# Patient Record
Sex: FEMALE | Race: BLACK OR AFRICAN AMERICAN | Employment: FULL TIME | URBAN - METROPOLITAN AREA
[De-identification: names, ages, dates, MRNs, and addresses within clinical notes are randomized per-mention and may not be internally consistent; named-entity substitution may affect disease eponyms.]

---

## 2017-05-25 ENCOUNTER — HOSPITAL ENCOUNTER (EMERGENCY)
Age: 37
Discharge: HOME | End: 2017-05-25
Attending: EMERGENCY MEDICINE
Payer: COMMERCIAL

## 2017-05-25 VITALS
HEIGHT: 69 IN | WEIGHT: 275 LBS | OXYGEN SATURATION: 100 % | TEMPERATURE: 98 F | RESPIRATION RATE: 20 BRPM | HEART RATE: 98 BPM | BODY MASS INDEX: 40.73 KG/M2 | SYSTOLIC BLOOD PRESSURE: 130 MMHG | DIASTOLIC BLOOD PRESSURE: 69 MMHG

## 2017-05-25 DIAGNOSIS — K13.0 LIP ABSCESS: Primary | ICD-10-CM

## 2017-05-25 PROCEDURE — 75810000289 HC I&D ABSCESS SIMP/COMP/MULT: Performed by: EMERGENCY MEDICINE

## 2017-05-25 PROCEDURE — 99283 EMERGENCY DEPT VISIT LOW MDM: CPT | Performed by: EMERGENCY MEDICINE

## 2017-05-25 PROCEDURE — 74011250637 HC RX REV CODE- 250/637: Performed by: EMERGENCY MEDICINE

## 2017-05-25 RX ORDER — CEPHALEXIN 500 MG/1
500 CAPSULE ORAL
Status: COMPLETED | OUTPATIENT
Start: 2017-05-25 | End: 2017-05-25

## 2017-05-25 RX ORDER — INSULIN ASPART 100 [IU]/ML
INJECTION, SOLUTION INTRAVENOUS; SUBCUTANEOUS
COMMUNITY
End: 2018-05-07

## 2017-05-25 RX ORDER — TRAMADOL HYDROCHLORIDE 50 MG/1
50-100 TABLET ORAL
Qty: 20 TAB | Refills: 0 | Status: SHIPPED | OUTPATIENT
Start: 2017-05-25 | End: 2017-08-10

## 2017-05-25 RX ORDER — CEPHALEXIN 500 MG/1
500 CAPSULE ORAL 4 TIMES DAILY
Qty: 28 CAP | Refills: 0 | Status: SHIPPED | OUTPATIENT
Start: 2017-05-25 | End: 2017-05-25

## 2017-05-25 RX ORDER — CEPHALEXIN 500 MG/1
500 CAPSULE ORAL 4 TIMES DAILY
Qty: 28 CAP | Refills: 0 | Status: SHIPPED | OUTPATIENT
Start: 2017-05-25 | End: 2017-06-01

## 2017-05-25 RX ADMIN — CEPHALEXIN 500 MG: 500 CAPSULE ORAL at 05:10

## 2017-05-25 NOTE — DISCHARGE INSTRUCTIONS

## 2017-05-25 NOTE — ED NOTES
I have reviewed discharge instructions with the patient. The patient verbalized understanding. Pt left ambulatory.

## 2017-05-25 NOTE — ED PROVIDER NOTES
HPI Comments: Woke with a white bump to the right lower lip, within the vermilion border  Swollen and tender to the touch, no drainage  Patient has never had a fever blister before. Patient is a 29 y.o. female presenting with lip swelling. The history is provided by the patient. Lip Swelling    The incident occurred 1 to 2 hours ago. She came to the ER via walk-in. The volume of blood lost was none. The quality of the pain is described as throbbing. The pain is moderate. The pain has been constant since the injury. She has tried nothing for the symptoms. There was no loss of consciousness. Past Medical History:   Diagnosis Date    Diabetes ([address]     Hypertension     pt denies       Past Surgical History:   Procedure Laterality Date    HX  SECTION           Family History:   Problem Relation Age of Onset    Diabetes Mother     Other Mother      Gina Willis       Social History     Social History    Marital status: SINGLE     Spouse name: N/A    Number of children: N/A    Years of education: N/A     Occupational History    Not on file. Social History Main Topics    Smoking status: Never Smoker    Smokeless tobacco: Never Used    Alcohol use Yes      Comment: occasionally    Drug use: No    Sexual activity: Not Currently     Other Topics Concern    Not on file     Social History Narrative         ALLERGIES: Review of patient's allergies indicates no known allergies. Review of Systems   Constitutional: Negative for chills and fever. HENT: Negative for congestion, mouth sores and sore throat. Eyes: Negative for discharge and redness. Musculoskeletal: Negative for neck pain and neck stiffness. Skin: Positive for rash. Vitals:    17 0419   BP: 130/69   Pulse: 98   Resp: 20   Temp: 98 °F (36.7 °C)   SpO2: 100%   Weight: 124.7 kg (275 lb)   Height: 5' 9\" (1.753 m)            Physical Exam   Constitutional: She is oriented to person, place, and time.  She appears well-developed and well-nourished. No distress. HENT:   Head: Normocephalic and atraumatic. Mouth/Throat: Oropharynx is clear and moist. No oropharyngeal exudate. Eyes: Conjunctivae and EOM are normal. Pupils are equal, round, and reactive to light. Right eye exhibits no discharge. Left eye exhibits no discharge. No scleral icterus. Neck: Normal range of motion. Neck supple. Pulmonary/Chest: Effort normal. No respiratory distress. Abdominal: There is no tenderness. Neurological: She is alert and oriented to person, place, and time. She exhibits normal muscle tone. cni 2-12 grossly   Skin: Skin is warm and dry. She is not diaphoretic. Psychiatric: She has a normal mood and affect. Her behavior is normal.   Nursing note and vitals reviewed. MDM  Number of Diagnoses or Management Options  Lip abscess:   Diagnosis management comments: Medical decision making note:  Small pustule on lip, tender, lanced with a 30ga needle  Pain improved, small pus obtained  Keflex, ice, ultram  This concludes the \"medical decision making note\" part of this emergency department visit note. ED Course       I&D Abcess Simple  Date/Time: 5/25/2017 4:48 AM  Performed by: Karo Jeronimo  Authorized by: Karo Jeronimo     Consent:     Consent obtained:  Verbal    Consent given by:  Patient  Location:     Type:  Abscess    Location:  Mouth  Anesthesia (see MAR for exact dosages): Anesthesia method: ice pack. Procedure type:     Complexity:  Simple  Procedure details:     Needle aspiration: yes      Needle size:  25 G    Incision types:  Stab incision    Incision depth:  Dermal    Drainage:  Purulent and bloody    Drainage amount:  Scant    Packing materials:  None  Post-procedure details:     Patient tolerance of procedure:   Tolerated well, no immediate complications

## 2017-08-10 ENCOUNTER — HOSPITAL ENCOUNTER (EMERGENCY)
Age: 37
Discharge: HOME | End: 2017-08-10
Attending: EMERGENCY MEDICINE
Payer: COMMERCIAL

## 2017-08-10 VITALS
WEIGHT: 280 LBS | TEMPERATURE: 97.7 F | HEIGHT: 68 IN | DIASTOLIC BLOOD PRESSURE: 94 MMHG | BODY MASS INDEX: 42.44 KG/M2 | HEART RATE: 93 BPM | SYSTOLIC BLOOD PRESSURE: 155 MMHG | RESPIRATION RATE: 16 BRPM | OXYGEN SATURATION: 97 %

## 2017-08-10 DIAGNOSIS — L73.2 HIDRADENITIS AXILLARIS: Primary | ICD-10-CM

## 2017-08-10 PROCEDURE — 99282 EMERGENCY DEPT VISIT SF MDM: CPT | Performed by: EMERGENCY MEDICINE

## 2017-08-10 RX ORDER — SULFAMETHOXAZOLE AND TRIMETHOPRIM 800; 160 MG/1; MG/1
1 TABLET ORAL 2 TIMES DAILY
Qty: 20 TAB | Refills: 0 | Status: SHIPPED | OUTPATIENT
Start: 2017-08-10 | End: 2017-08-20

## 2017-08-10 NOTE — ED PROVIDER NOTES
HPI Comments: Pt with right axillary hidradenitis. It's with open drainage  Starting two to 3 days ago. Social history diabetes. Sugars at home in the 200s. Has never seen a surgeon for this. Patient is a 29 y.o. female presenting with skin problem. The history is provided by the patient. No  was used. Skin Problem    This is a new problem. The current episode started more than 2 days ago. The problem has been gradually worsening. The problem is associated with nothing. There has been no fever. The rash is present on the right arm. The pain is mild. The pain has been constant since onset. Associated symptoms include pain and weeping. She has tried nothing for the symptoms. Past Medical History:   Diagnosis Date    Diabetes ([address]     Hypertension     pt denies       Past Surgical History:   Procedure Laterality Date    HX  SECTION           Family History:   Problem Relation Age of Onset    Diabetes Mother     Other Mother      Son Gregorio       Social History     Social History    Marital status: SINGLE     Spouse name: N/A    Number of children: N/A    Years of education: N/A     Occupational History    Not on file. Social History Main Topics    Smoking status: Never Smoker    Smokeless tobacco: Never Used    Alcohol use Yes      Comment: occasionally    Drug use: No    Sexual activity: Not Currently     Other Topics Concern    Not on file     Social History Narrative         ALLERGIES: Review of patient's allergies indicates no known allergies. Review of Systems   Constitutional: Negative for chills and fever. Eyes: Negative for pain and redness. Respiratory: Negative for chest tightness, shortness of breath and wheezing. Cardiovascular: Negative for chest pain and leg swelling. Gastrointestinal: Negative for abdominal pain, diarrhea, nausea and vomiting. Musculoskeletal: Negative for back pain, gait problem, neck pain and neck stiffness. Skin: Positive for wound. Negative for color change and rash. Neurological: Negative for weakness, numbness and headaches. Vitals:    08/10/17 0332   BP: (!) 155/94   Pulse: 93   Resp: 16   Temp: 97.7 °F (36.5 °C)   SpO2: 97%   Weight: 127 kg (280 lb)   Height: 5' 8\" (1.727 m)            Physical Exam   Constitutional: She is oriented to person, place, and time. She appears well-developed and well-nourished. HENT:   Head: Normocephalic and atraumatic. Cardiovascular: Normal rate and regular rhythm. No murmur heard. Pulmonary/Chest: Effort normal and breath sounds normal. She has no wheezes. Abdominal: Soft. Bowel sounds are normal. There is no tenderness. Musculoskeletal: Normal range of motion. She exhibits tenderness (right axilla with scar tissue and small areas of TTP. open drainage present. ). She exhibits no edema. Neurological: She is alert and oriented to person, place, and time. Skin: Skin is warm and dry. No erythema. Nursing note and vitals reviewed. MDM  Number of Diagnoses or Management Options  Diagnosis management comments: Hidradenitis.  Will treat and refer to surgery./     Patient Progress  Patient progress: stable    ED Course       Procedures

## 2017-08-10 NOTE — DISCHARGE INSTRUCTIONS
Hidradenitis Suppurativa: Care Instructions  Your Care Instructions    Hidradenitis suppurativa (say \"are-tvlx-ih-NY-tus sup-mai-uh-TY-vuh\") is a skin condition that causes lumps on the skin that look like pimples or boils. The lumps are usually painful and can break open and drain blood and bad-smelling pus. The condition can come and go for many years. Treatment for this condition may include antibiotics and other medicines. You may need surgery to remove the lumps. Home care includes wearing loose-fitting clothes and washing the area gently. You can help prevent lumps from coming back by staying at a healthy weight and not smoking. Doctors don't know exactly how this condition starts. But they do know that something irritates and inflames the hair follicles, causing them to swell and form lumps. This skin condition can't be spread from person to person (isn't contagious). Follow-up care is a key part of your treatment and safety. Be sure to make and go to all appointments, and call your doctor if you are having problems. It's also a good idea to know your test results and keep a list of the medicines you take. How can you care for yourself at home? Skin care  · Wash the area every day with mild soap. Use your hands rather than a washcloth or sponge when you wash that part of your body. · Leave the affected areas uncovered when you can. If you have lumps that are draining, you can cover them with a bandage or other dressing. Put petroleum jelly (such as Vaseline) on the dressing to help keep it from sticking. · Wear-loose fitting clothes that don't rub against the area. Avoid activities that cause skin to rub together. · If you have pain, try a warm compress. Soak a towel or washcloth in warm water, wring it out, and place it on the affected skin for about 10 minutes. Medicines  · Be safe with medicines. Take your medicines exactly as prescribed.  Call your doctor if you think you are having a problem with your medicine. You will get more details on the specific medicines your doctor prescribes. · If your doctor prescribed antibiotics, take them as directed. Do not stop taking them just because you feel better. You need to take the full course of antibiotics. Lifestyle choices  · If you smoke, think about quitting. Smoking can make the condition worse. If you need help quitting, talk to your doctor about stop-smoking programs and medicines. These can increase your chances of quitting for good. · Stay at a healthy weight, or lose weight, by eating healthy foods and being physically active. Being overweight could make this condition worse. When should you call for help? Call your doctor now or seek immediate medical care if:  · You have symptoms of infection, such as:  ¨ Increased pain, swelling, warmth, or redness. ¨ Red streaks leading from the area. ¨ Pus draining from the area. ¨ A fever. Watch closely for changes in your health, and be sure to contact your doctor if:  · You do not get better as expected. Where can you learn more? Go to http://yocasta-flavia.info/. Enter G395 in the search box to learn more about \"Hidradenitis Suppurativa: Care Instructions. \"  Current as of: October 13, 2016  Content Version: 11.3  © 9768-7244 [employer]. Care instructions adapted under license by [employer] (which disclaims liability or warranty for this information). If you have questions about a medical condition or this instruction, always ask your healthcare professional. [place] [address] any warranty or liability for your use of this information.

## 2017-09-14 PROBLEM — L73.2 HYDRADENITIS: Status: ACTIVE | Noted: 2017-09-14

## 2017-09-19 RX ORDER — CLINDAMYCIN PHOSPHATE 900 MG/50ML
900 INJECTION INTRAVENOUS
Status: CANCELLED | OUTPATIENT
Start: 2017-09-19

## 2017-12-06 ENCOUNTER — HOSPITAL ENCOUNTER (EMERGENCY)
Age: 37
Discharge: HOME | End: 2017-12-06
Attending: EMERGENCY MEDICINE
Payer: COMMERCIAL

## 2017-12-06 VITALS
SYSTOLIC BLOOD PRESSURE: 124 MMHG | BODY MASS INDEX: 41.98 KG/M2 | OXYGEN SATURATION: 99 % | HEIGHT: 68 IN | TEMPERATURE: 98.7 F | RESPIRATION RATE: 16 BRPM | HEART RATE: 79 BPM | WEIGHT: 277 LBS | DIASTOLIC BLOOD PRESSURE: 83 MMHG

## 2017-12-06 DIAGNOSIS — L73.2 HIDRADENITIS SUPPURATIVA OF RIGHT AXILLA: ICD-10-CM

## 2017-12-06 DIAGNOSIS — M77.12 LATERAL EPICONDYLITIS OF LEFT ELBOW: Primary | ICD-10-CM

## 2017-12-06 PROCEDURE — 99282 EMERGENCY DEPT VISIT SF MDM: CPT | Performed by: EMERGENCY MEDICINE

## 2017-12-06 RX ORDER — DICLOFENAC SODIUM 50 MG/1
50 TABLET, DELAYED RELEASE ORAL 2 TIMES DAILY
Qty: 14 TAB | Refills: 0 | Status: SHIPPED | OUTPATIENT
Start: 2017-12-06 | End: 2018-05-05

## 2017-12-06 RX ORDER — CEPHALEXIN 500 MG/1
500 CAPSULE ORAL 3 TIMES DAILY
Qty: 21 CAP | Refills: 0 | Status: SHIPPED | OUTPATIENT
Start: 2017-12-06 | End: 2017-12-13

## 2017-12-06 NOTE — ED TRIAGE NOTES
Pt states she has several cysts in right axillary area that cannot have surgery until January due to insurance deductible. Also having pain in right arm.  Denies injury or trauma

## 2017-12-06 NOTE — ED NOTES
I have reviewed discharge instructions with the patient. The patient verbalized understanding. Patient left ED via Discharge Method: ambulatory to Home). Opportunity for questions and clarification provided. Patient given 2 scripts. To continue your aftercare when you leave the hospital, you may receive an automated call from our care team to check in on how you are doing. This is a free service and part of our promise to provide the best care and service to meet your aftercare needs.  If you have questions, or wish to unsubscribe from this service please call [phone]. Thank you for Choosing our [facility] Department.

## 2017-12-06 NOTE — ED PROVIDER NOTES
HPI Comments: 60-year-old lady with a history of pain in her left elbow that she says has been present for several days. Additionally, she says that she has hidradenitis in her right armpit and is scheduled for surgery on that. He says that she took some Keflex for a couple months ago and that helps decrease some of the swelling. She said she would like another prescription for that until she can follow up with her surgeon. With respect to her elbow she says her pain as an 8 of 10 and she says she does do a lot of work involving her wrist and arm. Elements of this note were created using speech recognition software. As such, errors of speech recognition may be present. Patient is a 40 y.o. female presenting with arm pain. The history is provided by the patient. Arm Pain           Past Medical History:   Diagnosis Date    Diabetes ([address]     Hypertension     pt denies       Past Surgical History:   Procedure Laterality Date    HX  SECTION           Family History:   Problem Relation Age of Onset    Diabetes Mother     Other Mother      Sukhi Ahmadi       Social History     Social History    Marital status: SINGLE     Spouse name: N/A    Number of children: N/A    Years of education: N/A     Occupational History    Not on file. Social History Main Topics    Smoking status: Never Smoker    Smokeless tobacco: Never Used    Alcohol use Yes      Comment: occasionally    Drug use: No    Sexual activity: Not Currently     Other Topics Concern    Not on file     Social History Narrative         ALLERGIES: Review of patient's allergies indicates no known allergies. Review of Systems   Constitutional: Negative for chills and fever. Musculoskeletal: Positive for arthralgias and joint swelling. Negative for myalgias. Skin: Positive for color change and wound.        Vitals:    17 0144   BP: 124/83   Pulse: 79   Resp: 16   Temp: 98.7 °F (37.1 °C)   SpO2: 98%   Weight: 125.6 kg (277 lb)   Height: 5' 8\" (1.727 m)            Physical Exam   Constitutional: She is oriented to person, place, and time. She appears well-developed and well-nourished. Musculoskeletal:   Patient has tenderness over her left lateral epicondyle she also has pain with wrist extension and supination pronation movements   Neurological: She is alert and oriented to person, place, and time. Skin:   Large area of hidradenitis in her right axilla with no obvious discrete abscess   Nursing note and vitals reviewed. MDM  Number of Diagnoses or Management Options  Hidradenitis suppurativa of right axilla:   Lateral epicondylitis of left elbow:   Diagnosis management comments: I will plan to discharge her home with some apparent for the pain and Keflex for her hidradenitis.     ED Course       Procedures

## 2017-12-06 NOTE — DISCHARGE INSTRUCTIONS
Return with any fevers, vomiting, increased swelling, worsening symptoms, or additional concerns. Follow-up with your primary care doctor and your surgeon as planned. Tennis Elbow: Exercises  Your Care Instructions  Here are some examples of typical rehabilitation exercises for your condition. Start each exercise slowly. Ease off the exercise if you start to have pain. Your doctor or physical therapist will tell you when you can start these exercises and which ones will work best for you. How to do the exercises  Wrist flexor stretch    1. Extend your arm in front of you with your palm up. 2. Bend your wrist, pointing your hand toward the floor. 3. With your other hand, gently bend your wrist farther until you feel a mild to moderate stretch in your forearm. 4. Hold for at least 15 to 30 seconds. Repeat 2 to 4 times. Wrist extensor stretch    1. Repeat steps 1 to 4 of the stretch above but begin with your extended hand palm down. Ball or sock squeeze    1. Hold a tennis ball (or a rolled-up sock) in your hand. 2. Make a fist around the ball (or sock) and squeeze. 3. Hold for about 6 seconds, and then relax for up to 10 seconds. 4. Repeat 8 to 12 times. 5. Switch the ball (or sock) to your other hand and do 8 to 12 times. Wrist deviation    1. Sit so that your arm is supported but your hand hangs off the edge of a flat surface, such as a table. 2. Hold your hand out like you are shaking hands with someone. 3. Move your hand up and down. 4. Repeat this motion 8 to 12 times. 5. Switch arms. 6. Try to do this exercise twice with each hand. Wrist curls    1. Place your forearm on a table with your hand hanging over the edge of the table, palm up. 2. Place a 1- to 2-pound weight in your hand. This may be a dumbbell, a can of food, or a filled water bottle. 3. Slowly raise and lower the weight while keeping your forearm on the table and your palm facing up.   4. Repeat this motion 8 to 12 times.  5. Switch arms, and do steps 1 through 4.  6. Repeat with your hand facing down toward the floor. Switch arms. Biceps curls    1. Sit leaning forward with your legs slightly spread and your left hand on your left thigh. 2. Place your right elbow on your right thigh, and hold the weight with your forearm horizontal.  3. Slowly curl the weight up and toward your chest.  4. Repeat this motion 8 to 12 times. 5. Switch arms, and do steps 1 through 4. Follow-up care is a key part of your treatment and safety. Be sure to make and go to all appointments, and call your doctor if you are having problems. It's also a good idea to know your test results and keep a list of the medicines you take. Where can you learn more? Go to http://yocasta-flavia.info/. Enter Q585 in the search box to learn more about \"Tennis Elbow: Exercises. \"  Current as of: March 21, 2017  Content Version: 11.4  © 3198-5486 [employer]. Care instructions adapted under license by [employer] (which disclaims liability or warranty for this information). If you have questions about a medical condition or this instruction, always ask your healthcare professional. [address] any warranty or liability for your use of this information.

## 2018-01-22 ENCOUNTER — PATIENT OUTREACH (OUTPATIENT)
Dept: CASE MANAGEMENT | Age: 38
End: 2018-01-22

## 2018-01-22 NOTE — PROGRESS NOTES
Outreach to MsRogerio Naomi who states she is in the process of planning her mother's . MsRogerio Zeus Cagle agreed to a call from E next week. This note will not be viewable in [address]

## 2018-01-30 ENCOUNTER — PATIENT OUTREACH (OUTPATIENT)
Dept: CASE MANAGEMENT | Age: 38
End: 2018-01-30

## 2018-01-30 NOTE — PROGRESS NOTES
Outreach to MsRogerio Naomi. She is currently out of town.  Call scheduled for next week, 2/5, per patient request.

## 2018-02-08 ENCOUNTER — PATIENT OUTREACH (OUTPATIENT)
Dept: CASE MANAGEMENT | Age: 38
End: 2018-02-08

## 2018-02-08 NOTE — PROGRESS NOTES
Outreach call to Ms. Salgado. She answered call and requested to call CDE back. Will await return call from Ms. Salgado. This note will not be viewable in [address]

## 2018-02-09 NOTE — PROGRESS NOTES
No return call received from Ms. Salgado. [facility] has attempted 3x to engage Ms. Salgado in DSME. Will leave it in Ms. Chester's court to return CDE's call if she is interested in DSME. No further outreach planned at this time. This note will not be viewable in [address]

## 2018-02-12 PROBLEM — R80.9 POSITIVE FOR MICROALBUMINURIA: Status: ACTIVE | Noted: 2018-02-12

## 2018-05-05 ENCOUNTER — APPOINTMENT (OUTPATIENT)
Dept: GENERAL RADIOLOGY | Age: 38
End: 2018-05-05
Attending: EMERGENCY MEDICINE
Payer: COMMERCIAL

## 2018-05-05 ENCOUNTER — HOSPITAL ENCOUNTER (EMERGENCY)
Age: 38
Discharge: HOME | End: 2018-05-05
Attending: EMERGENCY MEDICINE
Payer: COMMERCIAL

## 2018-05-05 VITALS
DIASTOLIC BLOOD PRESSURE: 84 MMHG | TEMPERATURE: 98.2 F | BODY MASS INDEX: 41.47 KG/M2 | HEART RATE: 74 BPM | WEIGHT: 280 LBS | SYSTOLIC BLOOD PRESSURE: 132 MMHG | HEIGHT: 69 IN | OXYGEN SATURATION: 99 % | RESPIRATION RATE: 20 BRPM

## 2018-05-05 DIAGNOSIS — M79.671 RIGHT FOOT PAIN: Primary | ICD-10-CM

## 2018-05-05 DIAGNOSIS — L02.811 SCALP ABSCESS: ICD-10-CM

## 2018-05-05 PROCEDURE — 73110 X-RAY EXAM OF WRIST: CPT

## 2018-05-05 PROCEDURE — 73630 X-RAY EXAM OF FOOT: CPT

## 2018-05-05 PROCEDURE — 99283 EMERGENCY DEPT VISIT LOW MDM: CPT | Performed by: EMERGENCY MEDICINE

## 2018-05-05 RX ORDER — SULFAMETHOXAZOLE AND TRIMETHOPRIM 800; 160 MG/1; MG/1
1 TABLET ORAL 2 TIMES DAILY
Qty: 20 TAB | Refills: 0 | Status: SHIPPED | OUTPATIENT
Start: 2018-05-05 | End: 2018-05-07 | Stop reason: SDUPTHER

## 2018-05-05 RX ORDER — NAPROXEN 500 MG/1
500 TABLET ORAL 2 TIMES DAILY WITH MEALS
Qty: 20 TAB | Refills: 0 | Status: SHIPPED | OUTPATIENT
Start: 2018-05-05 | End: 2018-05-07

## 2018-05-05 NOTE — ED NOTES
I have reviewed discharge instructions with the patient. The patient verbalized understanding. Patient left ED via Discharge Method: ambulatory to Home with (self). Opportunity for questions and clarification provided. Patient given 2 scripts. To continue your aftercare when you leave the hospital, you may receive an automated call from our care team to check in on how you are doing. This is a free service and part of our promise to provide the best care and service to meet your aftercare needs.  If you have questions, or wish to unsubscribe from this service please call [phone]. Thank you for Choosing our [facility] Emergency Department.

## 2018-05-05 NOTE — DISCHARGE INSTRUCTIONS
Foot Pain: Care Instructions  Your Care Instructions  Foot injuries that cause pain and swelling are fairly common. Almost all sports or home repair projects can cause a misstep that ends up as foot pain. Normal wear and tear, especially as you get older, also can cause foot pain. Most minor foot injuries will heal on their own, and home treatment is usually all you need to do. If you have a severe injury, you may need tests and treatment. Follow-up care is a key part of your treatment and safety. Be sure to make and go to all appointments, and call your doctor if you are having problems. It's also a good idea to know your test results and keep a list of the medicines you take. How can you care for yourself at home? · Take pain medicines exactly as directed. ¨ If the doctor gave you a prescription medicine for pain, take it as prescribed. ¨ If you are not taking a prescription pain medicine, ask your doctor if you can take an over-the-counter medicine. · Rest and protect your foot. Take a break from any activity that may cause pain. · Put ice or a cold pack on your foot for 10 to 20 minutes at a time. Put a thin cloth between the ice and your skin. · Prop up the sore foot on a pillow when you ice it or anytime you sit or lie down during the next 3 days. Try to keep it above the level of your heart. This will help reduce swelling. · Your doctor may recommend that you wrap your foot with an elastic bandage. Keep your foot wrapped for as long as your doctor advises. · If your doctor recommends crutches, use them as directed. · Wear roomy footwear. · As soon as pain and swelling end, begin gentle exercises of your foot. Your doctor can tell you which exercises will help. When should you call for help? Call 911 anytime you think you may need emergency care. For example, call if:  ? · Your foot turns pale, white, blue, or cold.    ?Call your doctor now or seek immediate medical care if:  ? · You cannot move or stand on your foot. ? · Your foot looks twisted or out of its normal position. ? · Your foot is not stable when you step down. ? · You have signs of infection, such as:  ¨ Increased pain, swelling, warmth, or redness. ¨ Red streaks leading from the sore area. ¨ Pus draining from a place on your foot. ¨ A fever. ? · Your foot is numb or tingly. ? Watch closely for changes in your health, and be sure to contact your doctor if:  ? · You do not get better as expected. ? · You have bruises from an injury that last longer than 2 weeks. Where can you learn more? Go to http://yocasta-flavia.info/. Enter F116 in the search box to learn more about \"Foot Pain: Care Instructions. \"  Current as of: March 21, 2017  Content Version: 11.4  © 9887-0445 [employer]. Care instructions adapted under license by [employer] (which disclaims liability or warranty for this information). If you have questions about a medical condition or this instruction, always ask your healthcare professional. [address] any warranty or liability for your use of this information.

## 2018-05-05 NOTE — ED PROVIDER NOTES
HPI Comments: Patient is a 56-year-old female with a history of diabetes who presents to the ER today complaining of pain in all right foot and her left wrist for the past 2 days. She denies any known injury. She denies any numbness or weakness. Patient is a 40 y.o. female presenting with foot pain and wrist pain. The history is provided by the patient. Foot Pain    This is a new problem. The current episode started 2 days ago. The problem occurs constantly. The problem has not changed since onset. The pain is present in the left wrist and right foot. The quality of the pain is described as aching. Pertinent negatives include no numbness, full range of motion, no back pain and no neck pain. She has tried nothing for the symptoms. There has been no history of extremity trauma. Wrist Pain    Pertinent negatives include no numbness, full range of motion, no back pain and no neck pain. Past Medical History:   Diagnosis Date    Diabetes ([address]     Hypertension     pt denies       Past Surgical History:   Procedure Laterality Date    HX  SECTION           Family History:   Problem Relation Age of Onset    Diabetes Mother     Other Mother      Nirajkapillauren Quevedo       Social History     Social History    Marital status: SINGLE     Spouse name: N/A    Number of children: N/A    Years of education: N/A     Occupational History    Not on file. Social History Main Topics    Smoking status: Never Smoker    Smokeless tobacco: Never Used    Alcohol use Yes      Comment: occasionally    Drug use: No    Sexual activity: Not Currently     Other Topics Concern    Not on file     Social History Narrative         ALLERGIES: Review of patient's allergies indicates no known allergies. Review of Systems   Constitutional: Negative. HENT: Negative. Eyes: Negative. Respiratory: Negative. Cardiovascular: Negative. Gastrointestinal: Negative. Endocrine: Negative.     Genitourinary: Negative. Musculoskeletal: Negative for back pain and neck pain. Skin: Negative. Neurological: Negative for numbness. Vitals:    05/05/18 1739   BP: 129/87   Pulse: 88   Resp: 18   Temp: 98 °F (36.7 °C)   SpO2: 100%   Weight: 127 kg (280 lb)   Height: 5' 9\" (1.753 m)            Physical Exam   Constitutional: She is oriented to person, place, and time. She appears well-developed and well-nourished. HENT:   Head: Normocephalic and atraumatic. Musculoskeletal: She exhibits tenderness. She exhibits no deformity. Tender on the flexor surface of the left wrist, and tender on the sole and arch of the right foot. No obvious deformity. No bruising. No swelling. Neurological: She is alert and oriented to person, place, and time. She has normal strength. No cranial nerve deficit or sensory deficit. GCS eye subscore is 4. GCS verbal subscore is 5. GCS motor subscore is 6. Skin: Skin is warm and dry. No rash noted. Nursing note and vitals reviewed. MDM  Number of Diagnoses or Management Options  Diagnosis management comments: Differential diagnosis includes contusion, fracture, sprain, arthritis       Amount and/or Complexity of Data Reviewed  Tests in the radiology section of CPT®: ordered and reviewed  Independent visualization of images, tracings, or specimens: yes    Risk of Complications, Morbidity, and/or Mortality  Presenting problems: low  Diagnostic procedures: low  Management options: low    Patient Progress  Patient progress: stable        ED Course   7:23 PM  X-rays of the left wrist and the right foot are negative for fracture dislocation. I advised some NSAIDs for the pain. She states that she has follow-up with her doctor next week. She then went on to ask me about drainage from a scalp wound. She reports that she wears weeks chronically and now has had swelling and pus drainage from her occipital scalp for longtime.   She is reluctant to show me this but then agrees to remove a week. There is a large indurated area on the occipital scalp which is draining pus. She does not want any incision or drainage at this time advised her to use warm compresses and to wash the area daily I will prescribe an oral antibiotic and she states she will follow-up with her doctor next week. Voice dictation software was used during the making of this note. This software is not perfect and grammatical and other typographical errors may be present. This note has been proofread, but may still contain errors.   Genaro Harris MD; 5/5/2018 @7:24 PM   ===================================================================        Procedures

## 2018-07-04 ENCOUNTER — PATIENT OUTREACH (OUTPATIENT)
Dept: CASE MANAGEMENT | Age: 38
End: 2018-07-04

## 2018-08-08 ENCOUNTER — APPOINTMENT (OUTPATIENT)
Dept: GENERAL RADIOLOGY | Age: 38
End: 2018-08-08
Attending: EMERGENCY MEDICINE
Payer: COMMERCIAL

## 2018-08-08 ENCOUNTER — HOSPITAL ENCOUNTER (EMERGENCY)
Age: 38
Discharge: HOME | End: 2018-08-09
Attending: EMERGENCY MEDICINE
Payer: COMMERCIAL

## 2018-08-08 DIAGNOSIS — M79.671 RIGHT FOOT PAIN: Primary | ICD-10-CM

## 2018-08-08 DIAGNOSIS — M25.571 ARTHRALGIA OF FOOT, RIGHT: ICD-10-CM

## 2018-08-08 PROCEDURE — 73630 X-RAY EXAM OF FOOT: CPT

## 2018-08-08 PROCEDURE — 99283 EMERGENCY DEPT VISIT LOW MDM: CPT | Performed by: EMERGENCY MEDICINE

## 2018-08-09 VITALS
SYSTOLIC BLOOD PRESSURE: 130 MMHG | HEIGHT: 69 IN | DIASTOLIC BLOOD PRESSURE: 84 MMHG | TEMPERATURE: 98.2 F | RESPIRATION RATE: 16 BRPM | OXYGEN SATURATION: 98 % | BODY MASS INDEX: 42.21 KG/M2 | WEIGHT: 285 LBS | HEART RATE: 78 BPM

## 2018-08-09 RX ORDER — TRAMADOL HYDROCHLORIDE 50 MG/1
100 TABLET ORAL
Qty: 19 TAB | Refills: 0 | Status: SHIPPED | OUTPATIENT
Start: 2018-08-09 | End: 2018-11-21

## 2018-08-09 RX ORDER — MELOXICAM 15 MG/1
15 TABLET ORAL DAILY
Qty: 20 TAB | Refills: 0 | Status: SHIPPED | OUTPATIENT
Start: 2018-08-09 | End: 2020-01-31

## 2018-08-09 NOTE — ED NOTES
I have reviewed discharge instructions with the patient. The patient verbalized understanding. Patient left ED via Discharge Method: ambulatory to Home with self. Opportunity for questions and clarification provided. Patient given 2 scripts. To continue your aftercare when you leave the hospital, you may receive an automated call from our care team to check in on how you are doing. This is a free service and part of our promise to provide the best care and service to meet your aftercare needs.  If you have questions, or wish to unsubscribe from this service please call [phone]. Thank you for Choosing our [facility] Emergency Department.

## 2018-08-09 NOTE — DISCHARGE INSTRUCTIONS

## 2018-08-09 NOTE — ED PROVIDER NOTES
HPI Comments: 69-year-old female presents with right foot pain and swelling. Pain is more localized at the base of her second and third toes   Her entire right foot is swollen. She denies any swelling in either calf. Patient is a 40 y.o. female presenting with foot pain. The history is provided by the patient. Foot Pain    This is a recurrent problem. The current episode started more than 2 days ago. The problem occurs constantly. The problem has been gradually worsening. The pain is present in the right foot. The quality of the pain is described as aching and pounding. The pain is moderate. Pertinent negatives include no numbness, no stiffness, no tingling, no back pain and no neck pain. The symptoms are aggravated by standing. She has tried nothing for the symptoms. There has been no history of extremity trauma. Past Medical History:   Diagnosis Date    Diabetes ([address]     Hypertension     pt denies       Past Surgical History:   Procedure Laterality Date    HX  SECTION           Family History:   Problem Relation Age of Onset    Diabetes Mother     Other Mother      Oleg Shaker       Social History     Social History    Marital status: SINGLE     Spouse name: N/A    Number of children: N/A    Years of education: N/A     Occupational History    Not on file. Social History Main Topics    Smoking status: Never Smoker    Smokeless tobacco: Never Used    Alcohol use Yes      Comment: occasionally    Drug use: No    Sexual activity: Not Currently     Other Topics Concern    Not on file     Social History Narrative         ALLERGIES: Review of patient's allergies indicates no known allergies. Review of Systems   Constitutional: Negative for chills and fever. HENT: Negative for congestion, ear pain and rhinorrhea. Eyes: Negative for photophobia and discharge. Respiratory: Negative for cough and shortness of breath.     Cardiovascular: Negative for chest pain and palpitations. Gastrointestinal: Negative for abdominal pain, constipation, diarrhea and vomiting. Endocrine: Negative for cold intolerance and heat intolerance. Genitourinary: Negative for dysuria and flank pain. Musculoskeletal: Negative for arthralgias, back pain, myalgias, neck pain and stiffness. Skin: Negative for rash and wound. Allergic/Immunologic: Negative for environmental allergies and food allergies. Neurological: Negative for tingling, syncope, numbness and headaches. Hematological: Negative for adenopathy. Does not bruise/bleed easily. Psychiatric/Behavioral: Negative for dysphoric mood. The patient is not nervous/anxious. All other systems reviewed and are negative. Vitals:    08/08/18 2138   BP: 150/87   Pulse: 98   Resp: 17   Temp: 98.1 °F (36.7 °C)   SpO2: 98%   Weight: 129.3 kg (285 lb)   Height: 5' 9\" (1.753 m)            Physical Exam   Constitutional: She is oriented to person, place, and time. She appears well-developed and well-nourished. She appears distressed. HENT:   Head: Normocephalic and atraumatic. Mouth/Throat: Oropharynx is clear and moist.   Eyes: Conjunctivae and EOM are normal. Pupils are equal, round, and reactive to light. Right eye exhibits no discharge. Left eye exhibits no discharge. No scleral icterus. Neck: Normal range of motion. Neck supple. No thyromegaly present. Cardiovascular: Normal rate, regular rhythm, normal heart sounds and intact distal pulses. Exam reveals no gallop and no friction rub. No murmur heard. Pulmonary/Chest: Effort normal and breath sounds normal. No respiratory distress. She has no wheezes. She exhibits no tenderness. Abdominal: Soft. Bowel sounds are normal. She exhibits no distension. There is no hepatosplenomegaly. There is no tenderness. There is no rebound and no guarding. No hernia. Musculoskeletal: Normal range of motion. She exhibits no edema or tenderness.    Right foot reveals mild to moderate diffuse swelling  No erythema. No open wounds    There is focal tenderness to palpation at the base of the right second and third toes at the MTPs  There is a palpable mass in this region. No fluctuance   Neurological: She is alert and oriented to person, place, and time. No cranial nerve deficit. She exhibits normal muscle tone. Skin: Skin is warm. No rash noted. She is not diaphoretic. No erythema. Psychiatric: She has a normal mood and affect. Her behavior is normal. Judgment and thought content normal.   Nursing note and vitals reviewed. MDM  Number of Diagnoses or Management Options  Arthralgia of foot, right: established and worsening  Right foot pain: established and worsening  Diagnosis management comments: X-ray negative    Patient will be treated symptomatically  Podiatry follow-up provided for the patient       Amount and/or Complexity of Data Reviewed  Tests in the radiology section of CPT®: ordered and reviewed  Review and summarize past medical records: yes    Risk of Complications, Morbidity, and/or Mortality  Presenting problems: moderate  Diagnostic procedures: low  Management options: low  General comments: Elements of this note have been dictated via voice recognition software. Text and phrases may be limited by the accuracy of the software. The chart has been reviewed, but errors may still be present.       Patient Progress  Patient progress: improved        ED Course       Procedures

## 2018-11-21 ENCOUNTER — HOSPITAL ENCOUNTER (EMERGENCY)
Age: 38
Discharge: HOME | End: 2018-11-21
Attending: EMERGENCY MEDICINE
Payer: COMMERCIAL

## 2018-11-21 VITALS
HEART RATE: 96 BPM | TEMPERATURE: 98.8 F | DIASTOLIC BLOOD PRESSURE: 87 MMHG | RESPIRATION RATE: 20 BRPM | SYSTOLIC BLOOD PRESSURE: 150 MMHG | WEIGHT: 293 LBS | BODY MASS INDEX: 43.4 KG/M2 | OXYGEN SATURATION: 99 % | HEIGHT: 69 IN

## 2018-11-21 DIAGNOSIS — A60.04 HERPES SIMPLEX VIRUS (HSV) INFECTION OF VAGINA: ICD-10-CM

## 2018-11-21 DIAGNOSIS — R10.2 VAGINAL PAIN: Primary | ICD-10-CM

## 2018-11-21 LAB
HCG UR QL: NEGATIVE
SERVICE CMNT-IMP: NORMAL
WET PREP GENITAL: NORMAL

## 2018-11-21 PROCEDURE — 81025 URINE PREGNANCY TEST: CPT

## 2018-11-21 PROCEDURE — 81003 URINALYSIS AUTO W/O SCOPE: CPT | Performed by: EMERGENCY MEDICINE

## 2018-11-21 PROCEDURE — 87491 CHLMYD TRACH DNA AMP PROBE: CPT

## 2018-11-21 PROCEDURE — 99284 EMERGENCY DEPT VISIT MOD MDM: CPT | Performed by: EMERGENCY MEDICINE

## 2018-11-21 PROCEDURE — 87210 SMEAR WET MOUNT SALINE/INK: CPT

## 2018-11-21 RX ORDER — ACYCLOVIR 800 MG/1
400 TABLET ORAL 3 TIMES DAILY
Qty: 15 TAB | Refills: 0 | Status: SHIPPED | OUTPATIENT
Start: 2018-11-21 | End: 2018-12-01

## 2018-11-21 RX ORDER — METRONIDAZOLE 500 MG/1
500 TABLET ORAL 2 TIMES DAILY
Qty: 14 TAB | Refills: 0 | Status: SHIPPED | OUTPATIENT
Start: 2018-11-21 | End: 2018-11-28

## 2018-11-22 NOTE — ED NOTES
I have reviewed discharge instructions with the patient. The patient verbalized understanding. Patient left ED via Discharge Method: ambulatory to Home with self. Opportunity for questions and clarification provided. Patient given 2 scripts. To continue your aftercare when you leave the hospital, you may receive an automated call from our care team to check in on how you are doing. This is a free service and part of our promise to provide the best care and service to meet your aftercare needs.  If you have questions, or wish to unsubscribe from this service please call [phone]. Thank you for Choosing our [facility] Emergency Department.

## 2018-11-27 LAB
C TRACH RRNA SPEC QL NAA+PROBE: NEGATIVE
N GONORRHOEA RRNA SPEC QL NAA+PROBE: NEGATIVE
SPECIMEN SOURCE: NORMAL

## 2019-02-28 ENCOUNTER — HOSPITAL ENCOUNTER (EMERGENCY)
Age: 39
Discharge: HOME | End: 2019-02-28
Attending: EMERGENCY MEDICINE
Payer: COMMERCIAL

## 2019-02-28 VITALS
HEIGHT: 69 IN | RESPIRATION RATE: 19 BRPM | DIASTOLIC BLOOD PRESSURE: 78 MMHG | OXYGEN SATURATION: 99 % | BODY MASS INDEX: 43.4 KG/M2 | WEIGHT: 293 LBS | TEMPERATURE: 98.2 F | HEART RATE: 94 BPM | SYSTOLIC BLOOD PRESSURE: 142 MMHG

## 2019-02-28 DIAGNOSIS — L03.311 CELLULITIS OF ABDOMINAL WALL: Primary | ICD-10-CM

## 2019-02-28 PROCEDURE — 99283 EMERGENCY DEPT VISIT LOW MDM: CPT | Performed by: EMERGENCY MEDICINE

## 2019-02-28 RX ORDER — TRAMADOL HYDROCHLORIDE 50 MG/1
50 TABLET ORAL
Qty: 20 TAB | Refills: 0 | Status: SHIPPED | OUTPATIENT
Start: 2019-02-28 | End: 2019-03-05

## 2019-02-28 RX ORDER — CLINDAMYCIN HYDROCHLORIDE 300 MG/1
300 CAPSULE ORAL 4 TIMES DAILY
Qty: 28 CAP | Refills: 0 | Status: SHIPPED | OUTPATIENT
Start: 2019-02-28 | End: 2019-03-07

## 2019-03-01 NOTE — ED PROVIDER NOTES
Patient presents with 2 locations of swelling and tenderness that has been going on and gradually worsening over the past few days. The first location is in the lower abdominal wall under the abdominal pannus. The second location is the proximal left thigh. She reports history of multiple abscesses in the past she states she's been applying boil cream and warm compresses without improvement. Denies any fever or chills. The history is provided by the patient. Abscess This is a new problem. The current episode started more than 2 days ago. The problem has been gradually worsening. There has been no fever. The rash is present on the abdomen and groin. The pain is severe. The pain has been constant since onset. Associated symptoms include pain. Pertinent negatives include no blisters, no itching, no weeping and no hives. The treatment provided no relief. Past Medical History:  
Diagnosis Date  Diabetes ([address]  Hypertension   
 pt denies Past Surgical History:  
Procedure Laterality Date  HX  SECTION Family History:  
Problem Relation Age of Onset  Diabetes Mother [place] Other Mother Dixon Ho Social History Socioeconomic History  Marital status: SINGLE Spouse name: Not on file  Number of children: Not on file  Years of education: Not on file  Highest education level: Not on file Social Needs  Financial resource strain: Not on file  Food insecurity - worry: Not on file  Food insecurity - inability: Not on file  Transportation needs - medical: Not on file  Transportation needs - non-medical: Not on file Occupational History  Not on file Tobacco Use  Smoking status: Never Smoker  Smokeless tobacco: Never Used Substance and Sexual Activity  Alcohol use: Yes Comment: occasionally  Drug use: No  
 Sexual activity: Not Currently Other Topics Concern  Not on file Social History Narrative  Not on file ALLERGIES: Patient has no known allergies. Review of Systems Skin: Negative for itching. All other systems reviewed and are negative. Vitals:  
 02/28/19 2205 BP: (!) 152/96 Pulse: 95 Resp: 20 Temp: 98.2 °F (36.8 °C) SpO2: 97% Weight: 136.1 kg (300 lb) Height: 5' 9\" (1.753 m) Physical Exam  
Constitutional: She is oriented to person, place, and time. She appears well-developed and well-nourished. No distress. HENT:  
Head: Normocephalic and atraumatic. Eyes: Conjunctivae and EOM are normal. Pupils are equal, round, and reactive to light. Neurological: She is alert and oriented to person, place, and time. Skin: Skin is warm and dry. Capillary refill takes less than 2 seconds. She is not diaphoretic. No erythema. Patient has about a palm sized area of induration to the lower abdominal wall with no focal area of fluctuance noted. Erythema is present. There is a second area of swelling and fluctuance noted in the proximal left thigh consistent with a small abscess. t Psychiatric: She has a normal mood and affect. Her behavior is normal.  
Nursing note and vitals reviewed. MDM Number of Diagnoses or Management Options Cellulitis of abdominal wall:  
Diagnosis management comments: It was recommended that the affected areas be incised and drained due to abscess formation however the patient refused. I informed her that the abscesses would probably continue to get worse even with oral antibiotics and pain medications she expressed understanding and elected to try antibiotics for a few days to see if they would improve. She did state that she would return if they were worsening. Risk of Complications, Morbidity, and/or Mortality Presenting problems: low Diagnostic procedures: minimal 
Management options: low Patient Progress Patient progress: stable Procedures

## 2019-03-01 NOTE — DISCHARGE INSTRUCTIONS

## 2019-03-01 NOTE — ED NOTES
I have reviewed discharge instructions with the patient. The patient verbalized understanding. Patient left ED via Discharge Method: ambulatory to Home with self. Opportunity for questions and clarification provided. Patient given 2 scripts. To continue your aftercare when you leave the hospital, you may receive an automated call from our care team to check in on how you are doing. This is a free service and part of our promise to provide the best care and service to meet your aftercare needs.  If you have questions, or wish to unsubscribe from this service please call [phone]. Thank you for Choosing our [facility] Emergency Department.

## 2019-03-10 ENCOUNTER — HOSPITAL ENCOUNTER (EMERGENCY)
Age: 39
Discharge: HOME | End: 2019-03-10
Attending: EMERGENCY MEDICINE
Payer: COMMERCIAL

## 2019-03-10 VITALS
TEMPERATURE: 97.8 F | BODY MASS INDEX: 41.77 KG/M2 | SYSTOLIC BLOOD PRESSURE: 145 MMHG | OXYGEN SATURATION: 98 % | DIASTOLIC BLOOD PRESSURE: 82 MMHG | HEART RATE: 80 BPM | HEIGHT: 69 IN | RESPIRATION RATE: 16 BRPM | WEIGHT: 282 LBS

## 2019-03-10 DIAGNOSIS — L02.211 ABDOMINAL WALL ABSCESS: Primary | ICD-10-CM

## 2019-03-10 LAB — GLUCOSE BLD STRIP.AUTO-MCNC: 268 MG/DL (ref 65–100)

## 2019-03-10 PROCEDURE — 96375 TX/PRO/DX INJ NEW DRUG ADDON: CPT | Performed by: NURSE PRACTITIONER

## 2019-03-10 PROCEDURE — 77030012406 HC DRN WND PENRS BARD -A

## 2019-03-10 PROCEDURE — 99284 EMERGENCY DEPT VISIT MOD MDM: CPT | Performed by: NURSE PRACTITIONER

## 2019-03-10 PROCEDURE — 74011250636 HC RX REV CODE- 250/636: Performed by: NURSE PRACTITIONER

## 2019-03-10 PROCEDURE — 82962 GLUCOSE BLOOD TEST: CPT

## 2019-03-10 PROCEDURE — 75810000289 HC I&D ABSCESS SIMP/COMP/MULT: Performed by: NURSE PRACTITIONER

## 2019-03-10 PROCEDURE — 87077 CULTURE AEROBIC IDENTIFY: CPT

## 2019-03-10 PROCEDURE — 87205 SMEAR GRAM STAIN: CPT

## 2019-03-10 PROCEDURE — 96374 THER/PROPH/DIAG INJ IV PUSH: CPT | Performed by: NURSE PRACTITIONER

## 2019-03-10 RX ORDER — MORPHINE SULFATE 4 MG/ML
4 INJECTION INTRAVENOUS
Status: COMPLETED | OUTPATIENT
Start: 2019-03-10 | End: 2019-03-10

## 2019-03-10 RX ORDER — CEPHALEXIN 500 MG/1
500 CAPSULE ORAL 4 TIMES DAILY
Qty: 28 CAP | Refills: 0 | Status: SHIPPED | OUTPATIENT
Start: 2019-03-10 | End: 2019-03-17

## 2019-03-10 RX ORDER — HYDROCODONE BITARTRATE AND ACETAMINOPHEN 5; 325 MG/1; MG/1
1 TABLET ORAL
Qty: 20 TAB | Refills: 0 | Status: SHIPPED | OUTPATIENT
Start: 2019-03-10 | End: 2019-03-13

## 2019-03-10 RX ORDER — ONDANSETRON 2 MG/ML
4 INJECTION INTRAMUSCULAR; INTRAVENOUS
Status: COMPLETED | OUTPATIENT
Start: 2019-03-10 | End: 2019-03-10

## 2019-03-10 RX ADMIN — MORPHINE SULFATE 4 MG: 4 INJECTION INTRAVENOUS at 12:23

## 2019-03-10 RX ADMIN — ONDANSETRON 4 MG: 2 INJECTION INTRAMUSCULAR; INTRAVENOUS at 12:23

## 2019-03-10 NOTE — ED NOTES
I have reviewed discharge instructions with the patient. The patient verbalized understanding. Patient left ED via Discharge Method: ambulatory to Home with friend Opportunity for questions and clarification provided. Patient given 2 scripts. To continue your aftercare when you leave the hospital, you may receive an automated call from our care team to check in on how you are doing. This is a free service and part of our promise to provide the best care and service to meet your aftercare needs.  If you have questions, or wish to unsubscribe from this service please call [phone]. Thank you for Choosing our [facility] Emergency Department.

## 2019-03-10 NOTE — DISCHARGE INSTRUCTIONS
Medications as prescribed  Return on Tuesday for a recheck. Return sooner if redness increase, you develop a fever, or for any additional concerns you may have.

## 2019-03-10 NOTE — ED PROVIDER NOTES
Patient was seen last week for an abscess on her abdomen. She was given clindamycin but declined to have abscess drained. She states she took medication as prescribed but area has failed to improve. She denies fever. She states the abscess to her abdomen has started draining. The history is provided by the patient. Skin Problem    This is a new problem. The current episode started more than 2 days ago. The problem has been gradually worsening. The problem is associated with nothing. There has been no fever. The rash is present on the abdomen. The pain is at a severity of 10/10. The pain is moderate. The pain has been constant since onset. Associated symptoms include pain. She has tried antibiotic for the symptoms. The treatment provided no relief. Past Medical History:   Diagnosis Date    [address] [address]     Hypertension     pt denies       Past Surgical History:   Procedure Laterality Date    HX  SECTION           Family History:   Problem Relation Age of Onset    Diabetes Mother     Other Mother         Mary Davidson       Social History     Socioeconomic History    Marital status: SINGLE     Spouse name: Not on file    Number of children: Not on file    Years of education: Not on file    Highest education level: Not on file   Social Needs    Financial resource strain: Not on file    Food insecurity - worry: Not on file    Food insecurity - inability: Not on file   [employer] needs - medical: Not on file   [employer] needs - non-medical: Not on file   Occupational History    Not on file   Tobacco Use    Smoking status: Never Smoker    Smokeless tobacco: Never Used   Substance and Sexual Activity    Alcohol use: Yes     Comment: occasionally    Drug use: No    Sexual activity: Not Currently   Other Topics Concern    Not on file   Social History Narrative    Not on file         ALLERGIES: Patient has no known allergies.     Review of Systems   Constitutional: Negative for fever. Respiratory: Negative for cough and shortness of breath. Cardiovascular: Negative for chest pain. Gastrointestinal: Negative for abdominal pain. Skin: Positive for color change and wound. Vitals:    03/10/19 1159 03/10/19 1223 03/10/19 1225   BP: 137/88 131/76    Pulse: 85  80   Resp: 16     Temp: 97.8 °F (36.6 °C)     SpO2: 100%  98%   Weight: 127.9 kg (282 lb)     Height: 5' 9\" (1.753 m)              Physical Exam   Constitutional: She is oriented to person, place, and time. No distress. HENT:   Head: Normocephalic. Eyes: Conjunctivae and EOM are normal.   Cardiovascular: Normal rate and regular rhythm. Pulmonary/Chest: Effort normal and breath sounds normal.   Neurological: She is alert and oriented to person, place, and time. Skin: Skin is warm. She is not diaphoretic. There is erythema. Psychiatric: She has a normal mood and affect. Her behavior is normal.   Nursing note and vitals reviewed. Patient was discussed and evaluated with Dr. Gene Jasmine. MDM  Number of Diagnoses or Management Options  Abdominal wall abscess:   Diagnosis management comments: Area drained and drain placed. Dressing applied prior to discharge. Patient given IV morphine and zofran. Wound culture collected. Patient discussed with Dr. Gene Jasmine. Prescriptions for norco and keflex given.        Amount and/or Complexity of Data Reviewed  Clinical lab tests: reviewed and ordered  Tests in the medicine section of CPT®: ordered and reviewed  Discuss the patient with other providers: yes Ok Glow  )    Risk of Complications, Morbidity, and/or Mortality  Presenting problems: moderate  Diagnostic procedures: low  Management options: moderate    Patient Progress  Patient progress: stable         I&D Abcess Simple  Date/Time: 3/10/2019 1:32 PM  Performed by: TASHA Latif  Authorized by: TASHA Latif     Consent:     Consent obtained:  Verbal    Consent given by:  Patient Risks discussed:  Incomplete drainage and pain    Alternatives discussed:  No treatment  Location:     Type:  Abscess    Size:  6 cm x 8 cm    Location:  Trunk    Trunk location:  Abdomen  Pre-procedure details:     Skin preparation:  Betadine  Anesthesia (see MAR for exact dosages): Anesthesia method:  Local infiltration    Local anesthetic:  Lidocaine 1% w/o epi  Procedure type:     Complexity:  Simple  Procedure details:     Needle aspiration: no      Incision types:  Single straight    Incision depth:  Subcutaneous    Scalpel blade:  11    Wound management:  Probed and deloculated    Drainage:  Bloody    Drainage amount:  Copious    Wound treatment:  Drain placed and wound left open    Packing materials:  None  Post-procedure details:     Patient tolerance of procedure:   Tolerated well, no immediate complications

## 2019-03-12 ENCOUNTER — HOSPITAL ENCOUNTER (EMERGENCY)
Age: 39
Discharge: HOME | End: 2019-03-12
Attending: EMERGENCY MEDICINE
Payer: COMMERCIAL

## 2019-03-12 VITALS
OXYGEN SATURATION: 98 % | BODY MASS INDEX: 41.92 KG/M2 | DIASTOLIC BLOOD PRESSURE: 92 MMHG | SYSTOLIC BLOOD PRESSURE: 139 MMHG | HEIGHT: 69 IN | TEMPERATURE: 98.3 F | WEIGHT: 283 LBS | RESPIRATION RATE: 16 BRPM | HEART RATE: 93 BPM

## 2019-03-12 DIAGNOSIS — Z51.89 WOUND CHECK, ABSCESS: Primary | ICD-10-CM

## 2019-03-12 PROCEDURE — 75810000275 HC EMERGENCY DEPT VISIT NO LEVEL OF CARE: Performed by: EMERGENCY MEDICINE

## 2019-03-12 NOTE — ED TRIAGE NOTES
Pt states she had a bartholin's cyst drained and was told to come back for follow up.     Joel Aviles RN

## 2019-03-12 NOTE — LETTER
[facility] EMERGENCY DEPT 
[address] [place] [id number] 
[phone] Work/School Note Date: 3/12/2019 To Whom It May concern: Ann-Marie Mcneil was seen and treated today in the emergency room by the following provider(s): 
Attending Provider: Mikki Wick MD 
Nurse Practitioner: TASHA Warren. Ann-Marie Mcneil needs to be excused from work 03/12/2019-03/15/2019.  
 
Sincerely, 
 
 
 
 
TASHA Nickerson

## 2019-03-12 NOTE — DISCHARGE INSTRUCTIONS
Continue with Keflex you were prescribed last visit. Continue with wound care as you are doing at home. Return in 2 days for a wound recheck. Return sooner if you develop fever, increase in redness or for any additional concerns.

## 2019-03-12 NOTE — ED NOTES
I have reviewed discharge instructions with the patient. The patient verbalized understanding. Patient left ED via Discharge Method: ambulatory to Home with self. Opportunity for questions and clarification provided. Patient given 0 scripts. To continue your aftercare when you leave the hospital, you may receive an automated call from our care team to check in on how you are doing. This is a free service and part of our promise to provide the best care and service to meet your aftercare needs.  If you have questions, or wish to unsubscribe from this service please call [phone]. Thank you for Choosing our [facility] Emergency Department.

## 2019-03-12 NOTE — ED PROVIDER NOTES
Patient presents for a wound check. She had an abscess to abdominal wall I&D 2 days ago. She had a drain placed and was prescribed keflex. She denies fever and states pain has improved. Drain remains in place. The history is provided by the patient. Wound Check    This is a new problem. The current episode started 2 days ago. The problem occurs constantly. The problem has been gradually improving. Pain location: abdomen. The quality of the pain is described as aching. The pain is at a severity of 8/10. The pain is mild. Pertinent negatives include no numbness, full range of motion, no stiffness, no tingling, no itching, no back pain and no neck pain. The symptoms are aggravated by contact. Treatments tried: keflex and drainage. The treatment provided mild relief. Past Medical History:   Diagnosis Date    Diabetes ([address]     Hypertension     pt denies       Past Surgical History:   Procedure Laterality Date    HX  SECTION           Family History:   Problem Relation Age of Onset    Diabetes Mother     Other Mother         Eda Eye       Social History     Socioeconomic History    Marital status: SINGLE     Spouse name: Not on file    Number of children: Not on file    Years of education: Not on file    Highest education level: Not on file   Social Needs    Financial resource strain: Not on file    Food insecurity - worry: Not on file    Food insecurity - inability: Not on file   [employer] needs - medical: Not on file   [employer] needs - non-medical: Not on file   Occupational History    Not on file   Tobacco Use    Smoking status: Never Smoker    Smokeless tobacco: Never Used   Substance and Sexual Activity    Alcohol use: Yes     Comment: occasionally    Drug use: No    Sexual activity: Not Currently   Other Topics Concern    Not on file   Social History Narrative    Not on file         ALLERGIES: Patient has no known allergies.     Review of Systems Constitutional: Negative for chills and fever. Respiratory: Negative for cough and shortness of breath. Musculoskeletal: Negative for back pain, neck pain and stiffness. Skin: Positive for color change and wound. Negative for itching. Neurological: Negative for tingling and numbness. Vitals:    03/12/19 1120   BP: (!) 139/92   Pulse: 93   Resp: 16   Temp: 98.3 °F (36.8 °C)   SpO2: 98%   Weight: 128.4 kg (283 lb)   Height: 5' 9\" (1.753 m)            Physical Exam   Constitutional: She is oriented to person, place, and time. She appears well-developed and well-nourished. No distress. Eyes: Conjunctivae and EOM are normal.   Cardiovascular: Normal rate and regular rhythm. Pulmonary/Chest: Effort normal and breath sounds normal.   Neurological: She is alert and oriented to person, place, and time. Skin: Skin is warm and dry. There is erythema. Erythema has improved. Penrose drain remains in place. Yellow drainage noted from the area. Psychiatric: She has a normal mood and affect. Her behavior is normal.   Nursing note and vitals reviewed. I was able to express minimal amount of drainage from the opening. Area irrigated using sterile saline and syringe. Area irrigated with 120 ml of normal saline. MDM  Number of Diagnoses or Management Options  Wound check, abscess:   Diagnosis management comments: Drain was not removed. Continue kelfex and return in 2 days for wound recheck. Patient was discussed and evaluated by Dr. Javi Daniels.         Amount and/or Complexity of Data Reviewed  Discuss the patient with other providers: yes Chinedu Copeland  )    Patient Progress  Patient progress: stable         Procedures

## 2019-03-13 LAB
BACTERIA SPEC CULT: ABNORMAL
BACTERIA SPEC CULT: ABNORMAL
GRAM STN SPEC: ABNORMAL
SERVICE CMNT-IMP: ABNORMAL

## 2019-03-14 ENCOUNTER — HOSPITAL ENCOUNTER (EMERGENCY)
Age: 39
Discharge: HOME | End: 2019-03-14
Attending: EMERGENCY MEDICINE
Payer: COMMERCIAL

## 2019-03-14 VITALS
TEMPERATURE: 97.8 F | WEIGHT: 283 LBS | HEART RATE: 75 BPM | BODY MASS INDEX: 41.92 KG/M2 | OXYGEN SATURATION: 100 % | HEIGHT: 69 IN | SYSTOLIC BLOOD PRESSURE: 140 MMHG | DIASTOLIC BLOOD PRESSURE: 75 MMHG | RESPIRATION RATE: 16 BRPM

## 2019-03-14 DIAGNOSIS — Z51.89 WOUND CHECK, ABSCESS: Primary | ICD-10-CM

## 2019-03-14 PROCEDURE — 75810000275 HC EMERGENCY DEPT VISIT NO LEVEL OF CARE: Performed by: NURSE PRACTITIONER

## 2019-03-14 RX ORDER — TRAMADOL HYDROCHLORIDE 50 MG/1
50 TABLET ORAL
Qty: 18 TAB | Refills: 0 | Status: SHIPPED | OUTPATIENT
Start: 2019-03-14 | End: 2019-03-17

## 2019-03-14 NOTE — LETTER
[facility] EMERGENCY DEPT 
[facility] [address] [place] [address] [id number] [phone] Work/School Note Date: 3/14/2019 To Whom It May concern: Kavita Rascon was seen and treated today in the emergency room by the following provider(s): 
Attending Provider: Amor Leon MD 
Nurse Practitioner: Constance Kim NP. Kavita Rascon may return to work on Wednesday. Sincerely, Liu Bragg NP

## 2019-03-14 NOTE — ED TRIAGE NOTES
Pt here for a wound check of an abscess that was drained here. It is on her lower abdomen. She states she feels some better.

## 2019-03-14 NOTE — ED NOTES
I have reviewed discharge instructions with the patient. The patient verbalized understanding. Patient left ED via Discharge Method: ambulatory to Home with self. Opportunity for questions and clarification provided. Patient given 1 scripts. Tramadol. Pt instructed to finish keflex and to follow up with ED in 2 days for wound recheck. Work excuse given. To continue your aftercare when you leave the hospital, you may receive an automated call from our care team to check in on how you are doing. This is a free service and part of our promise to provide the best care and service to meet your aftercare needs.  If you have questions, or wish to unsubscribe from this service please call [phone]. Thank you for Choosing our [facility] Emergency Department.

## 2019-03-14 NOTE — DISCHARGE INSTRUCTIONS
Home with family   Keep wound clean and replace dressing daily. Return to ED in two days for wound check.   Work note

## 2019-03-14 NOTE — ED PROVIDER NOTES
41-year-old female arrives for a wound recheck. She was seen initially on  with a abdominal wall cellulitis and refused incision and drainage however agreed to antibiotics. She returned on 3/10 with a rather large abscess to her abdominal wall. At that time she had incision and drainage completed with a Penrose drain placed. She is here today for reevaluation and wound check. She still has moderate drainage present. It is pertinent in nature. Her blood sugars are running in the low 100s to 110. No fevers at home. She did have some nausea with one of the medications as prescribed and she has stopped that. She has run out of her tramadol for pain. Otherwise she is managing her dressings on her own and feels that she is doing very well. The drain is still in place keeping the 2 incisions open. And compared with the picture she showed me the wound appears much improved. Last menstrual period was on  regular and on time.              Past Medical History:   Diagnosis Date    Diabetes ([facility] [address]     Hypertension     pt denies       Past Surgical History:   Procedure Laterality Date    HX  SECTION           Family History:   Problem Relation Age of Onset    Diabetes Mother     Other Mother         Maryella Galeazzi       Social History     Socioeconomic History    Marital status: SINGLE     Spouse name: Not on file    Number of children: Not on file    Years of education: Not on file    Highest education level: Not on file   Social Needs    Financial resource strain: Not on file    Food insecurity - worry: Not on file    Food insecurity - inability: Not on file   [employer] needs - medical: Not on file   [employer] needs - non-medical: Not on file   Occupational History    Not on file   Tobacco Use    Smoking status: Never Smoker    Smokeless tobacco: Never Used   Substance and Sexual Activity    Alcohol use: Yes     Comment: occasionally    Drug use: No    Sexual activity: Not Currently   Other Topics Concern    Not on file   Social History Narrative    Not on file         ALLERGIES: Patient has no known allergies. Review of Systems   Constitutional: Negative for chills and fever. HENT: Negative for facial swelling and nosebleeds. Eyes: Negative for discharge and redness. Respiratory: Negative for cough and shortness of breath. Cardiovascular: Negative for chest pain and palpitations. Gastrointestinal: Positive for nausea. Negative for diarrhea and vomiting. Genitourinary: Negative for difficulty urinating and menstrual problem. Musculoskeletal: Negative for back pain and myalgias. Skin: Positive for color change and wound. Neurological: Negative for weakness and headaches. Psychiatric/Behavioral: Negative for confusion and decreased concentration. Vitals:    03/14/19 1149 03/14/19 1159   BP: (!) 151/96    Pulse: 87    Resp: 20    Temp: 97.8 °F (36.6 °C)    SpO2: 98% 98%   Weight: 128.4 kg (283 lb)    Height: 5' 9\" (1.753 m)             Physical Exam   Constitutional: She is oriented to person, place, and time. She appears well-developed and well-nourished. HENT:   Head: Normocephalic and atraumatic. Eyes: EOM are normal. Pupils are equal, round, and reactive to light. Neck: Normal range of motion. Cardiovascular: Normal rate. Pulmonary/Chest: Effort normal.   Abdominal: Soft. There is tenderness. Musculoskeletal: Normal range of motion. Neurological: She is alert and oriented to person, place, and time. Skin: Skin is warm and dry. Capillary refill takes less than 2 seconds. Psychiatric: She has a normal mood and affect. Her behavior is normal. Judgment and thought content normal.   Nursing note and vitals reviewed. MDM  Number of Diagnoses or Management Options  Diagnosis management comments: 70-year-old female arrives for a wound recheck.   She was seen initially on 2/28 with a abdominal wall cellulitis and refused incision and drainage however agreed to antibiotics. She returned on 3/10 with a rather large abscess to her abdominal wall. At that time she had incision and drainage completed with a Penrose drain placed. She is here today for reevaluation and wound check. She still has moderate drainage present. It is pertinent in nature. Her blood sugars are running in the low 100s to 110. No fevers at home. She did have some nausea with one of the medications as prescribed and she has stopped that. She has run out of her tramadol for pain. Otherwise she is managing her dressings on her own and feels that she is doing very well. The drain is still in place keeping the 2 incisions open. And compared with the picture she showed me the wound appears much improved. Last menstrual period was on February 28 regular and on time. Wound cleaned with saline, and penrose left in place for two more days to allow for continued drainage. Dressing replaced. Will refill tramadol, pt to return in two days.     Risk of Complications, Morbidity, and/or Mortality  Presenting problems: minimal  Diagnostic procedures: minimal  Management options: minimal    Patient Progress  Patient progress: stable         Procedures

## 2019-03-16 ENCOUNTER — HOSPITAL ENCOUNTER (EMERGENCY)
Age: 39
Discharge: HOME | End: 2019-03-16
Attending: EMERGENCY MEDICINE
Payer: COMMERCIAL

## 2019-03-16 VITALS
DIASTOLIC BLOOD PRESSURE: 93 MMHG | HEART RATE: 89 BPM | TEMPERATURE: 98.7 F | SYSTOLIC BLOOD PRESSURE: 140 MMHG | OXYGEN SATURATION: 98 % | RESPIRATION RATE: 16 BRPM

## 2019-03-16 DIAGNOSIS — Z51.89 WOUND CHECK, ABSCESS: Primary | ICD-10-CM

## 2019-03-16 PROCEDURE — 75810000275 HC EMERGENCY DEPT VISIT NO LEVEL OF CARE: Performed by: PHYSICIAN ASSISTANT

## 2019-03-16 NOTE — ED PROVIDER NOTES
Pt here for wound check, had large abscess to lower bad area, drain in place, no fevers      The history is provided by the patient. Wound Check    This is a new problem. The current episode started more than 2 days ago. The problem occurs constantly. The problem has been gradually improving. Pain location: lower abd area. The quality of the pain is described as aching. The pain is at a severity of 2/10. The pain is mild. The symptoms are aggravated by palpation and activity. Treatments tried: I&D abx. The treatment provided significant relief. Past Medical History:   Diagnosis Date    [address] ([facility] [address]     Hypertension     pt denies       Past Surgical History:   Procedure Laterality Date    HX  SECTION           Family History:   Problem Relation Age of Onset    Diabetes Mother     Other Mother         Mis Majestic       Social History     Socioeconomic History    Marital status: SINGLE     Spouse name: Not on file    Number of children: Not on file    Years of education: Not on file    Highest education level: Not on file   Social Needs    Financial resource strain: Not on file    Food insecurity - worry: Not on file    Food insecurity - inability: Not on file   [employer] needs - medical: Not on file   [employer] needs - non-medical: Not on file   Occupational History    Not on file   Tobacco Use    Smoking status: Never Smoker    Smokeless tobacco: Never Used   Substance and Sexual Activity    Alcohol use: Yes     Comment: occasionally    Drug use: No    Sexual activity: Not Currently   Other Topics Concern    Not on file   Social History Narrative    Not on file         ALLERGIES: Patient has no known allergies. Review of Systems   All other systems reviewed and are negative.       Vitals:    19 1730   BP: (!) 140/93   Pulse: 89   Resp: 16   Temp: 98.7 °F (37.1 °C)   SpO2: 98%            Physical Exam   Constitutional: She is oriented to person, place, and time. She appears well-developed and well-nourished. No distress. obese   HENT:   Head: Normocephalic and atraumatic. Eyes: EOM are normal. Pupils are equal, round, and reactive to light. Neck: Normal range of motion. Neck supple. Cardiovascular: Normal rate and regular rhythm. Pulmonary/Chest: Effort normal and breath sounds normal.   Abdominal: Soft. Bowel sounds are normal.   Lower abd area with pinrose drain in place, no active drainage, per pt area much better   Musculoskeletal: Normal range of motion. Neurological: She is alert and oriented to person, place, and time. Skin: Skin is warm. She is not diaphoretic. Psychiatric: She has a normal mood and affect. Nursing note and vitals reviewed.        MDM  Number of Diagnoses or Management Options  Diagnosis management comments: Healing abscess to lower abd  Drain removed, wound cleaned and dressed  Stressed to pt to continue at home meds, call her pmd on Monday to arrange follow up appt, return toer if symptoms worsen       Amount and/or Complexity of Data Reviewed  Review and summarize past medical records: yes    Risk of Complications, Morbidity, and/or Mortality  Presenting problems: low  Diagnostic procedures: low  Management options: low    Patient Progress  Patient progress: improved         Procedures

## 2019-03-16 NOTE — ED NOTES
I have reviewed discharge instructions with the patient. The patient verbalized understanding. Patient left ED via Discharge Method: ambulatory to Home with self. Opportunity for questions and clarification provided. Patient given 0 scripts. To continue your aftercare when you leave the hospital, you may receive an automated call from our care team to check in on how you are doing. This is a free service and part of our promise to provide the best care and service to meet your aftercare needs.  If you have questions, or wish to unsubscribe from this service please call [phone]. Thank you for Choosing our [facility] Emergency Department.

## 2019-03-16 NOTE — ED NOTES
Patient's wounds were covered with ABD Pad and paper tape. Patient instructed to return to \"C\" Bed when she was redressed.

## 2019-12-14 ENCOUNTER — HOSPITAL ENCOUNTER (EMERGENCY)
Age: 39
Discharge: HOME | End: 2019-12-14
Attending: EMERGENCY MEDICINE
Payer: COMMERCIAL

## 2019-12-14 VITALS
BODY MASS INDEX: 41.03 KG/M2 | HEART RATE: 89 BPM | OXYGEN SATURATION: 99 % | HEIGHT: 69 IN | TEMPERATURE: 98.1 F | WEIGHT: 277 LBS | DIASTOLIC BLOOD PRESSURE: 78 MMHG | SYSTOLIC BLOOD PRESSURE: 146 MMHG | RESPIRATION RATE: 16 BRPM

## 2019-12-14 DIAGNOSIS — L02.411 ABSCESS OF RIGHT AXILLA: Primary | ICD-10-CM

## 2019-12-14 DIAGNOSIS — L73.2 HYDRADENITIS: ICD-10-CM

## 2019-12-14 PROCEDURE — 74011250636 HC RX REV CODE- 250/636: Performed by: EMERGENCY MEDICINE

## 2019-12-14 PROCEDURE — 99283 EMERGENCY DEPT VISIT LOW MDM: CPT | Performed by: EMERGENCY MEDICINE

## 2019-12-14 PROCEDURE — 96372 THER/PROPH/DIAG INJ SC/IM: CPT | Performed by: EMERGENCY MEDICINE

## 2019-12-14 PROCEDURE — 75810000289 HC I&D ABSCESS SIMP/COMP/MULT: Performed by: EMERGENCY MEDICINE

## 2019-12-14 RX ORDER — KETOROLAC TROMETHAMINE 30 MG/ML
60 INJECTION, SOLUTION INTRAMUSCULAR; INTRAVENOUS
Status: COMPLETED | OUTPATIENT
Start: 2019-12-14 | End: 2019-12-14

## 2019-12-14 RX ORDER — DOXYCYCLINE HYCLATE 100 MG
100 TABLET ORAL 2 TIMES DAILY
Qty: 28 TAB | Refills: 1 | Status: SHIPPED | OUTPATIENT
Start: 2019-12-14 | End: 2019-12-28

## 2019-12-14 RX ORDER — HYDROCODONE BITARTRATE AND ACETAMINOPHEN 5; 325 MG/1; MG/1
1 TABLET ORAL
Qty: 12 TAB | Refills: 0 | Status: SHIPPED | OUTPATIENT
Start: 2019-12-14 | End: 2019-12-17

## 2019-12-14 RX ADMIN — KETOROLAC TROMETHAMINE 60 MG: 30 INJECTION, SOLUTION INTRAMUSCULAR at 20:42

## 2019-12-15 NOTE — ED NOTES
I have reviewed discharge instructions with the patient. The patient verbalized understanding. Patient left ED via Discharge Method: ambulatory to Home with self. Opportunity for questions and clarification provided. Patient given 2 scripts. To continue your aftercare when you leave the hospital, you may receive an automated call from our care team to check in on how you are doing. This is a free service and part of our promise to provide the best care and service to meet your aftercare needs.  If you have questions, or wish to unsubscribe from this service please call [phone]. Thank you for Choosing our [facility] Emergency Department.

## 2019-12-15 NOTE — DISCHARGE INSTRUCTIONS
Patient Education        Skin Abscess: Care Instructions  Your Care Instructions    A skin abscess is a bacterial infection that forms a pocket of pus. A boil is a kind of skin abscess. The doctor may have cut an opening in the abscess so that the pus can drain out. You may have gauze in the cut so that the abscess will stay open and keep draining. You may need antibiotics. You will need to follow up with your doctor to make sure the infection has gone away. The doctor has checked you carefully, but problems can develop later. If you notice any problems or new symptoms, get medical treatment right away. Follow-up care is a key part of your treatment and safety. Be sure to make and go to all appointments, and call your doctor if you are having problems. It's also a good idea to know your test results and keep a list of the medicines you take. How can you care for yourself at home? · Apply warm and dry compresses, a heating pad set on low, or a hot water bottle 3 or 4 times a day for pain. Keep a cloth between the heat source and your skin. · If your doctor prescribed antibiotics, take them as directed. Do not stop taking them just because you feel better. You need to take the full course of antibiotics. · Take pain medicines exactly as directed. ? If the doctor gave you a prescription medicine for pain, take it as prescribed. ? If you are not taking a prescription pain medicine, ask your doctor if you can take an over-the-counter medicine. · Keep your bandage clean and dry. Change the bandage whenever it gets wet or dirty, or at least one time a day. · If the abscess was packed with gauze:  ? Keep follow-up appointments to have the gauze changed or removed. If the doctor instructed you to remove the gauze, follow the instructions you were given for how to remove it. ? After the gauze is removed, soak the area in warm water for 15 to 20 minutes 2 times a day, until the wound closes.   When should you call for help? Call your doctor now or seek immediate medical care if:    · You have signs of worsening infection, such as:  ? Increased pain, swelling, warmth, or redness. ? Red streaks leading from the infected skin. ? Pus draining from the wound. ? A fever.    Watch closely for changes in your health, and be sure to contact your doctor if:    · You do not get better as expected. Where can you learn more? Go to http://yocasta-flavia.info/. Enter W156 in the search box to learn more about \"Skin Abscess: Care Instructions. \"  Current as of: April 1, 2019  Content Version: 12.2  © 2231-9504 [employer]. Care instructions adapted under license by [employer] (which disclaims liability or warranty for this information). If you have questions about a medical condition or this instruction, always ask your healthcare professional. [place] [address] any warranty or liability for your use of this information. Patient Education        Hidradenitis Suppurativa: Care Instructions  Your Care Instructions    Hidradenitis suppurativa (say \"myj-xlls-ui-NY-tus sup-yur-uh-TY-vuh\") is a skin condition that causes lumps on the skin that look like pimples or boils. The lumps are usually painful and can break open and drain blood and bad-smelling pus. The condition can come and go for many years. Treatment for this condition may include antibiotics and other medicines. You may need surgery to remove the lumps. Home care includes wearing loose-fitting clothes and washing the area gently. You can help prevent lumps from coming back by staying at a healthy weight and not smoking. Doctors don't know exactly how this condition starts. But they do know that something irritates and inflames the hair follicles, causing them to swell and form lumps. This skin condition can't be spread from person to person (isn't contagious).   Follow-up care is a key part of your treatment and safety. Be sure to make and go to all appointments, and call your doctor if you are having problems. It's also a good idea to know your test results and keep a list of the medicines you take. How can you care for yourself at home?  [facility] care    · Wash the area every day with mild soap. Use your hands rather than a washcloth or sponge when you wash that part of your body.     · Leave the affected areas uncovered when you can. If you have lumps that are draining, you can cover them with a bandage or other dressing. Put petroleum jelly (such as Vaseline) on the dressing to help keep it from sticking.     · Wear-loose fitting clothes that don't rub against the area. Avoid activities that cause skin to rub together.     · If you have pain, try a warm compress. Soak a towel or washcloth in warm water, wring it out, and place it on the affected skin for about 10 minutes. Medicines    · Be safe with medicines. Take your medicines exactly as prescribed. Call your doctor if you think you are having a problem with your medicine. You will get more details on the specific medicines your doctor prescribes.     · If your doctor prescribed antibiotics, take them as directed. Do not stop taking them just because you feel better. You need to take the full course of antibiotics.    Lifestyle choices    · If you smoke, think about quitting. Smoking can make the condition worse. If you need help quitting, talk to your doctor about stop-smoking programs and medicines. These can increase your chances of quitting for good.     · Stay at a healthy weight, or lose weight, by eating healthy foods and being physically active. Being overweight could make this condition worse. When should you call for help? Call your doctor now or seek immediate medical care if:    · You have symptoms of infection, such as:  ? Increased pain, swelling, warmth, or redness. ? Red streaks leading from the area. ? Pus draining from the area. ? A fever.  Watch closely for changes in your health, and be sure to contact your doctor if:    · You do not get better as expected. Where can you learn more? Go to http://yocasta-flavia.info/. Enter O990 in the search box to learn more about \"Hidradenitis Suppurativa: Care Instructions. \"  Current as of: April 1, 2019  Content Version: 12.2  © 6270-0886 [employer]. Care instructions adapted under license by [employer] (which disclaims liability or warranty for this information). If you have questions about a medical condition or this instruction, always ask your healthcare professional. [address] any warranty or liability for your use of this information.

## 2019-12-15 NOTE — ED PROVIDER NOTES
Patient presents with complaints of recurrence of abscess in the axilla. The patient has had multiple episodes in the past with several currently draining. The history is provided by the patient. Skin Problem    This is a recurrent problem. The current episode started 2 days ago. The problem has been gradually worsening. Associated with: Probable hidradenitis  There has been no fever. Affected Location: Right axilla. The pain is at a severity of 8/10. The pain is moderate. The pain has been constant since onset. Associated symptoms include pain and weeping. She has tried nothing for the symptoms. The treatment provided no relief.         Past Medical History:   Diagnosis Date    Diabetes ([facility] [address]     Hypertension     pt denies       Past Surgical History:   Procedure Laterality Date    HX  SECTION           Family History:   Problem Relation Age of Onset    Diabetes Mother     Other Mother         Joni Bio       Social History     Socioeconomic History    Marital status: SINGLE     Spouse name: Not on file    Number of children: Not on file    Years of education: Not on file    Highest education level: Not on file   Occupational History    Not on file   Social Needs    Financial resource strain: Not on file    Food insecurity:     Worry: Not on file     Inability: Not on file    Transportation needs:     Medical: Not on file     Non-medical: Not on file   Tobacco Use    Smoking status: Never Smoker    Smokeless tobacco: Never Used   Substance and Sexual Activity    Alcohol use: Yes     Comment: occasionally    Drug use: No    Sexual activity: Not Currently   Lifestyle    Physical activity:     Days per week: Not on file     Minutes per session: Not on file    Stress: Not on file   Relationships    Social connections:     Talks on phone: Not on file     Gets together: Not on file     Attends Moravian service: Not on file     Active member of club or organization: Not on file Attends meetings of clubs or organizations: Not on file     Relationship status: Not on file    Intimate partner violence:     Fear of current or ex partner: Not on file     Emotionally abused: Not on file     Physically abused: Not on file     Forced sexual activity: Not on file   Other Topics Concern    Not on file   Social History Narrative    Not on file         ALLERGIES: Patient has no known allergies. Review of Systems   All other systems reviewed and are negative. Vitals:    12/14/19 1931   BP: 130/84   Pulse: 89   Resp: 16   Temp: 98.1 °F (36.7 °C)   SpO2: 97%   Weight: 125.6 kg (277 lb)   Height: 5' 9\" (1.753 m)            Physical Exam  Vitals signs and nursing note reviewed. Constitutional:       Appearance: Normal appearance. HENT:      Head: Normocephalic and atraumatic. Musculoskeletal: Normal range of motion. Skin:     General: Skin is warm and dry. Capillary Refill: Capillary refill takes less than 2 seconds. Comments: Sniffing and scarring and keloid formation is noted in the right axilla there are multiple small sites draining purulent material.  There is 1 large tender and indurated area centrally located with some mild erythema surrounding. Neurological:      General: No focal deficit present. Mental Status: She is alert and oriented to person, place, and time. Mental status is at baseline. Psychiatric:         Mood and Affect: Mood normal.         Behavior: Behavior normal.          MDM  Number of Diagnoses or Management Options  Abscess of right axilla:   Hydradenitis:   Risk of Complications, Morbidity, and/or Mortality  Presenting problems: low  Diagnostic procedures: minimal  Management options: low    Patient Progress  Patient progress: stable         I&D Abcess Simple  Date/Time: 12/14/2019 8:10 PM  Performed by: Kendrick Evans DO  Authorized by:  Kendrick Evans DO     Consent:     Consent obtained:  Verbal    Consent given by:  Patient    Risks discussed:  Bleeding  Location:     Type:  Abscess    Location:  Upper extremity    Upper extremity location: Axilla. Pre-procedure details:     Skin preparation:  Betadine  Anesthesia (see MAR for exact dosages): Anesthesia method:  Local infiltration    Local anesthetic:  Lidocaine 1% w/o epi  Procedure type:     Complexity:  Simple  Procedure details:     Needle aspiration: no      Incision types:  Single straight    Incision depth:  Subcutaneous    Scalpel blade:  11    Wound management:  Probed and deloculated    Drainage:  Purulent    Drainage amount: Moderate    Wound treatment:  Wound left open    Packing materials:  None  Post-procedure details:     Patient tolerance of procedure:   Tolerated well, no immediate complications

## 2019-12-22 ENCOUNTER — HOSPITAL ENCOUNTER (EMERGENCY)
Age: 39
Discharge: HOME | End: 2019-12-22
Attending: EMERGENCY MEDICINE
Payer: COMMERCIAL

## 2019-12-22 VITALS
TEMPERATURE: 99 F | DIASTOLIC BLOOD PRESSURE: 80 MMHG | OXYGEN SATURATION: 99 % | HEART RATE: 95 BPM | HEIGHT: 69 IN | SYSTOLIC BLOOD PRESSURE: 115 MMHG | WEIGHT: 277 LBS | BODY MASS INDEX: 41.03 KG/M2 | RESPIRATION RATE: 18 BRPM

## 2019-12-22 DIAGNOSIS — L73.2 HIDRADENITIS SUPPURATIVA: Primary | ICD-10-CM

## 2019-12-22 PROCEDURE — 99283 EMERGENCY DEPT VISIT LOW MDM: CPT | Performed by: EMERGENCY MEDICINE

## 2019-12-22 RX ORDER — CIPROFLOXACIN 500 MG/1
500 TABLET ORAL 2 TIMES DAILY
Qty: 14 TAB | Refills: 0 | Status: SHIPPED | OUTPATIENT
Start: 2019-12-22 | End: 2019-12-29

## 2019-12-22 RX ORDER — SULFAMETHOXAZOLE AND TRIMETHOPRIM 800; 160 MG/1; MG/1
1 TABLET ORAL 2 TIMES DAILY
Qty: 14 TAB | Refills: 0 | Status: SHIPPED | OUTPATIENT
Start: 2019-12-22 | End: 2019-12-29

## 2019-12-22 RX ORDER — HYDROCODONE BITARTRATE AND ACETAMINOPHEN 5; 325 MG/1; MG/1
1 TABLET ORAL
Qty: 8 TAB | Refills: 0 | Status: SHIPPED | OUTPATIENT
Start: 2019-12-22 | End: 2019-12-24

## 2019-12-23 NOTE — DISCHARGE INSTRUCTIONS
Patient Education        Hidradenitis Suppurativa: Care Instructions  Your Care Instructions    Hidradenitis suppurativa (say \"knn-jxcu-ov-NY-tus sup-yur-uh-TY-vuh\") is a skin condition that causes lumps on the skin that look like pimples or boils. The lumps are usually painful and can break open and drain blood and bad-smelling pus. The condition can come and go for many years. Treatment for this condition may include antibiotics and other medicines. You may need surgery to remove the lumps. Home care includes wearing loose-fitting clothes and washing the area gently. You can help prevent lumps from coming back by staying at a healthy weight and not smoking. Doctors don't know exactly how this condition starts. But they do know that something irritates and inflames the hair follicles, causing them to swell and form lumps. This skin condition can't be spread from person to person (isn't contagious). Follow-up care is a key part of your treatment and safety. Be sure to make and go to all appointments, and call your doctor if you are having problems. It's also a good idea to know your test results and keep a list of the medicines you take. How can you care for yourself at home?  [facility] care    · Wash the area every day with mild soap. Use your hands rather than a washcloth or sponge when you wash that part of your body.     · Leave the affected areas uncovered when you can. If you have lumps that are draining, you can cover them with a bandage or other dressing. Put petroleum jelly (such as Vaseline) on the dressing to help keep it from sticking.     · Wear-loose fitting clothes that don't rub against the area. Avoid activities that cause skin to rub together.     · If you have pain, try a warm compress. Soak a towel or washcloth in warm water, wring it out, and place it on the affected skin for about 10 minutes. Medicines    · Be safe with medicines. Take your medicines exactly as prescribed.  Call your doctor if you think you are having a problem with your medicine. You will get more details on the specific medicines your doctor prescribes.     · If your doctor prescribed antibiotics, take them as directed. Do not stop taking them just because you feel better. You need to take the full course of antibiotics.    Lifestyle choices    · If you smoke, think about quitting. Smoking can make the condition worse. If you need help quitting, talk to your doctor about stop-smoking programs and medicines. These can increase your chances of quitting for good.     · Stay at a healthy weight, or lose weight, by eating healthy foods and being physically active. Being overweight could make this condition worse. When should you call for help? Call your doctor now or seek immediate medical care if:    · You have symptoms of infection, such as:  ? Increased pain, swelling, warmth, or redness. ? Red streaks leading from the area. ? Pus draining from the area. ? A fever.    Watch closely for changes in your health, and be sure to contact your doctor if:    · You do not get better as expected. Where can you learn more? Go to http://yocasta-flavia.info/. Enter W566 in the search box to learn more about \"Hidradenitis Suppurativa: Care Instructions. \"  Current as of: April 1, 2019  Content Version: 12.2  © 8557-9909 [employer]. Care instructions adapted under license by [employer] (which disclaims liability or warranty for this information). If you have questions about a medical condition or this instruction, always ask your healthcare professional. [place] [address] any warranty or liability for your use of this information.

## 2019-12-23 NOTE — ED TRIAGE NOTES
Pt c/o right axillary abscess. States she was here last Saturday for same concern. Pt states it is still draining but is swollen.

## 2019-12-23 NOTE — ED PROVIDER NOTES
52-year-old -American female with history of diabetes and hidradenitis presents with continued drainage and pain from axillary abscess. She was seen 8 days ago and had I&D performed. She is on doxycycline but reports no improvement in discharge or discomfort. She has been referred to surgery but cannot see them until next month. No fever or vomiting. Glucose has been normal.    The history is provided by the patient.    Abscess           Past Medical History:   Diagnosis Date    Diabetes ([address]     Hypertension     pt denies       Past Surgical History:   Procedure Laterality Date    HX  SECTION           Family History:   Problem Relation Age of Onset    Diabetes Mother     Other Mother         Ron Funk       Social History     Socioeconomic History    Marital status: SINGLE     Spouse name: Not on file    Number of children: Not on file    Years of education: Not on file    Highest education level: Not on file   Occupational History    Not on file   Social Needs    Financial resource strain: Not on file    Food insecurity:     Worry: Not on file     Inability: Not on file    Transportation needs:     Medical: Not on file     Non-medical: Not on file   Tobacco Use    Smoking status: Never Smoker    Smokeless tobacco: Never Used   Substance and Sexual Activity    Alcohol use: Yes     Comment: occasionally    Drug use: No    Sexual activity: Not Currently   Lifestyle    Physical activity:     Days per week: Not on file     Minutes per session: Not on file    Stress: Not on file   Relationships    Social connections:     Talks on phone: Not on file     Gets together: Not on file     Attends Scientology service: Not on file     Active member of club or organization: Not on file     Attends meetings of clubs or organizations: Not on file     Relationship status: Not on file    Intimate partner violence:     Fear of current or ex partner: Not on file     Emotionally abused: Not on file     Physically abused: Not on file     Forced sexual activity: Not on file   Other Topics Concern    Not on file   Social History Narrative    Not on file         ALLERGIES: Patient has no known allergies. Review of Systems   Constitutional: Negative for fever. Respiratory: Negative for shortness of breath. Gastrointestinal: Negative for vomiting. Neurological: Negative for headaches. Vitals:    12/22/19 1959   BP: 107/84   Pulse: (!) 102   Resp: 17   Temp: 99 °F (37.2 °C)   SpO2: 97%   Weight: 125.6 kg (277 lb)   Height: 5' 9\" (1.753 m)            Physical Exam  Vitals signs and nursing note reviewed. Constitutional:       General: She is not in acute distress. Appearance: Normal appearance. HENT:      Head: Normocephalic and atraumatic. Mouth/Throat:      Mouth: Mucous membranes are moist.   Eyes:      Pupils: Pupils are equal, round, and reactive to light. Neck:      Musculoskeletal: Normal range of motion. Cardiovascular:      Rate and Rhythm: Normal rate. Pulmonary:      Effort: Pulmonary effort is normal.   Skin:     Comments: Right axilla has numerous areas of purulent drainage and extensive scarring and discoloration consistent with chronic hidradenitis. Neurological:      Mental Status: She is alert and oriented to person, place, and time. Psychiatric:         Mood and Affect: Mood normal.         Behavior: Behavior normal.          MDM  Number of Diagnoses or Management Options  Diagnosis management comments: Patient is nontoxic and appears safe for continued outpatient treatment with antibiotics. Considering diabetes will add Cipro to cover Pseudomonas and Bactrim for MRSA. Advised to follow-up with general surgery as soon as possible.     Risk of Complications, Morbidity, and/or Mortality  Presenting problems: low  Diagnostic procedures: low  Management options: low           Procedures

## 2019-12-23 NOTE — ED NOTES
I have reviewed discharge instructions with the patient. The patient verbalized understanding. Patient left ED via Discharge Method: ambulatory to Home with self. Opportunity for questions and clarification provided. Patient given 3 scripts. To continue your aftercare when you leave the hospital, you may receive an automated call from our care team to check in on how you are doing. This is a free service and part of our promise to provide the best care and service to meet your aftercare needs.  If you have questions, or wish to unsubscribe from this service please call [phone]. Thank you for Choosing our [facility] Emergency Department.

## 2020-01-31 ENCOUNTER — HOSPITAL ENCOUNTER (EMERGENCY)
Age: 40
Discharge: HOME | End: 2020-01-31
Attending: EMERGENCY MEDICINE
Payer: COMMERCIAL

## 2020-01-31 VITALS
WEIGHT: 275 LBS | OXYGEN SATURATION: 99 % | DIASTOLIC BLOOD PRESSURE: 70 MMHG | HEART RATE: 80 BPM | TEMPERATURE: 98 F | RESPIRATION RATE: 16 BRPM | HEIGHT: 70 IN | BODY MASS INDEX: 39.37 KG/M2 | SYSTOLIC BLOOD PRESSURE: 151 MMHG

## 2020-01-31 DIAGNOSIS — M25.572 ACUTE LEFT ANKLE PAIN: ICD-10-CM

## 2020-01-31 DIAGNOSIS — M79.89 LEFT LEG SWELLING: Primary | ICD-10-CM

## 2020-01-31 LAB
ANION GAP SERPL CALC-SCNC: 7 MMOL/L (ref 7–16)
BASOPHILS # BLD: 0 K/UL (ref 0–0.2)
BASOPHILS NFR BLD: 0 % (ref 0–2)
BUN SERPL-MCNC: 15 MG/DL (ref 6–23)
CALCIUM SERPL-MCNC: 9 MG/DL (ref 8.3–10.4)
CHLORIDE SERPL-SCNC: 97 MMOL/L (ref 98–107)
CO2 SERPL-SCNC: 26 MMOL/L (ref 21–32)
CREAT SERPL-MCNC: 0.88 MG/DL (ref 0.6–1)
DIFFERENTIAL METHOD BLD: ABNORMAL
EOSINOPHIL # BLD: 0.1 K/UL (ref 0–0.8)
EOSINOPHIL NFR BLD: 1 % (ref 0.5–7.8)
ERYTHROCYTE [DISTWIDTH] IN BLOOD BY AUTOMATED COUNT: 12.9 % (ref 11.9–14.6)
GLUCOSE SERPL-MCNC: 385 MG/DL (ref 65–100)
HCT VFR BLD AUTO: 32.2 % (ref 35.8–46.3)
HGB BLD-MCNC: 10.2 G/DL (ref 11.7–15.4)
IMM GRANULOCYTES # BLD AUTO: 0 K/UL (ref 0–0.5)
IMM GRANULOCYTES NFR BLD AUTO: 0 % (ref 0–5)
LYMPHOCYTES # BLD: 1.7 K/UL (ref 0.5–4.6)
LYMPHOCYTES NFR BLD: 24 % (ref 13–44)
MCH RBC QN AUTO: 26 PG (ref 26.1–32.9)
MCHC RBC AUTO-ENTMCNC: 31.7 G/DL (ref 31.4–35)
MCV RBC AUTO: 81.9 FL (ref 79.6–97.8)
MONOCYTES # BLD: 0.3 K/UL (ref 0.1–1.3)
MONOCYTES NFR BLD: 5 % (ref 4–12)
NEUTS SEG # BLD: 4.9 K/UL (ref 1.7–8.2)
NEUTS SEG NFR BLD: 70 % (ref 43–78)
NRBC # BLD: 0 K/UL (ref 0–0.2)
PLATELET # BLD AUTO: 384 K/UL (ref 150–450)
PMV BLD AUTO: 10.4 FL (ref 9.4–12.3)
POTASSIUM SERPL-SCNC: 4.1 MMOL/L (ref 3.5–5.1)
RBC # BLD AUTO: 3.93 M/UL (ref 4.05–5.2)
SODIUM SERPL-SCNC: 130 MMOL/L (ref 136–145)
URATE SERPL-MCNC: 4 MG/DL (ref 2.6–6)
WBC # BLD AUTO: 6.9 K/UL (ref 4.3–11.1)

## 2020-01-31 PROCEDURE — 80048 BASIC METABOLIC PNL TOTAL CA: CPT

## 2020-01-31 PROCEDURE — 99283 EMERGENCY DEPT VISIT LOW MDM: CPT

## 2020-01-31 PROCEDURE — 85025 COMPLETE CBC W/AUTO DIFF WBC: CPT

## 2020-01-31 PROCEDURE — 84550 ASSAY OF BLOOD/URIC ACID: CPT

## 2020-01-31 PROCEDURE — 74011250637 HC RX REV CODE- 250/637: Performed by: EMERGENCY MEDICINE

## 2020-01-31 RX ORDER — HYDROCODONE BITARTRATE AND ACETAMINOPHEN 7.5; 325 MG/1; MG/1
1 TABLET ORAL
Status: COMPLETED | OUTPATIENT
Start: 2020-01-31 | End: 2020-01-31

## 2020-01-31 RX ORDER — IBUPROFEN 800 MG/1
800 TABLET ORAL
Qty: 21 TAB | Refills: 0 | Status: SHIPPED | OUTPATIENT
Start: 2020-01-31 | End: 2020-02-23

## 2020-01-31 RX ORDER — HYDROCODONE BITARTRATE AND ACETAMINOPHEN 7.5; 325 MG/1; MG/1
1 TABLET ORAL
Qty: 12 TAB | Refills: 0 | Status: SHIPPED | OUTPATIENT
Start: 2020-01-31 | End: 2020-02-03

## 2020-01-31 RX ADMIN — HYDROCODONE BITARTRATE AND ACETAMINOPHEN 1 TABLET: 7.5; 325 TABLET ORAL at 22:10

## 2020-02-01 NOTE — ED PROVIDER NOTES
61-year-old black female presents emergency department complaining of left lower extremity swelling for the last week. States when it first started she was seen at the urgent care and had an ultrasound which was read as negative. She is not placed on any medications for the discomfort, and complains of persistent pain and swelling to the left lower extremity primarily from the knee down to the ankle. Patient denies recent travel, trauma, surgery, hormonal therapy or history of DVT/PE. The history is provided by the patient. Leg Pain    This is a new problem. The current episode started more than 2 days ago. The problem occurs constantly. The problem has been gradually worsening. The pain is present in the left lower leg, left ankle and left foot. The quality of the pain is described as aching. The pain is moderate. Associated symptoms include stiffness. Pertinent negatives include no numbness, no tingling, no itching, no back pain and no neck pain. The symptoms are aggravated by palpation, standing and movement. She has tried OTC pain medications and rest for the symptoms. The treatment provided no relief. There has been no history of extremity trauma.         Past Medical History:   Diagnosis Date    Diabetes ([address]     Hypertension     pt denies       Past Surgical History:   Procedure Laterality Date    HX  SECTION           Family History:   Problem Relation Age of Onset    Diabetes Mother     Other Mother         [facility]       Social History     Socioeconomic History    Marital status: SINGLE     Spouse name: Not on file    Number of children: Not on file    Years of education: Not on file    Highest education level: Not on file   Occupational History    Not on file   Social Needs    Financial resource strain: Not on file    Food insecurity:     Worry: Not on file     Inability: Not on file    Transportation needs:     Medical: Not on file     Non-medical: Not on file Tobacco Use    Smoking status: Never Smoker    Smokeless tobacco: Never Used   Substance and Sexual Activity    Alcohol use: Yes     Comment: occasionally    Drug use: No    Sexual activity: Not Currently   Lifestyle    Physical activity:     Days per week: Not on file     Minutes per session: Not on file    Stress: Not on file   Relationships    Social connections:     Talks on phone: Not on file     Gets together: Not on file     Attends Sikhism service: Not on file     Active member of club or organization: Not on file     Attends meetings of clubs or organizations: Not on file     Relationship status: Not on file    Intimate partner violence:     Fear of current or ex partner: Not on file     Emotionally abused: Not on file     Physically abused: Not on file     Forced sexual activity: Not on file   Other Topics Concern    Not on file   Social History Narrative    Not on file         ALLERGIES: Patient has no known allergies. Review of Systems   Constitutional: Negative for chills and fever. Respiratory: Negative for shortness of breath. Cardiovascular: Positive for leg swelling. Negative for chest pain and palpitations. Musculoskeletal: Positive for arthralgias and stiffness. Negative for back pain and neck pain. Skin: Negative for itching. Neurological: Negative for tingling and numbness. All other systems reviewed and are negative. Vitals:    01/31/20 1928   BP: (!) 143/109   Pulse: 90   Resp: 17   Temp: 98 °F (36.7 °C)   SpO2: 97%   Weight: 124.7 kg (275 lb)   Height: 5' 10\" (1.778 m)            Physical Exam  Vitals signs and nursing note reviewed. Constitutional:       General: She is not in acute distress. Appearance: She is well-developed. HENT:      Head: Normocephalic and atraumatic.       Right Ear: External ear normal.      Left Ear: External ear normal.      Nose: Nose normal.      Mouth/Throat:      Mouth: Mucous membranes are moist.   Eyes: Extraocular Movements: Extraocular movements intact. Conjunctiva/sclera: Conjunctivae normal.      Pupils: Pupils are equal, round, and reactive to light. Neck:      Musculoskeletal: Normal range of motion and neck supple. No neck rigidity. Cardiovascular:      Rate and Rhythm: Normal rate and regular rhythm. Heart sounds: Normal heart sounds. Pulmonary:      Effort: Pulmonary effort is normal.      Breath sounds: Normal breath sounds. Abdominal:      General: Bowel sounds are normal.      Palpations: Abdomen is soft. Tenderness: There is no abdominal tenderness. Hernia: No hernia is present. Musculoskeletal: Normal range of motion. General: Swelling present. Left ankle: She exhibits swelling. She exhibits normal range of motion, no ecchymosis, no deformity, no laceration and normal pulse. Tenderness. Lateral malleolus tenderness found. No posterior TFL, no head of 5th metatarsal and no proximal fibula tenderness found. Achilles tendon exhibits pain. Achilles tendon exhibits no defect and normal Pride's test results. Right lower leg: No edema. Left lower leg: Edema present. Comments: Left lower extremity with 2+ swelling, moderate tenderness to the medial malleolus with moderate warmth to the ankle region compared to the right side. Distal pulses are 2+. Sensation remains intact. Negative Homans, negative calf tenderness. Skin:     General: Skin is warm and dry. Capillary Refill: Capillary refill takes less than 2 seconds. Coloration: Skin is not jaundiced. Findings: No rash. Neurological:      General: No focal deficit present. Mental Status: She is alert and oriented to person, place, and time. Cranial Nerves: Cranial nerves are intact. Sensory: Sensation is intact. Motor: Motor function is intact. Coordination: Coordination is intact.    Psychiatric:         Mood and Affect: Mood normal.         Behavior: Behavior normal.          MDM  Number of Diagnoses or Management Options  Acute left ankle pain: new and requires workup  Left leg swelling: new and requires workup     Amount and/or Complexity of Data Reviewed  Clinical lab tests: ordered and reviewed  Review and summarize past medical records: yes    Risk of Complications, Morbidity, and/or Mortality  Presenting problems: moderate  Diagnostic procedures: minimal  Management options: moderate    Patient Progress  Patient progress: stable         Procedures      Results Reviewed:      Recent Results (from the past 24 hour(s))   CBC WITH AUTOMATED DIFF    Collection Time: 01/31/20  8:49 PM   Result Value Ref Range    WBC 6.9 4.3 - 11.1 K/uL    RBC 3.93 (L) 4.05 - 5.2 M/uL    HGB 10.2 (L) 11.7 - 15.4 g/dL    HCT 32.2 (L) 35.8 - 46.3 %    MCV 81.9 79.6 - 97.8 FL    MCH 26.0 (L) 26.1 - 32.9 PG    MCHC 31.7 31.4 - 35.0 g/dL    RDW 12.9 11.9 - 14.6 %    PLATELET 653 557 - 265 K/uL    MPV 10.4 9.4 - 12.3 FL    ABSOLUTE NRBC 0.00 0.0 - 0.2 K/uL    DF AUTOMATED      NEUTROPHILS 70 43 - 78 %    LYMPHOCYTES 24 13 - 44 %    MONOCYTES 5 4.0 - 12.0 %    EOSINOPHILS 1 0.5 - 7.8 %    BASOPHILS 0 0.0 - 2.0 %    IMMATURE GRANULOCYTES 0 0.0 - 5.0 %    ABS. NEUTROPHILS 4.9 1.7 - 8.2 K/UL    ABS. LYMPHOCYTES 1.7 0.5 - 4.6 K/UL    ABS. MONOCYTES 0.3 0.1 - 1.3 K/UL    ABS. EOSINOPHILS 0.1 0.0 - 0.8 K/UL    ABS. BASOPHILS 0.0 0.0 - 0.2 K/UL    ABS. IMM.  GRANS. 0.0 0.0 - 0.5 K/UL   URIC ACID    Collection Time: 01/31/20  8:49 PM   Result Value Ref Range    Uric acid 4.0 2.6 - 6.0 MG/DL   METABOLIC PANEL, BASIC    Collection Time: 01/31/20  8:49 PM   Result Value Ref Range    Sodium 130 (L) 136 - 145 mmol/L    Potassium 4.1 3.5 - 5.1 mmol/L    Chloride 97 (L) 98 - 107 mmol/L    CO2 26 21 - 32 mmol/L    Anion gap 7 7 - 16 mmol/L    Glucose 385 (H) 65 - 100 mg/dL    BUN 15 6 - 23 MG/DL    Creatinine 0.88 0.6 - 1.0 MG/DL    GFR est AA >60 >60 ml/min/1.73m2    GFR est non-AA >60 >60 ml/min/1.73m2    Calcium 9.0 8.3 - 10.4 MG/DL       I discussed the results of all labs, procedures, radiographs, and treatments with the patient and available family. Treatment plan is agreed upon and the patient is ready for discharge. All voiced understanding of the discharge plan and medication instructions or changes as appropriate. Questions about treatment in the ED were answered. All were encouraged to return should symptoms worsen or new problems develop.

## 2020-02-01 NOTE — ED NOTES
I have reviewed discharge instructions with the patient. The patient verbalized understanding. Patient left ED via Discharge Method: ambulatory to Home . Opportunity for questions and clarification provided. Patient given 2 scripts. To continue your aftercare when you leave the hospital, you may receive an automated call from our care team to check in on how you are doing. This is a free service and part of our promise to provide the best care and service to meet your aftercare needs.  If you have questions, or wish to unsubscribe from this service please call [phone]. Thank you for Choosing our [facility] Emergency Department.

## 2020-02-01 NOTE — ED TRIAGE NOTES
Pt is complaining of left lower leg pain and swelling for 2 days. Pt denies any injury. Ankle and foot are swollen.

## 2020-02-01 NOTE — DISCHARGE INSTRUCTIONS

## 2020-02-23 ENCOUNTER — HOSPITAL ENCOUNTER (EMERGENCY)
Age: 40
Discharge: HOME | End: 2020-02-23
Attending: EMERGENCY MEDICINE
Payer: COMMERCIAL

## 2020-02-23 ENCOUNTER — APPOINTMENT (OUTPATIENT)
Dept: CT IMAGING | Age: 40
End: 2020-02-23
Attending: EMERGENCY MEDICINE
Payer: COMMERCIAL

## 2020-02-23 ENCOUNTER — APPOINTMENT (OUTPATIENT)
Dept: GENERAL RADIOLOGY | Age: 40
End: 2020-02-23
Attending: EMERGENCY MEDICINE
Payer: COMMERCIAL

## 2020-02-23 ENCOUNTER — APPOINTMENT (OUTPATIENT)
Dept: ULTRASOUND IMAGING | Age: 40
End: 2020-02-23
Attending: EMERGENCY MEDICINE
Payer: COMMERCIAL

## 2020-02-23 VITALS
WEIGHT: 260 LBS | HEART RATE: 88 BPM | OXYGEN SATURATION: 97 % | TEMPERATURE: 98.1 F | SYSTOLIC BLOOD PRESSURE: 122 MMHG | HEIGHT: 70 IN | BODY MASS INDEX: 37.22 KG/M2 | DIASTOLIC BLOOD PRESSURE: 86 MMHG | RESPIRATION RATE: 17 BRPM

## 2020-02-23 DIAGNOSIS — R07.9 CHEST PAIN, UNSPECIFIED TYPE: Primary | ICD-10-CM

## 2020-02-23 LAB
ALBUMIN SERPL-MCNC: 2.9 G/DL (ref 3.5–5)
ALBUMIN/GLOB SERPL: 0.4 {RATIO} (ref 1.2–3.5)
ALP SERPL-CCNC: 191 U/L (ref 50–130)
ALT SERPL-CCNC: 35 U/L (ref 12–65)
ANION GAP SERPL CALC-SCNC: 8 MMOL/L (ref 7–16)
AST SERPL-CCNC: 19 U/L (ref 15–37)
ATRIAL RATE: 116 BPM
BASOPHILS # BLD: 0 K/UL (ref 0–0.2)
BASOPHILS NFR BLD: 0 % (ref 0–2)
BILIRUB SERPL-MCNC: 0.4 MG/DL (ref 0.2–1.1)
BUN SERPL-MCNC: 9 MG/DL (ref 6–23)
CALCIUM SERPL-MCNC: 10 MG/DL (ref 8.3–10.4)
CALCULATED P AXIS, ECG09: 39 DEGREES
CALCULATED R AXIS, ECG10: 5 DEGREES
CALCULATED T AXIS, ECG11: 79 DEGREES
CHLORIDE SERPL-SCNC: 99 MMOL/L (ref 98–107)
CO2 SERPL-SCNC: 24 MMOL/L (ref 21–32)
CREAT SERPL-MCNC: 0.79 MG/DL (ref 0.6–1)
DIAGNOSIS, 93000: NORMAL
DIFFERENTIAL METHOD BLD: ABNORMAL
EOSINOPHIL # BLD: 0 K/UL (ref 0–0.8)
EOSINOPHIL NFR BLD: 0 % (ref 0.5–7.8)
ERYTHROCYTE [DISTWIDTH] IN BLOOD BY AUTOMATED COUNT: 13.2 % (ref 11.9–14.6)
GLOBULIN SER CALC-MCNC: 7.3 G/DL (ref 2.3–3.5)
GLUCOSE SERPL-MCNC: 246 MG/DL (ref 65–100)
HCG UR QL: NEGATIVE
HCT VFR BLD AUTO: 35 % (ref 35.8–46.3)
HGB BLD-MCNC: 10.9 G/DL (ref 11.7–15.4)
IMM GRANULOCYTES # BLD AUTO: 0 K/UL (ref 0–0.5)
IMM GRANULOCYTES NFR BLD AUTO: 0 % (ref 0–5)
LYMPHOCYTES # BLD: 2.1 K/UL (ref 0.5–4.6)
LYMPHOCYTES NFR BLD: 21 % (ref 13–44)
MCH RBC QN AUTO: 24.8 PG (ref 26.1–32.9)
MCHC RBC AUTO-ENTMCNC: 31.1 G/DL (ref 31.4–35)
MCV RBC AUTO: 79.7 FL (ref 79.6–97.8)
MONOCYTES # BLD: 0.5 K/UL (ref 0.1–1.3)
MONOCYTES NFR BLD: 5 % (ref 4–12)
NEUTS SEG # BLD: 7.1 K/UL (ref 1.7–8.2)
NEUTS SEG NFR BLD: 73 % (ref 43–78)
NRBC # BLD: 0 K/UL (ref 0–0.2)
P-R INTERVAL, ECG05: 140 MS
PLATELET # BLD AUTO: 514 K/UL (ref 150–450)
PMV BLD AUTO: 9.6 FL (ref 9.4–12.3)
POTASSIUM SERPL-SCNC: 3.9 MMOL/L (ref 3.5–5.1)
PROT SERPL-MCNC: 10.2 G/DL (ref 6.3–8.2)
Q-T INTERVAL, ECG07: 304 MS
QRS DURATION, ECG06: 88 MS
QTC CALCULATION (BEZET), ECG08: 422 MS
RBC # BLD AUTO: 4.39 M/UL (ref 4.05–5.2)
SODIUM SERPL-SCNC: 131 MMOL/L (ref 136–145)
TROPONIN I SERPL-MCNC: <0.02 NG/ML (ref 0.02–0.05)
TROPONIN I SERPL-MCNC: <0.02 NG/ML (ref 0.02–0.05)
VENTRICULAR RATE, ECG03: 116 BPM
WBC # BLD AUTO: 9.8 K/UL (ref 4.3–11.1)

## 2020-02-23 PROCEDURE — 74011000258 HC RX REV CODE- 258: Performed by: EMERGENCY MEDICINE

## 2020-02-23 PROCEDURE — 81025 URINE PREGNANCY TEST: CPT

## 2020-02-23 PROCEDURE — 93971 EXTREMITY STUDY: CPT

## 2020-02-23 PROCEDURE — 71045 X-RAY EXAM CHEST 1 VIEW: CPT

## 2020-02-23 PROCEDURE — 71260 CT THORAX DX C+: CPT

## 2020-02-23 PROCEDURE — 93005 ELECTROCARDIOGRAM TRACING: CPT | Performed by: EMERGENCY MEDICINE

## 2020-02-23 PROCEDURE — 85025 COMPLETE CBC W/AUTO DIFF WBC: CPT

## 2020-02-23 PROCEDURE — 84484 ASSAY OF TROPONIN QUANT: CPT

## 2020-02-23 PROCEDURE — 74011636320 HC RX REV CODE- 636/320: Performed by: EMERGENCY MEDICINE

## 2020-02-23 PROCEDURE — 99284 EMERGENCY DEPT VISIT MOD MDM: CPT

## 2020-02-23 PROCEDURE — 81003 URINALYSIS AUTO W/O SCOPE: CPT

## 2020-02-23 PROCEDURE — 80053 COMPREHEN METABOLIC PANEL: CPT

## 2020-02-23 RX ORDER — INDOMETHACIN 50 MG/1
50 CAPSULE ORAL 3 TIMES DAILY
COMMUNITY
End: 2020-10-21

## 2020-02-23 RX ORDER — COLCHICINE 0.6 MG/1
0.6 CAPSULE ORAL 2 TIMES DAILY
COMMUNITY
End: 2020-10-21

## 2020-02-23 RX ORDER — METFORMIN HYDROCHLORIDE 1000 MG/1
1000 TABLET ORAL
COMMUNITY
Start: 2020-01-25 | End: 2022-03-25

## 2020-02-23 RX ORDER — SODIUM CHLORIDE 0.9 % (FLUSH) 0.9 %
10 SYRINGE (ML) INJECTION
Status: COMPLETED | OUTPATIENT
Start: 2020-02-23 | End: 2020-02-23

## 2020-02-23 RX ORDER — CLINDAMYCIN HYDROCHLORIDE 150 MG/1
300 CAPSULE ORAL 4 TIMES DAILY
Qty: 60 CAP | Refills: 0 | Status: SHIPPED | OUTPATIENT
Start: 2020-02-23 | End: 2020-10-21

## 2020-02-23 RX ADMIN — Medication 10 ML: at 13:40

## 2020-02-23 RX ADMIN — SODIUM CHLORIDE 100 ML: 900 INJECTION, SOLUTION INTRAVENOUS at 13:40

## 2020-02-23 RX ADMIN — IOPAMIDOL 100 ML: 755 INJECTION, SOLUTION INTRAVENOUS at 13:40

## 2020-02-23 NOTE — DISCHARGE INSTRUCTIONS
AS WE DISCUSSED, THERE WERE FINDINGS ON YOUR EXAM TODAY THAT ARE ESSENTIAL THAT YOU FOLLOW UP ON IMMEDIATELY WITH YOUR PRIMARY CARE PROVIDER FOR FURTHER WORKUP INCLUDING MAMMOGRAM.

## 2020-02-23 NOTE — ED TRIAGE NOTES
Pt c/o constant mid chest pain and right arm pain and weakness for several days. Pt also c/o left foot pain and swelling. Pt states she went to PCP couple days ago and they are treating her for gout but per pt it has not improved. Pt denies any cardiac history.

## 2020-02-23 NOTE — ED NOTES
I have reviewed discharge instructions with the patient. The patient verbalized understanding. Patient left ED via Discharge Method: ambulatory to Home with self    Opportunity for questions and clarification provided. Patient given 1 scripts. To continue your aftercare when you leave the hospital, you may receive an automated call from our care team to check in on how you are doing. This is a free service and part of our promise to provide the best care and service to meet your aftercare needs.  If you have questions, or wish to unsubscribe from this service please call [phone]. Thank you for Choosing our [facility] Emergency Department.

## 2020-02-23 NOTE — ED PROVIDER NOTES
Patient is a 81-year-old female who presents with chest pain and shortness of breath. Patient states pain for the past couple of days located in her left chest, and states that she has had swelling of her left leg for last 2 weeks. Denies any abdominal pain, no nausea or vomiting, no cough, no congestion, no fevers or chills, no further complaints.   Pain is described as sharp in nature and stays in the left chest.           Past Medical History:   Diagnosis Date    Diabetes ([facility] [address]     Hypertension     pt denies       Past Surgical History:   Procedure Laterality Date    HX  SECTION           Family History:   Problem Relation Age of Onset    Diabetes Mother     Other Mother         Williams Rodríguez       Social History     Socioeconomic History    Marital status: SINGLE     Spouse name: Not on file    Number of children: Not on file    Years of education: Not on file    Highest education level: Not on file   Occupational History    Not on file   Social Needs    Financial resource strain: Not on file    Food insecurity:     Worry: Not on file     Inability: Not on file    Transportation needs:     Medical: Not on file     Non-medical: Not on file   Tobacco Use    Smoking status: Never Smoker    Smokeless tobacco: Never Used   Substance and Sexual Activity    Alcohol use: Yes     Comment: occasionally    Drug use: No    Sexual activity: Not Currently   Lifestyle    Physical activity:     Days per week: Not on file     Minutes per session: Not on file    Stress: Not on file   Relationships    Social connections:     Talks on phone: Not on file     Gets together: Not on file     Attends Sikh service: Not on file     Active member of club or organization: Not on file     Attends meetings of clubs or organizations: Not on file     Relationship status: Not on file    Intimate partner violence:     Fear of current or ex partner: Not on file     Emotionally abused: Not on file     Physically abused: Not on file     Forced sexual activity: Not on file   Other Topics Concern    Not on file   Social History Narrative    Not on file         ALLERGIES: Patient has no known allergies. Review of Systems   Constitutional: Negative for chills and fever. HENT: Negative for rhinorrhea and sore throat. Eyes: Negative for visual disturbance. Respiratory: Negative for cough and shortness of breath. Cardiovascular: Positive for chest pain and leg swelling. Gastrointestinal: Negative for abdominal pain, diarrhea, nausea and vomiting. Genitourinary: Negative for dysuria. Musculoskeletal: Negative for back pain and neck pain. Skin: Negative for rash. Neurological: Negative for weakness and headaches. Psychiatric/Behavioral: The patient is not nervous/anxious. Vitals:    02/23/20 1217   BP: 122/86   Pulse: (!) 117   Resp: 17   Temp: 98.1 °F (36.7 °C)   SpO2: 97%   Weight: 117.9 kg (260 lb)   Height: 5' 10\" (1.778 m)            Physical Exam  Vitals signs and nursing note reviewed. Constitutional:       Appearance: She is well-developed. HENT:      Head: Normocephalic. Right Ear: External ear normal.      Left Ear: External ear normal.   Eyes:      Conjunctiva/sclera: Conjunctivae normal.      Pupils: Pupils are equal, round, and reactive to light. Neck:      Musculoskeletal: Normal range of motion and neck supple. Trachea: No tracheal deviation. Cardiovascular:      Rate and Rhythm: Normal rate and regular rhythm. Heart sounds: Normal heart sounds. No murmur. Pulmonary:      Effort: Pulmonary effort is normal. No respiratory distress. Breath sounds: Normal breath sounds. Abdominal:      Palpations: Abdomen is soft. Tenderness: There is no abdominal tenderness. Musculoskeletal: Normal range of motion. Left lower leg: She exhibits tenderness. Edema present. Skin:     Findings: No rash.    Neurological:      Mental Status: She is alert and oriented to person, place, and time. Cranial Nerves: No cranial nerve deficit. MDM  Number of Diagnoses or Management Options     Amount and/or Complexity of Data Reviewed  Clinical lab tests: ordered and reviewed  Tests in the radiology section of CPT®: ordered and reviewed  Tests in the medicine section of CPT®: ordered and reviewed  Review and summarize past medical records: yes    Risk of Complications, Morbidity, and/or Mortality  Presenting problems: high  Diagnostic procedures: high  Management options: high    Patient Progress  Patient progress: stable         Procedures  Recent Results (from the past 12 hour(s))   EKG, 12 LEAD, INITIAL    Collection Time: 02/23/20 12:07 PM   Result Value Ref Range    Ventricular Rate 116 BPM    Atrial Rate 116 BPM    P-R Interval 140 ms    QRS Duration 88 ms    Q-T Interval 304 ms    QTC Calculation (Bezet) 422 ms    Calculated P Axis 39 degrees    Calculated R Axis 5 degrees    Calculated T Axis 79 degrees    Diagnosis       Sinus tachycardia  Moderate voltage criteria for LVH, may be normal variant  Nonspecific T wave abnormality  Abnormal ECG  When compared with ECG of 22-FEB-2014 15:40,  Nonspecific T wave abnormality, worse in Anterolateral leads     CBC WITH AUTOMATED DIFF    Collection Time: 02/23/20 12:19 PM   Result Value Ref Range    WBC 9.8 4.3 - 11.1 K/uL    RBC 4.39 4.05 - 5.2 M/uL    HGB 10.9 (L) 11.7 - 15.4 g/dL    HCT 35.0 (L) 35.8 - 46.3 %    MCV 79.7 79.6 - 97.8 FL    MCH 24.8 (L) 26.1 - 32.9 PG    MCHC 31.1 (L) 31.4 - 35.0 g/dL    RDW 13.2 11.9 - 14.6 %    PLATELET 069 (H) 433 - 450 K/uL    MPV 9.6 9.4 - 12.3 FL    ABSOLUTE NRBC 0.00 0.0 - 0.2 K/uL    DF AUTOMATED      NEUTROPHILS 73 43 - 78 %    LYMPHOCYTES 21 13 - 44 %    MONOCYTES 5 4.0 - 12.0 %    EOSINOPHILS 0 (L) 0.5 - 7.8 %    BASOPHILS 0 0.0 - 2.0 %    IMMATURE GRANULOCYTES 0 0.0 - 5.0 %    ABS. NEUTROPHILS 7.1 1.7 - 8.2 K/UL    ABS. LYMPHOCYTES 2.1 0.5 - 4.6 K/UL    ABS.  MONOCYTES 0.5 0.1 - 1.3 K/UL    ABS. EOSINOPHILS 0.0 0.0 - 0.8 K/UL    ABS. BASOPHILS 0.0 0.0 - 0.2 K/UL    ABS. IMM. GRANS. 0.0 0.0 - 0.5 K/UL   METABOLIC PANEL, COMPREHENSIVE    Collection Time: 02/23/20 12:19 PM   Result Value Ref Range    Sodium 131 (L) 136 - 145 mmol/L    Potassium 3.9 3.5 - 5.1 mmol/L    Chloride 99 98 - 107 mmol/L    CO2 24 21 - 32 mmol/L    Anion gap 8 7 - 16 mmol/L    Glucose 246 (H) 65 - 100 mg/dL    BUN 9 6 - 23 MG/DL    Creatinine 0.79 0.6 - 1.0 MG/DL    GFR est AA >60 >60 ml/min/1.73m2    GFR est non-AA >60 >60 ml/min/1.73m2    Calcium 10.0 8.3 - 10.4 MG/DL    Bilirubin, total 0.4 0.2 - 1.1 MG/DL    ALT (SGPT) 35 12 - 65 U/L    AST (SGOT) 19 15 - 37 U/L    Alk. phosphatase 191 (H) 50 - 130 U/L    Protein, total 10.2 (H) 6.3 - 8.2 g/dL    Albumin 2.9 (L) 3.5 - 5.0 g/dL    Globulin 7.3 (H) 2.3 - 3.5 g/dL    A-G Ratio 0.4 (L) 1.2 - 3.5     TROPONIN I    Collection Time: 02/23/20 12:19 PM   Result Value Ref Range    Troponin-I, Qt. <0.02 (L) 0.02 - 0.05 NG/ML   HCG URINE, QL. - POC    Collection Time: 02/23/20 12:44 PM   Result Value Ref Range    Pregnancy test,urine (POC) NEGATIVE  NEG       Ct Chest W Cont    Result Date: 2/23/2020  CT OF THE CHEST WITH INTRAVENOUS CONTRAST, 2/23/2020 Indication: Constant mid chest pain and right arm pain and weakness for several days. Left foot pain and swelling. Comparison: None Technique:   2.5 mm axial scans from above the aortic arch to the lung bases following the uneventful administration of 70 mL of Isovue-370. Intravenous contrast was given to evaluate for pulmonary embolism. All CT scans performed at this facility use one or all of the following: Automated exposure control, adjustment of the mA and/or kVp according to patient's size, iterative reconstruction. Findings: The base of the neck is unremarkable in appearance.   No clear adenopathy is seen although there is a mild asymmetrically prominent right axillary lymph node seen on axial image 26 measuring 1.4 cm short axis. The thoracic aorta is normal in caliber. Opacification of the pulmonary arteries is good. No abnormal filling defects are seen to suggest pulmonary embolism. Evaluation with lung windows demonstrates no acute infiltrates. Only moderate dependent atelectatic appearing changes are seen of the lungs. No pleural effusion is seen. Lungs are expanded without evidence for pneumothorax. No acute osseous abnormality is seen. Abnormal changes are seen in the right breast which are incompletely characterized by CT. Specifically, there is abnormal skin changes which appear to involve the right nipple and extending up to the right axilla. The possibility of a malignant process cannot be excluded. Limited evaluation of the upper abdomen demonstrates no acute abnormality. IMPRESSION:  1. No evidence for pulmonary embolism, or acute thoracic process otherwise to suggest a worrisome etiology for the patient's clinical symptoms. Only moderate dependent atelectatic appearing changes are seen of the lungs. 2. Changes of the right breast which are incompletely characterized. Specifically, there are abnormal skin changes which appear to involve the right nipple and subsequently extend to the right axilla. A right breast malignancy cannot be excluded given the appearance. If this is not a known finding, then initial evaluation with clinical exam is recommended. A follow-up diagnostic mammogram, and ultrasound, if indicated, is recommended if this has not been previously characterized at an outside institution. 3. Prominent right axillary lymph node. If the patient is subsequently found to have a right breast malignancy, then a metastatic lymph nodes cannot be excluded. Xr Chest Port    Result Date: 2/23/2020  CHEST X-RAY, single portable view  2/23/2020 History: Right arm and chest pain for 2 days. Technique: Single frontal view of the chest. Comparison: None. Findings:  The cardiac silhouette is normal in respect to size. The lungs are expanded without evidence for pneumothorax. No consolidative airspace process or pleural effusion is seen. IMPRESSION: 1. No acute cardiopulmonary process evident on single frontal view of the chest.     Duplex Lower Ext Venous Left    Result Date: 2/23/2020  HISTORY: Right lower extremity swelling, DVT Exam: Left lower extremity ultrasound Technique: Realtime grayscale and color Doppler imaging is performed in the longitudinal and transverse planes. FINDINGS: There is normal flow, augmentation, and compressibility within the deep venous system from the groin to popliteal fossa. Posterior tibial vein and peroneal vein are also normal. No Baker's cyst. IMPRESSIONS: No evidence of deep venous thrombosis within the left lower extremity. 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 I did not feel any lumps in her breast on brief breast exam however discussed with patient at length importance of follow up for mammogram and further workup to rule out breast cancer. I will start patient on clindamycin to cover for infection and patient to return with any worsening pain or swelling or any further concerns. As for left lower leg there is mild swelling although no redness and neurovascularly fully intact so discussed need for further workup with PCP including possible repeat US if symptoms persist and discussed elevation of left leg while resting at home. Will repeat troponin to complete cardiac workup at this time as EKG is sinus tachycardia (improved in ED to mid 80s on repeat evaluation) with no acute ischemic changes and initial troponin WNL.   If negative will discharge with plan as above.

## 2020-06-22 ENCOUNTER — HOSPITAL ENCOUNTER (EMERGENCY)
Age: 40
Discharge: HOME | End: 2020-06-22
Attending: EMERGENCY MEDICINE
Payer: COMMERCIAL

## 2020-06-22 VITALS
TEMPERATURE: 97.6 F | BODY MASS INDEX: 37.03 KG/M2 | RESPIRATION RATE: 20 BRPM | SYSTOLIC BLOOD PRESSURE: 146 MMHG | HEART RATE: 92 BPM | HEIGHT: 69 IN | DIASTOLIC BLOOD PRESSURE: 78 MMHG | OXYGEN SATURATION: 100 % | WEIGHT: 250 LBS

## 2020-06-22 DIAGNOSIS — E11.42 DIABETIC POLYNEUROPATHY ASSOCIATED WITH TYPE 2 DIABETES MELLITUS (HCC): Primary | ICD-10-CM

## 2020-06-22 DIAGNOSIS — R73.9 HYPERGLYCEMIA: ICD-10-CM

## 2020-06-22 LAB
ANION GAP SERPL CALC-SCNC: 4 MMOL/L (ref 7–16)
BASOPHILS # BLD: 0 K/UL (ref 0–0.2)
BASOPHILS NFR BLD: 0 % (ref 0–2)
BUN SERPL-MCNC: 13 MG/DL (ref 6–23)
CALCIUM SERPL-MCNC: 8.9 MG/DL (ref 8.3–10.4)
CHLORIDE SERPL-SCNC: 100 MMOL/L (ref 98–107)
CO2 SERPL-SCNC: 27 MMOL/L (ref 21–32)
CREAT SERPL-MCNC: 0.76 MG/DL (ref 0.6–1)
DIFFERENTIAL METHOD BLD: ABNORMAL
EOSINOPHIL # BLD: 0 K/UL (ref 0–0.8)
EOSINOPHIL NFR BLD: 1 % (ref 0.5–7.8)
ERYTHROCYTE [DISTWIDTH] IN BLOOD BY AUTOMATED COUNT: 13.4 % (ref 11.9–14.6)
EST. AVERAGE GLUCOSE BLD GHB EST-MCNC: 341 MG/DL
GLUCOSE SERPL-MCNC: 381 MG/DL (ref 65–100)
HBA1C MFR BLD: 13.5 %
HCT VFR BLD AUTO: 33.2 % (ref 35.8–46.3)
HGB BLD-MCNC: 10.8 G/DL (ref 11.7–15.4)
IMM GRANULOCYTES # BLD AUTO: 0 K/UL (ref 0–0.5)
IMM GRANULOCYTES NFR BLD AUTO: 0 % (ref 0–5)
LYMPHOCYTES # BLD: 1.5 K/UL (ref 0.5–4.6)
LYMPHOCYTES NFR BLD: 22 % (ref 13–44)
MCH RBC QN AUTO: 27.1 PG (ref 26.1–32.9)
MCHC RBC AUTO-ENTMCNC: 32.5 G/DL (ref 31.4–35)
MCV RBC AUTO: 83.4 FL (ref 79.6–97.8)
MONOCYTES # BLD: 0.4 K/UL (ref 0.1–1.3)
MONOCYTES NFR BLD: 5 % (ref 4–12)
NEUTS SEG # BLD: 4.7 K/UL (ref 1.7–8.2)
NEUTS SEG NFR BLD: 71 % (ref 43–78)
NRBC # BLD: 0 K/UL (ref 0–0.2)
PLATELET # BLD AUTO: 300 K/UL (ref 150–450)
PMV BLD AUTO: 11 FL (ref 9.4–12.3)
POTASSIUM SERPL-SCNC: 4.2 MMOL/L (ref 3.5–5.1)
RBC # BLD AUTO: 3.98 M/UL (ref 4.05–5.2)
SODIUM SERPL-SCNC: 131 MMOL/L (ref 136–145)
WBC # BLD AUTO: 6.6 K/UL (ref 4.3–11.1)

## 2020-06-22 PROCEDURE — 80048 BASIC METABOLIC PNL TOTAL CA: CPT

## 2020-06-22 PROCEDURE — 74011250636 HC RX REV CODE- 250/636: Performed by: EMERGENCY MEDICINE

## 2020-06-22 PROCEDURE — 74011636637 HC RX REV CODE- 636/637: Performed by: EMERGENCY MEDICINE

## 2020-06-22 PROCEDURE — 74011250637 HC RX REV CODE- 250/637: Performed by: EMERGENCY MEDICINE

## 2020-06-22 PROCEDURE — 83036 HEMOGLOBIN GLYCOSYLATED A1C: CPT

## 2020-06-22 PROCEDURE — 96374 THER/PROPH/DIAG INJ IV PUSH: CPT

## 2020-06-22 PROCEDURE — 99284 EMERGENCY DEPT VISIT MOD MDM: CPT

## 2020-06-22 PROCEDURE — 85025 COMPLETE CBC W/AUTO DIFF WBC: CPT

## 2020-06-22 RX ORDER — GABAPENTIN 300 MG/1
300 CAPSULE ORAL
Status: COMPLETED | OUTPATIENT
Start: 2020-06-22 | End: 2020-06-22

## 2020-06-22 RX ORDER — GABAPENTIN 300 MG/1
CAPSULE ORAL
Qty: 90 CAP | Refills: 1 | Status: SHIPPED | OUTPATIENT
Start: 2020-06-22 | End: 2020-11-09

## 2020-06-22 RX ADMIN — HUMAN INSULIN 10 UNITS: 100 INJECTION, SOLUTION SUBCUTANEOUS at 13:32

## 2020-06-22 RX ADMIN — GABAPENTIN 300 MG: 300 CAPSULE ORAL at 13:53

## 2020-06-22 RX ADMIN — SODIUM CHLORIDE 1000 ML: 900 INJECTION, SOLUTION INTRAVENOUS at 13:53

## 2020-06-22 NOTE — ED PROVIDER NOTES
72-year-old female with marginally controlled type 2 diabetes presents with pain numbness and burning sensation to her feet times onset 2 to 3 days prior to arrival and have been fairly constant. She notices the numbness in a stocking glove distribution from the toes to the proximal calves. Slightly worse on the left side than the right. She also notices some edema present both legs, again worse on the left than the right, the swelling seems to be better in the morning when she awakens, and worse by the end of the day after having been up and about on her feet. Patient states her sugar was 112 the other morning, but sometimes is as high as 300s. She is on metformin, and another pill that starts with the letter \"F\" (Denilson [place]? ? ). She has no change in activities no change in footwear, no injury to the feet or legs. She notices no numbness or weakness to the arms or face, no acute visual changes that she states things are generally a little bit blurrier at times, intermittently.            Past Medical History:   Diagnosis Date    Diabetes ([facility] [address]     Hypertension     pt denies       Past Surgical History:   Procedure Laterality Date    HX  SECTION           Family History:   Problem Relation Age of Onset    Diabetes Mother     Other Mother         Toñito Rm       Social History     Socioeconomic History    Marital status: SINGLE     Spouse name: Not on file    Number of children: Not on file    Years of education: Not on file    Highest education level: Not on file   Occupational History    Not on file   Social Needs    Financial resource strain: Not on file    Food insecurity     Worry: Not on file     Inability: Not on file    Transportation needs     Medical: Not on file     Non-medical: Not on file   Tobacco Use    Smoking status: Never Smoker    Smokeless tobacco: Never Used   Substance and Sexual Activity    Alcohol use: Yes     Comment: occasionally    Drug use: No    Sexual activity: Not Currently   Lifestyle    Physical activity     Days per week: Not on file     Minutes per session: Not on file    Stress: Not on file   Relationships    Social connections     Talks on phone: Not on file     Gets together: Not on file     Attends [employer] service: Not on file     Active member of club or organization: Not on file     Attends meetings of clubs or organizations: Not on file     Relationship status: Not on file    Intimate partner violence     Fear of current or ex partner: Not on file     Emotionally abused: Not on file     Physically abused: Not on file     Forced sexual activity: Not on file   Other Topics Concern    Not on file   Social History Narrative    Not on file         ALLERGIES: Patient has no known allergies. Review of Systems   Constitutional: Negative for chills and fever. HENT: Negative for rhinorrhea and sore throat. Eyes: Negative for discharge and redness. Respiratory: Negative for cough and shortness of breath. Cardiovascular: Positive for leg swelling. Negative for chest pain and palpitations. Gastrointestinal: Negative for abdominal pain, diarrhea, nausea and vomiting. Musculoskeletal: Negative for arthralgias and back pain. Skin: Negative for rash. Neurological: Positive for numbness. Negative for dizziness and headaches. All other systems reviewed and are negative. Vitals:    06/22/20 1122 06/22/20 1125   BP:  (!) 144/93   Pulse:  92   Resp:  20   Temp: 97.6 °F (36.4 °C)    SpO2:  99%   Weight: 113.4 kg (250 lb)    Height: 5' 9\" (1.753 m)             Physical Exam  Vitals signs and nursing note reviewed. Constitutional:       General: She is not in acute distress. Appearance: Normal appearance. She is well-developed. She is not ill-appearing, toxic-appearing or diaphoretic. HENT:      Head: Normocephalic and atraumatic. Eyes:      General: No scleral icterus. Right eye: No discharge.          Left eye: No discharge. Conjunctiva/sclera: Conjunctivae normal.      Pupils: Pupils are equal, round, and reactive to light. Neck:      Musculoskeletal: Normal range of motion and neck supple. Cardiovascular:      Rate and Rhythm: Normal rate and regular rhythm. Pulses:           Dorsalis pedis pulses are 2+ on the right side and 2+ on the left side. Posterior tibial pulses are 1+ on the right side and 1+ on the left side. Heart sounds: Normal heart sounds. No murmur. No gallop. Pulmonary:      Effort: Pulmonary effort is normal. No respiratory distress. Breath sounds: Normal breath sounds. No wheezing or rales. Abdominal:      General: Bowel sounds are normal.      Palpations: Abdomen is soft. Tenderness: There is no abdominal tenderness. There is no guarding. Musculoskeletal: Normal range of motion. Right lower leg: Edema present. Left lower leg: Edema present. Legs:    Skin:     General: Skin is warm and dry. Neurological:      General: No focal deficit present. Mental Status: She is alert and oriented to person, place, and time. Mental status is at baseline. Motor: No abnormal muscle tone. Comments: cni 2-12 grossly   Psychiatric:         Mood and Affect: Mood normal.         Behavior: Behavior normal.          MDM  Number of Diagnoses or Management Options  Diagnosis management comments: Medical decision making note:  Numbness and pain with mild swelling to both feet, suspect diabetic neuropathy. Pulses intact, doubtful for vascular disease. Sugars a bit high at 381, A1c has unfortunately trended back up to 13.  1 dose of insulin a liter of saline here,  About the benefits of a low carb diet information provided in AVS.  Follow-up with PCP,   start Neurontin for what seems to be diabetic neuropathy. This concludes the \"medical decision making note\" part of this emergency department visit note.            Procedures

## 2020-06-22 NOTE — DISCHARGE INSTRUCTIONS
Avoid sugary drinks, juice and gatorade  Instead, drink : water, diet sodas, crystal lite, or powerade-zero. Tea and/or coffee should be UNsweetened, or ARTIFICIALLY sweetened    Avoid breads, pasta, rice, fruit, sweets, candy. Eat meats, eggs, cheese, fats, oils, nuts, green vegetables    Look at [employer]  Or get the new atkins diet   To learn more about how to consume a ketogenic diet    Watch on youtube, for [employer]: \"the two big lies about type 2 diabetes\", and \"how to reverse type 2 diabetes naturally\"  He has an excellent book : \"The Diabetes Code\", which can be had for about $14    You do NOT have to progress to [facility] AND HIGHER DOSES OF) insulin, in order to keep your sugars under control  Treat the CAUSE (insulin resistance), NOT the symptom (blood sugar)    You can reach me at [email or web address] to discuss        Patient Education        Diabetic Neuropathy: Care Instructions  Your Care Instructions     When you have diabetes, your blood sugar level may get too high. Over time, high blood sugar levels can damage nerves. This is called diabetic neuropathy. Nerve damage can cause pain, burning, tingling, and numbness and may leave you feeling weak. The feet are often affected. When you have nerve damage in your feet, you cannot feel your feet and toes as well as normal and may not notice cuts or sores. Even a small injury can lead to a serious infection. It is very important that you follow your doctor's advice on foot care. Sometimes diabetes damages nerves that help the body function. If this happens, your blood pressure, sweating, digestion, and urination might be affected. Your doctor may give you a target blood sugar level that is higher or lower than you are used to. Try to keep your blood sugar very close to this target level to prevent more damage. Follow-up care is a key part of your treatment and safety.  Be sure to make and go to all appointments, and call your doctor if you are having problems. It's also a good idea to know your test results and keep a list of the medicines you take. How can you care for yourself at home? · Take your medicines exactly as prescribed. Call your doctor if you think you are having a problem with your medicine. It is very important that you take your insulin or diabetes pills as your doctor tells you. · Try to keep blood sugar at your target level. ? Eat a variety of healthy foods, with carbohydrate spread out in your meals. A dietitian can help you plan meals. ? Try to get at least 30 minutes of exercise on most days. ? Check your blood sugar as many times each day as your doctor recommends. · Take and record your blood pressure at home if your doctor tells you to. Learn the importance of the two measures of blood pressure (such as 130 over 80, or 130/80). To take your blood pressure at home:  ? Ask your doctor to check your blood pressure monitor to be sure it is accurate and the cuff fits you. Also ask your doctor to watch you to make sure that you are using it right. ? Do not use medicine known to raise blood pressure (such as some nasal decongestant sprays) before taking your blood pressure. ? Avoid taking your blood pressure if you have just exercised or are nervous or upset. Rest at least 15 minutes before you take a reading. · Take pain medicines exactly as directed. ? If the doctor gave you a prescription medicine for pain, take it as prescribed. ? If you are not taking a prescription pain medicine, ask your doctor if you can take an over-the-counter medicine. · Do not smoke. Smoking can increase your chance for a heart attack or stroke. If you need help quitting, talk to your doctor about stop-smoking programs and medicines. These can increase your chances of quitting for good. · Limit alcohol to 2 drinks a day for men and 1 drink a day for women. Too much alcohol can cause health problems.   · Eat small meals often, rather than 2 or 3 large meals a day. To care for your feet  · Prevent injury by wearing shoes at all times, even when you are indoors. · Do foot care as part of your daily routine. Wash your feet and then rub lotion on your feet, but not between your toes. Use a handheld mirror or magnifying mirror to inspect your feet for blisters, cuts, cracks, or sores. · Have your toenails trimmed and filed straight across. · Wear shoes and socks that fit well. Soft shoes that have good support and that fit well (such as tennis shoes) are best for your feet. · Check your shoes for any loose objects or rough edges before you put them on. · Ask your doctor to check your feet during each visit. Your doctor may notice a foot problem you have missed. · Get early treatment for any foot problem, even a minor one. When should you call for help? Call your doctor now or seek immediate medical care if:  · You have symptoms of infection, such as:  ? Increased pain, swelling, warmth, or redness. ? Red streaks leading from the area. ? Pus draining from the area. ? A fever. · You have new or worse numbness, pain, or tingling in any part of your body. Watch closely for changes in your health, and be sure to contact your doctor if:  · You have a new problem with your feet, such as:  ? A new sore or ulcer. ? A break in the skin that is not healing after several days. ? Bleeding corns or calluses. ? An ingrown toenail. · You do not get better as expected. Where can you learn more? Go to http://yocasta-flavia.info/  Enter V828 in the search box to learn more about \"Diabetic Neuropathy: Care Instructions. \"  Current as of: December 20, 2019               Content Version: 12.5  © 7153-9355 [employer]. Care instructions adapted under license by [employer] (which disclaims liability or warranty for this information).  If you have questions about a medical condition or this instruction, always ask your healthcare professional. [address] any warranty or liability for your use of this information.

## 2020-06-22 NOTE — ED NOTES
I have reviewed discharge instructions with the patient. The patient verbalized understanding. Patient left ED via Discharge Method: ambulatory to Home with self. Opportunity for questions and clarification provided. Patient given 1 scripts. To continue your aftercare when you leave the hospital, you may receive an automated call from our care team to check in on how you are doing. This is a free service and part of our promise to provide the best care and service to meet your aftercare needs.  If you have questions, or wish to unsubscribe from this service please call [phone]. Thank you for Choosing our [address] [place] [address] Emergency Department.

## 2020-06-22 NOTE — ED TRIAGE NOTES
Patient presents with subjective bilateral numbness to both legs and feet. Reports sensation has been present for a couple days. Sensation WNL in triage. Mask applied.

## 2020-06-23 ENCOUNTER — PATIENT OUTREACH (OUTPATIENT)
Dept: CASE MANAGEMENT | Age: 40
End: 2020-06-23

## 2020-06-23 NOTE — PROGRESS NOTES
RNCM attempted to reach pt regarding ED ASHLEY, no answer and vm is full. Will continue attempts to reach pt.

## 2020-06-24 ENCOUNTER — PATIENT OUTREACH (OUTPATIENT)
Dept: CASE MANAGEMENT | Age: 40
End: 2020-06-24

## 2020-06-25 ENCOUNTER — PATIENT OUTREACH (OUTPATIENT)
Dept: CASE MANAGEMENT | Age: 40
End: 2020-06-25

## 2020-10-12 ENCOUNTER — APPOINTMENT (OUTPATIENT)
Dept: GENERAL RADIOLOGY | Age: 40
End: 2020-10-12
Attending: EMERGENCY MEDICINE
Payer: COMMERCIAL

## 2020-10-12 ENCOUNTER — HOSPITAL ENCOUNTER (EMERGENCY)
Age: 40
Discharge: HOME | End: 2020-10-12
Attending: EMERGENCY MEDICINE
Payer: COMMERCIAL

## 2020-10-12 VITALS
RESPIRATION RATE: 16 BRPM | TEMPERATURE: 98.8 F | OXYGEN SATURATION: 98 % | SYSTOLIC BLOOD PRESSURE: 125 MMHG | HEART RATE: 89 BPM | DIASTOLIC BLOOD PRESSURE: 74 MMHG

## 2020-10-12 DIAGNOSIS — R07.89 ATYPICAL CHEST PAIN: Primary | ICD-10-CM

## 2020-10-12 LAB
ALBUMIN SERPL-MCNC: 2.8 G/DL (ref 3.5–5)
ALBUMIN/GLOB SERPL: 0.5 {RATIO} (ref 1.2–3.5)
ALP SERPL-CCNC: 124 U/L (ref 50–136)
ALT SERPL-CCNC: 22 U/L (ref 12–65)
ANION GAP SERPL CALC-SCNC: 8 MMOL/L (ref 7–16)
AST SERPL-CCNC: 16 U/L (ref 15–37)
BASOPHILS # BLD: 0 K/UL (ref 0–0.2)
BASOPHILS NFR BLD: 0 % (ref 0–2)
BILIRUB SERPL-MCNC: 0.2 MG/DL (ref 0.2–1.1)
BUN SERPL-MCNC: 12 MG/DL (ref 6–23)
CALCIUM SERPL-MCNC: 8.9 MG/DL (ref 8.3–10.4)
CHLORIDE SERPL-SCNC: 99 MMOL/L (ref 98–107)
CO2 SERPL-SCNC: 27 MMOL/L (ref 21–32)
CREAT SERPL-MCNC: 0.9 MG/DL (ref 0.6–1)
DIFFERENTIAL METHOD BLD: ABNORMAL
EOSINOPHIL # BLD: 0.1 K/UL (ref 0–0.8)
EOSINOPHIL NFR BLD: 1 % (ref 0.5–7.8)
ERYTHROCYTE [DISTWIDTH] IN BLOOD BY AUTOMATED COUNT: 13.6 % (ref 11.9–14.6)
GLOBULIN SER CALC-MCNC: 6.2 G/DL (ref 2.3–3.5)
GLUCOSE SERPL-MCNC: 272 MG/DL (ref 65–100)
HCT VFR BLD AUTO: 31.2 % (ref 35.8–46.3)
HGB BLD-MCNC: 10 G/DL (ref 11.7–15.4)
IMM GRANULOCYTES # BLD AUTO: 0 K/UL (ref 0–0.5)
IMM GRANULOCYTES NFR BLD AUTO: 0 % (ref 0–5)
LYMPHOCYTES # BLD: 2.2 K/UL (ref 0.5–4.6)
LYMPHOCYTES NFR BLD: 23 % (ref 13–44)
MCH RBC QN AUTO: 25.8 PG (ref 26.1–32.9)
MCHC RBC AUTO-ENTMCNC: 32.1 G/DL (ref 31.4–35)
MCV RBC AUTO: 80.4 FL (ref 79.6–97.8)
MONOCYTES # BLD: 0.5 K/UL (ref 0.1–1.3)
MONOCYTES NFR BLD: 5 % (ref 4–12)
NEUTS SEG # BLD: 6.8 K/UL (ref 1.7–8.2)
NEUTS SEG NFR BLD: 71 % (ref 43–78)
NRBC # BLD: 0 K/UL (ref 0–0.2)
PLATELET # BLD AUTO: 387 K/UL (ref 150–450)
PMV BLD AUTO: 10 FL (ref 9.4–12.3)
POTASSIUM SERPL-SCNC: 3.6 MMOL/L (ref 3.5–5.1)
PROT SERPL-MCNC: 9 G/DL (ref 6.3–8.2)
RBC # BLD AUTO: 3.88 M/UL (ref 4.05–5.2)
SODIUM SERPL-SCNC: 134 MMOL/L (ref 136–145)
TROPONIN-HIGH SENSITIVITY: 3.1 PG/ML (ref 0–14)
WBC # BLD AUTO: 9.7 K/UL (ref 4.3–11.1)

## 2020-10-12 PROCEDURE — 84484 ASSAY OF TROPONIN QUANT: CPT

## 2020-10-12 PROCEDURE — 99284 EMERGENCY DEPT VISIT MOD MDM: CPT

## 2020-10-12 PROCEDURE — 73610 X-RAY EXAM OF ANKLE: CPT

## 2020-10-12 PROCEDURE — 99283 EMERGENCY DEPT VISIT LOW MDM: CPT

## 2020-10-12 PROCEDURE — 74011250637 HC RX REV CODE- 250/637: Performed by: EMERGENCY MEDICINE

## 2020-10-12 PROCEDURE — 71046 X-RAY EXAM CHEST 2 VIEWS: CPT

## 2020-10-12 PROCEDURE — 85025 COMPLETE CBC W/AUTO DIFF WBC: CPT

## 2020-10-12 PROCEDURE — 80053 COMPREHEN METABOLIC PANEL: CPT

## 2020-10-12 RX ORDER — SUCRALFATE 1 G/1
1 TABLET ORAL ONCE
Status: COMPLETED | OUTPATIENT
Start: 2020-10-12 | End: 2020-10-12

## 2020-10-12 RX ORDER — TRAMADOL HYDROCHLORIDE 50 MG/1
50 TABLET ORAL
Status: COMPLETED | OUTPATIENT
Start: 2020-10-12 | End: 2020-10-12

## 2020-10-12 RX ADMIN — SUCRALFATE 1 G: 1 TABLET ORAL at 20:20

## 2020-10-12 RX ADMIN — TRAMADOL HYDROCHLORIDE 50 MG: 50 TABLET, FILM COATED ORAL at 20:20

## 2020-10-12 NOTE — ED PROVIDER NOTES
60-year-old lady presents with concerns about pain in her upper right chest that radiates into her jaw. She has had that pain for about 36 hours. She said it started yesterday morning and has persisted. With her pain she is had no other symptoms including no difficulty breathing. No fevers or chills. No prior history of cardiac issues but she does have a history of diabetes. Additionally, she is having pain on the inside of her right ankle that started around the same time. She has not had any swelling in that leg. No trauma or injury. No other associated symptoms. Elements of this note were created using speech recognition software. As such, errors of speech recognition may be present.            Past Medical History:   Diagnosis Date    Diabetes ([facility] [address]     Hypertension     pt denies       Past Surgical History:   Procedure Laterality Date    HX  SECTION           Family History:   Problem Relation Age of Onset    Diabetes Mother     Other Mother         Lashaun Ashley       Social History     Socioeconomic History    Marital status: SINGLE     Spouse name: Not on file    Number of children: Not on file    Years of education: Not on file    Highest education level: Not on file   Occupational History    Not on file   Social Needs    Financial resource strain: Not on file    Food insecurity     Worry: Not on file     Inability: Not on file    Transportation needs     Medical: Not on file     Non-medical: Not on file   Tobacco Use    Smoking status: Never Smoker    Smokeless tobacco: Never Used   Substance and Sexual Activity    Alcohol use: Yes     Comment: occasionally    Drug use: No    Sexual activity: Not Currently   Lifestyle    Physical activity     Days per week: Not on file     Minutes per session: Not on file    Stress: Not on file   Relationships    Social connections     Talks on phone: Not on file     Gets together: Not on file     Attends Sabianism service: Not on file     Active member of club or organization: Not on file     Attends meetings of clubs or organizations: Not on file     Relationship status: Not on file    Intimate partner violence     Fear of current or ex partner: Not on file     Emotionally abused: Not on file     Physically abused: Not on file     Forced sexual activity: Not on file   Other Topics Concern    Not on file   Social History Narrative    Not on file         ALLERGIES: Patient has no known allergies. Review of Systems   Constitutional: Negative for chills, diaphoresis and fever. HENT: Negative for congestion, rhinorrhea and sore throat. Eyes: Negative for redness and visual disturbance. Respiratory: Positive for chest tightness. Negative for cough, choking, shortness of breath and wheezing. Cardiovascular: Positive for chest pain. Negative for palpitations and leg swelling. Gastrointestinal: Negative for abdominal pain, blood in stool, diarrhea, nausea and vomiting. Endocrine: Negative for polydipsia and polyuria. Genitourinary: Negative for dysuria and hematuria. Musculoskeletal: Negative for arthralgias, myalgias and neck stiffness. Skin: Negative for rash. Allergic/Immunologic: Negative for environmental allergies and food allergies. Neurological: Negative for dizziness, weakness and headaches. Hematological: Negative for adenopathy. Does not bruise/bleed easily. Psychiatric/Behavioral: Negative for confusion and sleep disturbance. The patient is not nervous/anxious. Vitals:    10/12/20 1920   BP: 130/80   Pulse: 99   Resp: 20   Temp: 99 °F (37.2 °C)   SpO2: 99%            Physical Exam  Vitals signs and nursing note reviewed. Constitutional:       General: She is not in acute distress. Appearance: Normal appearance. She is well-developed. She is not toxic-appearing. HENT:      Head: Normocephalic and atraumatic. Eyes:      General: No scleral icterus. Right eye: No discharge. Left eye: No discharge. Conjunctiva/sclera: Conjunctivae normal.      Pupils: Pupils are equal, round, and reactive to light. Neck:      Musculoskeletal: Normal range of motion. No neck rigidity. Cardiovascular:      Rate and Rhythm: Normal rate and regular rhythm. Heart sounds: Normal heart sounds. Pulmonary:      Effort: Pulmonary effort is normal. No respiratory distress. Breath sounds: Normal breath sounds. No wheezing or rales. Comments: Minor tenderness along her right sternal border. Chest:      Chest wall: Tenderness present. Abdominal:      General: Bowel sounds are normal. There is no distension. Palpations: Abdomen is soft. Tenderness: There is no guarding or rebound. Musculoskeletal: Normal range of motion. General: Tenderness present. Comments: She had tenderness over her medial malleolus. No erythema or edema. No ligamentous instability. Lymphadenopathy:      Cervical: No cervical adenopathy. Skin:     General: Skin is warm and dry. Neurological:      General: No focal deficit present. Mental Status: She is alert and oriented to person, place, and time. Psychiatric:         Mood and Affect: Mood normal.         Behavior: Behavior normal.          MDM  Number of Diagnoses or Management Options  Diagnosis management comments: EKG shows no acute ischemic changes. I will check a troponin and other basic blood work. I will also get an x-ray of her right ankle. I will treat her symptoms with some Carafate and Ultram.    ED Course as of Oct 12 2055   Mon Oct 12, 2020   2053 Patient's troponin is negative. Her glucose is little high but her blood work is otherwise unremarkable.    [AC]   2054 Globin is stable at her baseline.   I do not think she is having an acute coronary syndrome and I will discharge her home.    [AC]      ED Course User Index  [AC] Agnes Mckeon MD       Procedures

## 2020-10-13 ENCOUNTER — PATIENT OUTREACH (OUTPATIENT)
Dept: CASE MANAGEMENT | Age: 40
End: 2020-10-13

## 2020-10-13 LAB
ATRIAL RATE: 99 BPM
CALCULATED P AXIS, ECG09: 53 DEGREES
CALCULATED R AXIS, ECG10: 65 DEGREES
CALCULATED T AXIS, ECG11: -5 DEGREES
DIAGNOSIS, 93000: NORMAL
P-R INTERVAL, ECG05: 156 MS
Q-T INTERVAL, ECG07: 328 MS
QRS DURATION, ECG06: 90 MS
QTC CALCULATION (BEZET), ECG08: 420 MS
VENTRICULAR RATE, ECG03: 99 BPM

## 2020-10-13 NOTE — ED NOTES
I have reviewed discharge instructions with the patient. The patient verbalized understanding. Patient left ED via Discharge Method: ambulatory to Home with self. Opportunity for questions and clarification provided. Patient given 0 scripts. Pt instructed to follow up with PCP if chest pain continues. To continue your aftercare when you leave the hospital, you may receive an automated call from our care team to check in on how you are doing. This is a free service and part of our promise to provide the best care and service to meet your aftercare needs.  If you have questions, or wish to unsubscribe from this service please call [phone]. Thank you for Choosing our [facility] Emergency Department.

## 2020-10-13 NOTE — PROGRESS NOTES
Date/Time:  10/13/2020 10:38 AM  Attempted to reach patient by telephone. Left HIPPA compliant message requesting a return call. Will attempt to reach patient again.
2

## 2020-10-14 ENCOUNTER — PATIENT OUTREACH (OUTPATIENT)
Dept: CASE MANAGEMENT | Age: 40
End: 2020-10-14

## 2020-10-14 NOTE — PROGRESS NOTES
Date/Time:  10/14/2020 9:49 AM  Attempted to reach patient by telephone. Left HIPPA compliant message requesting a return call. Ccm will close case d/t unable to reach x2.

## 2020-10-21 ENCOUNTER — HOSPITAL ENCOUNTER (EMERGENCY)
Age: 40
Discharge: HOME | End: 2020-10-21
Attending: EMERGENCY MEDICINE
Payer: COMMERCIAL

## 2020-10-21 VITALS
BODY MASS INDEX: 37.03 KG/M2 | WEIGHT: 250 LBS | HEIGHT: 69 IN | OXYGEN SATURATION: 99 % | SYSTOLIC BLOOD PRESSURE: 120 MMHG | TEMPERATURE: 99.3 F | RESPIRATION RATE: 18 BRPM | HEART RATE: 77 BPM | DIASTOLIC BLOOD PRESSURE: 78 MMHG

## 2020-10-21 DIAGNOSIS — M19.071 ARTHRITIS OF RIGHT ANKLE: Primary | ICD-10-CM

## 2020-10-21 LAB — HCG UR QL: NEGATIVE

## 2020-10-21 PROCEDURE — 74011250637 HC RX REV CODE- 250/637: Performed by: EMERGENCY MEDICINE

## 2020-10-21 PROCEDURE — 81025 URINE PREGNANCY TEST: CPT

## 2020-10-21 PROCEDURE — 81003 URINALYSIS AUTO W/O SCOPE: CPT

## 2020-10-21 PROCEDURE — 99284 EMERGENCY DEPT VISIT MOD MDM: CPT

## 2020-10-21 RX ORDER — COLCHICINE 0.6 MG/1
0.6 CAPSULE ORAL 2 TIMES DAILY
Qty: 10 CAP | Refills: 0 | Status: SHIPPED | OUTPATIENT
Start: 2020-10-21 | End: 2020-11-09

## 2020-10-21 RX ORDER — INDOMETHACIN 25 MG/1
25 CAPSULE ORAL 3 TIMES DAILY
Qty: 20 CAP | Refills: 0 | Status: SHIPPED | OUTPATIENT
Start: 2020-10-21 | End: 2020-11-09

## 2020-10-21 RX ORDER — OXYCODONE AND ACETAMINOPHEN 5; 325 MG/1; MG/1
1 TABLET ORAL
Status: COMPLETED | OUTPATIENT
Start: 2020-10-21 | End: 2020-10-21

## 2020-10-21 RX ORDER — OXYCODONE AND ACETAMINOPHEN 5; 325 MG/1; MG/1
1 TABLET ORAL
Qty: 12 TAB | Refills: 0 | Status: SHIPPED | OUTPATIENT
Start: 2020-10-21 | End: 2020-10-24

## 2020-10-21 RX ORDER — PIOGLITAZONEHYDROCHLORIDE 30 MG/1
30 TABLET ORAL DAILY
COMMUNITY
End: 2021-12-20

## 2020-10-21 RX ADMIN — OXYCODONE HYDROCHLORIDE AND ACETAMINOPHEN 1 TABLET: 5; 325 TABLET ORAL at 21:09

## 2020-10-21 NOTE — ED NOTES
Pt c/o swelling and pain to right side of body,  pt states started about a week ago when seen for same symptoms here. Pt than  states she has seeing  her primary care doctor for same symptoms for awhile  and has tried different medications and is still not feeling better.

## 2020-10-22 NOTE — ED PROVIDER NOTES
Patient complains of pain in her right ankle, right wrist, and right temple area. Onset 2 days ago. No fever. No URI symptoms. No nausea or vomiting. States her appetite is decreased secondary to the pain. States she was hospitalized in [facility] earlier this year for similar symptoms but she does not know what her diagnosis was. She has been referred to Rheumatologist but has not been seen as yet. Denies history of gout, RA. No injury. Had an Xray of the right ankle recently. Has tried Ibuprofen without relief.             Past Medical History:   Diagnosis Date    Diabetes ([facility] [address]     Hypertension     pt denies       Past Surgical History:   Procedure Laterality Date    HX  SECTION           Family History:   Problem Relation Age of Onset    Diabetes Mother     Other Mother         Shanna Urrutia       Social History     Socioeconomic History    Marital status: SINGLE     Spouse name: Not on file    Number of children: Not on file    Years of education: Not on file    Highest education level: Not on file   Occupational History    Not on file   Social Needs    Financial resource strain: Not on file    Food insecurity     Worry: Not on file     Inability: Not on file    Transportation needs     Medical: Not on file     Non-medical: Not on file   Tobacco Use    Smoking status: Never Smoker    Smokeless tobacco: Never Used   Substance and Sexual Activity    Alcohol use: Yes     Comment: occasionally    Drug use: No    Sexual activity: Not Currently   Lifestyle    Physical activity     Days per week: Not on file     Minutes per session: Not on file    Stress: Not on file   Relationships    Social connections     Talks on phone: Not on file     Gets together: Not on file     Attends Anabaptist service: Not on file     Active member of club or organization: Not on file     Attends meetings of clubs or organizations: Not on file     Relationship status: Not on file    Intimate partner violence Fear of current or ex partner: Not on file     Emotionally abused: Not on file     Physically abused: Not on file     Forced sexual activity: Not on file   Other Topics Concern    Not on file   Social History Narrative    Not on file         ALLERGIES: Patient has no known allergies. Review of Systems   Constitutional: Positive for appetite change. Negative for chills and fever. HENT: Negative. Eyes: Negative. Respiratory: Negative. Cardiovascular: Negative. Gastrointestinal: Negative. Genitourinary: Negative. Musculoskeletal: Positive for arthralgias, joint swelling and myalgias. Skin: Negative. Neurological: Negative. Hematological: Negative. Psychiatric/Behavioral: Negative. Vitals:    10/21/20 1936 10/21/20 2113   BP: 122/79 120/78   Pulse:  77   Resp: 16 18   Temp: 99 °F (37.2 °C) 99.3 °F (37.4 °C)   SpO2: 99% 99%   Weight: 113.4 kg (250 lb)    Height: 5' 9\" (1.753 m)             Physical Exam  Vitals signs and nursing note reviewed. Constitutional:       Appearance: She is obese. HENT:      Head: Normocephalic and atraumatic. Comments: No TMJ tenderness, swelling. No trismus. Right Ear: Tympanic membrane, ear canal and external ear normal.      Left Ear: Tympanic membrane, ear canal and external ear normal.      Nose: Nose normal.      Mouth/Throat:      Mouth: Mucous membranes are moist.      Comments: Has large tonsils with crypts that have white stones present. No exudate. Eyes:      Extraocular Movements: Extraocular movements intact. Pupils: Pupils are equal, round, and reactive to light. Neck:      Musculoskeletal: Normal range of motion and neck supple. Cardiovascular:      Rate and Rhythm: Normal rate and regular rhythm. Pulses: Normal pulses. Heart sounds: Normal heart sounds. Pulmonary:      Effort: Pulmonary effort is normal.      Breath sounds: Normal breath sounds.    Musculoskeletal:      Comments: Right ankle with swelling, tenderness to the lightest palpation, increased warmth. No redness. No induration. Right wrist with tenderness to palpation. FROM. No redness or increased warmth. Maximum tenderness is ulnar side dorsal aspect. No swelling. Skin:     General: Skin is warm and dry. Neurological:      General: No focal deficit present. Mental Status: She is alert and oriented to person, place, and time. Motor: No weakness. MDM  Number of Diagnoses or Management Options  Arthritis of right ankle:   Diagnosis management comments:  Ankle pain with swelling consistent with possible gout  Percocet 5 po  Rx Indocin 25 tid, Colchicine 0.6 bid, Percocet 5 # 12  Follow up with PCP  Return with new or worse symptoms         Amount and/or Complexity of Data Reviewed  Clinical lab tests: reviewed  Tests in the radiology section of CPT®: reviewed  Decide to obtain previous medical records or to obtain history from someone other than the patient: yes  Review and summarize past medical records: yes  Independent visualization of images, tracings, or specimens: yes    Patient Progress  Patient progress: stable         Procedures

## 2020-10-22 NOTE — ED NOTES
I have reviewed discharge instructions with the patient. The patient verbalized understanding. Patient left ED via Discharge Method: ambulatory to Home with self. Opportunity for questions and clarification provided. Patient given 3 scripts. To continue your aftercare when you leave the hospital, you may receive an automated call from our care team to check in on how you are doing. This is a free service and part of our promise to provide the best care and service to meet your aftercare needs.  If you have questions, or wish to unsubscribe from this service please call [phone]. Thank you for Choosing our [facility] Emergency Department.

## 2020-10-22 NOTE — DISCHARGE INSTRUCTIONS
Patient Education        Arthritis: Care Instructions  Overview  Arthritis, also called osteoarthritis, is a breakdown of the cartilage that cushions your joints. When the cartilage wears down, your bones rub against each other. This causes pain and stiffness. Many people have some arthritis as they age. Arthritis most often affects the joints of the spine, hands, hips, knees, or feet. Arthritis never goes away completely. But medicine and home treatment can help reduce pain and help you stay active. Follow-up care is a key part of your treatment and safety. Be sure to make and go to all appointments, and call your doctor if you are having problems. It's also a good idea to know your test results and keep a list of the medicines you take. How can you care for yourself at home? · Stay at a healthy weight. Being overweight puts extra strain on your joints. · Talk to your doctor or physical therapist about exercises that will help ease joint pain. ? Stretch. You may enjoy gentle forms of yoga to help keep your joints and muscles flexible. ? Walk instead of jog. Other types of exercise that are less stressful on the joints include riding a bike, swimming, ronn chi, or water exercise. ? Lift weights. Strong muscles help reduce stress on your joints. Stronger thigh muscles, for example, take some of the stress off of the knees and hips. Learn the right way to lift weights so you don't make joint pain worse. · Take your medicines exactly as prescribed. Call your doctor if you think you are having a problem with your medicine. · Take pain medicines exactly as directed. ? If the doctor gave you a prescription medicine for pain, take it as prescribed. ? If you are not taking a prescription pain medicine, ask your doctor if you can take an over-the-counter medicine. · Use a cane, crutch, walker, or another device if you need help to get around. These can help rest your joints.  You also can use other things to make life easier, such as a higher toilet seat and padded handles on kitchen utensils. · Do not sit in low chairs. They can make it hard to get up. · Put heat or cold on your sore joints as needed. Use whichever helps you most. You can also switch between hot and cold packs. ? Apply heat 2 or 3 times a day for 20 to 30 minutes--using a heating pad, hot shower, or hot pack--to relieve pain and stiffness. But don't use heat on a swollen joint. ? Put ice or a cold pack on your sore joint for 10 to 20 minutes at a time. Put a thin cloth between the ice and your skin. When should you call for help? Call your doctor now or seek immediate medical care if:    · You have sudden swelling, warmth, or pain in any joint.     · You have joint pain and a fever or rash.     · You have such bad pain that you cannot use a joint. Watch closely for changes in your health, and be sure to contact your doctor if:    · You have mild joint symptoms that continue even with more than 6 weeks of care at home.     · You have stomach pain or other problems with your medicine. Where can you learn more? Go to http://www.gray.com/  Enter C411 in the search box to learn more about \"Arthritis: Care Instructions. \"  Current as of: December 9, 2019               Content Version: 12.6  © 0648-3462 [employer]. Care instructions adapted under license by [employer] (which disclaims liability or warranty for this information). If you have questions about a medical condition or this instruction, always ask your healthcare professional. [address] any warranty or liability for your use of this information.

## 2020-10-27 ENCOUNTER — HOSPITAL ENCOUNTER (EMERGENCY)
Age: 40
Discharge: HOME | End: 2020-10-27
Attending: EMERGENCY MEDICINE
Payer: COMMERCIAL

## 2020-10-27 VITALS
WEIGHT: 262.35 LBS | OXYGEN SATURATION: 98 % | HEIGHT: 69 IN | HEART RATE: 113 BPM | RESPIRATION RATE: 16 BRPM | DIASTOLIC BLOOD PRESSURE: 86 MMHG | BODY MASS INDEX: 38.86 KG/M2 | TEMPERATURE: 98.5 F | SYSTOLIC BLOOD PRESSURE: 131 MMHG

## 2020-10-27 DIAGNOSIS — M25.471 ANKLE SWELLING, RIGHT: Primary | ICD-10-CM

## 2020-10-27 PROCEDURE — 99283 EMERGENCY DEPT VISIT LOW MDM: CPT

## 2020-10-27 PROCEDURE — 74011250637 HC RX REV CODE- 250/637: Performed by: EMERGENCY MEDICINE

## 2020-10-27 RX ORDER — OXYCODONE HYDROCHLORIDE 5 MG/1
5 TABLET ORAL
Status: COMPLETED | OUTPATIENT
Start: 2020-10-27 | End: 2020-10-27

## 2020-10-27 RX ORDER — CLINDAMYCIN HYDROCHLORIDE 150 MG/1
300 CAPSULE ORAL 3 TIMES DAILY
Qty: 42 CAP | Refills: 0 | Status: SHIPPED | OUTPATIENT
Start: 2020-10-27 | End: 2020-11-09

## 2020-10-27 RX ORDER — CLINDAMYCIN HYDROCHLORIDE 150 MG/1
300 CAPSULE ORAL
Status: COMPLETED | OUTPATIENT
Start: 2020-10-27 | End: 2020-10-27

## 2020-10-27 RX ORDER — OXYCODONE HYDROCHLORIDE 5 MG/1
5 TABLET ORAL
Qty: 13 TAB | Refills: 0 | Status: SHIPPED | OUTPATIENT
Start: 2020-10-27 | End: 2020-11-09

## 2020-10-27 RX ADMIN — OXYCODONE 5 MG: 5 TABLET ORAL at 03:36

## 2020-10-27 RX ADMIN — CLINDAMYCIN HYDROCHLORIDE 300 MG: 150 CAPSULE ORAL at 03:36

## 2020-10-27 NOTE — ED NOTES
Pt c/o lower leg and ankle swelling and very painful. States that she has been seen and treated several times for the same c/o.   States that nothing that has been done for has helped

## 2020-10-27 NOTE — ED TRIAGE NOTES
Pt presents to the ED for right foot and ankle swelling x 3 weeks. Pt has been seen and treated several times for the same c/o and states that the swelling just becomes worse every day.   Masked on arrival

## 2020-10-27 NOTE — ED PROVIDER NOTES
71-year-old lady presents with concerns about persistent pain and swelling in her right ankle. She says this is gradually gotten worse over the last 3 weeks. He was initially seen by me a couple weeks ago and at that time had no redness or edema. She then apparently got worse and was seen about a week ago and diagnosed with presumptive gout and treated with indomethacin and colchicine. She says neither those treatments seem to help much,  and her pain and swelling is gotten worse. She says she is had no fevers or chills but it is now too uncomfortable to walk on. X-ray was performed on her first visit which was unremarkable. She says currently she has no other swelling, pain, or redness in any other joints. No other associated symptoms. Elements of this note were created using speech recognition software. As such, errors of speech recognition may be present.            Past Medical History:   Diagnosis Date    Diabetes ([facility] [address]     Hypertension     pt denies       Past Surgical History:   Procedure Laterality Date    HX  SECTION           Family History:   Problem Relation Age of Onset    Diabetes Mother     Other Mother         Ruperto Holt       Social History     Socioeconomic History    Marital status: SINGLE     Spouse name: Not on file    Number of children: Not on file    Years of education: Not on file    Highest education level: Not on file   Occupational History    Not on file   Social Needs    Financial resource strain: Not on file    Food insecurity     Worry: Not on file     Inability: Not on file    Transportation needs     Medical: Not on file     Non-medical: Not on file   Tobacco Use    Smoking status: Never Smoker    Smokeless tobacco: Never Used   Substance and Sexual Activity    Alcohol use: Yes     Comment: occasionally    Drug use: No    Sexual activity: Not Currently   Lifestyle    Physical activity     Days per week: Not on file     Minutes per session: Not on file    Stress: Not on file   Relationships    Social connections     Talks on phone: Not on file     Gets together: Not on file     Attends Hinduism service: Not on file     Active member of club or organization: Not on file     Attends meetings of clubs or organizations: Not on file     Relationship status: Not on file    Intimate partner violence     Fear of current or ex partner: Not on file     Emotionally abused: Not on file     Physically abused: Not on file     Forced sexual activity: Not on file   Other Topics Concern    Not on file   Social History Narrative    Not on file         ALLERGIES: Patient has no known allergies. Review of Systems   Constitutional: Negative for chills and fever. Respiratory: Negative for cough and shortness of breath. Cardiovascular: Positive for leg swelling. Negative for chest pain and palpitations. Musculoskeletal: Positive for arthralgias, gait problem and joint swelling. Skin: Positive for color change. Negative for rash and wound. Vitals:    10/27/20 0320   BP: 131/86   Pulse: (!) 113   Resp: 16   Temp: 98.5 °F (36.9 °C)   SpO2: 98%   Weight: 119 kg (262 lb 5.6 oz)   Height: 5' 9\" (1.753 m)            Physical Exam  Vitals signs and nursing note reviewed. Constitutional:       Appearance: Normal appearance. Cardiovascular:      Rate and Rhythm: Tachycardia present. Comments: Mild tachycardia  Musculoskeletal:      Comments: Right medial malleolus now has some erythema and induration associated with some swelling. It is tender to the touch. I do not appreciate any abscess or fluctuance. No pain, swelling, or induration in her right calf. Neurological:      General: No focal deficit present. Mental Status: She is alert and oriented to person, place, and time.           MDM  Number of Diagnoses or Management Options  Ankle swelling, right:   Diagnosis management comments: Patient's exam is now concerning for a possible infection. Given the erythema and induration with no response to the colchicine or indomethacin I will empirically treat with clindamycin. I will also give her a new oxycodone prescription to help with the pain. Did encourage her to follow-up in the next 2 to 3 days with her primary care doctor for reevaluation.          Procedures

## 2020-10-27 NOTE — ED NOTES
I have reviewed discharge instructions with the patient. The patient verbalized understanding. Patient left ED via Discharge Method: ambulatory to Home with , self). Opportunity for questions and clarification provided. Patient given 2 scripts. To continue your aftercare when you leave the hospital, you may receive an automated call from our care team to check in on how you are doing. This is a free service and part of our promise to provide the best care and service to meet your aftercare needs.  If you have questions, or wish to unsubscribe from this service please call [phone]. Thank you for Choosing our [facility] Emergency Department.

## 2020-10-27 NOTE — DISCHARGE INSTRUCTIONS
Return with any fevers, vomiting, increased swelling, worsening symptoms, or additional concerns. Follow-up with your primary care doctor in 2 or 3 days for reevaluation.

## 2020-10-28 ENCOUNTER — APPOINTMENT (OUTPATIENT)
Dept: ULTRASOUND IMAGING | Age: 40
DRG: 494 | End: 2020-10-28
Attending: HOSPITALIST
Payer: COMMERCIAL

## 2020-10-28 ENCOUNTER — HOSPITAL ENCOUNTER (INPATIENT)
Age: 40
LOS: 12 days | Discharge: HOME HEALTH | DRG: 494 | End: 2020-11-09
Attending: EMERGENCY MEDICINE | Admitting: HOSPITALIST
Payer: COMMERCIAL

## 2020-10-28 DIAGNOSIS — M00.9 SEPTIC ARTHRITIS OF LEFT ANKLE, DUE TO UNSPECIFIED ORGANISM (HCC): Primary | ICD-10-CM

## 2020-10-28 PROBLEM — M25.471 RIGHT ANKLE SWELLING: Status: ACTIVE | Noted: 2020-10-28

## 2020-10-28 PROBLEM — M25.571 RIGHT ANKLE PAIN: Status: ACTIVE | Noted: 2020-10-28

## 2020-10-28 LAB
ALBUMIN SERPL-MCNC: 2.3 G/DL (ref 3.5–5)
ALBUMIN/GLOB SERPL: 0.4 {RATIO} (ref 1.2–3.5)
ALP SERPL-CCNC: 177 U/L (ref 50–136)
ALT SERPL-CCNC: 33 U/L (ref 12–65)
ANION GAP SERPL CALC-SCNC: 7 MMOL/L (ref 7–16)
APPEARANCE UR: ABNORMAL
AST SERPL-CCNC: 20 U/L (ref 15–37)
BACTERIA URNS QL MICRO: 0 /HPF
BASOPHILS # BLD: 0 K/UL (ref 0–0.2)
BASOPHILS NFR BLD: 0 % (ref 0–2)
BILIRUB SERPL-MCNC: 0.4 MG/DL (ref 0.2–1.1)
BILIRUB UR QL: NEGATIVE
BUN SERPL-MCNC: 10 MG/DL (ref 6–23)
CALCIUM SERPL-MCNC: 9.4 MG/DL (ref 8.3–10.4)
CHLORIDE SERPL-SCNC: 103 MMOL/L (ref 98–107)
CO2 SERPL-SCNC: 26 MMOL/L (ref 21–32)
COLOR UR: ABNORMAL
CREAT SERPL-MCNC: 0.65 MG/DL (ref 0.6–1)
CRP SERPL HS-MCNC: 132 MG/L
DIFFERENTIAL METHOD BLD: ABNORMAL
EOSINOPHIL # BLD: 0.1 K/UL (ref 0–0.8)
EOSINOPHIL NFR BLD: 1 % (ref 0.5–7.8)
EPI CELLS #/AREA URNS HPF: ABNORMAL /HPF
ERYTHROCYTE [DISTWIDTH] IN BLOOD BY AUTOMATED COUNT: 13.8 % (ref 11.9–14.6)
ERYTHROCYTE [SEDIMENTATION RATE] IN BLOOD: >120 MM/HR (ref 0–20)
EST. AVERAGE GLUCOSE BLD GHB EST-MCNC: 289 MG/DL
GLOBULIN SER CALC-MCNC: 6.4 G/DL (ref 2.3–3.5)
GLUCOSE BLD STRIP.AUTO-MCNC: 166 MG/DL (ref 65–100)
GLUCOSE BLD STRIP.AUTO-MCNC: 180 MG/DL (ref 65–100)
GLUCOSE SERPL-MCNC: 181 MG/DL (ref 65–100)
GLUCOSE UR STRIP.AUTO-MCNC: NEGATIVE MG/DL
HBA1C MFR BLD: 11.7 % (ref 4.8–6)
HCT VFR BLD AUTO: 27.2 % (ref 35.8–46.3)
HGB BLD-MCNC: 8.6 G/DL (ref 11.7–15.4)
HGB UR QL STRIP: ABNORMAL
IMM GRANULOCYTES # BLD AUTO: 0 K/UL (ref 0–0.5)
IMM GRANULOCYTES NFR BLD AUTO: 0 % (ref 0–5)
KETONES UR QL STRIP.AUTO: ABNORMAL MG/DL
LACTATE SERPL-SCNC: 0.9 MMOL/L (ref 0.4–2)
LEUKOCYTE ESTERASE UR QL STRIP.AUTO: ABNORMAL
LYMPHOCYTES # BLD: 1.4 K/UL (ref 0.5–4.6)
LYMPHOCYTES NFR BLD: 16 % (ref 13–44)
MAGNESIUM SERPL-MCNC: 1.8 MG/DL (ref 1.8–2.4)
MCH RBC QN AUTO: 25.2 PG (ref 26.1–32.9)
MCHC RBC AUTO-ENTMCNC: 31.6 G/DL (ref 31.4–35)
MCV RBC AUTO: 79.8 FL (ref 79.6–97.8)
MONOCYTES # BLD: 0.4 K/UL (ref 0.1–1.3)
MONOCYTES NFR BLD: 5 % (ref 4–12)
NEUTS SEG # BLD: 7.2 K/UL (ref 1.7–8.2)
NEUTS SEG NFR BLD: 79 % (ref 43–78)
NITRITE UR QL STRIP.AUTO: NEGATIVE
NRBC # BLD: 0 K/UL (ref 0–0.2)
OTHER OBSERVATIONS,UCOM: ABNORMAL
PH UR STRIP: 6 [PH] (ref 5–9)
PLATELET # BLD AUTO: 432 K/UL (ref 150–450)
PMV BLD AUTO: 9.7 FL (ref 9.4–12.3)
POTASSIUM SERPL-SCNC: 3.7 MMOL/L (ref 3.5–5.1)
PROT SERPL-MCNC: 8.7 G/DL (ref 6.3–8.2)
PROT UR STRIP-MCNC: 30 MG/DL
RBC # BLD AUTO: 3.41 M/UL (ref 4.05–5.2)
RBC #/AREA URNS HPF: ABNORMAL /HPF
SODIUM SERPL-SCNC: 136 MMOL/L (ref 138–145)
SP GR UR REFRACTOMETRY: 1.02 (ref 1–1.02)
UROBILINOGEN UR QL STRIP.AUTO: 0.2 EU/DL (ref 0.2–1)
WBC # BLD AUTO: 9.1 K/UL (ref 4.3–11.1)
WBC URNS QL MICRO: ABNORMAL /HPF

## 2020-10-28 PROCEDURE — 36415 COLL VENOUS BLD VENIPUNCTURE: CPT

## 2020-10-28 PROCEDURE — 93971 EXTREMITY STUDY: CPT

## 2020-10-28 PROCEDURE — 74011000258 HC RX REV CODE- 258: Performed by: EMERGENCY MEDICINE

## 2020-10-28 PROCEDURE — 83036 HEMOGLOBIN GLYCOSYLATED A1C: CPT

## 2020-10-28 PROCEDURE — 96375 TX/PRO/DX INJ NEW DRUG ADDON: CPT

## 2020-10-28 PROCEDURE — 87040 BLOOD CULTURE FOR BACTERIA: CPT

## 2020-10-28 PROCEDURE — 82962 GLUCOSE BLOOD TEST: CPT

## 2020-10-28 PROCEDURE — 80053 COMPREHEN METABOLIC PANEL: CPT

## 2020-10-28 PROCEDURE — 81001 URINALYSIS AUTO W/SCOPE: CPT

## 2020-10-28 PROCEDURE — 83735 ASSAY OF MAGNESIUM: CPT

## 2020-10-28 PROCEDURE — 86141 C-REACTIVE PROTEIN HS: CPT

## 2020-10-28 PROCEDURE — 74011250636 HC RX REV CODE- 250/636: Performed by: EMERGENCY MEDICINE

## 2020-10-28 PROCEDURE — 74011250636 HC RX REV CODE- 250/636: Performed by: HOSPITALIST

## 2020-10-28 PROCEDURE — 83605 ASSAY OF LACTIC ACID: CPT

## 2020-10-28 PROCEDURE — 99283 EMERGENCY DEPT VISIT LOW MDM: CPT

## 2020-10-28 PROCEDURE — 85652 RBC SED RATE AUTOMATED: CPT

## 2020-10-28 PROCEDURE — 65270000029 HC RM PRIVATE

## 2020-10-28 PROCEDURE — 74011000250 HC RX REV CODE- 250: Performed by: HOSPITALIST

## 2020-10-28 PROCEDURE — 96365 THER/PROPH/DIAG IV INF INIT: CPT

## 2020-10-28 PROCEDURE — 85025 COMPLETE CBC W/AUTO DIFF WBC: CPT

## 2020-10-28 PROCEDURE — 2709999900 HC NON-CHARGEABLE SUPPLY

## 2020-10-28 RX ORDER — SODIUM CHLORIDE 0.9 % (FLUSH) 0.9 %
5-40 SYRINGE (ML) INJECTION EVERY 8 HOURS
Status: DISCONTINUED | OUTPATIENT
Start: 2020-10-28 | End: 2020-11-09 | Stop reason: HOSPADM

## 2020-10-28 RX ORDER — ACETAMINOPHEN 650 MG/1
650 SUPPOSITORY RECTAL
Status: DISCONTINUED | OUTPATIENT
Start: 2020-10-28 | End: 2020-11-09 | Stop reason: HOSPADM

## 2020-10-28 RX ORDER — ACETAMINOPHEN 325 MG/1
650 TABLET ORAL
Status: DISCONTINUED | OUTPATIENT
Start: 2020-10-28 | End: 2020-11-09 | Stop reason: HOSPADM

## 2020-10-28 RX ORDER — VANCOMYCIN 1.75 GRAM/500 ML IN 0.9 % SODIUM CHLORIDE INTRAVENOUS
1750 EVERY 12 HOURS
Status: DISCONTINUED | OUTPATIENT
Start: 2020-10-29 | End: 2020-10-30 | Stop reason: DRUGHIGH

## 2020-10-28 RX ORDER — POTASSIUM CHLORIDE 7.45 MG/ML
10 INJECTION INTRAVENOUS AS NEEDED
Status: DISCONTINUED | OUTPATIENT
Start: 2020-10-28 | End: 2020-11-09 | Stop reason: HOSPADM

## 2020-10-28 RX ORDER — ENOXAPARIN SODIUM 100 MG/ML
40 INJECTION SUBCUTANEOUS DAILY
Status: DISCONTINUED | OUTPATIENT
Start: 2020-10-29 | End: 2020-11-09 | Stop reason: HOSPADM

## 2020-10-28 RX ORDER — VANCOMYCIN 1.75 GRAM/500 ML IN 0.9 % SODIUM CHLORIDE INTRAVENOUS
1750
Status: COMPLETED | OUTPATIENT
Start: 2020-10-28 | End: 2020-10-28

## 2020-10-28 RX ORDER — SODIUM CHLORIDE 0.9 % (FLUSH) 0.9 %
5-40 SYRINGE (ML) INJECTION AS NEEDED
Status: DISCONTINUED | OUTPATIENT
Start: 2020-10-28 | End: 2020-11-09 | Stop reason: HOSPADM

## 2020-10-28 RX ORDER — NALOXONE HYDROCHLORIDE 0.4 MG/ML
0.4 INJECTION, SOLUTION INTRAMUSCULAR; INTRAVENOUS; SUBCUTANEOUS AS NEEDED
Status: DISCONTINUED | OUTPATIENT
Start: 2020-10-28 | End: 2020-11-09 | Stop reason: HOSPADM

## 2020-10-28 RX ORDER — INSULIN LISPRO 100 [IU]/ML
INJECTION, SOLUTION INTRAVENOUS; SUBCUTANEOUS
Status: DISCONTINUED | OUTPATIENT
Start: 2020-10-28 | End: 2020-10-30

## 2020-10-28 RX ORDER — POLYETHYLENE GLYCOL 3350 17 G/17G
17 POWDER, FOR SOLUTION ORAL DAILY PRN
Status: DISCONTINUED | OUTPATIENT
Start: 2020-10-28 | End: 2020-11-03

## 2020-10-28 RX ORDER — MAGNESIUM SULFATE HEPTAHYDRATE 40 MG/ML
2 INJECTION, SOLUTION INTRAVENOUS AS NEEDED
Status: DISCONTINUED | OUTPATIENT
Start: 2020-10-28 | End: 2020-11-09 | Stop reason: HOSPADM

## 2020-10-28 RX ORDER — HYDROMORPHONE HYDROCHLORIDE 1 MG/ML
0.4 INJECTION, SOLUTION INTRAMUSCULAR; INTRAVENOUS; SUBCUTANEOUS
Status: DISCONTINUED | OUTPATIENT
Start: 2020-10-28 | End: 2020-10-30

## 2020-10-28 RX ORDER — PROMETHAZINE HYDROCHLORIDE 25 MG/1
12.5 TABLET ORAL
Status: DISCONTINUED | OUTPATIENT
Start: 2020-10-28 | End: 2020-11-09 | Stop reason: HOSPADM

## 2020-10-28 RX ORDER — ONDANSETRON 2 MG/ML
4 INJECTION INTRAMUSCULAR; INTRAVENOUS
Status: DISCONTINUED | OUTPATIENT
Start: 2020-10-28 | End: 2020-11-09 | Stop reason: HOSPADM

## 2020-10-28 RX ORDER — MORPHINE SULFATE 4 MG/ML
4 INJECTION INTRAVENOUS
Status: COMPLETED | OUTPATIENT
Start: 2020-10-28 | End: 2020-10-28

## 2020-10-28 RX ORDER — PIOGLITAZONEHYDROCHLORIDE 15 MG/1
15 TABLET ORAL
Status: DISCONTINUED | OUTPATIENT
Start: 2020-10-29 | End: 2020-11-09 | Stop reason: HOSPADM

## 2020-10-28 RX ORDER — NYSTATIN 100000 [USP'U]/G
POWDER TOPICAL 2 TIMES DAILY
Status: DISCONTINUED | OUTPATIENT
Start: 2020-10-28 | End: 2020-11-09 | Stop reason: HOSPADM

## 2020-10-28 RX ORDER — OXYCODONE HYDROCHLORIDE 5 MG/1
5 TABLET ORAL
Status: DISCONTINUED | OUTPATIENT
Start: 2020-10-28 | End: 2020-11-09 | Stop reason: HOSPADM

## 2020-10-28 RX ADMIN — VANCOMYCIN HYDROCHLORIDE 1750 MG: 10 INJECTION, POWDER, LYOPHILIZED, FOR SOLUTION INTRAVENOUS at 13:36

## 2020-10-28 RX ADMIN — HYDROMORPHONE HYDROCHLORIDE 0.4 MG: 1 INJECTION, SOLUTION INTRAMUSCULAR; INTRAVENOUS; SUBCUTANEOUS at 16:47

## 2020-10-28 RX ADMIN — Medication 10 ML: at 22:00

## 2020-10-28 RX ADMIN — CEFEPIME HYDROCHLORIDE 1 G: 1 INJECTION, POWDER, FOR SOLUTION INTRAMUSCULAR; INTRAVENOUS at 12:27

## 2020-10-28 RX ADMIN — MORPHINE SULFATE 4 MG: 4 INJECTION INTRAVENOUS at 12:24

## 2020-10-28 RX ADMIN — FAMOTIDINE 20 MG: 10 INJECTION INTRAVENOUS at 20:32

## 2020-10-28 RX ADMIN — HYDROMORPHONE HYDROCHLORIDE 0.4 MG: 1 INJECTION, SOLUTION INTRAMUSCULAR; INTRAVENOUS; SUBCUTANEOUS at 20:31

## 2020-10-28 RX ADMIN — Medication 5 ML: at 14:41

## 2020-10-28 RX ADMIN — SODIUM CHLORIDE 1000 ML: 900 INJECTION, SOLUTION INTRAVENOUS at 12:24

## 2020-10-28 NOTE — PROGRESS NOTES
TRANSFER - IN REPORT:    Verbal report received from Pretty RN(name) on [place] [facility]  being received from ED(unit) for routine progression of care      Report consisted of patients Situation, Background, Assessment and   Recommendations(SBAR). Information from the following report(s) SBAR, Kardex, ED Summary, Intake/Output, MAR and Recent Results was reviewed with the receiving nurse. Opportunity for questions and clarification was provided. Assessment completed upon patients arrival to unit and care assumed.

## 2020-10-28 NOTE — PROGRESS NOTES
END OF SHIFT NOTE:    INTAKE/OUTPUT  No intake/output data recorded. Voiding: YES  Catheter: NO  Drain:              Flatus: Patient does have flatus present. Stool:  0 occurrences. Characteristics:       Emesis: 0 occurrences. Characteristics:        VITAL SIGNS  Patient Vitals for the past 12 hrs:   Temp Pulse Resp BP SpO2   10/28/20 1625 98.9 °F (37.2 °C) 92 16 (!) 144/92 100 %   10/28/20 1133 99.4 °F (37.4 °C) (!) 103 16 (!) 134/90 100 %       Pain Assessment  Pain Intensity 1: 10 (10/28/20 1133)        Patient Stated Pain Goal: 0    Ambulating  No    Shift report given to oncoming nurse at the bedside.     Paty Ernst RN

## 2020-10-28 NOTE — ED NOTES
TRANSFER - OUT REPORT:    Verbal report given to Cristiano Iqbal RN on [facility]  being transferred to  for routine progression of care       Report consisted of patients Situation, Background, Assessment and   Recommendations(SBAR). Information from the following report(s) SBAR, Kardex, ED Summary, Procedure Summary, MAR and Recent Results was reviewed with the receiving nurse. Lines:       Opportunity for questions and clarification was provided.       Patient transported with:   [employer]

## 2020-10-28 NOTE — H&P
HOSPITALIST H&P/CONSULT  NAME:  Rayna Charles   Age:  45 y.o.  :   1980   MRN:   [id number]  PCP: Abraham Villa MD  Consulting MD:  Treatment Team: Attending Provider: Leilani Man MD; Primary Nurse: Kelsey Fay; Surgeon: Kaushik Heath MD; Primary Nurse: Autumn Reddy  HPI:   Patient is a 28-year-old -American female with history of morbid obesity, type 2 diabetes mellitus uncontrolled, hypertension who presented to the ER with 3 weeks history of constant right ankle pain and swelling which is progressively getting worse. Patient is currently a little somnolent since she got morphine in the ER for pain control. She is easily arousable and is answering questions appropriately. She reports symptoms began spontaneously with sudden onset of right ankle pain that was followed by ankle swelling. She has been to the emergency multiple times but she was told initially that it was a gout attack and then next time was told to be cellulitis and was discharged on antibiotics, colchicine, indomethacin. Over the last 2 to 3 days she has developed a blister medial aspect of right ankle. She denies having any fever but reports feeling warm every now and then with chills. Denies having any chest pain, palpitations, cough, shortness of breath, abdominal pain. Patient has been evaluated by Ortho in ER with possible plan for washout tomorrow following an MRI of right foot. Blood work showed CRP of 132 raising suspicion of septic joint. Complete ROS done and is as stated in HPI or otherwise negative  Past Medical History:   Diagnosis Date    Diabetes ([address]     Hypertension     pt denies      Past Surgical History:   Procedure Laterality Date    HX  SECTION        Prior to Admission Medications   Prescriptions Last Dose Informant Patient Reported? Taking?    Insulin Needles, Disposable, (STEPHANIE PEN NEEDLE) 32 gauge x \" ndle   No No   Sig: To be used with Insulin pen.   L-Mfolate-B6 Phos-Methyl-B12 (METANX) 3-35-2 mg tab tab   Yes No   Sig: Take 1 Tab by mouth daily. clindamycin (CLEOCIN) 150 mg capsule   No No   Sig: Take 2 Caps by mouth three (3) times daily for 7 days. colchicine (MITIGARE) 0.6 mg capsule   No No   Sig: Take 1 Cap by mouth two (2) times a day. dapagliflozin (FARXIGA) 10 mg tab tablet   Yes No   Sig: Take 10 mg by mouth daily. gabapentin (Neurontin) 300 mg capsule   No No   Sig: Take one a day for 3 days,   Then increase to twice a day for 3 days  The increase to three times a day   indomethacin (INDOCIN) 25 mg capsule   No No   Sig: Take 1 Cap by mouth three (3) times daily. metFORMIN (GLUCOPHAGE) 1,000 mg tablet   Yes No   Sig: Take 1,000 mg by mouth. oxyCODONE IR (ROXICODONE) 5 mg immediate release tablet   No No   Sig: Take 1 Tab by mouth every six (6) hours as needed for Pain for up to 3 days. Max Daily Amount: 20 mg.   pioglitazone (ACTOS) 30 mg tablet   Yes No   Sig: Take  by mouth daily. Facility-Administered Medications: None     No Known Allergies   Social History     Tobacco Use    Smoking status: Never Smoker    Smokeless tobacco: Never Used   Substance Use Topics    Alcohol use: Yes     Comment: occasionally      Family History   Problem Relation Age of Onset    Diabetes Mother    [place] Other Mother         [facility]      Objective:     Visit Vitals  BP (!) 134/90 (BP 1 Location: Right arm, BP Patient Position: At rest)   Pulse (!) 103   Temp 99.4 °F (37.4 °C)   Resp 16   Ht 5' 9\" (1.753 m)   Wt 118.8 kg (262 lb)   SpO2 100%   BMI 38.69 kg/m²      Temp (24hrs), Av.4 °F (37.4 °C), Min:99.4 °F (37.4 °C), Max:99.4 °F (37.4 °C)    Oxygen Therapy  O2 Sat (%): 100 % (10/28/20 1133)  O2 Device: Room air (10/28/20 1133)  Physical Exam:  General:    Morbidly obese, somewhat somnolent, NAD, on room air     Head:   Normocephalic, without obvious abnormality, atraumatic. Nose:  Nares normal. No drainage or sinus tenderness.   Lungs: Clear to auscultation bilaterally. No Wheezing or Rhonchi. No rales. Heart:   Regular rate and rhythm,  no murmur, rub or gallop. Abdomen:   Soft, non-tender. Not distended. Bowel sounds normal.   Extremities: 1+ pitting edema noticed in the right lower extremity mostly around ankle along with 5 cmx 7 cm blister with purulent fluid on medial aspect of right ankle. Warm and tender to palpation. Skin:     Texture, turgor normal. No rashes or lesions. Not Jaundiced  Neurologic: GCS 15, no motor or sensory deficits, cranial nerves II to XII grossly intact  Psych:             AO x3, mood and affect flat  Data Review:   Recent Results (from the past 24 hour(s))   CBC WITH AUTOMATED DIFF    Collection Time: 10/28/20 11:51 AM   Result Value Ref Range    WBC 9.1 4.3 - 11.1 K/uL    RBC 3.41 (L) 4.05 - 5.2 M/uL    HGB 8.6 (L) 11.7 - 15.4 g/dL    HCT 27.2 (L) 35.8 - 46.3 %    MCV 79.8 79.6 - 97.8 FL    MCH 25.2 (L) 26.1 - 32.9 PG    MCHC 31.6 31.4 - 35.0 g/dL    RDW 13.8 11.9 - 14.6 %    PLATELET 288 807 - 637 K/uL    MPV 9.7 9.4 - 12.3 FL    ABSOLUTE NRBC 0.00 0.0 - 0.2 K/uL    DF AUTOMATED      NEUTROPHILS 79 (H) 43 - 78 %    LYMPHOCYTES 16 13 - 44 %    MONOCYTES 5 4.0 - 12.0 %    EOSINOPHILS 1 0.5 - 7.8 %    BASOPHILS 0 0.0 - 2.0 %    IMMATURE GRANULOCYTES 0 0.0 - 5.0 %    ABS. NEUTROPHILS 7.2 1.7 - 8.2 K/UL    ABS. LYMPHOCYTES 1.4 0.5 - 4.6 K/UL    ABS. MONOCYTES 0.4 0.1 - 1.3 K/UL    ABS. EOSINOPHILS 0.1 0.0 - 0.8 K/UL    ABS. BASOPHILS 0.0 0.0 - 0.2 K/UL    ABS. IMM.  GRANS. 0.0 0.0 - 0.5 K/UL   METABOLIC PANEL, COMPREHENSIVE    Collection Time: 10/28/20 11:51 AM   Result Value Ref Range    Sodium 136 (L) 138 - 145 mmol/L    Potassium 3.7 3.5 - 5.1 mmol/L    Chloride 103 98 - 107 mmol/L    CO2 26 21 - 32 mmol/L    Anion gap 7 7 - 16 mmol/L    Glucose 181 (H) 65 - 100 mg/dL    BUN 10 6 - 23 MG/DL    Creatinine 0.65 0.6 - 1.0 MG/DL    GFR est AA >60 >60 ml/min/1.73m2    GFR est non-AA >60 >60 ml/min/1.73m2 Calcium 9.4 8.3 - 10.4 MG/DL    Bilirubin, total 0.4 0.2 - 1.1 MG/DL    ALT (SGPT) 33 12 - 65 U/L    AST (SGOT) 20 15 - 37 U/L    Alk. phosphatase 177 (H) 50 - 136 U/L    Protein, total 8.7 (H) 6.3 - 8.2 g/dL    Albumin 2.3 (L) 3.5 - 5.0 g/dL    Globulin 6.4 (H) 2.3 - 3.5 g/dL    A-G Ratio 0.4 (L) 1.2 - 3.5     MAGNESIUM    Collection Time: 10/28/20 11:51 AM   Result Value Ref Range    Magnesium 1.8 1.8 - 2.4 mg/dL   LACTIC ACID    Collection Time: 10/28/20 11:51 AM   Result Value Ref Range    Lactic acid 0.9 0.4 - 2.0 MMOL/L   CRP, HIGH SENSITIVITY    Collection Time: 10/28/20 11:51 AM   Result Value Ref Range    CRP, High sensitivity 132.0 mg/L     Imaging /Procedures /Studies   All diagnostic imaging personally reviewed by me. MRI of right foot pending  Assessment and Plan: Active Hospital Problems    Diagnosis Date Noted    Septic joint ([facility] 10/28/2020     Priority: 1 - One    Right ankle pain 10/28/2020     Priority: 2 - Two    Right ankle swelling 10/28/2020     Priority: 3 - Three    Positive for microalbuminuria 02/12/2018    DM2 (diabetes mellitus, type 2) ([facility] 02/24/2014     Newly diagnosed 2/22/14      HTN (hypertension) 02/24/2014    Morbid obesity ([facility] [address] 02/24/2014       PLAN  · Admit to inpatient for possible right ankle septic joint. · Patient evaluated by Ortho, with concerns of septic joint. MRI of right ankle is pending we will follow with results. · Follow-up with blood cultures. Lactic acid is normal  · Start on empiric IV antibiotic Rocephin 2 g IV daily along with vancomycin. Patient given 1 dose of cefepime and Vanco in the ER. · Pain control with Dilaudid and Jamilah as needed  · MiraLAX for opioid-induced constipation  · GI prophylaxis with Pepcid  · Bedrest for now  · Check A1c. POC glucose QA CHS. Restart Actos and Farxiga along with Humalog sliding scale. Per records last A1c from June 2020 was 13. 1.   · Concerns for medication and dietary noncompliance  · DVT prophylaxis with Lovenox  · Check venous Doppler of right lower extremity to rule out DVT    Code Status: Full  High risk with opioids on board    Anticipated discharge: Greater than 2 midnights    Signed By: Maddy Luna MD     October 28, 2020

## 2020-10-28 NOTE — ED PROVIDER NOTES
[address] EMERGENCY DEPARTMENT     Zoë Payton is a 45 y.o. female seen on 10/28/2020 in the [facility] EMERGENCY DEPT in room CPU04/CP4. Chief Complaint   Patient presents with    Foot Swelling     HPI: 28-year-old female presents to the emergency department with complaints of left ankle pain and infection. Patient has been seen in the emergency department 3 times for the same with progressive symptoms. Patient initially thought to be gout. She did not improve with gout medications therefore returned. Patient then treated with antibiotics for possible infection. Patient followed up with orthopedics, Dr. Herson De Los Santos, today in the office. Patient's foot and ankle continued to swell and had developed a blister. Dr. Herson De Los Santos tried to directly admit patient to the hospital however there were no beds. Patient was sent to the emergency department to be evaluated and to be admitted. Patient was to be admitted to hospitalist with orthopedics on consult. Patient denies any fevers, chills, nausea, vomiting, diarrhea, chest pain, shortness of breath or any other concerns. Historian: Patient    REVIEW OF SYSTEMS     Review of Systems   Constitutional: Negative for fever. HENT: Negative. Respiratory: Negative. Cardiovascular: Positive for leg swelling. Gastrointestinal: Negative. Genitourinary: Negative. Musculoskeletal: Positive for arthralgias, gait problem and joint swelling. Skin: Positive for wound. Hematological: Negative. Psychiatric/Behavioral: Negative. All other systems reviewed and are negative.       PAST MEDICAL HISTORY     Past Medical History:   Diagnosis Date    Diabetes ([address]     Hypertension     pt denies     Past Surgical History:   Procedure Laterality Date    HX  SECTION       Social History     Socioeconomic History    Marital status: SINGLE     Spouse name: Not on file    Number of children: Not on file    Years of education: Not on file    Highest education level: Not on file   Tobacco Use    Smoking status: Never Smoker    Smokeless tobacco: Never Used   Substance and Sexual Activity    Alcohol use: Yes     Comment: occasionally    Drug use: No    Sexual activity: Not Currently     Prior to Admission Medications   Prescriptions Last Dose Informant Patient Reported? Taking? Insulin Needles, Disposable, (STEPHANIE PEN NEEDLE) 32 gauge x 5/32\" ndle   No No   Sig: To be used with Insulin pen. L-Mfolate-B6 Phos-Methyl-B12 (METANX) 3-35-2 mg tab tab   Yes No   Sig: Take 1 Tab by mouth daily. clindamycin (CLEOCIN) 150 mg capsule   No No   Sig: Take 2 Caps by mouth three (3) times daily for 7 days. colchicine (MITIGARE) 0.6 mg capsule   No No   Sig: Take 1 Cap by mouth two (2) times a day. dapagliflozin (FARXIGA) 10 mg tab tablet   Yes No   Sig: Take 10 mg by mouth daily. gabapentin (Neurontin) 300 mg capsule   No No   Sig: Take one a day for 3 days,   Then increase to twice a day for 3 days  The increase to three times a day   indomethacin (INDOCIN) 25 mg capsule   No No   Sig: Take 1 Cap by mouth three (3) times daily. metFORMIN (GLUCOPHAGE) 1,000 mg tablet   Yes No   Sig: Take 1,000 mg by mouth. oxyCODONE IR (ROXICODONE) 5 mg immediate release tablet   No No   Sig: Take 1 Tab by mouth every six (6) hours as needed for Pain for up to 3 days. Max Daily Amount: 20 mg.   pioglitazone (ACTOS) 30 mg tablet   Yes No   Sig: Take  by mouth daily. Facility-Administered Medications: None     No Known Allergies     PHYSICAL EXAM       Vitals:    10/28/20 1133   BP: (!) 134/90   Pulse: (!) 103   Resp: 16   Temp: 99.4 °F (37.4 °C)   SpO2: 100%    Vital signs were reviewed. Physical Exam  Vitals signs and nursing note reviewed. Constitutional:       General: She is in acute distress (Obvious discomfort). Appearance: Normal appearance. HENT:      Head: Normocephalic and atraumatic.       Mouth/Throat:      Mouth: Mucous membranes are moist.   Eyes:      Extraocular Movements: Extraocular movements intact. Cardiovascular:      Rate and Rhythm: Normal rate and regular rhythm. Pulses: Normal pulses. Heart sounds: Normal heart sounds. Pulmonary:      Effort: Pulmonary effort is normal.      Breath sounds: Normal breath sounds. Abdominal:      General: Abdomen is flat. Palpations: Abdomen is soft. Musculoskeletal:         General: Swelling and tenderness present. Comments: Patient's left ankle on significantly swollen with warmth and erythema. Patient with large blister noted on the foot and ankle. Patient's range of motion significantly restricted secondary to pain. Skin:     Findings: Lesion present. Neurological:      General: No focal deficit present. Mental Status: She is alert and oriented to person, place, and time. Psychiatric:         Mood and Affect: Mood normal.         Behavior: Behavior normal.         Thought Content: Thought content normal.         Judgment: Judgment normal.          MEDICAL DECISION MAKING     ED Course:    Recent Results (from the past 8 hour(s))   CBC WITH AUTOMATED DIFF    Collection Time: 10/28/20 11:51 AM   Result Value Ref Range    WBC 9.1 4.3 - 11.1 K/uL    RBC 3.41 (L) 4.05 - 5.2 M/uL    HGB 8.6 (L) 11.7 - 15.4 g/dL    HCT 27.2 (L) 35.8 - 46.3 %    MCV 79.8 79.6 - 97.8 FL    MCH 25.2 (L) 26.1 - 32.9 PG    MCHC 31.6 31.4 - 35.0 g/dL    RDW 13.8 11.9 - 14.6 %    PLATELET 537 494 - 246 K/uL    MPV 9.7 9.4 - 12.3 FL    ABSOLUTE NRBC 0.00 0.0 - 0.2 K/uL    DF AUTOMATED      NEUTROPHILS 79 (H) 43 - 78 %    LYMPHOCYTES 16 13 - 44 %    MONOCYTES 5 4.0 - 12.0 %    EOSINOPHILS 1 0.5 - 7.8 %    BASOPHILS 0 0.0 - 2.0 %    IMMATURE GRANULOCYTES 0 0.0 - 5.0 %    ABS. NEUTROPHILS 7.2 1.7 - 8.2 K/UL    ABS. LYMPHOCYTES 1.4 0.5 - 4.6 K/UL    ABS. MONOCYTES 0.4 0.1 - 1.3 K/UL    ABS. EOSINOPHILS 0.1 0.0 - 0.8 K/UL    ABS. BASOPHILS 0.0 0.0 - 0.2 K/UL    ABS. IMM.  GRANS. 0.0 0.0 - 0.5 K/UL   SED RATE, AUTOMATED    Collection Time: 10/28/20 11:51 AM   Result Value Ref Range    Sed rate, automated >120 (H) 0 - 20 mm/hr   METABOLIC PANEL, COMPREHENSIVE    Collection Time: 10/28/20 11:51 AM   Result Value Ref Range    Sodium 136 (L) 138 - 145 mmol/L    Potassium 3.7 3.5 - 5.1 mmol/L    Chloride 103 98 - 107 mmol/L    CO2 26 21 - 32 mmol/L    Anion gap 7 7 - 16 mmol/L    Glucose 181 (H) 65 - 100 mg/dL    BUN 10 6 - 23 MG/DL    Creatinine 0.65 0.6 - 1.0 MG/DL    GFR est AA >60 >60 ml/min/1.73m2    GFR est non-AA >60 >60 ml/min/1.73m2    Calcium 9.4 8.3 - 10.4 MG/DL    Bilirubin, total 0.4 0.2 - 1.1 MG/DL    ALT (SGPT) 33 12 - 65 U/L    AST (SGOT) 20 15 - 37 U/L    Alk. phosphatase 177 (H) 50 - 136 U/L    Protein, total 8.7 (H) 6.3 - 8.2 g/dL    Albumin 2.3 (L) 3.5 - 5.0 g/dL    Globulin 6.4 (H) 2.3 - 3.5 g/dL    A-G Ratio 0.4 (L) 1.2 - 3.5     MAGNESIUM    Collection Time: 10/28/20 11:51 AM   Result Value Ref Range    Magnesium 1.8 1.8 - 2.4 mg/dL   LACTIC ACID    Collection Time: 10/28/20 11:51 AM   Result Value Ref Range    Lactic acid 0.9 0.4 - 2.0 MMOL/L   CRP, HIGH SENSITIVITY    Collection Time: 10/28/20 11:51 AM   Result Value Ref Range    CRP, High sensitivity 132.0 mg/L   HEMOGLOBIN A1C WITH EAG    Collection Time: 10/28/20 11:51 AM   Result Value Ref Range    Hemoglobin A1c 11.7 (H) 4.8 - 6.0 %    Est. average glucose 289 mg/dL     Duplex Lower Ext Venous Right    Result Date: 10/28/2020  Right lower extremity venous ultrasound INDICATION:  Pain and swelling, chronic, moderate COMPARISON: Radiography of the right ankle 10/12/2020 and ultrasound of the leg 2/23/2020 Technique: Grayscale, color Doppler, and spectral analysis were performed on the deep veins. FINDINGS: There is no evidence of Baker's cyst. Note of a right inguinal 2.5 x 1.1 x 1.7 cm lymph node with normal morphology and no hypervascularity. This is of unclear but doubtful significance, unless clinically suspicious.  There is normal flow in the common femoral, superficial femoral, greater saphenous, femoral and popliteal veins. Normal compression and augmentation demonstrated. The proximal calf veins are also patent as is the partially visualized contralateral common femoral vein. IMPRESSION: No evidence of deep venous thrombosis in the right lower extremity       MDM  Number of Diagnoses or Management Options  Septic arthritis of left ankle, due to unspecified organism [facility]):   Diagnosis management comments: 73-year-old female presented to the emergency department for suspected septic arthritis of her left ankle. Patient given an vancomycin and cefepime as well as fluids and pain control. Patient will be admitted to the hospitalist with plans for orthopedics to be on consult and to possibly take patient to the operating room tomorrow morning. Patient is agreeable to this plan. Amount and/or Complexity of Data Reviewed  Clinical lab tests: ordered and reviewed  Decide to obtain previous medical records or to obtain history from someone other than the patient: yes  Review and summarize past medical records: yes  Discuss the patient with other providers: yes    Patient Progress  Patient progress: stable        Disposition:  Admitted  Diagnosis:     ICD-10-CM ICD-9-CM   1. Septic arthritis of left ankle, due to unspecified organism ([facility]  M00.9 711.07     ____________________________________________________________________  A portion of this note was generated using voice recognition dictation software. While the note has been reviewed for accuracy, please note certain words and phrases may not be transcribed as intended and some grammatical and/or typographical errors may be present.

## 2020-10-28 NOTE — PROGRESS NOTES
10/28/20 1625   Dual Skin Pressure Injury Assessment   Dual Skin Pressure Injury Assessment WDL   Second Care Provider (Based on [address] [place] [address]) Cynthia Laughlin RN   Skin Integumentary   Skin Integumentary (WDL) X   Skin Color Appropriate for ethnicity; Red  (redness on RLE and under breast)   Skin Condition/Temp Dry;Warm;Inflamed  (inflammed on RLE & under breast)   Skin Integrity Scars (comment); Tattoos (comment);Cracked; Abrasion;Blister; Wound (add Wound LDA)    Pressure  Injury Documentation No Pressure Injury Noted-Pressure Ulcer Prevention Initiated   Wound Prevention and Protection Methods   Orientation of Wound Prevention Posterior   Location of Wound Prevention Sacrum/Coccyx   Dressing Present  No   Wound Offloading (Prevention Methods) Bed, pressure reduction mattress;Pillows;Repositioning

## 2020-10-28 NOTE — PROGRESS NOTES
Orthopedic Update:     Patient of Dr. Varinder Antonio. I spoke with him regarding this patient. Awaiting bed placement in the hospital. Right foot to be washed out tomorrow in the OR. Ok to start antibiotics. Will order MRI right foot and NPO after midnight.

## 2020-10-28 NOTE — ED TRIAGE NOTES
Pt arrives with foot pain after having been seen 4 times and unable to be diagnosed, first thought it was gout and then unsure of what it was. Right foot swollen with large blister, MD clark wants pt to be admitted and MD Neely Palmyra at bedside. Unable to bear weight on extremity.

## 2020-10-28 NOTE — PROGRESS NOTES
Pharmacokinetic Consult to Pharmacist    Charly Castaneda is a 45 y.o. female being treated for BJI with vancomycin. Height: 5' 9\" (175.3 cm)  Weight: 118.8 kg (262 lb)  Lab Results   Component Value Date/Time    BUN 10 10/28/2020 11:51 AM    Creatinine 0.65 10/28/2020 11:51 AM    WBC 9.1 10/28/2020 11:51 AM    Lactic acid 0.9 10/28/2020 11:51 AM      Estimated Creatinine Clearance: 148.5 mL/min (by C-G formula based on SCr of 0.65 mg/dL). CULTURES:  pending    Day 1 of vancomycin. Goal trough is 15-20. Vancomycin dose initiated at 1750 mg Q12H. Will continue to follow patient and order levels when clinically indicated.     Thank you,  Dontrell Arreola, PharmD, [id number]  Clinical Pharmacist  [phone]

## 2020-10-29 ENCOUNTER — APPOINTMENT (OUTPATIENT)
Dept: MRI IMAGING | Age: 40
DRG: 494 | End: 2020-10-29
Attending: PHYSICIAN ASSISTANT
Payer: COMMERCIAL

## 2020-10-29 ENCOUNTER — ANESTHESIA EVENT (OUTPATIENT)
Dept: SURGERY | Age: 40
DRG: 494 | End: 2020-10-29
Payer: COMMERCIAL

## 2020-10-29 ENCOUNTER — ANESTHESIA (OUTPATIENT)
Dept: SURGERY | Age: 40
DRG: 494 | End: 2020-10-29
Payer: COMMERCIAL

## 2020-10-29 LAB
ABO + RH BLD: NORMAL
ALBUMIN SERPL-MCNC: 1.9 G/DL (ref 3.5–5)
ALBUMIN/GLOB SERPL: 0.3 {RATIO} (ref 1.2–3.5)
ALP SERPL-CCNC: 153 U/L (ref 50–136)
ALT SERPL-CCNC: 23 U/L (ref 12–65)
ANION GAP SERPL CALC-SCNC: 6 MMOL/L (ref 7–16)
AST SERPL-CCNC: 14 U/L (ref 15–37)
BILIRUB SERPL-MCNC: 0.3 MG/DL (ref 0.2–1.1)
BLOOD GROUP ANTIBODIES SERPL: NORMAL
BUN SERPL-MCNC: 8 MG/DL (ref 6–23)
CALCIUM SERPL-MCNC: 8.4 MG/DL (ref 8.3–10.4)
CHLORIDE SERPL-SCNC: 107 MMOL/L (ref 98–107)
CO2 SERPL-SCNC: 26 MMOL/L (ref 21–32)
CREAT SERPL-MCNC: 0.48 MG/DL (ref 0.6–1)
ERYTHROCYTE [DISTWIDTH] IN BLOOD BY AUTOMATED COUNT: 13.6 % (ref 11.9–14.6)
GLOBULIN SER CALC-MCNC: 5.5 G/DL (ref 2.3–3.5)
GLUCOSE BLD STRIP.AUTO-MCNC: 117 MG/DL (ref 65–100)
GLUCOSE BLD STRIP.AUTO-MCNC: 117 MG/DL (ref 65–100)
GLUCOSE BLD STRIP.AUTO-MCNC: 137 MG/DL (ref 65–100)
GLUCOSE SERPL-MCNC: 157 MG/DL (ref 65–100)
HCT VFR BLD AUTO: 23.5 % (ref 35.8–46.3)
HGB BLD-MCNC: 7.5 G/DL (ref 11.7–15.4)
MCH RBC QN AUTO: 25.3 PG (ref 26.1–32.9)
MCHC RBC AUTO-ENTMCNC: 31.9 G/DL (ref 31.4–35)
MCV RBC AUTO: 79.4 FL (ref 79.6–97.8)
NRBC # BLD: 0 K/UL (ref 0–0.2)
PLATELET # BLD AUTO: 352 K/UL (ref 150–450)
PMV BLD AUTO: 9.4 FL (ref 9.4–12.3)
POTASSIUM SERPL-SCNC: 3.5 MMOL/L (ref 3.5–5.1)
PROT SERPL-MCNC: 7.4 G/DL (ref 6.3–8.2)
RBC # BLD AUTO: 2.96 M/UL (ref 4.05–5.2)
SODIUM SERPL-SCNC: 139 MMOL/L (ref 136–145)
SPECIMEN EXP DATE BLD: NORMAL
WBC # BLD AUTO: 7.5 K/UL (ref 4.3–11.1)

## 2020-10-29 PROCEDURE — 74011250636 HC RX REV CODE- 250/636: Performed by: INTERNAL MEDICINE

## 2020-10-29 PROCEDURE — 74011000258 HC RX REV CODE- 258: Performed by: HOSPITALIST

## 2020-10-29 PROCEDURE — 74011250636 HC RX REV CODE- 250/636: Performed by: HOSPITALIST

## 2020-10-29 PROCEDURE — 85027 COMPLETE CBC AUTOMATED: CPT

## 2020-10-29 PROCEDURE — 76010000138 HC OR TIME 0.5 TO 1 HR: Performed by: ORTHOPAEDIC SURGERY

## 2020-10-29 PROCEDURE — 74011250637 HC RX REV CODE- 250/637: Performed by: FAMILY MEDICINE

## 2020-10-29 PROCEDURE — 87075 CULTR BACTERIA EXCEPT BLOOD: CPT

## 2020-10-29 PROCEDURE — 2709999900 HC NON-CHARGEABLE SUPPLY: Performed by: ORTHOPAEDIC SURGERY

## 2020-10-29 PROCEDURE — 74011000250 HC RX REV CODE- 250: Performed by: HOSPITALIST

## 2020-10-29 PROCEDURE — 82962 GLUCOSE BLOOD TEST: CPT

## 2020-10-29 PROCEDURE — 77030000032 HC CUF TRNQT ZIMM -B: Performed by: ORTHOPAEDIC SURGERY

## 2020-10-29 PROCEDURE — 86900 BLOOD TYPING SEROLOGIC ABO: CPT

## 2020-10-29 PROCEDURE — 77030002916 HC SUT ETHLN J&J -A: Performed by: ORTHOPAEDIC SURGERY

## 2020-10-29 PROCEDURE — 74011250636 HC RX REV CODE- 250/636: Performed by: NURSE ANESTHETIST, CERTIFIED REGISTERED

## 2020-10-29 PROCEDURE — 76942 ECHO GUIDE FOR BIOPSY: CPT | Performed by: ORTHOPAEDIC SURGERY

## 2020-10-29 PROCEDURE — 76210000006 HC OR PH I REC 0.5 TO 1 HR: Performed by: ORTHOPAEDIC SURGERY

## 2020-10-29 PROCEDURE — 76010010054 HC POST OP PAIN BLOCK: Performed by: ORTHOPAEDIC SURGERY

## 2020-10-29 PROCEDURE — 80053 COMPREHEN METABOLIC PANEL: CPT

## 2020-10-29 PROCEDURE — 76060000032 HC ANESTHESIA 0.5 TO 1 HR: Performed by: ORTHOPAEDIC SURGERY

## 2020-10-29 PROCEDURE — 36415 COLL VENOUS BLD VENIPUNCTURE: CPT

## 2020-10-29 PROCEDURE — 2709999900 HC NON-CHARGEABLE SUPPLY

## 2020-10-29 PROCEDURE — 74011000250 HC RX REV CODE- 250: Performed by: NURSE ANESTHETIST, CERTIFIED REGISTERED

## 2020-10-29 PROCEDURE — 74011250636 HC RX REV CODE- 250/636: Performed by: ANESTHESIOLOGY

## 2020-10-29 PROCEDURE — 65270000029 HC RM PRIVATE

## 2020-10-29 PROCEDURE — 87205 SMEAR GRAM STAIN: CPT

## 2020-10-29 PROCEDURE — 0S9F0ZZ DRAINAGE OF RIGHT ANKLE JOINT, OPEN APPROACH: ICD-10-PCS | Performed by: ORTHOPAEDIC SURGERY

## 2020-10-29 PROCEDURE — 74011250637 HC RX REV CODE- 250/637: Performed by: ANESTHESIOLOGY

## 2020-10-29 PROCEDURE — 74011250637 HC RX REV CODE- 250/637: Performed by: HOSPITALIST

## 2020-10-29 RX ORDER — FLUMAZENIL 0.1 MG/ML
0.2 INJECTION INTRAVENOUS
Status: DISCONTINUED | OUTPATIENT
Start: 2020-10-29 | End: 2020-11-09 | Stop reason: HOSPADM

## 2020-10-29 RX ORDER — ACETAMINOPHEN 500 MG
1000 TABLET ORAL ONCE
Status: COMPLETED | OUTPATIENT
Start: 2020-10-29 | End: 2020-10-29

## 2020-10-29 RX ORDER — OXYCODONE HYDROCHLORIDE 5 MG/1
10 TABLET ORAL
Status: DISCONTINUED | OUTPATIENT
Start: 2020-10-29 | End: 2020-11-09 | Stop reason: HOSPADM

## 2020-10-29 RX ORDER — FENTANYL CITRATE 50 UG/ML
100 INJECTION, SOLUTION INTRAMUSCULAR; INTRAVENOUS ONCE
Status: COMPLETED | OUTPATIENT
Start: 2020-10-29 | End: 2020-10-29

## 2020-10-29 RX ORDER — FENTANYL CITRATE 50 UG/ML
INJECTION, SOLUTION INTRAMUSCULAR; INTRAVENOUS AS NEEDED
Status: DISCONTINUED | OUTPATIENT
Start: 2020-10-29 | End: 2020-10-29 | Stop reason: HOSPADM

## 2020-10-29 RX ORDER — NALOXONE HYDROCHLORIDE 0.4 MG/ML
0.1 INJECTION, SOLUTION INTRAMUSCULAR; INTRAVENOUS; SUBCUTANEOUS
Status: DISCONTINUED | OUTPATIENT
Start: 2020-10-29 | End: 2020-11-09 | Stop reason: HOSPADM

## 2020-10-29 RX ORDER — SODIUM CHLORIDE, SODIUM LACTATE, POTASSIUM CHLORIDE, CALCIUM CHLORIDE 600; 310; 30; 20 MG/100ML; MG/100ML; MG/100ML; MG/100ML
100 INJECTION, SOLUTION INTRAVENOUS CONTINUOUS
Status: DISCONTINUED | OUTPATIENT
Start: 2020-10-29 | End: 2020-10-29 | Stop reason: HOSPADM

## 2020-10-29 RX ORDER — MIDAZOLAM HYDROCHLORIDE 1 MG/ML
2 INJECTION, SOLUTION INTRAMUSCULAR; INTRAVENOUS
Status: COMPLETED | OUTPATIENT
Start: 2020-10-29 | End: 2020-10-29

## 2020-10-29 RX ORDER — HYDROMORPHONE HYDROCHLORIDE 2 MG/ML
0.5 INJECTION, SOLUTION INTRAMUSCULAR; INTRAVENOUS; SUBCUTANEOUS
Status: COMPLETED | OUTPATIENT
Start: 2020-10-29 | End: 2020-10-29

## 2020-10-29 RX ORDER — DIPHENHYDRAMINE HYDROCHLORIDE 50 MG/ML
12.5 INJECTION, SOLUTION INTRAMUSCULAR; INTRAVENOUS
Status: DISCONTINUED | OUTPATIENT
Start: 2020-10-29 | End: 2020-11-09 | Stop reason: HOSPADM

## 2020-10-29 RX ORDER — ONDANSETRON 2 MG/ML
INJECTION INTRAMUSCULAR; INTRAVENOUS AS NEEDED
Status: DISCONTINUED | OUTPATIENT
Start: 2020-10-29 | End: 2020-10-29 | Stop reason: HOSPADM

## 2020-10-29 RX ORDER — HALOPERIDOL 5 MG/ML
1 INJECTION INTRAMUSCULAR
Status: ACTIVE | OUTPATIENT
Start: 2020-10-29 | End: 2020-10-30

## 2020-10-29 RX ORDER — SODIUM CHLORIDE 9 MG/ML
75 INJECTION, SOLUTION INTRAVENOUS CONTINUOUS
Status: DISCONTINUED | OUTPATIENT
Start: 2020-10-29 | End: 2020-10-30

## 2020-10-29 RX ORDER — PROPOFOL 10 MG/ML
INJECTION, EMULSION INTRAVENOUS AS NEEDED
Status: DISCONTINUED | OUTPATIENT
Start: 2020-10-29 | End: 2020-10-29 | Stop reason: HOSPADM

## 2020-10-29 RX ORDER — PROPOFOL 10 MG/ML
INJECTION, EMULSION INTRAVENOUS
Status: DISCONTINUED | OUTPATIENT
Start: 2020-10-29 | End: 2020-10-29 | Stop reason: HOSPADM

## 2020-10-29 RX ORDER — SODIUM CHLORIDE, SODIUM LACTATE, POTASSIUM CHLORIDE, CALCIUM CHLORIDE 600; 310; 30; 20 MG/100ML; MG/100ML; MG/100ML; MG/100ML
75 INJECTION, SOLUTION INTRAVENOUS CONTINUOUS
Status: DISCONTINUED | OUTPATIENT
Start: 2020-10-29 | End: 2020-10-29

## 2020-10-29 RX ORDER — LIDOCAINE HYDROCHLORIDE 10 MG/ML
0.1 INJECTION INFILTRATION; PERINEURAL AS NEEDED
Status: DISCONTINUED | OUTPATIENT
Start: 2020-10-29 | End: 2020-10-29 | Stop reason: HOSPADM

## 2020-10-29 RX ORDER — OXYCODONE HYDROCHLORIDE 5 MG/1
5 TABLET ORAL
Status: COMPLETED | OUTPATIENT
Start: 2020-10-29 | End: 2020-10-29

## 2020-10-29 RX ORDER — LIDOCAINE HYDROCHLORIDE 20 MG/ML
INJECTION, SOLUTION EPIDURAL; INFILTRATION; INTRACAUDAL; PERINEURAL AS NEEDED
Status: DISCONTINUED | OUTPATIENT
Start: 2020-10-29 | End: 2020-10-29 | Stop reason: HOSPADM

## 2020-10-29 RX ADMIN — PIOGLITAZONE HYDROCHLORIDE 15 MG: 15 TABLET ORAL at 06:28

## 2020-10-29 RX ADMIN — FENTANYL CITRATE 25 MCG: 50 INJECTION INTRAMUSCULAR; INTRAVENOUS at 17:21

## 2020-10-29 RX ADMIN — ONDANSETRON 4 MG: 2 INJECTION INTRAMUSCULAR; INTRAVENOUS at 17:16

## 2020-10-29 RX ADMIN — PROPOFOL 50 MG: 10 INJECTION, EMULSION INTRAVENOUS at 17:02

## 2020-10-29 RX ADMIN — HYDROMORPHONE HYDROCHLORIDE 0.5 MG: 2 INJECTION INTRAMUSCULAR; INTRAVENOUS; SUBCUTANEOUS at 17:52

## 2020-10-29 RX ADMIN — OXYCODONE HYDROCHLORIDE 5 MG: 5 TABLET ORAL at 00:28

## 2020-10-29 RX ADMIN — OXYCODONE HYDROCHLORIDE 5 MG: 5 TABLET ORAL at 11:56

## 2020-10-29 RX ADMIN — HYDROMORPHONE HYDROCHLORIDE 0.5 MG: 2 INJECTION INTRAMUSCULAR; INTRAVENOUS; SUBCUTANEOUS at 18:15

## 2020-10-29 RX ADMIN — HYDROMORPHONE HYDROCHLORIDE 0.4 MG: 1 INJECTION, SOLUTION INTRAMUSCULAR; INTRAVENOUS; SUBCUTANEOUS at 04:51

## 2020-10-29 RX ADMIN — HYDROMORPHONE HYDROCHLORIDE 0.4 MG: 1 INJECTION, SOLUTION INTRAMUSCULAR; INTRAVENOUS; SUBCUTANEOUS at 08:49

## 2020-10-29 RX ADMIN — FENTANYL CITRATE 25 MCG: 50 INJECTION INTRAMUSCULAR; INTRAVENOUS at 17:13

## 2020-10-29 RX ADMIN — ROPIVACAINE HYDROCHLORIDE 20 ML: 5 INJECTION, SOLUTION EPIDURAL; INFILTRATION; PERINEURAL at 14:12

## 2020-10-29 RX ADMIN — FENTANYL CITRATE 100 MCG: 50 INJECTION, SOLUTION INTRAMUSCULAR; INTRAVENOUS at 14:03

## 2020-10-29 RX ADMIN — FAMOTIDINE 20 MG: 10 INJECTION INTRAVENOUS at 20:04

## 2020-10-29 RX ADMIN — LIDOCAINE HYDROCHLORIDE 20 MG: 20 INJECTION, SOLUTION EPIDURAL; INFILTRATION; INTRACAUDAL; PERINEURAL at 17:02

## 2020-10-29 RX ADMIN — FENTANYL CITRATE 25 MCG: 50 INJECTION INTRAMUSCULAR; INTRAVENOUS at 17:15

## 2020-10-29 RX ADMIN — MIDAZOLAM 2 MG: 1 INJECTION INTRAMUSCULAR; INTRAVENOUS at 14:03

## 2020-10-29 RX ADMIN — OXYCODONE 10 MG: 5 TABLET ORAL at 21:41

## 2020-10-29 RX ADMIN — VANCOMYCIN HYDROCHLORIDE 1750 MG: 10 INJECTION, POWDER, LYOPHILIZED, FOR SOLUTION INTRAVENOUS at 14:10

## 2020-10-29 RX ADMIN — VANCOMYCIN HYDROCHLORIDE 1750 MG: 10 INJECTION, POWDER, LYOPHILIZED, FOR SOLUTION INTRAVENOUS at 01:57

## 2020-10-29 RX ADMIN — SODIUM CHLORIDE 75 ML/HR: 900 INJECTION, SOLUTION INTRAVENOUS at 11:53

## 2020-10-29 RX ADMIN — FENTANYL CITRATE 25 MCG: 50 INJECTION INTRAMUSCULAR; INTRAVENOUS at 17:18

## 2020-10-29 RX ADMIN — ROPIVACAINE HYDROCHLORIDE 30 ML: 5 INJECTION, SOLUTION EPIDURAL; INFILTRATION; PERINEURAL at 14:09

## 2020-10-29 RX ADMIN — FAMOTIDINE 20 MG: 10 INJECTION INTRAVENOUS at 08:37

## 2020-10-29 RX ADMIN — Medication 10 ML: at 20:08

## 2020-10-29 RX ADMIN — CEFTRIAXONE SODIUM 2 G: 2 INJECTION, POWDER, FOR SOLUTION INTRAMUSCULAR; INTRAVENOUS at 00:19

## 2020-10-29 RX ADMIN — OXYCODONE 5 MG: 5 TABLET ORAL at 18:33

## 2020-10-29 RX ADMIN — SODIUM CHLORIDE, SODIUM LACTATE, POTASSIUM CHLORIDE, AND CALCIUM CHLORIDE 100 ML/HR: 600; 310; 30; 20 INJECTION, SOLUTION INTRAVENOUS at 14:00

## 2020-10-29 RX ADMIN — HYDROMORPHONE HYDROCHLORIDE 0.5 MG: 2 INJECTION INTRAMUSCULAR; INTRAVENOUS; SUBCUTANEOUS at 17:57

## 2020-10-29 RX ADMIN — HYDROMORPHONE HYDROCHLORIDE 0.5 MG: 2 INJECTION INTRAMUSCULAR; INTRAVENOUS; SUBCUTANEOUS at 18:04

## 2020-10-29 RX ADMIN — ACETAMINOPHEN 1000 MG: 500 TABLET, FILM COATED ORAL at 14:18

## 2020-10-29 RX ADMIN — PROPOFOL 180 MCG/KG/MIN: 10 INJECTION, EMULSION INTRAVENOUS at 17:02

## 2020-10-29 RX ADMIN — Medication 10 ML: at 06:30

## 2020-10-29 NOTE — BRIEF OP NOTE
Brief Postoperative Note    Patient: Nidhi Chang  YOB: 1980  MRN: [id number]    Date of Procedure: 10/29/2020     Pre-Op Diagnosis: right bankle swelling [M25.472]    Post-Op Diagnosis: right septic ankle    Procedure(s):  EXTREMITY IRRIGATION AND DEBRIDEMENT/right ANKLE    Surgeon(s):  Eldon Early MD    Surgical Assistant: None    Anesthesia: General     Estimated Blood Loss (mL): Minimal    Complications: None    Specimens:   ID Type Source Tests Collected by Time Destination   1 : Right Ankle  Joint Fluid Ankle CULTURE, ANAEROBIC, AEROBIC CULTURE + Jolene Wharton MD 10/29/2020 1715 Microbiology        Implants: * No implants in log *    Drains: * No LDAs found *    Findings: septic ankle    Electronically Signed by Pablo Cooney MD on 10/29/2020 at 6:19 PM

## 2020-10-29 NOTE — ANESTHESIA PROCEDURE NOTES
Peripheral Block    Start time: 10/29/2020 2:11 PM  End time: 10/29/2020 2:12 PM  Performed by: Berto Campbell MD  Authorized by: Berto Campbell MD       Pre-procedure: Indications: at surgeon's request and post-op pain management    Preanesthetic Checklist: patient identified, risks and benefits discussed, site marked, timeout performed, anesthesia consent given and patient being monitored    Timeout Time: 14:10          Block Type:   Block Type: Adductor canal  Laterality:  Right  Monitoring:  Standard ASA monitoring, continuous pulse ox, frequent vital sign checks, heart rate, responsive to questions and oxygen  Injection Technique:  Single shot  Procedures: ultrasound guided    Patient Position: supine  Prep: chlorhexidine    Location:  Mid thigh  Needle Type:  Stimuplex  Needle Gauge:  22 G  Needle Localization:  Ultrasound guidance  Medication Injected:  Ropivacaine 0.5% with epinephrine 1:200,000 injection, 20 mL (Mixture components: EPINEPHrine HCl (PF) 1 mg/mL (1 mL) Soln, . 005 mL; ropivacaine (PF) 5 mg/mL (0.5 %) Soln, 1 mL)  Med Admin Time: 10/29/2020 2:12 PM    Assessment:  Number of attempts:  1  Injection Assessment:  Incremental injection every 5 mL, local visualized surrounding nerve on ultrasound, negative aspiration for CSF, negative aspiration for blood, no paresthesia, no intravascular symptoms and ultrasound image on chart  Patient tolerance:  Patient tolerated the procedure well with no immediate complications

## 2020-10-29 NOTE — PROGRESS NOTES
Name: Cedric Juárez MRN: [id number]  : 1980  Age:40 y.o.  female  Admit Date:  10/28/2020 LOS: 1      Hospitalist Progress Note     Reason for Admission:  Septic joint ([address] [M00.9]  Right ankle pain [M25.571]  Right ankle swelling [M25.471]    Hospital Course:  Please refer to the admission H&P for details of presentation. In summary, Cedric Juárez is a 45 y.o. female with medical history significant for CAD, type 2 diabetes mellitus uncontrolled, hypertension who was admitted due to progressive worsening of right ankle pain and swelling for 3 weeks with concern for septic joint. ED, patient was afebrile and work-up shows no leukocytosis but elevated CRP. Was evaluated by orthopedics. Patient underwent ankle joint washout in the OR on 10/29/2020. She has been started on IV antibiotics for suspected septic joint. Subjective/24 hr Events (10/29/20) :  Patient is seen and examined at bedside. No acute events reported overnight by nursing staff. Reports no foot pain at rest but pain on movement or palpation. Patient denies fever, chills, chest pains, shortness of breath, n/v, abdominal pain. ROS: 10 point review of systems is otherwise negative with the exception of the elements mentioned above.     Objective:    Patient Vitals for the past 24 hrs:   Temp Pulse Resp BP SpO2   10/29/20 1450  81  (!) 146/79 100 %   10/29/20 1422  83 17 (!) 156/88 100 %   10/29/20 1417  84 17 (!) 153/79 100 %   10/29/20 1412  86 17 (!) 164/80 100 %   10/29/20 1410     100 %   10/29/20 1403     100 %   10/29/20 1344 98.4 °F (36.9 °C) 87 18 129/74 99 %   10/29/20 0720 98.4 °F (36.9 °C) 89 18 128/71 98 %   10/29/20 0516 98.6 °F (37 °C) 86 17 128/78 96 %   10/29/20 0049 98.6 °F (37 °C) 93 17 114/68 95 %   10/28/20 1951 99.2 °F (37.3 °C) 93 16 118/74 94 %   10/28/20 1625 98.9 °F (37.2 °C) 92 16 (!) 144/92 100 %     Oxygen Therapy  O2 Sat (%): 100 % (10/29/20 1450)  O2 Device: CO2 nasal cannula (10/29/20 1403)  O2 Flow Rate (L/min): 3 l/min (10/29/20 1450)    Estimated body mass index is 38.69 kg/m² as calculated from the following:    Height as of an earlier encounter on 10/28/20: 5' 9\" (1.753 m). Weight as of this encounter: 118.8 kg (262 lb). Intake/Output Summary (Last 24 hours) at 10/29/2020 1549  Last data filed at 10/29/2020 1240  Gross per 24 hour   Intake    Output 700 ml   Net -700 ml       *Note that automatically entered I/Os may not be accurate; dependent on patient compliance with collection and accurate  by techs. Physical Exam:   General:     alert, awake, no acute distress. Well nourished. Obese  Head:   normocephalic, atraumatic  Eyes, Ears, nose: PERRL, EOMI. Normal conjunctiva  Neck:    supple, non-tender. Trachea midline. Lungs:   CTAB, no wheezing, rhonchi, rales  Cardiac:   RRR, Normal S1 and S2. Abdomen:   Soft, non distended, nontender, +BS, no guarding/rebound  Extremities:   Warm, dry. No edema. Rt ankle swelling with warmth/tenderness + 5 cmx 7 cm blister with purulent fluid on medial aspect of right ankle. Skin:   No rashes, no jaundice  Neuro:  AAOx3.  No gross focal neurological deficit  Psychiatric:  No anxiety, calm, cooperative    Data Review:  I have reviewed all labs, meds, and studies from the last 24 hours:      Labs:    Recent Results (from the past 24 hour(s))   GLUCOSE, POC    Collection Time: 10/28/20  4:32 PM   Result Value Ref Range    Glucose (POC) 166 (H) 65 - 100 mg/dL   CULTURE, BLOOD    Collection Time: 10/28/20  6:15 PM    Specimen: Blood   Result Value Ref Range    Special Requests: RIGHT  Antecubital        Culture result: NO GROWTH AFTER 15 HOURS     URINALYSIS W/ RFLX MICROSCOPIC    Collection Time: 10/28/20  7:00 PM   Result Value Ref Range    Color CRYS      Appearance CLOUDY      Specific gravity 1.019 1.001 - 1.023      pH (UA) 6.0 5.0 - 9.0      Protein 30 (A) NEG mg/dL    Glucose Negative mg/dL    Ketone TRACE (A) NEG mg/dL    Bilirubin Negative NEG      Blood LARGE (A) NEG      Urobilinogen 0.2 0.2 - 1.0 EU/dL    Nitrites Negative NEG      Leukocyte Esterase SMALL (A) NEG      WBC 0-3 0 /hpf    RBC 20-50 0 /hpf    Epithelial cells 0-3 0 /hpf    Bacteria 0 0 /hpf    Other observations RESULTS VERIFIED MANUALLY     GLUCOSE, POC    Collection Time: 10/28/20  9:32 PM   Result Value Ref Range    Glucose (POC) 180 (H) 65 - 563 mg/dL   METABOLIC PANEL, COMPREHENSIVE    Collection Time: 10/29/20  4:25 AM   Result Value Ref Range    Sodium 139 136 - 145 mmol/L    Potassium 3.5 3.5 - 5.1 mmol/L    Chloride 107 98 - 107 mmol/L    CO2 26 21 - 32 mmol/L    Anion gap 6 (L) 7 - 16 mmol/L    Glucose 157 (H) 65 - 100 mg/dL    BUN 8 6 - 23 MG/DL    Creatinine 0.48 (L) 0.6 - 1.0 MG/DL    GFR est AA >60 >60 ml/min/1.73m2    GFR est non-AA >60 >60 ml/min/1.73m2    Calcium 8.4 8.3 - 10.4 MG/DL    Bilirubin, total 0.3 0.2 - 1.1 MG/DL    ALT (SGPT) 23 12 - 65 U/L    AST (SGOT) 14 (L) 15 - 37 U/L    Alk.  phosphatase 153 (H) 50 - 136 U/L    Protein, total 7.4 6.3 - 8.2 g/dL    Albumin 1.9 (L) 3.5 - 5.0 g/dL    Globulin 5.5 (H) 2.3 - 3.5 g/dL    A-G Ratio 0.3 (L) 1.2 - 3.5     CBC W/O DIFF    Collection Time: 10/29/20  4:25 AM   Result Value Ref Range    WBC 7.5 4.3 - 11.1 K/uL    RBC 2.96 (L) 4.05 - 5.2 M/uL    HGB 7.5 (L) 11.7 - 15.4 g/dL    HCT 23.5 (L) 35.8 - 46.3 %    MCV 79.4 (L) 79.6 - 97.8 FL    MCH 25.3 (L) 26.1 - 32.9 PG    MCHC 31.9 31.4 - 35.0 g/dL    RDW 13.6 11.9 - 14.6 %    PLATELET 270 683 - 674 K/uL    MPV 9.4 9.4 - 12.3 FL    ABSOLUTE NRBC 0.00 0.0 - 0.2 K/uL   GLUCOSE, POC    Collection Time: 10/29/20 11:56 AM   Result Value Ref Range    Glucose (POC) 137 (H) 65 - 100 mg/dL         All Micro Results     Procedure Component Value Units Date/Time    CULTURE, BLOOD [[id number]] Collected:  10/28/20 1815    Order Status:  Completed Specimen:  Blood Updated:  10/29/20 1100     Special Requests: --        RIGHT  Antecubital       Culture result: NO GROWTH AFTER 15 HOURS       CULTURE, BLOOD [[id number]] Collected:  10/28/20 1225    Order Status:  Completed Specimen:  Blood Updated:  10/28/20 1244          Current Meds:  Current Facility-Administered Medications   Medication Dose Route Frequency    lidocaine (XYLOCAINE) 10 mg/mL (1 %) injection 0.1 mL  0.1 mL SubCUTAneous PRN    lactated Ringers infusion  100 mL/hr IntraVENous CONTINUOUS    [START ON 10/30/2020] vancomycin trough reminder   Other ONCE    0.9% sodium chloride infusion  75 mL/hr IntraVENous CONTINUOUS    dapagliflozin (FARXIGA) tablet 10 mg (Patient Supplied)  10 mg Oral DAILY    pioglitazone (ACTOS) tablet 15 mg  15 mg Oral ACB    sodium chloride (NS) flush 5-40 mL  5-40 mL IntraVENous Q8H    sodium chloride (NS) flush 5-40 mL  5-40 mL IntraVENous PRN    acetaminophen (TYLENOL) tablet 650 mg  650 mg Oral Q6H PRN    Or    acetaminophen (TYLENOL) suppository 650 mg  650 mg Rectal Q6H PRN    polyethylene glycol (MIRALAX) packet 17 g  17 g Oral DAILY PRN    promethazine (PHENERGAN) tablet 12.5 mg  12.5 mg Oral Q6H PRN    Or    ondansetron (ZOFRAN) injection 4 mg  4 mg IntraVENous Q4H PRN    enoxaparin (LOVENOX) injection 40 mg  40 mg SubCUTAneous DAILY    potassium chloride 10 mEq in 100 ml IVPB  10 mEq IntraVENous PRN    magnesium sulfate 2 g/50 ml IVPB (premix or compounded)  2 g IntraVENous PRN    famotidine (PF) (PEPCID) 20 mg in 0.9% sodium chloride 10 mL injection  20 mg IntraVENous BID    insulin lispro (HUMALOG) injection   SubCUTAneous AC&HS    cefTRIAXone (ROCEPHIN) 2 g in 0.9% sodium chloride (MBP/ADV) 50 mL  2 g IntraVENous Q24H    HYDROmorphone (PF) (DILAUDID) injection 0.4 mg  0.4 mg IntraVENous Q3H PRN    oxyCODONE IR (ROXICODONE) tablet 5 mg  5 mg Oral Q4H PRN    naloxone (NARCAN) injection 0.4 mg  0.4 mg IntraVENous PRN    nystatin (MYCOSTATIN) 100,000 unit/gram powder   Topical BID    vancomycin (VANCOCIN) 1750 mg in  ml infusion  1,750 mg IntraVENous Q12H         Other Studies:  Xr Chest Pa Lat    Result Date: 10/12/2020  History: cp Two views chest Findings: The lungs are well expanded and clear. The cardiac silhouette, and mediastinal contour, and osseous structures are normal.     Impression: Unremarkable two-view chest.     Xr Ankle Rt Min 3 V    Result Date: 10/12/2020  History: Medial right ankle pain and swelling, 2 days duration 3 views right ankle FINDINGS: No acute fracture, dislocation, or additional acute bony abnormality. IMPRESSION: No acute findings. Duplex Lower Ext Venous Right    Result Date: 10/28/2020  Right lower extremity venous ultrasound INDICATION:  Pain and swelling, chronic, moderate COMPARISON: Radiography of the right ankle 10/12/2020 and ultrasound of the leg 2/23/2020 Technique: Grayscale, color Doppler, and spectral analysis were performed on the deep veins. FINDINGS: There is no evidence of Baker's cyst. Note of a right inguinal 2.5 x 1.1 x 1.7 cm lymph node with normal morphology and no hypervascularity. This is of unclear but doubtful significance, unless clinically suspicious. There is normal flow in the common femoral, superficial femoral, greater saphenous, femoral and popliteal veins. Normal compression and augmentation demonstrated. The proximal calf veins are also patent as is the partially visualized contralateral common femoral vein. IMPRESSION: No evidence of deep venous thrombosis in the right lower extremity     Assessment:    Active Hospital Problems    Diagnosis Date Noted    Right ankle swelling 10/28/2020    Right ankle pain 10/28/2020    Septic joint ([address] 10/28/2020    Positive for microalbuminuria 02/12/2018    DM2 (diabetes mellitus, type 2) ([address] 02/24/2014     Newly diagnosed 2/22/14      HTN (hypertension) 02/24/2014    Morbid obesity ([address] 02/24/2014       Plan:    Rt Ankle pain/swelling concerning for septic Joint  No DVT and no acute findings with XR.  Concerning for septic given warmth/TTP and slight erythema palpation. - continue IV ceftriaxone + vancomycin (10/29 - )  - follow up BCx    T2DM, uncontrolled  A1c 11.7  - hold home metformin  - consult diabetic education  - continue with home Actos and Farxiga  - ISS, diabetic diet    Morbid Obesity: Body mass index is 38.69 kg/m². Diet:  DIET NPO  DVT PPx: lovenox  Code: Full Code  Dispo: pending clinical course and PT/OT Eval  Estimated Discharge: TBD based on clinical course    Labs/Imaging Reviewed. Patient is HIGH risk due to current condition and comorbid conditions as well as requiring frequent monitoring and high risk of decline. Plan discussed with staff, patient/family and are in agreement.       Signed By: Alyssa Sosa MD     October 29, 2020

## 2020-10-29 NOTE — PROGRESS NOTES
Care Management Interventions  PCP Verified by CM: Yes  Last Visit to PCP: 02/10/20  Mode of Transport at Discharge: Self  Transition of Care Consult (CM Consult): Discharge Planning  Current Support Network: Own Home  Confirm Follow Up Transport: Self  Discharge Location  Discharge Placement: Unable to determine at this time    Met with patient in room (ppe + social distance maintained). Patient lives alone at home, she is independent, demographics confirmed. Her PCP is Dr. Noble Osgood. She uses [facility] pharmacy on [address] [place] [address]. She plans to go home @ d/c pending medical course. Will follow for any needs at d/c.     1600: patient's family brought disability/fmla paperwork to the hospital to be completed - spoke with Ortho office and faxed documents. Notified patient's family -  Ena Hendricksrohan [phone] that I faxed information and that I left blank paperwork in patient's room.

## 2020-10-29 NOTE — ANESTHESIA PREPROCEDURE EVALUATION
Anesthetic History   No history of anesthetic complications            Review of Systems / Medical History  Patient summary reviewed and pertinent labs reviewed    Pulmonary  Within defined limits                 Neuro/Psych   Within defined limits           Cardiovascular    Hypertension (Pt denies)              Exercise tolerance: >4 METS  Comments: Denies recent CP, SOB or Palpitations   GI/Hepatic/Renal  Within defined limits              Endo/Other    Diabetes (HgB A1c of 11.7): poorly controlled, type 2    Morbid obesity and anemia (Hgb of 7.5)     Other Findings              Physical Exam    Airway  Mallampati: III  TM Distance: 4 - 6 cm  Neck ROM: normal range of motion   Mouth opening: Normal     Cardiovascular  Regular rate and rhythm,  S1 and S2 normal,  no murmur, click, rub, or gallop             Dental  No notable dental hx       Pulmonary  Breath sounds clear to auscultation               Abdominal  GI exam deferred       Other Findings            Anesthetic Plan    ASA: 3  Anesthesia type: total IV anesthesia      Post-op pain plan if not by surgeon: peripheral nerve block single      Anesthetic plan and risks discussed with: Patient      Patient reports she has not eaten in \"days. \"

## 2020-10-29 NOTE — PROGRESS NOTES
Physical Therapy Note:    Physical therapy evaluation orders received and chart reviewed. Attempted to see patient to initiate assessment. Patient currently off of the floor for procedure. Will follow and re-attempt at a later time/date as schedule permits/patient available following procedure.     Thank you,  Dong Noland, PT, DPT

## 2020-10-29 NOTE — CONSULTS
This patient is a 78-year-old -American female with a history of right ankle pain and inability to bear weight. She presented to the office yesterday after having been to the ER 3 days complaining of right ankle pain and then was asked to follow-up in our orthopedic office yesterday. I examined the patient in the office yesterday as well as today. She appears to have a septic ankle joint with a large medial ankle abscess. She was sent to the emergency room as she could not be direct admitted due to a high bed census. She was admitted to the hospitalist service yesterday through the emergency room. She is afebrile with a white count within normal limits but does have a C-reactive protein of 132 and ongoing pain and swelling with an obvious purulent area on the medial ankle joint. We have discussed treatment options and I have recommended incision and drainage with arthrotomy of the ankle and subtalar joints at this point. We will take intraoperative cultures to guide treatment and she may very well need infectious disease consultation for antibiotic therapy. We discussed that given the magnitude of her infection that she may need multiple washouts that we would have to determine based on her clinical course. She understands the plan and wishes to proceed with incision and drainage of the right ankle today.

## 2020-10-29 NOTE — PERIOP NOTES
TRANSFER - IN REPORT:    Verbal report received from Yara Hopkins RN(name) on [place]  being received from  236(unit) for ordered procedure      Report consisted of patients Situation, Background, Assessment and   Recommendations(SBAR). Information from the following report(s) SBAR was reviewed with the receiving nurse. Opportunity for questions and clarification was provided. Assessment completed upon patients arrival to unit and care assumed.

## 2020-10-29 NOTE — PROGRESS NOTES
Problem: Diabetes Self-Management  Goal: *Disease process and treatment process  Description: Define diabetes and identify own type of diabetes; list 3 options for treating diabetes. Outcome: Progressing Towards Goal  Goal: *Incorporating nutritional management into lifestyle  Description: Describe effect of type, amount and timing of food on blood glucose; list 3 methods for planning meals. Outcome: Progressing Towards Goal  Goal: *Incorporating physical activity into lifestyle  Description: State effect of exercise on blood glucose levels. Outcome: Progressing Towards Goal  Goal: *Developing strategies to promote health/change behavior  Description: Define the ABC's of diabetes; identify appropriate screenings, schedule and personal plan for screenings. Outcome: Progressing Towards Goal  Goal: *Using medications safely  Description: State effect of diabetes medications on diabetes; name diabetes medication taking, action and side effects. Outcome: Progressing Towards Goal  Goal: *Monitoring blood glucose, interpreting and using results  Description: Identify recommended blood glucose targets  and personal targets. Outcome: Progressing Towards Goal  Goal: *Prevention, detection, treatment of acute complications  Description: List symptoms of hyper- and hypoglycemia; describe how to treat low blood sugar and actions for lowering  high blood glucose level. Outcome: Progressing Towards Goal  Goal: *Prevention, detection and treatment of chronic complications  Description: Define the natural course of diabetes and describe the relationship of blood glucose levels to long term complications of diabetes.   Outcome: Progressing Towards Goal  Goal: *Developing strategies to address psychosocial issues  Description: Describe feelings about living with diabetes; identify support needed and support network  Outcome: Progressing Towards Goal  Goal: *Insulin pump training  Outcome: Progressing Towards Goal  Goal: *Sick day guidelines  Outcome: Progressing Towards Goal  Goal: *Patient Specific Goal (EDIT GOAL, INSERT TEXT)  Outcome: Progressing Towards Goal     Problem: Patient Education: Go to Patient Education Activity  Goal: Patient/Family Education  Outcome: Progressing Towards Goal     Problem: Falls - Risk of  Goal: *Absence of Falls  Description: Document Stephen Hansen Fall Risk and appropriate interventions in the flowsheet. Outcome: Progressing Towards Goal  Note: Fall Risk Interventions:  Mobility Interventions: Communicate number of staff needed for ambulation/transfer, Patient to call before getting OOB         Medication Interventions: Patient to call before getting OOB, Teach patient to arise slowly    Elimination Interventions: Call light in reach              Problem: Patient Education: Go to Patient Education Activity  Goal: Patient/Family Education  Outcome: Progressing Towards Goal     Problem: Pressure Injury - Risk of  Goal: *Prevention of pressure injury  Description: Document Alistair Scale and appropriate interventions in the flowsheet.   Outcome: Progressing Towards Goal  Note: Pressure Injury Interventions:       Moisture Interventions: Absorbent underpads    Activity Interventions: Increase time out of bed, Pressure redistribution bed/mattress(bed type)    Mobility Interventions: Pressure redistribution bed/mattress (bed type), HOB 30 degrees or less    Nutrition Interventions: Offer support with meals,snacks and hydration, Document food/fluid/supplement intake                     Problem: Patient Education: Go to Patient Education Activity  Goal: Patient/Family Education  Outcome: Progressing Towards Goal

## 2020-10-29 NOTE — ANESTHESIA POSTPROCEDURE EVALUATION
Procedure(s):  EXTREMITY IRRIGATION AND DEBRIDEMENT/LEFT ANKLE.    total IV anesthesia    Anesthesia Post Evaluation      Multimodal analgesia: multimodal analgesia used between 6 hours prior to anesthesia start to PACU discharge  Patient location during evaluation: bedside  Patient participation: complete - patient participated  Level of consciousness: awake and alert  Pain management: adequate  Airway patency: patent  Anesthetic complications: no  Cardiovascular status: hemodynamically stable  Respiratory status: spontaneous ventilation  Hydration status: euvolemic  Comments: Patient stable and may discharge at this time. Post anesthesia nausea and vomiting:  none  Final Post Anesthesia Temperature Assessment:  Normothermia (36.0-37.5 degrees C)    Good result with popliteal and adductor canal blocks. INITIAL Post-op Vital signs:   Vitals Value Taken Time   /68 10/29/2020  5:45 PM   Temp 36.8 °C (98.3 °F) 10/29/2020  5:38 PM   Pulse 94 10/29/2020  5:46 PM   Resp 18 10/29/2020  5:38 PM   SpO2 100 % 10/29/2020  5:46 PM   Vitals shown include unvalidated device data.

## 2020-10-29 NOTE — PROGRESS NOTES
TRANSFER - OUT REPORT:    Verbal report given to Jojo Richard on [address]  being transferred to preop for ordered procedure       Report consisted of patients Situation, Background, Assessment and   Recommendations(SBAR). Information from the following report(s) Kardex was reviewed with the receiving nurse. Lines:   Peripheral IV 10/29/20 Right; Inner Forearm (Active)   Site Assessment Clean, dry, & intact 10/29/20 1240   Phlebitis Assessment 0 10/29/20 1240   Infiltration Assessment 0 10/29/20 1240   Dressing Status Clean, dry, & intact 10/29/20 1240   Dressing Type Transparent 10/29/20 1240   Hub Color/Line Status Capped;Flushed 10/29/20 1240        Opportunity for questions and clarification was provided.       Patient transported with:   [employer]

## 2020-10-29 NOTE — ANESTHESIA PROCEDURE NOTES
Peripheral Block    Start time: 10/29/2020 2:07 PM  End time: 10/29/2020 2:09 PM  Performed by: Mela Damon MD  Authorized by: Mela Damon MD       Pre-procedure: Indications: at surgeon's request and post-op pain management    Preanesthetic Checklist: patient identified, risks and benefits discussed, site marked, timeout performed, anesthesia consent given and patient being monitored    Timeout Time: 14:03          Block Type:   Block Type:  Popliteal  Laterality:  Right  Monitoring:  Standard ASA monitoring, continuous pulse ox, frequent vital sign checks, heart rate, responsive to questions and oxygen  Injection Technique:  Single shot  Procedures: ultrasound guided and nerve stimulator    Patient Position: supine  Prep: chlorhexidine    Location:  Lower thigh  Needle Type:  Stimuplex  Needle Gauge:  22 G  Needle Localization:  Anatomical landmarks, ultrasound guidance and nerve stimulator  Motor Response comment:   Motor Response: minimal motor response >0.4 mA   Medication Injected:  Ropivacaine 0.5% with epinephrine 1:200,000 injection, 30 mL (Mixture components: EPINEPHrine HCl (PF) 1 mg/mL (1 mL) Soln, . 005 mL; ropivacaine (PF) 5 mg/mL (0.5 %) Soln, 1 mL)  Med Admin Time: 10/29/2020 2:09 PM    Assessment:  Number of attempts:  1  Injection Assessment:  Incremental injection every 5 mL, local visualized surrounding nerve on ultrasound, negative aspiration for CSF, negative aspiration for blood, no paresthesia, no intravascular symptoms and ultrasound image on chart  Patient tolerance:  Patient tolerated the procedure well with no immediate complications

## 2020-10-29 NOTE — PROGRESS NOTES
Patient was brought down to get MRI prior to surgery and she could not tolerate due to pain and swelling. The camera would not go over foot. Per  mri not needed prior to surgery. Pt sent to preop.

## 2020-10-29 NOTE — PERIOP NOTES
TRANSFER - OUT REPORT:    Verbal report given to Jose Velazco RN on [employer]  being transferred to [facility] routine post - op       Report consisted of patients Situation, Background, Assessment and   Recommendations(SBAR). Information from the following report(s) SBAR, Procedure Summary, Intake/Output, MAR, Recent Results and Cardiac Rhythm NSR was reviewed with the receiving nurse. Lines:   Peripheral IV 10/29/20 Right; Inner Forearm (Active)   Site Assessment Clean, dry, & intact 10/29/20 1738   Phlebitis Assessment 0 10/29/20 1738   Infiltration Assessment 0 10/29/20 1738   Dressing Status Clean, dry, & intact 10/29/20 1738   Dressing Type Transparent;Tape 10/29/20 1738   Hub Color/Line Status Blue;Capped 10/29/20 1738       Peripheral IV 10/29/20 Right Hand (Active)   Site Assessment Clean, dry, & intact 10/29/20 1738   Phlebitis Assessment 0 10/29/20 1738   Infiltration Assessment 0 10/29/20 1738   Dressing Status Clean, dry, & intact 10/29/20 1738   Dressing Type Transparent;Tape 10/29/20 1738   Hub Color/Line Status Pink; Infusing 10/29/20 1738        Opportunity for questions and clarification was provided. Patient transported with:   O2 @ 2 liters    VTE prophylaxis orders have not been written for [employer]. Patient given floor number and nurses name. No family in hospital with patient.

## 2020-10-29 NOTE — OP NOTES
FULL OP NOTE    PATIENT NAME: Rosa Tee  MRN: [id number]    DATE OF SURGERY: 10/29/2020    PREOPERATIVE DIAGNOSIS: right septic ankle      POSTOPERATIVE DIAGNOSIS: right septic ankle  . PROCEDURE: 1. Right ankle arthrotomy with exploration and removal of septic material, 78657                            2.  Right tibia incision of osteomyelitis with drainage, 06029                             3.  Right subtalar joint arthrotomy with exploration and removal of infection material, 53222    SURGEON: Jovani Mariee MD    HARDWARE: * No implants in log *   INDICATIONS: This patient is a 71-year-old female who presented to the office yesterday with a multiday history of right ankle pain and swelling consistent with a septic ankle joint. She was admitted to the hospital with C-reactive protein of 132 and presents today for operative incision and drainage. Risks and benefits of the procedure including but not limited to risks of anesthetic complications as well as surgical complications including damage nerves blood vessels, risk infection, risk of incomplete pain relief, and need for additional surgery been discussed with the patient. She understands the risks and wishes to proceed with surgery at this time. PROCEDURE IN DETAIL: A time out was done to confirm the operating procedure, surgeon, patient and site. A block was placed by the department of anesthesia. In a preop surgical timeout the right lower extremity was identified to correct surgical site and prepped and draped in a standard sterile fashion using ChloraPrep solution. An anteromedial approach to the ankle was opened at that time with gross purulence identified. Cultures were obtained at that time. An arthrotomy of the ankle was then performed there was purulence located in the ankle joint which was debrided out at that time there was no significant damage to the cartilage identified however at that point.   The tibia was also incised at that point. There is no definite osteomyelitis identified in the tibia. A subtalar joint arthrotomy was also performed at that time. The wounds were then sundeep irrigated using sterile saline and closed loosely using nylon sutures. Sterile dressings then applied. Anesthesia was discontinued. The patient was transferred back to recovery bed. She was taken recovery in satisfactory condition. She appeared to tolerate the procedure well. There were no apparent surgical or anesthetic complications. All needle and sponge counts were correct. Postoperatively we will follow her operative cultures. She will likely need infectious disease consultation. She also may need additional washouts based on the level of purulence identified. TOURNIQUET TIME: Approx 12  minutes. SPECIMENS: none    ESTIMATED BLOOD LOSS: min mL.

## 2020-10-30 PROBLEM — L73.2 LEFT AXILLARY HIDRADENITIS: Status: ACTIVE | Noted: 2020-10-30

## 2020-10-30 LAB
ALBUMIN SERPL-MCNC: 2 G/DL (ref 3.5–5)
ALBUMIN/GLOB SERPL: 0.4 {RATIO} (ref 1.2–3.5)
ALP SERPL-CCNC: 170 U/L (ref 50–136)
ALT SERPL-CCNC: 24 U/L (ref 12–65)
ANION GAP SERPL CALC-SCNC: 3 MMOL/L (ref 7–16)
AST SERPL-CCNC: 18 U/L (ref 15–37)
BILIRUB SERPL-MCNC: 0.2 MG/DL (ref 0.2–1.1)
BUN SERPL-MCNC: 8 MG/DL (ref 6–23)
CALCIUM SERPL-MCNC: 8.2 MG/DL (ref 8.3–10.4)
CHLORIDE SERPL-SCNC: 106 MMOL/L (ref 98–107)
CO2 SERPL-SCNC: 28 MMOL/L (ref 21–32)
CREAT SERPL-MCNC: 0.58 MG/DL (ref 0.6–1)
ERYTHROCYTE [DISTWIDTH] IN BLOOD BY AUTOMATED COUNT: 13.8 % (ref 11.9–14.6)
GLOBULIN SER CALC-MCNC: 4.8 G/DL (ref 2.3–3.5)
GLUCOSE BLD STRIP.AUTO-MCNC: 139 MG/DL (ref 65–100)
GLUCOSE BLD STRIP.AUTO-MCNC: 147 MG/DL (ref 65–100)
GLUCOSE BLD STRIP.AUTO-MCNC: 147 MG/DL (ref 65–100)
GLUCOSE SERPL-MCNC: 188 MG/DL (ref 65–100)
HCT VFR BLD AUTO: 24.5 % (ref 35.8–46.3)
HGB BLD-MCNC: 7.7 G/DL (ref 11.7–15.4)
MCH RBC QN AUTO: 25.4 PG (ref 26.1–32.9)
MCHC RBC AUTO-ENTMCNC: 31.4 G/DL (ref 31.4–35)
MCV RBC AUTO: 80.9 FL (ref 79.6–97.8)
NRBC # BLD: 0 K/UL (ref 0–0.2)
PLATELET # BLD AUTO: 387 K/UL (ref 150–450)
PMV BLD AUTO: 9.8 FL (ref 9.4–12.3)
POTASSIUM SERPL-SCNC: 3.7 MMOL/L (ref 3.5–5.1)
PROT SERPL-MCNC: 6.8 G/DL (ref 6.3–8.2)
RBC # BLD AUTO: 3.03 M/UL (ref 4.05–5.2)
SODIUM SERPL-SCNC: 137 MMOL/L (ref 136–145)
VANCOMYCIN TROUGH SERPL-MCNC: 11.3 UG/ML (ref 5–20)
WBC # BLD AUTO: 10.7 K/UL (ref 4.3–11.1)

## 2020-10-30 PROCEDURE — 74011250636 HC RX REV CODE- 250/636: Performed by: HOSPITALIST

## 2020-10-30 PROCEDURE — 2709999900 HC NON-CHARGEABLE SUPPLY

## 2020-10-30 PROCEDURE — 82962 GLUCOSE BLOOD TEST: CPT

## 2020-10-30 PROCEDURE — 65270000029 HC RM PRIVATE

## 2020-10-30 PROCEDURE — 74011250636 HC RX REV CODE- 250/636: Performed by: INTERNAL MEDICINE

## 2020-10-30 PROCEDURE — 74011000250 HC RX REV CODE- 250: Performed by: HOSPITALIST

## 2020-10-30 PROCEDURE — 74011250637 HC RX REV CODE- 250/637: Performed by: FAMILY MEDICINE

## 2020-10-30 PROCEDURE — 85027 COMPLETE CBC AUTOMATED: CPT

## 2020-10-30 PROCEDURE — 36415 COLL VENOUS BLD VENIPUNCTURE: CPT

## 2020-10-30 PROCEDURE — 74011250637 HC RX REV CODE- 250/637: Performed by: HOSPITALIST

## 2020-10-30 PROCEDURE — 80202 ASSAY OF VANCOMYCIN: CPT

## 2020-10-30 PROCEDURE — 80053 COMPREHEN METABOLIC PANEL: CPT

## 2020-10-30 RX ORDER — MORPHINE SULFATE 2 MG/ML
2 INJECTION, SOLUTION INTRAMUSCULAR; INTRAVENOUS
Status: DISCONTINUED | OUTPATIENT
Start: 2020-10-30 | End: 2020-11-09 | Stop reason: HOSPADM

## 2020-10-30 RX ORDER — INSULIN LISPRO 100 [IU]/ML
0-10 INJECTION, SOLUTION INTRAVENOUS; SUBCUTANEOUS
Status: DISCONTINUED | OUTPATIENT
Start: 2020-10-30 | End: 2020-11-02

## 2020-10-30 RX ORDER — VANCOMYCIN HYDROCHLORIDE
1250 EVERY 8 HOURS
Status: DISCONTINUED | OUTPATIENT
Start: 2020-10-30 | End: 2020-11-03

## 2020-10-30 RX ADMIN — MORPHINE SULFATE 2 MG: 2 INJECTION, SOLUTION INTRAMUSCULAR; INTRAVENOUS at 14:45

## 2020-10-30 RX ADMIN — PIOGLITAZONE HYDROCHLORIDE 15 MG: 15 TABLET ORAL at 07:32

## 2020-10-30 RX ADMIN — VANCOMYCIN HYDROCHLORIDE 1250 MG: 10 INJECTION, POWDER, LYOPHILIZED, FOR SOLUTION INTRAVENOUS at 11:52

## 2020-10-30 RX ADMIN — ENOXAPARIN SODIUM 40 MG: 40 INJECTION SUBCUTANEOUS at 08:39

## 2020-10-30 RX ADMIN — OXYCODONE 10 MG: 5 TABLET ORAL at 18:15

## 2020-10-30 RX ADMIN — OXYCODONE 10 MG: 5 TABLET ORAL at 22:46

## 2020-10-30 RX ADMIN — VANCOMYCIN HYDROCHLORIDE 1250 MG: 10 INJECTION, POWDER, LYOPHILIZED, FOR SOLUTION INTRAVENOUS at 18:15

## 2020-10-30 RX ADMIN — FAMOTIDINE 20 MG: 10 INJECTION INTRAVENOUS at 08:39

## 2020-10-30 RX ADMIN — Medication 10 ML: at 22:00

## 2020-10-30 RX ADMIN — Medication 10 ML: at 13:03

## 2020-10-30 RX ADMIN — OXYCODONE 10 MG: 5 TABLET ORAL at 02:22

## 2020-10-30 RX ADMIN — VANCOMYCIN HYDROCHLORIDE 1750 MG: 10 INJECTION, POWDER, LYOPHILIZED, FOR SOLUTION INTRAVENOUS at 02:20

## 2020-10-30 RX ADMIN — FAMOTIDINE 20 MG: 10 INJECTION INTRAVENOUS at 22:46

## 2020-10-30 RX ADMIN — Medication 10 ML: at 08:45

## 2020-10-30 RX ADMIN — OXYCODONE 10 MG: 5 TABLET ORAL at 12:00

## 2020-10-30 RX ADMIN — OXYCODONE 10 MG: 5 TABLET ORAL at 07:32

## 2020-10-30 NOTE — CONSULTS
Comprehensive Nutrition Assessment    Type and Reason for Visit: Consult   Consult for general nutrition management and supplements (Hospitalist)    Nutrition Recommendations/Plan:  Continue current diet  Add glucerna TID    Nutrition Assessment:  PMH: morbid obesity, HTN, type 2 diabetes mellitus uncontrolled, DKA, hypertension who presented to the ER with 3 weeks history of constant right ankle pain and swelling. Pt s/p right ankle joint washout and drainage of abscess 10/29. Upon assessment pt was alert and oriented, stating her ankle was in immense pain earlier this am, however is now managed by pain meds. Pt reported extremely poor PO intake over last 3-4 weeks, when pain and infection in ankle first started. Visual observation of lunch tray showed pt consumed <10% of meal. Pt stated she \"has no desire to eat when in pain. \" Pt denied n/v/d. Pt also denied any recent weight loss. Pt reported eating at baseline, 3-4 meals per day, prior to onset of septic ankle. Pt agreeable to glucerna once educated on the reason for needing protein related to wound healing. Pt educated on drinking ONS, and when intake improves then number of glucerna per day can be reduced. RD intern brought pt strawberry glucerna, pt sipping on it when RD intern left room. Estimated Daily Nutrient Needs:  Energy (kcal): 0191-2428(97-66HGUV/XK CBW: 118.8kg)   Protein (g): 77-89(20% estimated needs)     Current Nutrition Therapies:  DIET DIABETIC CONSISTENT CARB Regular    Anthropometric Measures:  · Height:  5' 9\" (175.3 cm)  · Current Body Wt:  118.8 kg (261 lb 14.5 oz)(standing scale 10/29)   Body mass index is 38.69 kg/m². · BMI Category:  Obese class 2 (BMI 35.0-39. 9)     WT / BMI WEIGHT BODY MASS INDEX   10/29/2020 262 lb 38.69 kg/m2   10/27/2020 262 lb 5.6 oz 38.74 kg/m2   10/21/2020 250 lb 36.92 kg/m2   6/22/2020 250 lb 36.92 kg/m2   2/23/2020 260 lb 37.31 kg/m2   1/31/2020 275 lb 39.46 kg/m2   12/22/2019 277 lb 40.91 kg/m2 12/14/2019 277 lb 40.91 kg/m2   Weight hx per EMR shows weight remaining fairly stable, however unable to verify sources of weight. Nutrition Diagnosis:   · Inadequate oral intake related to (pain from septic ankle joint) as evidenced by (pt reported barriers to PO intake)    Nutrition Interventions:   Food and/or Nutrient Delivery: Continue current diet, Start oral nutrition supplement    Goals:   Increase oral intake to >75% of estimated needs       Nutrition Monitoring and Evaluation:   Food/Nutrient Intake Outcomes: Food and nutrient intake, Supplement intake    Discharge Planning:    Continue oral nutrition supplement, Continue current diet     Electronically signed by Sylvie Giordano on 10/30/2020 at 1:04 PM    Contact: [phone]

## 2020-10-30 NOTE — PROGRESS NOTES
END OF SHIFT NOTE:    INTAKE/OUTPUT  10/29 0701 - 10/30 0700  In: 900 [I.V.:900]  Out: 705 [Urine:700]  Voiding: YES  Catheter: NO  Drain:              Flatus: Patient does have flatus present. Stool:  0 occurrences. Characteristics:  Stool Assessment  Stool Color: Other (Comment)(have not observed )    Emesis: 0 occurrences. Characteristics:        VITAL SIGNS  Patient Vitals for the past 12 hrs:   Temp Pulse Resp BP SpO2   10/30/20 1600 98.7 °F (37.1 °C) 88 17 123/68 100 %   10/30/20 0705 99.3 °F (37.4 °C) 94 16 116/69 97 %       Pain Assessment  Pain Intensity 1: 10 (10/30/20 0733)  Pain Location 1: Ankle, Incisional  Pain Intervention(s) 1: Medication (see MAR)  Patient Stated Pain Goal: 2    Ambulating  No    Shift report given to oncoming nurse at the bedside.     Oscar Mijares RN

## 2020-10-30 NOTE — PROGRESS NOTES
TRANSFER - IN REPORT:    Verbal report received from Alvarez rodrigez Zandra Larsen  being received from OP PACU for routine post - op      Report consisted of patients Situation, Background, Assessment and   Recommendations(SBAR). Information from the following report(s) Kardex was reviewed with the receiving nurse. Opportunity for questions and clarification was provided. Assessment completed upon patients arrival to unit and care assumed.

## 2020-10-30 NOTE — PROGRESS NOTES
PT Note:  PT orders received and chart review initiated. Attempted evaluation. Patient refused stating she's in too much pain and wants to try to sleep right now. Will attempt another time/day as schedule permits.   A Zaid Nicole, PT, DPT

## 2020-10-30 NOTE — DIABETES MGMT
Patient admitted with septic joint. Blood glucose ranged 117-157 yesterday with patient receiving Actos 15mg. Blood glucose this morning was 147. Reviewed patient current regimen: Actos 15mg daily and Humalog SSI however noted both a normal and resistant scale listed. Provider updated via [employer] for clarification. Patient seen for assessment regarding diabetes management. Patient in bed, lights off, awakens to voice. Patient states her pain is \"terrible\" this morning but she did receive pain medicine this morning, unit secretary updated and will inform priamry RN. Patient has a past medical history of DM, obesity, HTN. Patient states they were diagnosed in 2014 with diabetes and voices a positive family history of diabetes. Patient states they do have a working glucometer with supplies at home. Per patient they typically check blood glucose levels one time a day in the morning. Per patient their blood glucose levels have been running 100s around 150 at home. Patient states they are currently taking Metformin, Actos, and Muscoda Loveless at home for management of diabetes. Patient has not attended formal diabetes education in the past. Patient reports no difficulty with affording their diabetic supplies. Patient has used both insulin syring method and insulin pen method in the past, stating she was on Tresiba but ran out of that when her doctor left the practice she was going to. Patient states she no longer sees Dr. Pat Brown but she does have a new PCP. Discussed possible need for insulin at discharge as patient HbA1c 11.7 (eAG 289). Reviewed the effects of good glycemic control on wound healing. Also discussed the relationship between hyperglycemia and infection. Discussed complications of uncontrolled diabetes. Patient verbalized understanding and voices no further questions regarding diabetes management at this time. Update: attempted to see patient for diabetes education at 1125.  Patient in bed, lights off, sleeping. Patient awakens to voice. Patient states she is not up for education at this time but was receptive to receiving written diabetes educational materials. Reviewed significance of HbA1c. Educated patient regarding educational materials and outpatient education classes. Patient would like referral placed for outpatient education. Referral placed to [employer].

## 2020-10-30 NOTE — CONSULTS
Infectious Disease Consult    Impression:   · Left ankle infection/septic joint. On clindamycin as outpatient so concern cx will be negative. Poorly controlled DM and active L axillary hidradenitis lesion as risk factors. · Type 2 DM: poorly controlled, Hgb A1c 10/28 11.7  · Hidradenitis suppurative bilateral axilla had only drainage to Left axilla so tissue remains and is actively draining but states has been this way since March. Plan:   · Continue current ABX: Vanc and Ceftriaxone  · Follow blood and surgical cx  · DM management per IM but based in HgbA1c oral meds alone may not be sufficient. Control BS crucial to eradication current infection ankle joint. · Did discuss that anticipate will need 6 weeks abx, probably IV. · Would consider surgical consult as outpatient for resection L axilla hidradenitis; extensive area and actively draining; doubt will resolve alone and this is not a primarily infectious problem, rather a sweat gland dysfunction. Anti-infectives:   1. Vancomycin 10/28-  2. CTX 10/29-  3. Cefepime x 1 10/28    Subjective:   Date of Consultation:  October 30, 2020  Date of Admission: 10/28/2020   Referring Physician:   Reason for Consult: septic ankle    Patient is a 45 y.o. female with HTN, DM, obesity, and  R axillary hidradenitis suppurativa (positive path/negative cx at [address] in March)  with resection at [address] in April, who was admitted on 10/28/2020 with complaints of swelling of her left foot and ankle and left ankle pain for 3rd time in 3 weeks. Initially thought to be gout but did not improve with treatment gout. Seen initially 10/12 in ED with CP and R ankle pain. Seen back 10/21 and this time indicated similar symptoms in early 2020 and [facility] and referred to Rheum. Exam on that visit noted exquisite pain in R ankle to lightest touch as well as right wrist pain without redness or heat and preserved ROM. WBC normal and X-ray without effusion.  Treated with Indocin and Colchicine. Seen back 10/27 and treated with clindamycin x 7 days Seen yesterday by Ortho and noted blister on medial R ankle and wanted to admit but hospital [facility] so sent thru ED. Got dose CTX in ED. Ortho rec MRI and planned for OR exploration. On arrival, low grade temp 99.4, mild tachycardia but not hypotensive. No peripheral leukocytosis. Has been taking Clindamycin. Taken to OR 10/29 and dontrell pus was encountered immediately; underwent arthrotomy and debridement and surgical cultures obtained. Blood cx collected on admission and does appear before CTX given. Gram stain operative cx negative; culture pending. No positive cx in Care Everywhere. CRP 10/28 132. Denies any past staph or strep infection. Denies groin hidradenitis lesions. Chronic draining wound L axilla since March. No diarrhea, vaginitis, ingrown nails, recent pedicure or dental work. No sore or loose teeth. No open wounds beyond axilla on left. Patient Active Problem List   Diagnosis Code    DKA (diabetic ketoacidoses) ([address] E11.10    DM2 (diabetes mellitus, type 2) ([address] E11.9    Morbid obesity ([address] E66.01    HTN (hypertension) I10    Hyponatremia E87.1    Hydradenitis L73.2    Positive for microalbuminuria R80.9    Right ankle swelling M25.471    Right ankle pain M25.571    Septic joint ([address] M00.9     Past Medical History:   Diagnosis Date    Diabetes ([address]     Hypertension     pt denies      Family History   Problem Relation Age of Onset    Diabetes Mother     Other Mother         Mary Kate Mckenzien      Social History     Tobacco Use    Smoking status: Never Smoker    Smokeless tobacco: Never Used   Substance Use Topics    Alcohol use: Yes     Comment: occasionally     Past Surgical History:   Procedure Laterality Date    HX  SECTION        Prior to Admission medications    Medication Sig Start Date End Date Taking?  Authorizing Provider   clindamycin (CLEOCIN) 150 mg capsule Take 2 Caps by mouth three (3) times daily for 7 days. 10/27/20 11/3/20  Tati Carrera MD   oxyCODONE IR (ROXICODONE) 5 mg immediate release tablet Take 1 Tab by mouth every six (6) hours as needed for Pain for up to 3 days. Max Daily Amount: 20 mg. 10/27/20 10/30/20  Tati Carrera MD   L-Evyjgxi-K4 Phos-Methyl-B12 [facility]) 3-35-2 mg tab tab Take 1 Tab by mouth daily. Mateo Mckeon MD   pioglitazone (ACTOS) 30 mg tablet Take  by mouth daily. Mateo Mckeon MD   indomethacin (INDOCIN) 25 mg capsule Take 1 Cap by mouth three (3) times daily. 10/21/20   Kali Jimenes MD   colchicine (MITIGARE) 0.6 mg capsule Take 1 Cap by mouth two (2) times a day. 10/21/20   Kali Jimenes MD   dapagliflozin (FARXIGA) 10 mg tab tablet Take 10 mg by mouth daily. 20   Mateo Mckeon MD   gabapentin (Neurontin) 300 mg capsule Take one a day for 3 days,   Then increase to twice a day for 3 days  The increase to three times a day 20   Harriet Montez MD   metFORMIN (GLUCOPHAGE) 1,000 mg tablet Take 1,000 mg by mouth. 20   Mateo Mckeon MD   Insulin Needles, Disposable, (STEPHANIE PEN NEEDLE) 32 gauge x 5/32\" ndle To be used with Insulin pen. 18   Juan J Andrew MD     No Known Allergies       Review of Systems:  A comprehensive review of systems was negative except for that written in the History of Present Illness. Objective:   Blood pressure 116/69, pulse 94, temperature 99.3 °F (37.4 °C), resp. rate 16, height 5' 9\" (1.753 m), weight 118.8 kg (262 lb), last menstrual period 10/29/2020, SpO2 97 %.   Temp (24hrs), Av.2 °F (36.8 °C), Min:97.6 °F (36.4 °C), Max:99.3 °F (37.4 °C)       Lines: none    Exam:     General: NC/AT, alert and cooperative, looks stated age, in NAD   HEENT: PERRL, non-icteric sclera, no splinter hemorrhages, good dentition   Neck: supple, symmetric, no masses or lymphadenopathy   Cardiac:Nl S1/S2, no murmurs/rubs/gallops/clicks, no LE edema   Pulmonary:clear bilaterally ivette/wheezes, good air movement    Abdomen: soft, NT, non-distended, BS marine   Skin:no rashes, well healed incision R axilla; left with enlarge tissue with multiple oozing sites; no odor or local redness   MSK:no enlarged or swollen joints in limbs or sternoclavicular area; left ankle and distal leg wrapped; no redness or heat; no ingrown nails or foot wounds   Extremities: no cords/clots/cyanosis, pulses palpable and symmetric    Psychiatric: mood and affect appropriate to situation       Data Review:   Recent Results (from the past 24 hour(s))   GLUCOSE, POC    Collection Time: 10/29/20 11:56 AM   Result Value Ref Range    Glucose (POC) 137 (H) 65 - 100 mg/dL   TYPE & SCREEN    Collection Time: 10/29/20  4:22 PM   Result Value Ref Range    Crossmatch Expiration 11/01/2020,2359     ABO/Rh(D) AB POSITIVE     Antibody screen NEG    CULTURE, WOUND W GRAM STAIN    Collection Time: 10/29/20  5:15 PM    Specimen: Ankle    RIGHT   Result Value Ref Range    Special Requests: NO SPECIAL REQUESTS      GRAM STAIN PENDING     Culture result:        NO GROWTH AFTER SHORT PERIOD OF INCUBATION. FURTHER RESULTS TO FOLLOW AFTER OVERNIGHT INCUBATION. GLUCOSE, POC    Collection Time: 10/29/20  5:39 PM   Result Value Ref Range    Glucose (POC) 117 (H) 65 - 100 mg/dL   GLUCOSE, POC    Collection Time: 10/29/20  9:23 PM   Result Value Ref Range    Glucose (POC) 117 (H) 65 - 393 mg/dL   METABOLIC PANEL, COMPREHENSIVE    Collection Time: 10/30/20  1:05 AM   Result Value Ref Range    Sodium 137 136 - 145 mmol/L    Potassium 3.7 3.5 - 5.1 mmol/L    Chloride 106 98 - 107 mmol/L    CO2 28 21 - 32 mmol/L    Anion gap 3 (L) 7 - 16 mmol/L    Glucose 188 (H) 65 - 100 mg/dL    BUN 8 6 - 23 MG/DL    Creatinine 0.58 (L) 0.6 - 1.0 MG/DL    GFR est AA >60 >60 ml/min/1.73m2    GFR est non-AA >60 >60 ml/min/1.73m2    Calcium 8.2 (L) 8.3 - 10.4 MG/DL    Bilirubin, total 0.2 0.2 - 1.1 MG/DL    ALT (SGPT) 24 12 - 65 U/L    AST (SGOT) 18 15 - 37 U/L    Alk.  phosphatase 170 (H) 50 - 136 U/L    Protein, total 6.8 6.3 - 8.2 g/dL    Albumin 2.0 (L) 3.5 - 5.0 g/dL    Globulin 4.8 (H) 2.3 - 3.5 g/dL    A-G Ratio 0.4 (L) 1.2 - 3.5     CBC W/O DIFF    Collection Time: 10/30/20  1:05 AM   Result Value Ref Range    WBC 10.7 4.3 - 11.1 K/uL    RBC 3.03 (L) 4.05 - 5.2 M/uL    HGB 7.7 (L) 11.7 - 15.4 g/dL    HCT 24.5 (L) 35.8 - 46.3 %    MCV 80.9 79.6 - 97.8 FL    MCH 25.4 (L) 26.1 - 32.9 PG    MCHC 31.4 31.4 - 35.0 g/dL    RDW 13.8 11.9 - 14.6 %    PLATELET 065 252 - 608 K/uL    MPV 9.8 9.4 - 12.3 FL    ABSOLUTE NRBC 0.00 0.0 - 0.2 K/uL   VANCOMYCIN, TROUGH    Collection Time: 10/30/20  1:05 AM   Result Value Ref Range    Vancomycin,trough 11.3 5 - 20 ug/mL   GLUCOSE, POC    Collection Time: 10/30/20  7:08 AM   Result Value Ref Range    Glucose (POC) 147 (H) 65 - 100 mg/dL        Microbiology:    All Micro Results     Procedure Component Value Units Date/Time    CULTURE, BLOOD [[id number]] Collected:  10/28/20 1815    Order Status:  Completed Specimen:  Blood Updated:  10/30/20 0905     Special Requests: --        RIGHT  Antecubital       Culture result: NO GROWTH 2 DAYS       CULTURE, Ermalinda Rued STAIN [[id number]] Collected:  10/29/20 1715    Order Status:  Completed Specimen:  Ankle Updated:  10/30/20 0649     Special Requests: NO SPECIAL REQUESTS        GRAM STAIN PENDING     Culture result:       NO GROWTH AFTER SHORT PERIOD OF INCUBATION. FURTHER RESULTS TO FOLLOW AFTER OVERNIGHT INCUBATION.           Miguelina Alegre [[id number]] Collected:  10/29/20 1715    Order Status:  Completed Updated:  10/29/20 2243    CULTURE, BLOOD [[id number]] Collected:  10/28/20 1225    Order Status:  Completed Specimen:  Blood Updated:  10/28/20 1244          Studies/Imaging:    Final [99]  10/28/2020  14:49  10/28/2020  15:13    Study Result     Right lower extremity venous ultrasound     INDICATION:  Pain and swelling, chronic, moderate     COMPARISON: Radiography of the right ankle 10/12/2020 and ultrasound of the leg  2/23/2020     Technique: Grayscale, color Doppler, and spectral analysis were performed on the  deep veins.     FINDINGS: There is no evidence of Baker's cyst. Note of a right inguinal 2.5 x  1.1 x 1.7 cm lymph node with normal morphology and no hypervascularity. This is  of unclear but doubtful significance, unless clinically suspicious.     There is normal flow in the common femoral, superficial femoral, greater  saphenous, femoral and popliteal veins. Normal compression and augmentation  demonstrated.  The proximal calf veins are also patent as is the partially  visualized contralateral common femoral vein.        IMPRESSION  IMPRESSION: No evidence of deep venous thrombosis in the right lower extremity           Signed By: Arjun Quintana MD     October 30, 2020

## 2020-10-30 NOTE — PROGRESS NOTES
Patient seen and examined    Dressing clean and dry    Pain well controlled    Cxs pending    Appreciate ID input  Plans for 6 weeks iv abx noted  Ok to wbat on right leg--may need additional I and D next week depending on clinical course

## 2020-10-30 NOTE — PROGRESS NOTES
Pharmacokinetic Consult to Pharmacist    Cedriclolita Juárez is a 45 y.o. female being treated for BJI with vancomycin. Height: 5' 9\" (175.3 cm)  Weight: 118.8 kg (262 lb)  Lab Results   Component Value Date/Time    BUN 8 10/30/2020 01:05 AM    Creatinine 0.58 (L) 10/30/2020 01:05 AM    WBC 10.7 10/30/2020 01:05 AM    Lactic acid 0.9 10/28/2020 11:51 AM      Estimated Creatinine Clearance: 148.5 mL/min (A) (by C-G formula based on SCr of 0.58 mg/dL (L)). Lab Results   Component Value Date/Time    Vancomycin,trough 11.3 10/30/2020 01:05 AM       CULTURES:  Ankle wound pending  Blood NGTD    Day 3 of vancomycin. Goal trough is 15-20. Trough subtherapeutic, dose increased to 1250 mg every 8 hours. Will continue to follow patient and order levels when clinically indicated. Thank you,  Vivienne Solis, Pharm. D.   PGY1 Pharmacy Resident  [phone]

## 2020-10-30 NOTE — PROGRESS NOTES
Name: Ramona Jeffery MRN: [id number]  : 1980  Age:40 y.o.  female  Admit Date:  10/28/2020 LOS: 2      Hospitalist Progress Note     Reason for Admission:  Septic joint ([address] [M00.9]  Right ankle pain [M25.571]  Right ankle swelling [M25.471]    Hospital Course:  Please refer to the admission H&P for details of presentation. In summary, Ramona Jeffery is a 45 y.o. female with medical history significant for CAD, type 2 diabetes mellitus uncontrolled, hypertension who was admitted due to progressive worsening of right ankle pain and swelling for 3 weeks with concern for septic joint. ED, patient was afebrile and work-up shows no leukocytosis but elevated CRP. Was evaluated by orthopedics. Patient underwent ankle joint washout in the OR on 10/29/2020. She has been started on IV antibiotics for suspected septic joint. Subjective/24 hr Events (10/30/20) :  Patient is seen and examined at bedside. No acute events reported overnight by nursing staff. Complains of foot pain after the procedure yesterday. No other complaints. Resting comfortably in bed. Patient denies fever, chills, chest pains, shortness of breath, n/v, abdominal pain. ROS: 10 point review of systems is otherwise negative with the exception of the elements mentioned above.     Objective:    Patient Vitals for the past 24 hrs:   Temp Pulse Resp BP SpO2   10/30/20 0705 99.3 °F (37.4 °C) 94 16 116/69 97 %   10/30/20 0400 98 °F (36.7 °C) 97 15 122/66 97 %   10/30/20 0027 97.6 °F (36.4 °C) 95 14 118/61 99 %   10/29/20 2102 98.1 °F (36.7 °C) 89 12 124/73 100 %   10/29/20 1840  82  138/81 97 %   10/29/20 1835  83  136/78 99 %   10/29/20 1830  82  137/72 98 %   10/29/20 1825  81  133/64 99 %   10/29/20 1819 98 °F (36.7 °C) 82 12 135/79 98 %   10/29/20 1814  81 12 136/79 100 %   10/29/20 1809  81 12 139/84 100 %   10/29/20 1804  83 12 (!) 140/86 100 %   10/29/20 1759  87 12 139/83 97 %   10/29/20 1755  98 16 (!) 114/44 97 %   10/29/20 1749  99 16 (!) 114/44 99 %   10/29/20 1745  93 16 131/68 100 %   10/29/20 1740  93 16 124/73 100 %   10/29/20 1738 98.3 °F (36.8 °C) 86 18 126/76 100 %   10/29/20 1450  81  (!) 146/79 100 %   10/29/20 1422  83 17 (!) 156/88 100 %   10/29/20 1417  84 17 (!) 153/79 100 %   10/29/20 1412  86 17 (!) 164/80 100 %   10/29/20 1410     100 %   10/29/20 1403     100 %   10/29/20 1344 98.4 °F (36.9 °C) 87 18 129/74 99 %     Oxygen Therapy  O2 Sat (%): 97 % (10/30/20 0705)  Pulse via Oximetry: 84 beats per minute (10/29/20 1840)  O2 Device: Nasal cannula (10/29/20 1920)  O2 Flow Rate (L/min): 2 l/min (10/29/20 1920)    Estimated body mass index is 38.69 kg/m² as calculated from the following:    Height as of an earlier encounter on 10/28/20: 5' 9\" (1.753 m). Weight as of this encounter: 118.8 kg (262 lb). Intake/Output Summary (Last 24 hours) at 10/30/2020 1049  Last data filed at 10/30/2020 0733  Gross per 24 hour   Intake 2394 ml   Output 705 ml   Net 1689 ml       *Note that automatically entered I/Os may not be accurate; dependent on patient compliance with collection and accurate  by [place]. Physical Exam:   General:     alert, awake, no acute distress. Well nourished. Obese  Head:   normocephalic, atraumatic  Eyes, Ears, nose: PERRL, EOMI. Normal conjunctiva  Neck:    supple, non-tender. Trachea midline. Lungs:   CTAB, no wheezing, rhonchi, rales  Cardiac:   RRR, Normal S1 and S2. Abdomen:   Soft, non distended, nontender, +BS, no guarding/rebound  Extremities:   Warm, dry. No edema. Rt foot wrapped in bandage. Skin:   No rashes, no jaundice  Neuro:  AAOx3.  No gross focal neurological deficit  Psychiatric:  No anxiety, calm, cooperative    Data Review:  I have reviewed all labs, meds, and studies from the last 24 hours:      Labs:    Recent Results (from the past 24 hour(s))   GLUCOSE, POC    Collection Time: 10/29/20 11:56 AM   Result Value Ref Range Glucose (POC) 137 (H) 65 - 100 mg/dL   TYPE & SCREEN    Collection Time: 10/29/20  4:22 PM   Result Value Ref Range    Crossmatch Expiration 11/01/2020,2359     ABO/Rh(D) AB POSITIVE     Antibody screen NEG    CULTURE, WOUND W GRAM STAIN    Collection Time: 10/29/20  5:15 PM    Specimen: Ankle    RIGHT   Result Value Ref Range    Special Requests: NO SPECIAL REQUESTS      GRAM STAIN 0 TO 1 WBCS SEEN PER OIF     GRAM STAIN NO DEFINITE ORGANISM SEEN      Culture result:        NO GROWTH AFTER SHORT PERIOD OF INCUBATION. FURTHER RESULTS TO FOLLOW AFTER OVERNIGHT INCUBATION. GLUCOSE, POC    Collection Time: 10/29/20  5:39 PM   Result Value Ref Range    Glucose (POC) 117 (H) 65 - 100 mg/dL   GLUCOSE, POC    Collection Time: 10/29/20  9:23 PM   Result Value Ref Range    Glucose (POC) 117 (H) 65 - 970 mg/dL   METABOLIC PANEL, COMPREHENSIVE    Collection Time: 10/30/20  1:05 AM   Result Value Ref Range    Sodium 137 136 - 145 mmol/L    Potassium 3.7 3.5 - 5.1 mmol/L    Chloride 106 98 - 107 mmol/L    CO2 28 21 - 32 mmol/L    Anion gap 3 (L) 7 - 16 mmol/L    Glucose 188 (H) 65 - 100 mg/dL    BUN 8 6 - 23 MG/DL    Creatinine 0.58 (L) 0.6 - 1.0 MG/DL    GFR est AA >60 >60 ml/min/1.73m2    GFR est non-AA >60 >60 ml/min/1.73m2    Calcium 8.2 (L) 8.3 - 10.4 MG/DL    Bilirubin, total 0.2 0.2 - 1.1 MG/DL    ALT (SGPT) 24 12 - 65 U/L    AST (SGOT) 18 15 - 37 U/L    Alk.  phosphatase 170 (H) 50 - 136 U/L    Protein, total 6.8 6.3 - 8.2 g/dL    Albumin 2.0 (L) 3.5 - 5.0 g/dL    Globulin 4.8 (H) 2.3 - 3.5 g/dL    A-G Ratio 0.4 (L) 1.2 - 3.5     CBC W/O DIFF    Collection Time: 10/30/20  1:05 AM   Result Value Ref Range    WBC 10.7 4.3 - 11.1 K/uL    RBC 3.03 (L) 4.05 - 5.2 M/uL    HGB 7.7 (L) 11.7 - 15.4 g/dL    HCT 24.5 (L) 35.8 - 46.3 %    MCV 80.9 79.6 - 97.8 FL    MCH 25.4 (L) 26.1 - 32.9 PG    MCHC 31.4 31.4 - 35.0 g/dL    RDW 13.8 11.9 - 14.6 %    PLATELET 523 516 - 755 K/uL    MPV 9.8 9.4 - 12.3 FL    ABSOLUTE NRBC 0.00 0.0 - 0.2 K/uL   Magui Miguel    Collection Time: 10/30/20  1:05 AM   Result Value Ref Range    Vancomycin,trough 11.3 5 - 20 ug/mL   GLUCOSE, POC    Collection Time: 10/30/20  7:08 AM   Result Value Ref Range    Glucose (POC) 147 (H) 65 - 100 mg/dL         All Micro Results     Procedure Component Value Units Date/Time    CULTURE, Mela  STAIN [[id number]] Collected:  10/29/20 1715    Order Status:  Completed Specimen:  Ankle Updated:  10/30/20 0948     Special Requests: NO SPECIAL REQUESTS        GRAM STAIN 0 TO 1 WBCS SEEN PER OIF      NO DEFINITE ORGANISM SEEN        Culture result:       NO GROWTH AFTER SHORT PERIOD OF INCUBATION. FURTHER RESULTS TO FOLLOW AFTER OVERNIGHT INCUBATION.           CULTURE, BLOOD [[id number]] Collected:  10/28/20 1815    Order Status:  Completed Specimen:  Blood Updated:  10/30/20 0905     Special Requests: --        RIGHT  Antecubital       Culture result: NO GROWTH 2 DAYS       CULTURE, ANAEROBIC [[id number]] Collected:  10/29/20 1715    Order Status:  Completed Updated:  10/29/20 2243    CULTURE, BLOOD [[id number]] Collected:  10/28/20 1225    Order Status:  Completed Specimen:  Blood Updated:  10/28/20 1244          Current Meds:  Current Facility-Administered Medications   Medication Dose Route Frequency    vancomycin (VANCOCIN) 1250 mg in  ml infusion  1,250 mg IntraVENous Q8H    [START ON 10/31/2020] Vancomycin Trough Reminder   Other ONCE    insulin lispro (HUMALOG) injection 0-10 Units  0-10 Units SubCUTAneous AC&HS    naloxone (NARCAN) injection 0.1 mg  0.1 mg IntraVENous EVERY 2 MINUTES AS NEEDED    flumazeniL (ROMAZICON) 0.1 mg/mL injection 0.2 mg  0.2 mg IntraVENous Multiple    haloperidol lactate (HALDOL) injection 1 mg  1 mg IntraVENous ONCE PRN    diphenhydrAMINE (BENADRYL) injection 12.5 mg  12.5 mg IntraVENous Q15MIN PRN    oxyCODONE IR (ROXICODONE) tablet 10 mg  10 mg Oral Q4H PRN    dapagliflozin (FARXIGA) tablet 10 mg (Patient Supplied)  10 mg Oral DAILY    pioglitazone (ACTOS) tablet 15 mg  15 mg Oral ACB    sodium chloride (NS) flush 5-40 mL  5-40 mL IntraVENous Q8H    sodium chloride (NS) flush 5-40 mL  5-40 mL IntraVENous PRN    acetaminophen (TYLENOL) tablet 650 mg  650 mg Oral Q6H PRN    Or    acetaminophen (TYLENOL) suppository 650 mg  650 mg Rectal Q6H PRN    polyethylene glycol (MIRALAX) packet 17 g  17 g Oral DAILY PRN    promethazine (PHENERGAN) tablet 12.5 mg  12.5 mg Oral Q6H PRN    Or    ondansetron (ZOFRAN) injection 4 mg  4 mg IntraVENous Q4H PRN    enoxaparin (LOVENOX) injection 40 mg  40 mg SubCUTAneous DAILY    potassium chloride 10 mEq in 100 ml IVPB  10 mEq IntraVENous PRN    magnesium sulfate 2 g/50 ml IVPB (premix or compounded)  2 g IntraVENous PRN    famotidine (PF) (PEPCID) 20 mg in 0.9% sodium chloride 10 mL injection  20 mg IntraVENous BID    cefTRIAXone (ROCEPHIN) 2 g in 0.9% sodium chloride (MBP/ADV) 50 mL  2 g IntraVENous Q24H    HYDROmorphone (PF) (DILAUDID) injection 0.4 mg  0.4 mg IntraVENous Q3H PRN    oxyCODONE IR (ROXICODONE) tablet 5 mg  5 mg Oral Q4H PRN    naloxone (NARCAN) injection 0.4 mg  0.4 mg IntraVENous PRN    nystatin (MYCOSTATIN) 100,000 unit/gram powder   Topical BID         Other Studies:  Xr Chest Pa Lat    Result Date: 10/12/2020  History: cp Two views chest Findings: The lungs are well expanded and clear. The cardiac silhouette, and mediastinal contour, and osseous structures are normal.     Impression: Unremarkable two-view chest.     Xr Ankle Rt Min 3 V    Result Date: 10/12/2020  History: Medial right ankle pain and swelling, 2 days duration 3 views right ankle FINDINGS: No acute fracture, dislocation, or additional acute bony abnormality. IMPRESSION: No acute findings.     Duplex Lower Ext Venous Right    Result Date: 10/28/2020  Right lower extremity venous ultrasound INDICATION:  Pain and swelling, chronic, moderate COMPARISON: Radiography of the right ankle 10/12/2020 and ultrasound of the leg 2/23/2020 Technique: Grayscale, color Doppler, and spectral analysis were performed on the deep veins. FINDINGS: There is no evidence of Baker's cyst. Note of a right inguinal 2.5 x 1.1 x 1.7 cm lymph node with normal morphology and no hypervascularity. This is of unclear but doubtful significance, unless clinically suspicious. There is normal flow in the common femoral, superficial femoral, greater saphenous, femoral and popliteal veins. Normal compression and augmentation demonstrated. The proximal calf veins are also patent as is the partially visualized contralateral common femoral vein. IMPRESSION: No evidence of deep venous thrombosis in the right lower extremity     Assessment:    Active Hospital Problems    Diagnosis Date Noted    Left axillary hidradenitis 10/30/2020    Right ankle swelling 10/28/2020    Right ankle pain 10/28/2020    Septic joint ([facility] [address] 10/28/2020    Positive for microalbuminuria 02/12/2018    DM2 (diabetes mellitus, type 2) ([facility] [address] 02/24/2014     Newly diagnosed 2/22/14      HTN (hypertension) 02/24/2014    Morbid obesity ([facility] [address] 02/24/2014       Plan:    Rt Ankle pain/swelling concerning for septic Joint  No DVT and no acute findings with XR. Concerning for septic given warmth/TTP and slight erythema palpation. - continue IV ceftriaxone + vancomycin (10/29 - )  - ID consulted for Abx recs as likely will need long term IV Abx  - follow up BCx    T2DM, uncontrolled  A1c 11.7  - hold home metformin  - consult diabetic education  - continue with home Actos and Farxiga  - ISS, diabetic diet    L axilla hidradenitis  - will need out patient surgical follow up for resection    Hypoalbuminemia: consult nutrition    Morbid Obesity: Body mass index is 38.69 kg/m². Diet:  DIET DIABETIC CONSISTENT CARB  DVT PPx: lovenox  Code: Full Code  Dispo: pending clinical course and PT/OT Eval  Estimated Discharge: TBD based on clinical course    Labs/Imaging Reviewed. Patient is HIGH risk due to current condition and comorbid conditions as well as requiring frequent monitoring and high risk of decline. Plan discussed with staff, patient/family and are in agreement.       Signed By: Good Steinberg MD     October 30, 2020

## 2020-10-31 ENCOUNTER — APPOINTMENT (OUTPATIENT)
Dept: ULTRASOUND IMAGING | Age: 40
DRG: 494 | End: 2020-10-31
Attending: INTERNAL MEDICINE
Payer: COMMERCIAL

## 2020-10-31 PROBLEM — M79.641 PAIN OF RIGHT HAND: Status: ACTIVE | Noted: 2020-10-31

## 2020-10-31 LAB
ALBUMIN SERPL-MCNC: 1.8 G/DL (ref 3.5–5)
ALBUMIN/GLOB SERPL: 0.3 {RATIO} (ref 1.2–3.5)
ALP SERPL-CCNC: 179 U/L (ref 50–136)
ALT SERPL-CCNC: 25 U/L (ref 12–65)
ANION GAP SERPL CALC-SCNC: 8 MMOL/L (ref 7–16)
AST SERPL-CCNC: 18 U/L (ref 15–37)
BILIRUB SERPL-MCNC: 0.3 MG/DL (ref 0.2–1.1)
BUN SERPL-MCNC: 4 MG/DL (ref 6–23)
CALCIUM SERPL-MCNC: 8.5 MG/DL (ref 8.3–10.4)
CHLORIDE SERPL-SCNC: 105 MMOL/L (ref 98–107)
CO2 SERPL-SCNC: 26 MMOL/L (ref 21–32)
CREAT SERPL-MCNC: 0.51 MG/DL (ref 0.6–1)
ERYTHROCYTE [DISTWIDTH] IN BLOOD BY AUTOMATED COUNT: 13.7 % (ref 11.9–14.6)
GLOBULIN SER CALC-MCNC: 5.7 G/DL (ref 2.3–3.5)
GLUCOSE BLD STRIP.AUTO-MCNC: 140 MG/DL (ref 65–100)
GLUCOSE BLD STRIP.AUTO-MCNC: 142 MG/DL (ref 65–100)
GLUCOSE BLD STRIP.AUTO-MCNC: 158 MG/DL (ref 65–100)
GLUCOSE BLD STRIP.AUTO-MCNC: 182 MG/DL (ref 65–100)
GLUCOSE SERPL-MCNC: 124 MG/DL (ref 65–100)
HCT VFR BLD AUTO: 22.9 % (ref 35.8–46.3)
HGB BLD-MCNC: 7.1 G/DL (ref 11.7–15.4)
MCH RBC QN AUTO: 24.7 PG (ref 26.1–32.9)
MCHC RBC AUTO-ENTMCNC: 31 G/DL (ref 31.4–35)
MCV RBC AUTO: 79.8 FL (ref 79.6–97.8)
NRBC # BLD: 0 K/UL (ref 0–0.2)
PLATELET # BLD AUTO: 376 K/UL (ref 150–450)
PMV BLD AUTO: 9.3 FL (ref 9.4–12.3)
POTASSIUM SERPL-SCNC: 3.3 MMOL/L (ref 3.5–5.1)
PROT SERPL-MCNC: 7.5 G/DL (ref 6.3–8.2)
RBC # BLD AUTO: 2.87 M/UL (ref 4.05–5.2)
SODIUM SERPL-SCNC: 139 MMOL/L (ref 138–145)
VANCOMYCIN TROUGH SERPL-MCNC: 17.8 UG/ML (ref 5–20)
WBC # BLD AUTO: 8.9 K/UL (ref 4.3–11.1)

## 2020-10-31 PROCEDURE — 85027 COMPLETE CBC AUTOMATED: CPT

## 2020-10-31 PROCEDURE — 74011250636 HC RX REV CODE- 250/636: Performed by: INTERNAL MEDICINE

## 2020-10-31 PROCEDURE — 82962 GLUCOSE BLOOD TEST: CPT

## 2020-10-31 PROCEDURE — 74011250636 HC RX REV CODE- 250/636: Performed by: HOSPITALIST

## 2020-10-31 PROCEDURE — 80202 ASSAY OF VANCOMYCIN: CPT

## 2020-10-31 PROCEDURE — 36415 COLL VENOUS BLD VENIPUNCTURE: CPT

## 2020-10-31 PROCEDURE — 2709999900 HC NON-CHARGEABLE SUPPLY

## 2020-10-31 PROCEDURE — 65270000029 HC RM PRIVATE

## 2020-10-31 PROCEDURE — 93971 EXTREMITY STUDY: CPT

## 2020-10-31 PROCEDURE — 74011000250 HC RX REV CODE- 250: Performed by: HOSPITALIST

## 2020-10-31 PROCEDURE — 74011250637 HC RX REV CODE- 250/637: Performed by: FAMILY MEDICINE

## 2020-10-31 PROCEDURE — 74011000258 HC RX REV CODE- 258: Performed by: HOSPITALIST

## 2020-10-31 PROCEDURE — 80053 COMPREHEN METABOLIC PANEL: CPT

## 2020-10-31 PROCEDURE — 74011250637 HC RX REV CODE- 250/637: Performed by: HOSPITALIST

## 2020-10-31 RX ADMIN — OXYCODONE 10 MG: 5 TABLET ORAL at 18:06

## 2020-10-31 RX ADMIN — MORPHINE SULFATE 2 MG: 2 INJECTION, SOLUTION INTRAMUSCULAR; INTRAVENOUS at 16:37

## 2020-10-31 RX ADMIN — Medication 10 ML: at 06:12

## 2020-10-31 RX ADMIN — VANCOMYCIN HYDROCHLORIDE 1250 MG: 10 INJECTION, POWDER, LYOPHILIZED, FOR SOLUTION INTRAVENOUS at 18:06

## 2020-10-31 RX ADMIN — Medication 10 ML: at 14:35

## 2020-10-31 RX ADMIN — Medication 10 ML: at 21:45

## 2020-10-31 RX ADMIN — MORPHINE SULFATE 2 MG: 2 INJECTION, SOLUTION INTRAMUSCULAR; INTRAVENOUS at 07:18

## 2020-10-31 RX ADMIN — FAMOTIDINE 20 MG: 10 INJECTION INTRAVENOUS at 21:45

## 2020-10-31 RX ADMIN — VANCOMYCIN HYDROCHLORIDE 1250 MG: 10 INJECTION, POWDER, LYOPHILIZED, FOR SOLUTION INTRAVENOUS at 10:43

## 2020-10-31 RX ADMIN — PIOGLITAZONE HYDROCHLORIDE 15 MG: 15 TABLET ORAL at 06:12

## 2020-10-31 RX ADMIN — MORPHINE SULFATE 2 MG: 2 INJECTION, SOLUTION INTRAMUSCULAR; INTRAVENOUS at 00:45

## 2020-10-31 RX ADMIN — OXYCODONE 10 MG: 5 TABLET ORAL at 10:43

## 2020-10-31 RX ADMIN — CEFTRIAXONE SODIUM 2 G: 2 INJECTION, POWDER, FOR SOLUTION INTRAMUSCULAR; INTRAVENOUS at 00:00

## 2020-10-31 RX ADMIN — ENOXAPARIN SODIUM 40 MG: 40 INJECTION SUBCUTANEOUS at 08:50

## 2020-10-31 RX ADMIN — FAMOTIDINE 20 MG: 10 INJECTION INTRAVENOUS at 08:50

## 2020-10-31 RX ADMIN — VANCOMYCIN HYDROCHLORIDE 1250 MG: 10 INJECTION, POWDER, LYOPHILIZED, FOR SOLUTION INTRAVENOUS at 03:00

## 2020-10-31 NOTE — PROGRESS NOTES
Pharmacokinetic Consult to Pharmacist    Christiano Cole is a 45 y.o. female being treated for BJI with vancomycin. Anticipating needing 6 weeks of therapy per ID. Height: 5' 9\" (175.3 cm)  Weight: 118.8 kg (262 lb)  Lab Results   Component Value Date/Time    BUN 4 (L) 10/31/2020 05:08 AM    Creatinine 0.51 (L) 10/31/2020 05:08 AM    WBC 8.9 10/31/2020 05:08 AM    Lactic acid 0.9 10/28/2020 11:51 AM      Estimated Creatinine Clearance: 148.5 mL/min (A) (by C-G formula based on SCr of 0.51 mg/dL (L)). Lab Results   Component Value Date/Time    Vancomycin,trough 17.8 10/31/2020 10:03 AM         Day 4 of vancomycin. Goal trough is 15-20. Vancomycin trough resulted within goal at 17.8, so continue current dose of 1250 mg Q8H. Will continue to follow patient and order levels when clinically indicated.     Thank you,  David Luciano, PharmD, [id number]  Clinical Pharmacist  [phone]

## 2020-10-31 NOTE — PROGRESS NOTES
Name: Rm Perry MRN: [id number]  : 1980  Age:40 y.o.  female  Admit Date:  10/28/2020 LOS: 3      Hospitalist Progress Note     Reason for Admission:  Septic joint ([address] [M00.9]  Right ankle pain [M25.571]  Right ankle swelling [M25.471]    Hospital Course:  Please refer to the admission H&P for details of presentation. In summary, Rm Perry is a 45 y.o. female with medical history significant for CAD, type 2 diabetes mellitus uncontrolled, hypertension who was admitted due to progressive worsening of right ankle pain and swelling for 3 weeks with concern for septic joint. ED, patient was afebrile and work-up shows no leukocytosis but elevated CRP. Was evaluated by orthopedics. Patient underwent ankle joint washout in the OR on 10/29/2020. She has been started on IV antibiotics for suspected septic joint. Infection disease has been consulted. Subjective/24 hr Events (10/31/20) : Patient is seen and examined at bedside. No acute events reported overnight by nursing staff. Reports foot pain is manageable but still ongoing. Reports right arm swelling and pain. Pain with movement of wrist and finger without any numbness/tingling. Patient denies fever, chills, chest pains, shortness of breath, n/v, abdominal pain. ROS: 10 point review of systems is otherwise negative with the exception of the elements mentioned above.     Objective:    Patient Vitals for the past 24 hrs:   Temp Pulse Resp BP SpO2   10/30/20 2316 98.1 °F (36.7 °C) 91 17 124/65 98 %   10/30/20 1932 98.2 °F (36.8 °C) 88 17 126/69 98 %   10/30/20 1600 98.7 °F (37.1 °C) 88 17 123/68 100 %     Oxygen Therapy  O2 Sat (%): (refused) (10/31/20 0349)  Pulse via Oximetry: 84 beats per minute (10/29/20 1840)  O2 Device: Nasal cannula (10/29/20 1920)  O2 Flow Rate (L/min): 2 l/min (10/29/20 1920)    Estimated body mass index is 38.69 kg/m² as calculated from the following:    Height as of this encounter: 5' 9\" (1.905 m).    Weight as of this encounter: 118.8 kg (262 lb). Intake/Output Summary (Last 24 hours) at 10/31/2020 1123  Last data filed at 10/31/2020 5085  Gross per 24 hour   Intake 1074 ml   Output 600 ml   Net 474 ml       *Note that automatically entered I/Os may not be accurate; dependent on patient compliance with collection and accurate  by techs. Physical Exam:   General:     alert, awake, no acute distress. Well nourished. Obese  Head:   normocephalic, atraumatic  Eyes, Ears, nose: PERRL, EOMI. Normal conjunctiva  Neck:    supple, non-tender. Trachea midline. Lungs:   CTAB, no wheezing, rhonchi, rales  Cardiac:   RRR, Normal S1 and S2. Abdomen:   Soft, non distended, nontender, +BS, no guarding/rebound  Extremities:   Warm, dry. No edema. Rt foot wrapped in bandage. Unable to  right hand due to pain. Skin:   No rashes, no jaundice  Neuro:  AAOx3.  No gross focal neurological deficit  Psychiatric:  No anxiety, calm, cooperative    Data Review:  I have reviewed all labs, meds, and studies from the last 24 hours:      Labs:    Recent Results (from the past 24 hour(s))   GLUCOSE, POC    Collection Time: 10/30/20 11:55 AM   Result Value Ref Range    Glucose (POC) 139 (H) 65 - 100 mg/dL   GLUCOSE, POC    Collection Time: 10/30/20  3:53 PM   Result Value Ref Range    Glucose (POC) 147 (H) 65 - 100 mg/dL   CBC W/O DIFF    Collection Time: 10/31/20  5:08 AM   Result Value Ref Range    WBC 8.9 4.3 - 11.1 K/uL    RBC 2.87 (L) 4.05 - 5.2 M/uL    HGB 7.1 (L) 11.7 - 15.4 g/dL    HCT 22.9 (L) 35.8 - 46.3 %    MCV 79.8 79.6 - 97.8 FL    MCH 24.7 (L) 26.1 - 32.9 PG    MCHC 31.0 (L) 31.4 - 35.0 g/dL    RDW 13.7 11.9 - 14.6 %    PLATELET 223 124 - 947 K/uL    MPV 9.3 (L) 9.4 - 12.3 FL    ABSOLUTE NRBC 0.00 0.0 - 0.2 K/uL   METABOLIC PANEL, COMPREHENSIVE    Collection Time: 10/31/20  5:08 AM   Result Value Ref Range    Sodium 139 138 - 145 mmol/L    Potassium 3.3 (L) 3.5 - 5.1 mmol/L    Chloride 105 98 - 107 mmol/L    CO2 26 21 - 32 mmol/L    Anion gap 8 7 - 16 mmol/L    Glucose 124 (H) 65 - 100 mg/dL    BUN 4 (L) 6 - 23 MG/DL    Creatinine 0.51 (L) 0.6 - 1.0 MG/DL    GFR est AA >60 >60 ml/min/1.73m2    GFR est non-AA >60 >60 ml/min/1.73m2    Calcium 8.5 8.3 - 10.4 MG/DL    Bilirubin, total 0.3 0.2 - 1.1 MG/DL    ALT (SGPT) 25 12 - 65 U/L    AST (SGOT) 18 15 - 37 U/L    Alk. phosphatase 179 (H) 50 - 136 U/L    Protein, total 7.5 6.3 - 8.2 g/dL    Albumin 1.8 (L) 3.5 - 5.0 g/dL    Globulin 5.7 (H) 2.3 - 3.5 g/dL    A-G Ratio 0.3 (L) 1.2 - 3.5     GLUCOSE, POC    Collection Time: 10/31/20  7:49 AM   Result Value Ref Range    Glucose (POC) 140 (H) 65 - 100 mg/dL   Juan Crews    Collection Time: 10/31/20 10:03 AM   Result Value Ref Range    Vancomycin,trough 17.8 5 - 20 ug/mL         All Micro Results     Procedure Component Value Units Date/Time    CULTURE, Sigmund Ill STAIN [[id number]] Collected:  10/29/20 1715    Order Status:  Completed Specimen:  Ankle Updated:  10/31/20 1003     Special Requests: NO SPECIAL REQUESTS        GRAM STAIN 0 TO 1 WBCS SEEN PER OIF      NO DEFINITE ORGANISM SEEN        Culture result: NO GROWTH 1 DAY       CULTURE, BLOOD [[id number]] Collected:  10/28/20 1815    Order Status:  Completed Specimen:  Blood Updated:  10/31/20 0625     Special Requests: --        RIGHT  Antecubital       Culture result: NO GROWTH 3 DAYS       CULTURE, ANAEROBIC [[id number]] Collected:  10/29/20 1715    Order Status:  Completed Specimen:  Ankle Updated:  10/30/20 1109     Special Requests: NO SPECIAL REQUESTS        Culture result:       NO GROWTH AFTER SHORT PERIOD OF INCUBATION. FURTHER RESULTS TO FOLLOW AFTER OVERNIGHT INCUBATION.           CULTURE, BLOOD [[id number]] Collected:  10/28/20 1225    Order Status:  Completed Specimen:  Blood Updated:  10/28/20 1244          Current Meds:  Current Facility-Administered Medications   Medication Dose Route Frequency    vancomycin (VANCOCIN) 1250 mg in  ml infusion  1,250 mg IntraVENous Q8H    insulin lispro (HUMALOG) injection 0-10 Units  0-10 Units SubCUTAneous AC&HS    morphine injection 2 mg  2 mg IntraVENous Q4H PRN    naloxone (NARCAN) injection 0.1 mg  0.1 mg IntraVENous EVERY 2 MINUTES AS NEEDED    flumazeniL (ROMAZICON) 0.1 mg/mL injection 0.2 mg  0.2 mg IntraVENous Multiple    diphenhydrAMINE (BENADRYL) injection 12.5 mg  12.5 mg IntraVENous Q15MIN PRN    oxyCODONE IR (ROXICODONE) tablet 10 mg  10 mg Oral Q4H PRN    dapagliflozin (FARXIGA) tablet 10 mg (Patient Supplied)  10 mg Oral DAILY    pioglitazone (ACTOS) tablet 15 mg  15 mg Oral ACB    sodium chloride (NS) flush 5-40 mL  5-40 mL IntraVENous Q8H    sodium chloride (NS) flush 5-40 mL  5-40 mL IntraVENous PRN    acetaminophen (TYLENOL) tablet 650 mg  650 mg Oral Q6H PRN    Or    acetaminophen (TYLENOL) suppository 650 mg  650 mg Rectal Q6H PRN    polyethylene glycol (MIRALAX) packet 17 g  17 g Oral DAILY PRN    promethazine (PHENERGAN) tablet 12.5 mg  12.5 mg Oral Q6H PRN    Or    ondansetron (ZOFRAN) injection 4 mg  4 mg IntraVENous Q4H PRN    enoxaparin (LOVENOX) injection 40 mg  40 mg SubCUTAneous DAILY    potassium chloride 10 mEq in 100 ml IVPB  10 mEq IntraVENous PRN    magnesium sulfate 2 g/50 ml IVPB (premix or compounded)  2 g IntraVENous PRN    famotidine (PF) (PEPCID) 20 mg in 0.9% sodium chloride 10 mL injection  20 mg IntraVENous BID    cefTRIAXone (ROCEPHIN) 2 g in 0.9% sodium chloride (MBP/ADV) 50 mL  2 g IntraVENous Q24H    oxyCODONE IR (ROXICODONE) tablet 5 mg  5 mg Oral Q4H PRN    naloxone (NARCAN) injection 0.4 mg  0.4 mg IntraVENous PRN    nystatin (MYCOSTATIN) 100,000 unit/gram powder   Topical BID         Other Studies:  Xr Chest Pa Lat    Result Date: 10/12/2020  History: cp Two views chest Findings: The lungs are well expanded and clear.  The cardiac silhouette, and mediastinal contour, and osseous structures are normal.     Impression: Unremarkable two-view chest.     Xr Ankle Rt Min 3 V    Result Date: 10/12/2020  History: Medial right ankle pain and swelling, 2 days duration 3 views right ankle FINDINGS: No acute fracture, dislocation, or additional acute bony abnormality. IMPRESSION: No acute findings. Duplex Lower Ext Venous Right    Result Date: 10/28/2020  Right lower extremity venous ultrasound INDICATION:  Pain and swelling, chronic, moderate COMPARISON: Radiography of the right ankle 10/12/2020 and ultrasound of the leg 2/23/2020 Technique: Grayscale, color Doppler, and spectral analysis were performed on the deep veins. FINDINGS: There is no evidence of Baker's cyst. Note of a right inguinal 2.5 x 1.1 x 1.7 cm lymph node with normal morphology and no hypervascularity. This is of unclear but doubtful significance, unless clinically suspicious. There is normal flow in the common femoral, superficial femoral, greater saphenous, femoral and popliteal veins. Normal compression and augmentation demonstrated. The proximal calf veins are also patent as is the partially visualized contralateral common femoral vein. IMPRESSION: No evidence of deep venous thrombosis in the right lower extremity     Assessment:    Active Hospital Problems    Diagnosis Date Noted    Left axillary hidradenitis 10/30/2020    Right ankle swelling 10/28/2020    Right ankle pain 10/28/2020    Septic joint ([address] 10/28/2020    Positive for microalbuminuria 02/12/2018    DM2 (diabetes mellitus, type 2) ([address] 02/24/2014     Newly diagnosed 2/22/14      HTN (hypertension) 02/24/2014    Morbid obesity ([address] 02/24/2014       Plan:    Rt Ankle pain/swelling concerning for septic Joint  No DVT and no acute findings with XR. Concerning for septic given warmth/TTP and slight erythema palpation. - continue IV ceftriaxone + vancomycin (10/29 - )  - ID following.  - follow up BCx and joint aspirate culture    Rt hand/arm swelling and pain  Possibly due to IV infiltration?  Unable to  due to pain. No numbness/tingling  - US R UE. - warm compress    T2DM, uncontrolled  A1c 11.7  - hold home metformin  - consult diabetic education  - continue with home Actos and Farxiga  - ISS, diabetic diet    L axilla hidradenitis  - will need out patient surgical follow up for resection    Hypoalbuminemia: consult nutrition    Morbid Obesity: Body mass index is 38.69 kg/m². Diet:  DIET DIABETIC CONSISTENT CARB  DIET NUTRITIONAL SUPPLEMENTS  DVT PPx: lovenox  Code: Full Code  Dispo: pending clinical course and PT/OT Eval  Estimated Discharge: TBD based on clinical course    Labs/Imaging Reviewed. Patient is HIGH risk due to current condition and comorbid conditions as well as requiring frequent monitoring and high risk of decline. Plan discussed with staff, patient/family and are in agreement.       Signed By: Jarold Meckel, MD     October 31, 2020

## 2020-10-31 NOTE — PROGRESS NOTES
PT note:    Chart reviewed and attempted PT evaluation this morning however pt currently on bedpan. Will check back later time/date when pt is available and as schedule allows.      Erika Poon, PT, DPT

## 2020-11-01 LAB
ALBUMIN SERPL-MCNC: 1.8 G/DL (ref 3.5–5)
ALBUMIN/GLOB SERPL: 0.3 {RATIO} (ref 1.2–3.5)
ALP SERPL-CCNC: 180 U/L (ref 50–136)
ALT SERPL-CCNC: 26 U/L (ref 12–65)
ANION GAP SERPL CALC-SCNC: 9 MMOL/L (ref 7–16)
AST SERPL-CCNC: 18 U/L (ref 15–37)
BACTERIA SPEC CULT: NORMAL
BILIRUB SERPL-MCNC: 0.2 MG/DL (ref 0.2–1.1)
BUN SERPL-MCNC: 5 MG/DL (ref 6–23)
CALCIUM SERPL-MCNC: 8.5 MG/DL (ref 8.3–10.4)
CHLORIDE SERPL-SCNC: 105 MMOL/L (ref 98–107)
CO2 SERPL-SCNC: 26 MMOL/L (ref 21–32)
CREAT SERPL-MCNC: 0.53 MG/DL (ref 0.6–1)
ERYTHROCYTE [DISTWIDTH] IN BLOOD BY AUTOMATED COUNT: 13.7 % (ref 11.9–14.6)
GLOBULIN SER CALC-MCNC: 5.8 G/DL (ref 2.3–3.5)
GLUCOSE BLD STRIP.AUTO-MCNC: 133 MG/DL (ref 65–100)
GLUCOSE BLD STRIP.AUTO-MCNC: 160 MG/DL (ref 65–100)
GLUCOSE BLD STRIP.AUTO-MCNC: 183 MG/DL (ref 65–100)
GLUCOSE SERPL-MCNC: 129 MG/DL (ref 65–100)
GRAM STN SPEC: NORMAL
GRAM STN SPEC: NORMAL
HCT VFR BLD AUTO: 25.8 % (ref 35.8–46.3)
HGB BLD-MCNC: 7.8 G/DL (ref 11.7–15.4)
MCH RBC QN AUTO: 24.6 PG (ref 26.1–32.9)
MCHC RBC AUTO-ENTMCNC: 30.2 G/DL (ref 31.4–35)
MCV RBC AUTO: 81.4 FL (ref 79.6–97.8)
NRBC # BLD: 0 K/UL (ref 0–0.2)
PLATELET # BLD AUTO: 400 K/UL (ref 150–450)
PMV BLD AUTO: 9.4 FL (ref 9.4–12.3)
POTASSIUM SERPL-SCNC: 3.3 MMOL/L (ref 3.5–5.1)
PROT SERPL-MCNC: 7.6 G/DL (ref 6.3–8.2)
RBC # BLD AUTO: 3.17 M/UL (ref 4.05–5.2)
SERVICE CMNT-IMP: NORMAL
SODIUM SERPL-SCNC: 140 MMOL/L (ref 138–145)
WBC # BLD AUTO: 8.8 K/UL (ref 4.3–11.1)

## 2020-11-01 PROCEDURE — 65270000029 HC RM PRIVATE

## 2020-11-01 PROCEDURE — 80053 COMPREHEN METABOLIC PANEL: CPT

## 2020-11-01 PROCEDURE — 82962 GLUCOSE BLOOD TEST: CPT

## 2020-11-01 PROCEDURE — 74011250637 HC RX REV CODE- 250/637: Performed by: INTERNAL MEDICINE

## 2020-11-01 PROCEDURE — 74011250637 HC RX REV CODE- 250/637: Performed by: HOSPITALIST

## 2020-11-01 PROCEDURE — 2709999900 HC NON-CHARGEABLE SUPPLY

## 2020-11-01 PROCEDURE — 85027 COMPLETE CBC AUTOMATED: CPT

## 2020-11-01 PROCEDURE — 74011000250 HC RX REV CODE- 250: Performed by: HOSPITALIST

## 2020-11-01 PROCEDURE — 74011250636 HC RX REV CODE- 250/636: Performed by: HOSPITALIST

## 2020-11-01 PROCEDURE — 74011250637 HC RX REV CODE- 250/637: Performed by: FAMILY MEDICINE

## 2020-11-01 PROCEDURE — 74011250636 HC RX REV CODE- 250/636: Performed by: INTERNAL MEDICINE

## 2020-11-01 PROCEDURE — 74011000258 HC RX REV CODE- 258: Performed by: HOSPITALIST

## 2020-11-01 PROCEDURE — 36415 COLL VENOUS BLD VENIPUNCTURE: CPT

## 2020-11-01 RX ORDER — POTASSIUM CHLORIDE 20 MEQ/1
40 TABLET, EXTENDED RELEASE ORAL
Status: COMPLETED | OUTPATIENT
Start: 2020-11-01 | End: 2020-11-01

## 2020-11-01 RX ADMIN — MORPHINE SULFATE 2 MG: 2 INJECTION, SOLUTION INTRAMUSCULAR; INTRAVENOUS at 21:44

## 2020-11-01 RX ADMIN — OXYCODONE 10 MG: 5 TABLET ORAL at 17:26

## 2020-11-01 RX ADMIN — POTASSIUM CHLORIDE 40 MEQ: 20 TABLET, EXTENDED RELEASE ORAL at 10:36

## 2020-11-01 RX ADMIN — VANCOMYCIN HYDROCHLORIDE 1250 MG: 10 INJECTION, POWDER, LYOPHILIZED, FOR SOLUTION INTRAVENOUS at 18:07

## 2020-11-01 RX ADMIN — CEFTRIAXONE SODIUM 2 G: 2 INJECTION, POWDER, FOR SOLUTION INTRAMUSCULAR; INTRAVENOUS at 01:09

## 2020-11-01 RX ADMIN — VANCOMYCIN HYDROCHLORIDE 1250 MG: 10 INJECTION, POWDER, LYOPHILIZED, FOR SOLUTION INTRAVENOUS at 10:36

## 2020-11-01 RX ADMIN — Medication 10 ML: at 13:03

## 2020-11-01 RX ADMIN — OXYCODONE 10 MG: 5 TABLET ORAL at 01:09

## 2020-11-01 RX ADMIN — MORPHINE SULFATE 2 MG: 2 INJECTION, SOLUTION INTRAMUSCULAR; INTRAVENOUS at 10:00

## 2020-11-01 RX ADMIN — ACETAMINOPHEN 650 MG: 325 TABLET, FILM COATED ORAL at 17:26

## 2020-11-01 RX ADMIN — OXYCODONE 10 MG: 5 TABLET ORAL at 12:20

## 2020-11-01 RX ADMIN — MORPHINE SULFATE 2 MG: 2 INJECTION, SOLUTION INTRAMUSCULAR; INTRAVENOUS at 05:00

## 2020-11-01 RX ADMIN — ENOXAPARIN SODIUM 40 MG: 40 INJECTION SUBCUTANEOUS at 09:10

## 2020-11-01 RX ADMIN — PIOGLITAZONE HYDROCHLORIDE 15 MG: 15 TABLET ORAL at 06:00

## 2020-11-01 RX ADMIN — FAMOTIDINE 20 MG: 10 INJECTION INTRAVENOUS at 09:10

## 2020-11-01 RX ADMIN — VANCOMYCIN HYDROCHLORIDE 1250 MG: 10 INJECTION, POWDER, LYOPHILIZED, FOR SOLUTION INTRAVENOUS at 03:00

## 2020-11-01 RX ADMIN — FAMOTIDINE 20 MG: 10 INJECTION INTRAVENOUS at 21:44

## 2020-11-01 RX ADMIN — Medication 10 ML: at 05:55

## 2020-11-01 NOTE — PROGRESS NOTES
PT note: Attempted PT evaluation and treatment this morning however pt politely declining stating she \"has been trying to move her ankle and leg some in bed at this time but wanting to wait on trying to stand until tomorrow\". Will check back later time/date as schedule allows.     Jayden Morrell, PT, DPT

## 2020-11-01 NOTE — PROGRESS NOTES
END OF SHIFT NOTE:    INTAKE/OUTPUT  No intake/output data recorded. Voiding: YES  Catheter: NO  Drain:              Flatus: Patient does have flatus present. Stool:  0 occurrences. Characteristics:  Stool Assessment  Stool Color: Brown  Stool Appearance: Soft  Stool Amount: Medium  Stool Source/Status: Rectum    Emesis: 0 occurrences. Characteristics:        VITAL SIGNS  Patient Vitals for the past 12 hrs:   Temp Pulse Resp BP SpO2   11/01/20 1213 98.3 °F (36.8 °C) 90 18 131/85 96 %   11/01/20 0751 98.2 °F (36.8 °C) 87 18 116/73 96 %       Pain Assessment  Pain Intensity 1: 2 (11/01/20 0210)  Pain Location 1: Ankle  Pain Intervention(s) 1: Medication (see MAR)  Patient Stated Pain Goal: 2    Ambulating  No    Shift report given to oncoming nurse at the bedside.     Marty Early RN

## 2020-11-01 NOTE — PROGRESS NOTES
END OF SHIFT NOTE:    INTAKE/OUTPUT  No intake/output data recorded. Voiding: YES  Catheter: NO  Drain:              Flatus: Patient does have flatus present. Stool:  0 occurrences. Characteristics:  Stool Assessment  Stool Color: Brown  Stool Appearance: Soft  Stool Amount: Medium  Stool Source/Status: Rectum    Emesis: 0 occurrences. Characteristics:        VITAL SIGNS  Patient Vitals for the past 12 hrs:   Temp Pulse Resp BP SpO2   11/01/20 0330 99.2 °F (37.3 °C) 70 18 (!) 148/91 97 %   10/31/20 2150 99.3 °F (37.4 °C) 69 18 130/80 98 %   10/31/20 1937 99.9 °F (37.7 °C) 93 18 107/67 93 %       Pain Assessment  Pain Intensity 1: 2 (11/01/20 0210)  Pain Location 1: Ankle  Pain Intervention(s) 1: Medication (see MAR)  Patient Stated Pain Goal: 2    Ambulating  No    Shift report given to oncoming nurse at the bedside.     Joel Mccullough

## 2020-11-01 NOTE — PROGRESS NOTES
Name: Estella Reza MRN: [id number]  : 1980  Age:40 y.o.  female  Admit Date:  10/28/2020 LOS: 4      Hospitalist Progress Note     Reason for Admission:  Septic joint ([address] [M00.9]  Right ankle pain [M25.571]  Right ankle swelling [M25.471]    Hospital Course:  Please refer to the admission H&P for details of presentation. In summary, Estella Reza is a 45 y.o. female with medical history significant for CAD, type 2 diabetes mellitus uncontrolled, hypertension who was admitted due to progressive worsening of right ankle pain and swelling for 3 weeks with concern for septic joint. ED, patient was afebrile and work-up shows no leukocytosis but elevated CRP. Was evaluated by orthopedics. Patient underwent ankle joint washout in the OR on 10/29/2020. She has been started on IV antibiotics for suspected septic joint. Infection disease has been consulted. Subjective/24 hr Events (20) : Patient is seen and examined at bedside. No acute events reported overnight by nursing staff. Reports foot pain is manageable but still ongoing. Reports right arm swelling and pain. Pain with movement of wrist and finger without any numbness/tingling. Patient denies fever, chills, chest pains, shortness of breath, n/v, abdominal pain. ROS: 10 point review of systems is otherwise negative with the exception of the elements mentioned above. Objective:    Patient Vitals for the past 24 hrs:   Temp Pulse Resp BP SpO2   20 0751 98.2 °F (36.8 °C) 87 18 116/73 96 %   10/31/20 1937 99.9 °F (37.7 °C) 93 18 107/67 93 %   10/31/20 1105 98.3 °F (36.8 °C) 93 18 118/85 97 %     Oxygen Therapy  O2 Sat (%): 96 % (20 0751)  Pulse via Oximetry: 84 beats per minute (10/29/20 1840)  O2 Device: Nasal cannula (10/29/20 1920)  O2 Flow Rate (L/min): 2 l/min (10/29/20 1920)    Estimated body mass index is 38.69 kg/m² as calculated from the following:    Height as of this encounter: 5' 9\" (1.753 m). Weight as of this encounter: 118.8 kg (262 lb). Intake/Output Summary (Last 24 hours) at 11/1/2020 1016  Last data filed at 11/1/2020 0751  Gross per 24 hour   Intake 1500 ml   Output 400 ml   Net 1100 ml       *Note that automatically entered I/Os may not be accurate; dependent on patient compliance with collection and accurate  by techs. Physical Exam:   General:     alert, awake, no acute distress. Well nourished. Obese  Head:   normocephalic, atraumatic  Eyes, Ears, nose: PERRL, EOMI. Normal conjunctiva  Neck:    supple, non-tender. Trachea midline. Lungs:   CTAB, no wheezing, rhonchi, rales  Cardiac:   RRR, Normal S1 and S2. Abdomen:   Soft, non distended, nontender, +BS, no guarding/rebound  Extremities:   Warm, dry. No edema. Rt foot wrapped in bandage. Unable to  right hand due to pain. Rt forearm slightly swollen. Prior needle stick marks in rt antecubital fossa  Skin:   No rashes, no jaundice  Neuro:  AAOx3.  No gross focal neurological deficit  Psychiatric:  No anxiety, calm, cooperative    Data Review:  I have reviewed all labs, meds, and studies from the last 24 hours:      Labs:    Recent Results (from the past 24 hour(s))   GLUCOSE, POC    Collection Time: 10/31/20 11:54 AM   Result Value Ref Range    Glucose (POC) 182 (H) 65 - 100 mg/dL   GLUCOSE, POC    Collection Time: 10/31/20  4:41 PM   Result Value Ref Range    Glucose (POC) 142 (H) 65 - 100 mg/dL   GLUCOSE, POC    Collection Time: 10/31/20  9:27 PM   Result Value Ref Range    Glucose (POC) 158 (H) 65 - 100 mg/dL   CBC W/O DIFF    Collection Time: 11/01/20  5:05 AM   Result Value Ref Range    WBC 8.8 4.3 - 11.1 K/uL    RBC 3.17 (L) 4.05 - 5.2 M/uL    HGB 7.8 (L) 11.7 - 15.4 g/dL    HCT 25.8 (L) 35.8 - 46.3 %    MCV 81.4 79.6 - 97.8 FL    MCH 24.6 (L) 26.1 - 32.9 PG    MCHC 30.2 (L) 31.4 - 35.0 g/dL    RDW 13.7 11.9 - 14.6 %    PLATELET 437 422 - 623 K/uL    MPV 9.4 9.4 - 12.3 FL    ABSOLUTE NRBC 0.00 0.0 - 0.2 K/uL METABOLIC PANEL, COMPREHENSIVE    Collection Time: 11/01/20  5:05 AM   Result Value Ref Range    Sodium 140 138 - 145 mmol/L    Potassium 3.3 (L) 3.5 - 5.1 mmol/L    Chloride 105 98 - 107 mmol/L    CO2 26 21 - 32 mmol/L    Anion gap 9 7 - 16 mmol/L    Glucose 129 (H) 65 - 100 mg/dL    BUN 5 (L) 6 - 23 MG/DL    Creatinine 0.53 (L) 0.6 - 1.0 MG/DL    GFR est AA >60 >60 ml/min/1.73m2    GFR est non-AA >60 >60 ml/min/1.73m2    Calcium 8.5 8.3 - 10.4 MG/DL    Bilirubin, total 0.2 0.2 - 1.1 MG/DL    ALT (SGPT) 26 12 - 65 U/L    AST (SGOT) 18 15 - 37 U/L    Alk. phosphatase 180 (H) 50 - 136 U/L    Protein, total 7.6 6.3 - 8.2 g/dL    Albumin 1.8 (L) 3.5 - 5.0 g/dL    Globulin 5.8 (H) 2.3 - 3.5 g/dL    A-G Ratio 0.3 (L) 1.2 - 3.5     GLUCOSE, POC    Collection Time: 11/01/20  7:54 AM   Result Value Ref Range    Glucose (POC) 133 (H) 65 - 100 mg/dL         All Micro Results     Procedure Component Value Units Date/Time    CULTURE, BLOOD [[id number]] Collected:  10/28/20 1715    Order Status:  Completed Specimen:  Blood Updated:  11/01/20 0812     Special Requests: --        RIGHT  Antecubital       Culture result: NO GROWTH 4 DAYS       CULTURE, BLOOD [[id number]] Collected:  10/28/20 1125    Order Status:  Completed Specimen:  Blood Updated:  11/01/20 0812     Special Requests: --        RIGHT  Antecubital       Culture result: NO GROWTH 2 DAYS       CULTURE, Phoebe Clock STAIN [[id number]] Collected:  10/29/20 1615    Order Status:  Completed Specimen:  Ankle Updated:  11/01/20 0811     Special Requests: NO SPECIAL REQUESTS        GRAM STAIN 0 TO 1 WBCS SEEN PER OIF      NO DEFINITE ORGANISM SEEN        Culture result: NO GROWTH 2 DAYS       CULTURE, ANAEROBIC [[id number]] Collected:  10/29/20 1715    Order Status:  Completed Specimen:  Ankle Updated:  10/30/20 1109     Special Requests: NO SPECIAL REQUESTS        Culture result:       NO GROWTH AFTER SHORT PERIOD OF INCUBATION.  FURTHER RESULTS TO FOLLOW AFTER OVERNIGHT INCUBATION.                 Current Meds:  Current Facility-Administered Medications   Medication Dose Route Frequency    vancomycin (VANCOCIN) 1250 mg in  ml infusion  1,250 mg IntraVENous Q8H    insulin lispro (HUMALOG) injection 0-10 Units  0-10 Units SubCUTAneous AC&HS    morphine injection 2 mg  2 mg IntraVENous Q4H PRN    naloxone (NARCAN) injection 0.1 mg  0.1 mg IntraVENous EVERY 2 MINUTES AS NEEDED    flumazeniL (ROMAZICON) 0.1 mg/mL injection 0.2 mg  0.2 mg IntraVENous Multiple    diphenhydrAMINE (BENADRYL) injection 12.5 mg  12.5 mg IntraVENous Q15MIN PRN    oxyCODONE IR (ROXICODONE) tablet 10 mg  10 mg Oral Q4H PRN    dapagliflozin (FARXIGA) tablet 10 mg (Patient Supplied)  10 mg Oral DAILY    pioglitazone (ACTOS) tablet 15 mg  15 mg Oral ACB    sodium chloride (NS) flush 5-40 mL  5-40 mL IntraVENous Q8H    sodium chloride (NS) flush 5-40 mL  5-40 mL IntraVENous PRN    acetaminophen (TYLENOL) tablet 650 mg  650 mg Oral Q6H PRN    Or    acetaminophen (TYLENOL) suppository 650 mg  650 mg Rectal Q6H PRN    polyethylene glycol (MIRALAX) packet 17 g  17 g Oral DAILY PRN    promethazine (PHENERGAN) tablet 12.5 mg  12.5 mg Oral Q6H PRN    Or    ondansetron (ZOFRAN) injection 4 mg  4 mg IntraVENous Q4H PRN    enoxaparin (LOVENOX) injection 40 mg  40 mg SubCUTAneous DAILY    potassium chloride 10 mEq in 100 ml IVPB  10 mEq IntraVENous PRN    magnesium sulfate 2 g/50 ml IVPB (premix or compounded)  2 g IntraVENous PRN    famotidine (PF) (PEPCID) 20 mg in 0.9% sodium chloride 10 mL injection  20 mg IntraVENous BID    cefTRIAXone (ROCEPHIN) 2 g in 0.9% sodium chloride (MBP/ADV) 50 mL  2 g IntraVENous Q24H    oxyCODONE IR (ROXICODONE) tablet 5 mg  5 mg Oral Q4H PRN    naloxone (NARCAN) injection 0.4 mg  0.4 mg IntraVENous PRN    nystatin (MYCOSTATIN) 100,000 unit/gram powder   Topical BID         Other Studies:  Xr Chest Pa Lat    Result Date: 10/12/2020  History: cp Two views chest Findings: The lungs are well expanded and clear. The cardiac silhouette, and mediastinal contour, and osseous structures are normal.     Impression: Unremarkable two-view chest.     Xr Ankle Rt Min 3 V    Result Date: 10/12/2020  History: Medial right ankle pain and swelling, 2 days duration 3 views right ankle FINDINGS: No acute fracture, dislocation, or additional acute bony abnormality. IMPRESSION: No acute findings. Duplex Lower Ext Venous Right    Result Date: 10/28/2020  Right lower extremity venous ultrasound INDICATION:  Pain and swelling, chronic, moderate COMPARISON: Radiography of the right ankle 10/12/2020 and ultrasound of the leg 2/23/2020 Technique: Grayscale, color Doppler, and spectral analysis were performed on the deep veins. FINDINGS: There is no evidence of Baker's cyst. Note of a right inguinal 2.5 x 1.1 x 1.7 cm lymph node with normal morphology and no hypervascularity. This is of unclear but doubtful significance, unless clinically suspicious. There is normal flow in the common femoral, superficial femoral, greater saphenous, femoral and popliteal veins. Normal compression and augmentation demonstrated. The proximal calf veins are also patent as is the partially visualized contralateral common femoral vein. IMPRESSION: No evidence of deep venous thrombosis in the right lower extremity     Assessment:    Active Hospital Problems    Diagnosis Date Noted    Pain of right hand 10/31/2020    Left axillary hidradenitis 10/30/2020    Right ankle swelling 10/28/2020    Right ankle pain 10/28/2020    Septic joint ([address] 10/28/2020    Positive for microalbuminuria 02/12/2018    DM2 (diabetes mellitus, type 2) ([address] 02/24/2014     Newly diagnosed 2/22/14      HTN (hypertension) 02/24/2014    Morbid obesity ([address] 02/24/2014       Plan:    Rt Ankle pain/swelling concerning for septic Joint  No DVT and no acute findings with XR.  Concerning for septic given warmth/TTP and slight erythema palpation. - continue IV ceftriaxone + vancomycin (10/29 - )  - ID following.  - follow up BCx and joint aspirate culture    Rt hand/arm swelling and pain  Rt superficial vein thrombosis (cephalic vein)  Doppler of UE showed small short segment thrombus in the cephalic vein at the antecubital fossa, a  superficial vein and Mild interstitial edema at the area of swelling of the right wrist without  drainable fluid collection. Unable to  due to pain. No numbness/tingling.  - warm compress  - no indication for abx given superficial nature of thrombosis    T2DM, uncontrolled  A1c 11.7  - hold home metformin  - consult diabetic education  - continue with home Actos and Farxiga  - ISS, diabetic diet    L axilla hidradenitis  - will need out patient surgical follow up for resection    Hypoalbuminemia: consult nutrition    Morbid Obesity: Body mass index is 38.69 kg/m². Diet:  DIET DIABETIC CONSISTENT CARB  DIET NUTRITIONAL SUPPLEMENTS  DVT PPx: lovenox  Code: Full Code  Dispo: pending clinical course and PT/OT Eval  Estimated Discharge: TBD based on clinical course    Labs/Imaging Reviewed. Patient is HIGH risk due to current condition and comorbid conditions as well as requiring frequent monitoring and high risk of decline. Plan discussed with staff, patient/family and are in agreement.       Signed By: Jackson Soni MD     November 1, 2020

## 2020-11-02 PROBLEM — R23.8 VESICULAR RASH: Status: ACTIVE | Noted: 2020-11-02

## 2020-11-02 LAB
ALBUMIN SERPL-MCNC: 1.7 G/DL (ref 3.5–5)
ALBUMIN/GLOB SERPL: 0.3 {RATIO} (ref 1.2–3.5)
ALP SERPL-CCNC: 176 U/L (ref 50–136)
ALT SERPL-CCNC: 31 U/L (ref 12–65)
ANION GAP SERPL CALC-SCNC: 5 MMOL/L (ref 7–16)
AST SERPL-CCNC: 27 U/L (ref 15–37)
BACTERIA SPEC CULT: NORMAL
BILIRUB SERPL-MCNC: 0.2 MG/DL (ref 0.2–1.1)
BUN SERPL-MCNC: 7 MG/DL (ref 6–23)
CALCIUM SERPL-MCNC: 8.1 MG/DL (ref 8.3–10.4)
CHLORIDE SERPL-SCNC: 107 MMOL/L (ref 98–107)
CO2 SERPL-SCNC: 29 MMOL/L (ref 21–32)
CREAT SERPL-MCNC: 0.5 MG/DL (ref 0.6–1)
ERYTHROCYTE [DISTWIDTH] IN BLOOD BY AUTOMATED COUNT: 13.8 % (ref 11.9–14.6)
FERRITIN SERPL-MCNC: 105 NG/ML (ref 8–388)
GLOBULIN SER CALC-MCNC: 5.5 G/DL (ref 2.3–3.5)
GLUCOSE BLD STRIP.AUTO-MCNC: 136 MG/DL (ref 65–100)
GLUCOSE BLD STRIP.AUTO-MCNC: 138 MG/DL (ref 65–100)
GLUCOSE BLD STRIP.AUTO-MCNC: 152 MG/DL (ref 65–100)
GLUCOSE BLD STRIP.AUTO-MCNC: 157 MG/DL (ref 65–100)
GLUCOSE SERPL-MCNC: 141 MG/DL (ref 65–100)
HCT VFR BLD AUTO: 21.9 % (ref 35.8–46.3)
HGB BLD-MCNC: 6.8 G/DL (ref 11.7–15.4)
HGB BLD-MCNC: 7.1 G/DL (ref 11.7–15.4)
IRON SATN MFR SERPL: 10 %
IRON SERPL-MCNC: 19 UG/DL (ref 35–150)
MCH RBC QN AUTO: 24.8 PG (ref 26.1–32.9)
MCHC RBC AUTO-ENTMCNC: 31.1 G/DL (ref 31.4–35)
MCV RBC AUTO: 79.9 FL (ref 79.6–97.8)
NRBC # BLD: 0 K/UL (ref 0–0.2)
PLATELET # BLD AUTO: 380 K/UL (ref 150–450)
PMV BLD AUTO: 9.6 FL (ref 9.4–12.3)
POTASSIUM SERPL-SCNC: 3.4 MMOL/L (ref 3.5–5.1)
PROT SERPL-MCNC: 7.2 G/DL (ref 6.3–8.2)
RBC # BLD AUTO: 2.74 M/UL (ref 4.05–5.2)
SERVICE CMNT-IMP: NORMAL
SODIUM SERPL-SCNC: 141 MMOL/L (ref 136–145)
TIBC SERPL-MCNC: 196 UG/DL (ref 250–450)
VANCOMYCIN TROUGH SERPL-MCNC: 16.6 UG/ML (ref 5–20)
WBC # BLD AUTO: 7.8 K/UL (ref 4.3–11.1)

## 2020-11-02 PROCEDURE — 74011000250 HC RX REV CODE- 250: Performed by: HOSPITALIST

## 2020-11-02 PROCEDURE — 74011250637 HC RX REV CODE- 250/637: Performed by: FAMILY MEDICINE

## 2020-11-02 PROCEDURE — 74011250636 HC RX REV CODE- 250/636: Performed by: INTERNAL MEDICINE

## 2020-11-02 PROCEDURE — 80202 ASSAY OF VANCOMYCIN: CPT

## 2020-11-02 PROCEDURE — 82728 ASSAY OF FERRITIN: CPT

## 2020-11-02 PROCEDURE — 97161 PT EVAL LOW COMPLEX 20 MIN: CPT

## 2020-11-02 PROCEDURE — 85018 HEMOGLOBIN: CPT

## 2020-11-02 PROCEDURE — 65270000029 HC RM PRIVATE

## 2020-11-02 PROCEDURE — 85027 COMPLETE CBC AUTOMATED: CPT

## 2020-11-02 PROCEDURE — 74011000258 HC RX REV CODE- 258: Performed by: INTERNAL MEDICINE

## 2020-11-02 PROCEDURE — 83540 ASSAY OF IRON: CPT

## 2020-11-02 PROCEDURE — 82962 GLUCOSE BLOOD TEST: CPT

## 2020-11-02 PROCEDURE — 97116 GAIT TRAINING THERAPY: CPT

## 2020-11-02 PROCEDURE — 74011250637 HC RX REV CODE- 250/637: Performed by: INTERNAL MEDICINE

## 2020-11-02 PROCEDURE — 80053 COMPREHEN METABOLIC PANEL: CPT

## 2020-11-02 PROCEDURE — 36415 COLL VENOUS BLD VENIPUNCTURE: CPT

## 2020-11-02 PROCEDURE — 74011250636 HC RX REV CODE- 250/636: Performed by: HOSPITALIST

## 2020-11-02 PROCEDURE — 2709999900 HC NON-CHARGEABLE SUPPLY

## 2020-11-02 PROCEDURE — 74011250637 HC RX REV CODE- 250/637: Performed by: HOSPITALIST

## 2020-11-02 RX ORDER — FAMOTIDINE 20 MG/1
20 TABLET, FILM COATED ORAL 2 TIMES DAILY
Status: DISCONTINUED | OUTPATIENT
Start: 2020-11-02 | End: 2020-11-09 | Stop reason: HOSPADM

## 2020-11-02 RX ORDER — CIPROFLOXACIN 500 MG/1
500 TABLET ORAL EVERY 12 HOURS
Status: DISCONTINUED | OUTPATIENT
Start: 2020-11-02 | End: 2020-11-09 | Stop reason: HOSPADM

## 2020-11-02 RX ADMIN — ACETAMINOPHEN 650 MG: 325 TABLET, FILM COATED ORAL at 12:43

## 2020-11-02 RX ADMIN — MORPHINE SULFATE 2 MG: 2 INJECTION, SOLUTION INTRAMUSCULAR; INTRAVENOUS at 12:43

## 2020-11-02 RX ADMIN — CEFTRIAXONE SODIUM 2 G: 2 INJECTION, POWDER, FOR SOLUTION INTRAMUSCULAR; INTRAVENOUS at 01:50

## 2020-11-02 RX ADMIN — FAMOTIDINE 20 MG: 10 INJECTION INTRAVENOUS at 08:10

## 2020-11-02 RX ADMIN — OXYCODONE HYDROCHLORIDE 5 MG: 5 TABLET ORAL at 10:38

## 2020-11-02 RX ADMIN — Medication 10 ML: at 06:25

## 2020-11-02 RX ADMIN — OXYCODONE HYDROCHLORIDE 5 MG: 5 TABLET ORAL at 02:30

## 2020-11-02 RX ADMIN — FAMOTIDINE 20 MG: 20 TABLET, FILM COATED ORAL at 17:48

## 2020-11-02 RX ADMIN — ENOXAPARIN SODIUM 40 MG: 40 INJECTION SUBCUTANEOUS at 08:15

## 2020-11-02 RX ADMIN — MORPHINE SULFATE 2 MG: 2 INJECTION, SOLUTION INTRAMUSCULAR; INTRAVENOUS at 08:05

## 2020-11-02 RX ADMIN — VANCOMYCIN HYDROCHLORIDE 1250 MG: 10 INJECTION, POWDER, LYOPHILIZED, FOR SOLUTION INTRAVENOUS at 10:32

## 2020-11-02 RX ADMIN — CIPROFLOXACIN 500 MG: 500 TABLET, FILM COATED ORAL at 21:23

## 2020-11-02 RX ADMIN — POTASSIUM CHLORIDE 10 MEQ: 7.46 INJECTION, SOLUTION INTRAVENOUS at 12:47

## 2020-11-02 RX ADMIN — POTASSIUM CHLORIDE 10 MEQ: 7.46 INJECTION, SOLUTION INTRAVENOUS at 15:55

## 2020-11-02 RX ADMIN — MORPHINE SULFATE 2 MG: 2 INJECTION, SOLUTION INTRAMUSCULAR; INTRAVENOUS at 17:50

## 2020-11-02 RX ADMIN — PIOGLITAZONE HYDROCHLORIDE 15 MG: 15 TABLET ORAL at 06:25

## 2020-11-02 RX ADMIN — OXYCODONE 10 MG: 5 TABLET ORAL at 15:10

## 2020-11-02 RX ADMIN — OXYCODONE 10 MG: 5 TABLET ORAL at 21:22

## 2020-11-02 RX ADMIN — VANCOMYCIN HYDROCHLORIDE 1250 MG: 10 INJECTION, POWDER, LYOPHILIZED, FOR SOLUTION INTRAVENOUS at 17:48

## 2020-11-02 RX ADMIN — MORPHINE SULFATE 2 MG: 2 INJECTION, SOLUTION INTRAMUSCULAR; INTRAVENOUS at 22:17

## 2020-11-02 RX ADMIN — CIPROFLOXACIN 500 MG: 500 TABLET, FILM COATED ORAL at 12:42

## 2020-11-02 RX ADMIN — VANCOMYCIN HYDROCHLORIDE 1250 MG: 10 INJECTION, POWDER, LYOPHILIZED, FOR SOLUTION INTRAVENOUS at 03:00

## 2020-11-02 NOTE — PROGRESS NOTES
Met with patient regarding physical therapy's recommendation of short term rehab at d/c. Patient stated she would need to think about this as she would more than likely rather do outpatient therapy if possible. I provided a choice list for short term rehab facilities - patient stated she would look over it and think about it.    Will follow up tomorrow 11/3 am.

## 2020-11-02 NOTE — PROGRESS NOTES
CM following for needs @ d/c - Ortho and ID following post procedure 10/29 for recommendations. Not medically ready for d/c at this time.

## 2020-11-02 NOTE — PROGRESS NOTES
Infectious Disease Consult    Impression:   · Right ankle infection/septic joint. She had had ankle pain for at least 3 weeks. On clindamycin as outpatient and so all cultures are negative from blood and ankle aspiration. Poorly controlled DM and active L axillary hidradenitis lesion as risk factors. No cell count or crystal analysis performed on synovial fluid. · Type 2 DM: poorly controlled, Hgb A1c 10/28 11.7  · Hidradenitis suppurative right axilla with chronic inflammation and drainage  · Pustular rash on abdomen. Most likely this is secondary to a new medication, and probably an antibiotic. Plan:   · Continue vancomycin. Stop ceftriaxone and substitute cipro  · I anticipate at least 4 weeks of parenteral antibiotics, but would like to see the rash improving before we conclude on an antibiotic plan. Anti-infectives:   1. Vancomycin 10/28-  2. CTX 10/29-  3. Cefepime x 1 10/28    Subjective:   Right ankle is still swollen and painful and she has been unable to bear weight. She has a new non-pruritic rash on her abdomen    No Known Allergies     Review of Systems:  A comprehensive review of systems was negative except for that written in the History of Present Illness. Objective:   Blood pressure 110/62, pulse 86, temperature 98.2 °F (36.8 °C), resp. rate 18, height 5' 9\" (1.753 m), weight 118.8 kg (262 lb), last menstrual period 10/29/2020, SpO2 97 %. Temp (24hrs), Av.5 °F (36.9 °C), Min:98.2 °F (36.8 °C), Max:99.1 °F (37.3 °C)       Lines: none    Exam:     General: NC/AT, alert and cooperative, looks stated age, in NAD   HEENT: PERRL, non-icteric sclera, no splinter hemorrhages, good dentition   Neck: supple, symmetric, no masses or lymphadenopathy   Cardiac:Nl S1/S2, no murmurs/rubs/gallops/clicks, no LE edema   Pulmonary:clear bilaterally no rales/wheezes, good air movement    Abdomen: soft, NT, non-distended, BS marine   Skin: pustular rash with erythematous base on her abdomen.    MSK: right ankle is wrapped and remains swollen, ecchymotic, and has pain on manipulation   Extremities: no cords/clots/cyanosis, pulses palpable and symmetric    Psychiatric: mood and affect appropriate to situation       Data Review:   Recent Results (from the past 24 hour(s))   GLUCOSE, POC    Collection Time: 11/01/20 12:14 PM   Result Value Ref Range    Glucose (POC) 160 (H) 65 - 100 mg/dL   GLUCOSE, POC    Collection Time: 11/01/20  4:49 PM   Result Value Ref Range    Glucose (POC) 183 (H) 65 - 100 mg/dL   CBC W/O DIFF    Collection Time: 11/02/20  4:11 AM   Result Value Ref Range    WBC 7.8 4.3 - 11.1 K/uL    RBC 2.74 (L) 4.05 - 5.2 M/uL    HGB 6.8 (LL) 11.7 - 15.4 g/dL    HCT 21.9 (L) 35.8 - 46.3 %    MCV 79.9 79.6 - 97.8 FL    MCH 24.8 (L) 26.1 - 32.9 PG    MCHC 31.1 (L) 31.4 - 35.0 g/dL    RDW 13.8 11.9 - 14.6 %    PLATELET 397 549 - 673 K/uL    MPV 9.6 9.4 - 12.3 FL    ABSOLUTE NRBC 0.00 0.0 - 0.2 K/uL   METABOLIC PANEL, COMPREHENSIVE    Collection Time: 11/02/20  4:11 AM   Result Value Ref Range    Sodium 141 136 - 145 mmol/L    Potassium 3.4 (L) 3.5 - 5.1 mmol/L    Chloride 107 98 - 107 mmol/L    CO2 29 21 - 32 mmol/L    Anion gap 5 (L) 7 - 16 mmol/L    Glucose 141 (H) 65 - 100 mg/dL    BUN 7 6 - 23 MG/DL    Creatinine 0.50 (L) 0.6 - 1.0 MG/DL    GFR est AA >60 >60 ml/min/1.73m2    GFR est non-AA >60 >60 ml/min/1.73m2    Calcium 8.1 (L) 8.3 - 10.4 MG/DL    Bilirubin, total 0.2 0.2 - 1.1 MG/DL    ALT (SGPT) 31 12 - 65 U/L    AST (SGOT) 27 15 - 37 U/L    Alk.  phosphatase 176 (H) 50 - 136 U/L    Protein, total 7.2 6.3 - 8.2 g/dL    Albumin 1.7 (L) 3.5 - 5.0 g/dL    Globulin 5.5 (H) 2.3 - 3.5 g/dL    A-G Ratio 0.3 (L) 1.2 - 3.5     TRANSFERRIN SATURATION    Collection Time: 11/02/20  4:11 AM   Result Value Ref Range    Iron 19 (L) 35 - 150 ug/dL    TIBC 196 (L) 250 - 450 ug/dL    Transferrin Saturation 10 (L) >20 %   FERRITIN    Collection Time: 11/02/20  4:11 AM   Result Value Ref Range    Ferritin 105 8 - 388 NG/ML   GLUCOSE, POC    Collection Time: 11/02/20  7:37 AM   Result Value Ref Range    Glucose (POC) 157 (H) 65 - 100 mg/dL        Microbiology:    All Micro Results     Procedure Component Value Units Date/Time    CULTURE, BLOOD [[id number]] Collected:  10/28/20 1715    Order Status:  Completed Specimen:  Blood Updated:  11/02/20 0913     Special Requests: --        RIGHT  Antecubital       Culture result: NO GROWTH 5 DAYS       CULTURE, BLOOD [[id number]] Collected:  10/28/20 1125    Order Status:  Completed Specimen:  Blood Updated:  11/02/20 0913     Special Requests: --        RIGHT  Antecubital       Culture result: NO GROWTH 3 DAYS       CULTURE, Eudelia Naomi STAIN [[id number]] Collected:  10/29/20 1615    Order Status:  Completed Specimen:  Ankle Updated:  11/01/20 0811     Special Requests: NO SPECIAL REQUESTS        GRAM STAIN 0 TO 1 WBCS SEEN PER OIF      NO DEFINITE ORGANISM SEEN        Culture result: NO GROWTH 2 DAYS       CULTURE, ANAEROBIC [[id number]] Collected:  10/29/20 1715    Order Status:  Completed Specimen:  Ankle Updated:  10/30/20 1109     Special Requests: NO SPECIAL REQUESTS        Culture result:       NO GROWTH AFTER SHORT PERIOD OF INCUBATION. FURTHER RESULTS TO FOLLOW AFTER OVERNIGHT INCUBATION. Studies/Imaging:    Final [[id number]]  10/28/2020  14:49  10/28/2020  15:13    Study Result     Right lower extremity venous ultrasound     INDICATION:  Pain and swelling, chronic, moderate     COMPARISON: Radiography of the right ankle 10/12/2020 and ultrasound of the leg  2/23/2020     Technique: Grayscale, color Doppler, and spectral analysis were performed on the  deep veins.     FINDINGS: There is no evidence of Baker's cyst. Note of a right inguinal 2.5 x  1.1 x 1.7 cm lymph node with normal morphology and no hypervascularity.  This is  of unclear but doubtful significance, unless clinically suspicious.     There is normal flow in the common femoral, superficial femoral, greater  saphenous, femoral and popliteal veins. Normal compression and augmentation  demonstrated.  The proximal calf veins are also patent as is the partially  visualized contralateral common femoral vein.        IMPRESSION  IMPRESSION: No evidence of deep venous thrombosis in the right lower extremity           Signed By: True West MD     November 2, 2020

## 2020-11-02 NOTE — PROGRESS NOTES
Name: Lenin Olivas MRN: [id number]  : 1980  Age:40 y.o.  female  Admit Date:  10/28/2020 LOS: 5      Hospitalist Progress Note     Reason for Admission:  Septic joint ([address] [M00.9]  Right ankle pain [M25.571]  Right ankle swelling [M25.471]    Hospital Course:  Please refer to the admission H&P for details of presentation. In summary, Lenin Olivas is a 45 y.o. female with medical history significant for CAD, type 2 diabetes mellitus uncontrolled, hypertension who was admitted due to progressive worsening of right ankle pain and swelling for 3 weeks with concern for septic joint. ED, patient was afebrile and work-up shows no leukocytosis but elevated CRP. Was evaluated by orthopedics. Patient underwent ankle joint washout in the OR on 10/29/2020. She has been started on IV antibiotics for suspected septic joint. Infection disease has been consulted. Subjective/24 hr Events (20) : Patient is seen and examined at bedside. No acute events reported overnight by nursing staff. Improved pain and swelling of her foot. Still having right arm pain and swelling but improved alittle. Complains of new slightly pruritic vesicular rash on her abdomen. No pain or redness. Patient denies fever, chills, chest pains, shortness of breath, n/v, abdominal pain. ROS: 10 point review of systems is otherwise negative with the exception of the elements mentioned above. Objective:    Patient Vitals for the past 24 hrs:   Temp Pulse Resp BP SpO2   20 0715 98.2 °F (36.8 °C) 86 18 110/62 97 %   20 191 99.1 °F (37.3 °C) 90 18 122/68 96 %     Oxygen Therapy  O2 Sat (%): 97 % (2015)  Pulse via Oximetry: 84 beats per minute (10/29/20 1840)  O2 Device: Nasal cannula (10/29/20 1920)  O2 Flow Rate (L/min): 2 l/min (10/29/20 1920)    Estimated body mass index is 38.69 kg/m² as calculated from the following:    Height as of this encounter: 5' 9\" (1.753 m).     Weight as of this encounter: 118.8 kg (262 lb). Intake/Output Summary (Last 24 hours) at 11/2/2020 1253  Last data filed at 11/2/2020 1038  Gross per 24 hour   Intake 1638 ml   Output 2000 ml   Net -362 ml       *Note that automatically entered I/Os may not be accurate; dependent on patient compliance with collection and accurate  by techs. Physical Exam:   General:     alert, awake, no acute distress. Well nourished. Obese  Head:   normocephalic, atraumatic  Eyes, Ears, nose: PERRL, EOMI. Normal conjunctiva  Neck:    supple, non-tender. Trachea midline. Lungs:   CTAB, no wheezing, rhonchi, rales  Cardiac:   RRR, Normal S1 and S2. Abdomen:   Soft, non distended, nontender, +BS, vesicular rash on mid abdomen  Extremities:   Warm, dry. No edema. Rt foot wrapped in bandage. Unable to  right hand due to pain. Rt forearm slightly swollen. Prior needle stick marks in rt antecubital fossa  Skin:   No rashes, no jaundice  Neuro:  AAOx3.  No gross focal neurological deficit  Psychiatric:  No anxiety, calm, cooperative    Data Review:  I have reviewed all labs, meds, and studies from the last 24 hours:      Labs:    Recent Results (from the past 24 hour(s))   GLUCOSE, POC    Collection Time: 11/01/20  4:49 PM   Result Value Ref Range    Glucose (POC) 183 (H) 65 - 100 mg/dL   CBC W/O DIFF    Collection Time: 11/02/20  4:11 AM   Result Value Ref Range    WBC 7.8 4.3 - 11.1 K/uL    RBC 2.74 (L) 4.05 - 5.2 M/uL    HGB 6.8 (LL) 11.7 - 15.4 g/dL    HCT 21.9 (L) 35.8 - 46.3 %    MCV 79.9 79.6 - 97.8 FL    MCH 24.8 (L) 26.1 - 32.9 PG    MCHC 31.1 (L) 31.4 - 35.0 g/dL    RDW 13.8 11.9 - 14.6 %    PLATELET 261 921 - 835 K/uL    MPV 9.6 9.4 - 12.3 FL    ABSOLUTE NRBC 0.00 0.0 - 0.2 K/uL   METABOLIC PANEL, COMPREHENSIVE    Collection Time: 11/02/20  4:11 AM   Result Value Ref Range    Sodium 141 136 - 145 mmol/L    Potassium 3.4 (L) 3.5 - 5.1 mmol/L    Chloride 107 98 - 107 mmol/L    CO2 29 21 - 32 mmol/L    Anion gap 5 (L) 7 - 16 mmol/L    Glucose 141 (H) 65 - 100 mg/dL    BUN 7 6 - 23 MG/DL    Creatinine 0.50 (L) 0.6 - 1.0 MG/DL    GFR est AA >60 >60 ml/min/1.73m2    GFR est non-AA >60 >60 ml/min/1.73m2    Calcium 8.1 (L) 8.3 - 10.4 MG/DL    Bilirubin, total 0.2 0.2 - 1.1 MG/DL    ALT (SGPT) 31 12 - 65 U/L    AST (SGOT) 27 15 - 37 U/L    Alk.  phosphatase 176 (H) 50 - 136 U/L    Protein, total 7.2 6.3 - 8.2 g/dL    Albumin 1.7 (L) 3.5 - 5.0 g/dL    Globulin 5.5 (H) 2.3 - 3.5 g/dL    A-G Ratio 0.3 (L) 1.2 - 3.5     TRANSFERRIN SATURATION    Collection Time: 11/02/20  4:11 AM   Result Value Ref Range    Iron 19 (L) 35 - 150 ug/dL    TIBC 196 (L) 250 - 450 ug/dL    Transferrin Saturation 10 (L) >20 %   FERRITIN    Collection Time: 11/02/20  4:11 AM   Result Value Ref Range    Ferritin 105 8 - 388 NG/ML   GLUCOSE, POC    Collection Time: 11/02/20  7:37 AM   Result Value Ref Range    Glucose (POC) 157 (H) 65 - 100 mg/dL   [facility]    Collection Time: 11/02/20  9:59 AM   Result Value Ref Range    Vancomycin,trough 16.6 5 - 20 ug/mL   HEMOGLOBIN    Collection Time: 11/02/20  9:59 AM   Result Value Ref Range    HGB 7.1 (L) 11.7 - 15.4 g/dL   GLUCOSE, POC    Collection Time: 11/02/20 11:55 AM   Result Value Ref Range    Glucose (POC) 138 (H) 65 - 100 mg/dL         All Micro Results     Procedure Component Value Units Date/Time    CULTURE, BLOOD [[id number]] Collected:  10/28/20 1717    Order Status:  Completed Specimen:  Blood Updated:  11/02/20 0962     Special Requests: --        RIGHT  Antecubital       Culture result: NO GROWTH 5 DAYS       CULTURE, BLOOD [[id number]] Collected:  10/28/20 1125    Order Status:  Completed Specimen:  Blood Updated:  11/02/20 0913     Special Requests: --        RIGHT  Antecubital       Culture result: NO GROWTH 3 DAYS       CULTURE, Delfina Hand STAIN [[id number]] Collected:  10/29/20 1615    Order Status:  Completed Specimen:  Ankle Updated:  11/01/20 0811     Special Requests: NO SPECIAL REQUESTS GRAM STAIN 0 TO 1 WBCS SEEN PER OIF      NO DEFINITE ORGANISM SEEN        Culture result: NO GROWTH 2 DAYS       CULTURE, ANAEROBIC [[id number]] Collected:  10/29/20 8945    Order Status:  Completed Specimen:  Ankle Updated:  10/30/20 1100     Special Requests: NO SPECIAL REQUESTS        Culture result:       NO GROWTH AFTER SHORT PERIOD OF INCUBATION. FURTHER RESULTS TO FOLLOW AFTER OVERNIGHT INCUBATION.                 Current Meds:  Current Facility-Administered Medications   Medication Dose Route Frequency    insulin regular (NOVOLIN R, HUMULIN R) injection   SubCUTAneous AC&HS    famotidine (PEPCID) tablet 20 mg  20 mg Oral BID    ciprofloxacin HCl (CIPRO) tablet 500 mg  500 mg Oral Q12H    vancomycin (VANCOCIN) 1250 mg in  ml infusion  1,250 mg IntraVENous Q8H    morphine injection 2 mg  2 mg IntraVENous Q4H PRN    naloxone (NARCAN) injection 0.1 mg  0.1 mg IntraVENous EVERY 2 MINUTES AS NEEDED    flumazeniL (ROMAZICON) 0.1 mg/mL injection 0.2 mg  0.2 mg IntraVENous Multiple    diphenhydrAMINE (BENADRYL) injection 12.5 mg  12.5 mg IntraVENous Q15MIN PRN    oxyCODONE IR (ROXICODONE) tablet 10 mg  10 mg Oral Q4H PRN    dapagliflozin (FARXIGA) tablet 10 mg (Patient Supplied)  10 mg Oral DAILY    pioglitazone (ACTOS) tablet 15 mg  15 mg Oral ACB    sodium chloride (NS) flush 5-40 mL  5-40 mL IntraVENous Q8H    sodium chloride (NS) flush 5-40 mL  5-40 mL IntraVENous PRN    acetaminophen (TYLENOL) tablet 650 mg  650 mg Oral Q6H PRN    Or    acetaminophen (TYLENOL) suppository 650 mg  650 mg Rectal Q6H PRN    polyethylene glycol (MIRALAX) packet 17 g  17 g Oral DAILY PRN    promethazine (PHENERGAN) tablet 12.5 mg  12.5 mg Oral Q6H PRN    Or    ondansetron (ZOFRAN) injection 4 mg  4 mg IntraVENous Q4H PRN    enoxaparin (LOVENOX) injection 40 mg  40 mg SubCUTAneous DAILY    potassium chloride 10 mEq in 100 ml IVPB  10 mEq IntraVENous PRN    magnesium sulfate 2 g/50 ml IVPB (premix or compounded)  2 g IntraVENous PRN    oxyCODONE IR (ROXICODONE) tablet 5 mg  5 mg Oral Q4H PRN    naloxone (NARCAN) injection 0.4 mg  0.4 mg IntraVENous PRN    nystatin (MYCOSTATIN) 100,000 unit/gram powder   Topical BID         Other Studies:  Xr Chest Pa Lat    Result Date: 10/12/2020  History: cp Two views chest Findings: The lungs are well expanded and clear. The cardiac silhouette, and mediastinal contour, and osseous structures are normal.     Impression: Unremarkable two-view chest.     Xr Ankle Rt Min 3 V    Result Date: 10/12/2020  History: Medial right ankle pain and swelling, 2 days duration 3 views right ankle FINDINGS: No acute fracture, dislocation, or additional acute bony abnormality. IMPRESSION: No acute findings. Duplex Lower Ext Venous Right    Result Date: 10/28/2020  Right lower extremity venous ultrasound INDICATION:  Pain and swelling, chronic, moderate COMPARISON: Radiography of the right ankle 10/12/2020 and ultrasound of the leg 2/23/2020 Technique: Grayscale, color Doppler, and spectral analysis were performed on the deep veins. FINDINGS: There is no evidence of Baker's cyst. Note of a right inguinal 2.5 x 1.1 x 1.7 cm lymph node with normal morphology and no hypervascularity. This is of unclear but doubtful significance, unless clinically suspicious. There is normal flow in the common femoral, superficial femoral, greater saphenous, femoral and popliteal veins. Normal compression and augmentation demonstrated. The proximal calf veins are also patent as is the partially visualized contralateral common femoral vein.      IMPRESSION: No evidence of deep venous thrombosis in the right lower extremity     Assessment:    Active Hospital Problems    Diagnosis Date Noted    Pain of right hand 10/31/2020    Left axillary hidradenitis 10/30/2020    Right ankle swelling 10/28/2020    Right ankle pain 10/28/2020    Septic joint ([address] 10/28/2020    Positive for microalbuminuria 02/12/2018  DM2 (diabetes mellitus, type 2) ([facility] 02/24/2014     Newly diagnosed 2/22/14      HTN (hypertension) 02/24/2014    Morbid obesity ([facility] 02/24/2014       Plan:    Rt Ankle pain/swelling concerning for septic Joint  No DVT and no acute findings with XR. Concerning for septic given warmth/TTP and slight erythema palpation.   - ID following : vancomycin + CTX  - follow up BCx and joint aspirate culture  - Ortho recs appreciated: no additional OR planned. Outpatient follow up in 2 weeks. Rt hand/arm swelling and pain  Rt superficial vein thrombosis (cephalic vein)  Doppler of UE showed small short segment thrombus in the cephalic vein at the antecubital fossa, a  superficial vein and Mild interstitial edema at the area of swelling of the right wrist without drainable fluid collection. Unable to  due to pain. No numbness/tingling.  - warm compress  - [facility] given superficial nature of thrombosis    Vesicular rash on abdomen  ?drug reaction  - monitor.  - abx changed per ID    T2DM, uncontrolled  A1c 11.7  - hold home metformin  - consult diabetic education  - continue with home Actos and Farxiga  - ISS, diabetic diet    L axilla hidradenitis  - will need out patient surgical follow up for resection    Hypoalbuminemia: consult nutrition    Morbid Obesity: Body mass index is 38.69 kg/m². Diet:  DIET DIABETIC CONSISTENT CARB  DIET NUTRITIONAL SUPPLEMENTS  DVT PPx: lovenox  Code: Full Code  Dispo: pending clinical course and PT/OT Eval  Estimated Discharge: TBD based on clinical course    Labs/Imaging Reviewed. Patient is HIGH risk due to current condition and comorbid conditions as well as requiring frequent monitoring and high risk of decline. Plan discussed with staff, patient/family and are in agreement.       Signed By: Radha Leyva MD     November 2, 2020

## 2020-11-02 NOTE — PROGRESS NOTES
Pharmacokinetic Consult to Pharmacist    Zandra Suzie is a 45 y.o. female being treated for BJI with vancomycin. Anticipating needing 6 weeks of therapy per ID. Height: 5' 9\" (175.3 cm)  Weight: (patient on bedrest, not on weigh bed)  Lab Results   Component Value Date/Time    BUN 7 11/02/2020 04:11 AM    Creatinine 0.50 (L) 11/02/2020 04:11 AM    WBC 7.8 11/02/2020 04:11 AM    Lactic acid 0.9 10/28/2020 11:51 AM      Estimated Creatinine Clearance: 148.5 mL/min (A) (by C-G formula based on SCr of 0.5 mg/dL (L)). Lab Results   Component Value Date/Time    Vancomycin,trough 16.6 11/02/2020 09:59 AM     Blood and Ankle cultures NGTD    Day 6 of vancomycin. Goal trough is 15-20. Vancomycin trough resulted within goal at 16.6, so continue current dose of 1250 mg Q8H. Will continue to follow patient and order levels when clinically indicated. Thank you,  Vivienne Solis, Pharm. D.   PGY1 Pharmacy Resident  [phone]

## 2020-11-02 NOTE — DIABETES MGMT
Patient admitted with septic joint. Blood glucose ranged 129-183 yesterday with patient receiving Actos 15mg. Blood glucose this morning was 157. Reviewed patient current regimen: Actos 15mg daily and Humalog SSI, noted patient has been refusing Humalog insulin here. Patient has looked over educational material, \"Diabetes Self-Management: A Patient Teaching Guide. \" Explained basic physiology of diabetes, as well as causes, signs and symptoms, and treatments and hyperglycemia. Described the effects of poor glycemic control and the development of long-term complications such as renal, eye, nerve, and cardiovascular disease. Patient states Tyrone Bowman got to make a change because I don't want to loose my foot. \" Discussed that given patient A1c and hospital glycemic control patient glucose levels may be diet driven. Patient admits that her struggle is carbs, [place] \" Reviewed alternative beverages to help improve glycemic control. Dicussed ADA recommendations regarding diabetes and alcohol intake as patient states her drink of choice is red wine. Discussed per ADA recommendations for women it is recommended no more than one drink per day which is equivalent to a 5oz glass of wine. Patient states \"that's nothing. \" Discussed effects of alcohol on glycemic control. Per patient they typically eat out a lot stating \"door dash is my friend. \" Educated re: effects of carbohydrates on blood glucose, the \"plate method\" of healthy meal planning, basics of healthy meal plan, Consistent Carbohydrate Diet, discussed the basics of carb counting. Patient given educational handout regarding dining out with diabetes and educational booklet on healthy meal planning and carb counting. Educated patient regarding the benefits of physical activity (as cleared by provider) on glycemic control. Also explained the relationship between hyperglycemia and infection and delayed healing.  Discussed target goals for blood glucose and A1C. Questioned if there was a reason patient has been refusing Humalog insulin. Patient states she was told by her PCP that Humalog/Novolog didn't work for her and she ended up on Tresiba. Educated patient regarding the difference between short and long acting insulin. Patient willing to use Regular insulin. Provider updated. Patient states she is willing to use insulin at home, prefers insulin pens. Also educated patient regarding CGM. Patient verbalized understanding of teaching and voices no further questions at this time regarding diabetes management.

## 2020-11-02 NOTE — PROGRESS NOTES
Called to room due to pt's concern over her discovery of small white raised bumps along lower abdomen along both right and left sides. SKi along this are is slightly pink and warm.  She will mention to MDs in am.

## 2020-11-02 NOTE — PROGRESS NOTES
END OF SHIFT NOTE:    INTAKE/OUTPUT  11/01 0701 - 11/02 0700  In: 7391 [I.V.:3138]  Out: 400 [Urine:400]  Voiding: YES  Catheter: NO  Drain:              Flatus: Patient does have flatus present. Stool:  0 occurrences. Characteristics:  Stool Assessment  Stool Color: Brown  Stool Appearance: Soft  Stool Amount: Medium  Stool Source/Status: Rectum    Emesis: 0 occurrences. Characteristics:        VITAL SIGNS  Patient Vitals for the past 12 hrs:   Temp Pulse Resp BP SpO2   11/01/20 1918 99.1 °F (37.3 °C) 90 18 122/68 96 %       Pain Assessment  Pain Intensity 1: 3 (11/02/20 0330)  Pain Location 1: Ankle  Pain Intervention(s) 1: Medication (see MAR)  Patient Stated Pain Goal: 2    Ambulating  No  Patient refuses insulin and for staff to check vital signs. Patient stable at present time. Shift report given to oncoming nurse at the bedside.     Claudetta Starch

## 2020-11-02 NOTE — PROGRESS NOTES
END OF SHIFT NOTE:    INTAKE/OUTPUT  11/01 0701 - 11/02 0700  In: 2936 [I.V.:3138]  Out: 400 [Urine:400]  Voiding: YES  Catheter: NO  Drain:              Flatus: Patient does have flatus present. Stool:  0 occurrences. Characteristics:  Stool Assessment  Stool Color: Brown  Stool Appearance: Soft  Stool Amount: Medium  Stool Source/Status: Rectum    Emesis: 0 occurrences. Characteristics:        VITAL SIGNS  Patient Vitals for the past 12 hrs:   Temp Pulse Resp BP SpO2   11/02/20 1521 98.2 °F (36.8 °C) 82 18 118/75 97 %   11/02/20 0715 98.2 °F (36.8 °C) 86 18 110/62 97 %       Pain Assessment  Pain Intensity 1: 10 (11/02/20 1750)  Pain Location 1: Ankle  Pain Intervention(s) 1: Medication (see MAR)  Patient Stated Pain Goal: 3    Ambulating  No    Shift report given to oncoming nurse at the bedside.     Marsha Duncan

## 2020-11-02 NOTE — PROGRESS NOTES
Patient seen and examined this morning. I removed her dressing to examine her wound. The wound is healing well. There is no sign of drainage. Her erythema and swelling have gone down significantly since surgery. I replaced her sterile dressing. We discussed at this point that there is no anticipated need for additional incision and drainage surgery. Plans for long-term IV antibiotics per infectious disease are noted. She may weight-bear as tolerated on the right ankle. I will follow her clinical course with you as long as she is in the hospital.  I will anticipate seeing her approximately 2 weeks postoperatively in the office should she be discharged for suture removal and to examine her wound again.

## 2020-11-02 NOTE — PROGRESS NOTES
Problem: Mobility Impaired (Adult and Pediatric)  Goal: *Acute Goals and Plan of Care (Insert Text)  Description: LTG:  (1.)Ms. Salgado will move from supine to sit and sit to supine , scoot up and down, and roll side to side in flat bed with INDEPENDENT within 7 treatment day(s). (2.)Ms. Salgado will transfer from bed to chair and chair to bed with MODIFIED INDEPENDENCE using the least restrictive device within 7 treatment day(s). (3.)Ms. Salgado will ambulate with MODIFIED INDEPENDENCE for 25+ feet with the least restrictive device within 7 treatment day(s). 4.  Ms. Rosa Junior will demonstrate 50 degrees AROM R ankle within 7 treatment days. ________________________________________________________________________________________________      Outcome: Progressing Towards Goal     PHYSICAL THERAPY: Initial Assessment and Daily Note 11/2/2020  INPATIENT: PT Visit Days : 1  WBAT R LE  Payor: BLUE CROSS / [place]: [place] OF STATE / Product Type: PPO /       NAME/AGE/GENDER: Marek Marino is a 45 y.o. female   PRIMARY DIAGNOSIS: Septic joint ([address] [M00.9]  Right ankle pain [M25.571]  Right ankle swelling [M25.471] Septic joint ([address] Septic joint (HCC)  Procedure(s) (LRB):  EXTREMITY IRRIGATION AND DEBRIDEMENT/LEFT ANKLE (Left)  4 Days Post-Op  ICD-10: Treatment Diagnosis:    Other abnormalities of gait and mobility (R26.89)   Precaution/Allergies:  Patient has no known allergies. ASSESSMENT:     Ms. Rosa Junior presents supine in bed and agreeable for PT assessment although she endorsed 10/10 pain. She lives alone and is independent in home and community. When her ankle began hurting, she began using a RW. Patient's R ankle wrapped in ACE and she exhibits very little active movement  and grossly 2/5 strength. Patient transitioned to sit with SBA and additional time with [facility] elevated ~60 degrees. Initiated treatment in gait training, instructed patient in how to limit WBing using UE's.   Patient stood with CGA to RW, attempted pivot on L foot but was unable and sat down. Instructed patient in sit pivot and she then performed that with CGA. Patient appears extremely anxious with mobility, all mobility extremely slow and guarded. She became tearful at end of session. Provided a lot of encouragement and educated in benefits of increasing mobility and upright. Ms. Rosa Junior is functioning well below baseline and is therefore appropriate for skilled PT to maximize rehab potential.       This section established at most recent assessment   PROBLEM LIST (Impairments causing functional limitations):  Decreased Strength  Decreased ADL/Functional Activities  Decreased Transfer Abilities  Decreased Ambulation Ability/Technique  Decreased Balance  Increased Pain  Decreased Activity Tolerance   INTERVENTIONS PLANNED: (Benefits and precautions of physical therapy have been discussed with the patient.)  Balance Exercise  Bed Mobility  Gait Training  Therapeutic Activites  Therapeutic Exercise/Strengthening  Transfer Training  education     TREATMENT PLAN: Frequency/Duration: 5 times a week for duration of hospital stay  Rehabilitation Potential For Stated Goals: Good     REHAB RECOMMENDATIONS (at time of discharge pending progress):    Placement: It is my opinion, based on this patient's performance to date, that Ms. Salgado may benefit from intensive therapy at a [address] [place] [address] after discharge due to the functional deficits listed above that are likely to improve with skilled rehabilitation and concerns that he/she may be unsafe to be unsupervised at home due to decreased mobility .   Equipment:   None at this time              HISTORY:   History of Present Injury/Illness (Reason for Referral):  Per MD note, \"Patient is a 58-year-old -American female with history of morbid obesity, type 2 diabetes mellitus uncontrolled, hypertension who presented to the ER with 3 weeks history of constant right ankle pain and swelling which is progressively getting worse. Patient is currently a little somnolent since she got morphine in the ER for pain control. She is easily arousable and is answering questions appropriately. She reports symptoms began spontaneously with sudden onset of right ankle pain that was followed by ankle swelling. She has been to the emergency multiple times but she was told initially that it was a gout attack and then next time was told to be cellulitis and was discharged on antibiotics, colchicine, indomethacin. Over the last 2 to 3 days she has developed a blister medial aspect of right ankle. She denies having any fever but reports feeling warm every now and then with chills. Denies having any chest pain, palpitations, cough, shortness of breath, abdominal pain. Patient has been evaluated by Ortho in ER with possible plan for washout tomorrow following an MRI of right foot. Blood work showed CRP of 132 raising suspicion of septic joint. \"  Past Medical History/Comorbidities:   Ms. Helon Aschoff  has a past medical history of Diabetes ([address] and Hypertension. Ms. Helon Aschoff  has a past surgical history that includes hx  section. Social History/Living Environment:   Home Environment: Apartment  One/Two Story Residence: Other (Comment)  Living Alone: Yes  Support Systems: Friends \ neighbors, Family member(s)  Patient Expects to be Discharged to[de-identified] Apartment  Current DME Used/Available at Home: Walker, rolling  Prior Level of Function/Work/Activity:  She lives alone and is independent in home and community.        Number of Personal Factors/Comorbidities that affect the Plan of Care: 1-2: MODERATE COMPLEXITY   EXAMINATION:   Most Recent Physical Functioning:   Gross Assessment:  AROM: Generally decreased, functional(except R ankle nonfunctional )  Strength: Generally decreased, functional(except R ankle nonfunctional)               Posture:  Posture (WDL): Exceptions to WDL  Posture Assessment: Forward head, Rounded shoulders  Balance:  Sitting: Intact  Standing: Impaired  Standing - Static: Constant support  Standing - Dynamic : Constant support Bed Mobility:  Supine to Sit: Stand-by assistance; Additional time;Bed Modified  Scooting: Stand-by assistance; Additional time  Wheelchair Mobility:     Transfers:  Sit to Stand: Contact guard assistance  Stand to Sit: Contact guard assistance  Bed to Chair: Contact guard assistance  Gait:  Right Side Weight Bearing: As tolerated         Body Structures Involved:  Bones  Joints  Muscles Body Functions Affected:  Sensory/Pain  Neuromusculoskeletal  Movement Related  Skin Related  Metobolic/Endocrine Activities and Participation Affected: [address], Social and [address]   Number of elements that affect the Plan of Care: 4+: HIGH COMPLEXITY   CLINICAL PRESENTATION:   Presentation: Stable and uncomplicated: LOW COMPLEXITY   CLINICAL DECISION MAKIN [facility] Inpatient Short Form  How much difficulty does the patient currently have. .. Unable A Lot A Little None   1. Turning over in bed (including adjusting bedclothes, sheets and blankets)? [] 1   [] 2   [x] 3   [] 4   2. Sitting down on and standing up from a chair with arms ( e.g., wheelchair, bedside commode, etc.)   [] 1   [] 2   [x] 3   [] 4   3. Moving from lying on back to sitting on the side of the bed? [] 1   [] 2   [x] 3   [] 4   How much help from another person does the patient currently need. .. Total A Lot A Little None   4. Moving to and from a bed to a chair (including a wheelchair)? [] 1   [] 2   [x] 3   [] 4   5. Need to walk in hospital room? [] 1   [x] 2   [] 3   [] 4   6. Climbing 3-5 steps with a railing? [] 1   [x] 2   [] 3   [] 4   © , [facility] of [address] [place] [address] [address] [id number], under license to [employer].  All rights reserved      Score:  Initial: 16 Most Recent: X (Date: -- ) Interpretation of Tool:  Represents activities that are increasingly more difficult (i.e. Bed mobility, Transfers, Gait). Medical Necessity:     Patient is expected to demonstrate progress in   strength, range of motion, balance, coordination, and functional technique   to   increase independence with   and improve safety during all functional mobility  . Reason for Services/Other Comments:  Patient continues to require skilled intervention due to   medical complications and patient unable to attend/participate in therapy as expected  . Use of outcome tool(s) and clinical judgement create a POC that gives a: Clear prediction of patient's progress: LOW COMPLEXITY            TREATMENT:   (In addition to Assessment/Re-Assessment sessions the following treatments were rendered)   Pre-treatment Symptoms/Complaints:  none. Pain: Initial:   Pain Intensity 1: 10  Pain Location 1: Ankle  Pain Orientation 1: Right  Pain Intervention(s) 1: Nurse notified, Repositioned  Post Session:  10     Gait Training ( 23):  Gait training to improve and/or restore physical functioning as related to mobility, strength, and balance. Sit to stand   with   using a   and maximal   related to their ankle position and motion to promote proper body alignment and promote proper body posture. Instruction in performance of using UE's to self limit WBing R LE to correct  comfort level . Braces/Orthotics/Lines/Etc:   IV  O2 Device: Nasal cannula  Treatment/Session Assessment:    Response to Treatment:  calm, cooperative. Interdisciplinary Collaboration:   Physical Therapist  Registered Nurse    After treatment position/precautions:   Up in chair  Bed/Chair-wheels locked  Call light within reach  RN notified   Compliance with Program/Exercises: Compliant all of the time, Will assess as treatment progresses  Recommendations/Intent for next treatment session:   \"Next visit will focus on advancements to more challenging activities and reduction in assistance provided\".   Total Treatment Duration:  PT Patient Time In/Time Out  Time In: 1440  Time Out: Karthik 27, PT, DPT

## 2020-11-03 LAB
ALBUMIN SERPL-MCNC: 1.8 G/DL (ref 3.5–5)
ALBUMIN/GLOB SERPL: 0.3 {RATIO} (ref 1.2–3.5)
ALP SERPL-CCNC: 175 U/L (ref 50–136)
ALT SERPL-CCNC: 28 U/L (ref 12–65)
ANION GAP SERPL CALC-SCNC: 6 MMOL/L (ref 7–16)
AST SERPL-CCNC: 19 U/L (ref 15–37)
BILIRUB SERPL-MCNC: 0.3 MG/DL (ref 0.2–1.1)
BUN SERPL-MCNC: 5 MG/DL (ref 6–23)
CALCIUM SERPL-MCNC: 8.6 MG/DL (ref 8.3–10.4)
CHLORIDE SERPL-SCNC: 105 MMOL/L (ref 98–107)
CK SERPL-CCNC: 54 U/L (ref 21–215)
CO2 SERPL-SCNC: 29 MMOL/L (ref 21–32)
CREAT SERPL-MCNC: 0.45 MG/DL (ref 0.6–1)
CRP SERPL-MCNC: 11.5 MG/DL (ref 0–0.9)
ERYTHROCYTE [DISTWIDTH] IN BLOOD BY AUTOMATED COUNT: 13.7 % (ref 11.9–14.6)
ERYTHROCYTE [SEDIMENTATION RATE] IN BLOOD: 120 MM/HR (ref 0–20)
ERYTHROCYTE [SEDIMENTATION RATE] IN BLOOD: 84 MM/HR (ref 0–20)
GLOBULIN SER CALC-MCNC: 5.4 G/DL (ref 2.3–3.5)
GLUCOSE BLD STRIP.AUTO-MCNC: 136 MG/DL (ref 65–100)
GLUCOSE BLD STRIP.AUTO-MCNC: 140 MG/DL (ref 65–100)
GLUCOSE BLD STRIP.AUTO-MCNC: 155 MG/DL (ref 65–100)
GLUCOSE BLD STRIP.AUTO-MCNC: 158 MG/DL (ref 65–100)
GLUCOSE SERPL-MCNC: 117 MG/DL (ref 65–100)
HCT VFR BLD AUTO: 21 % (ref 35.8–46.3)
HCT VFR BLD AUTO: 24.4 % (ref 35.8–46.3)
HCT VFR BLD AUTO: 43.6 % (ref 35.8–46.3)
HEMOCCULT STL QL: NEGATIVE
HGB BLD-MCNC: 14 G/DL (ref 11.7–15.4)
HGB BLD-MCNC: 6.4 G/DL (ref 11.7–15.4)
HGB BLD-MCNC: 7.6 G/DL (ref 11.7–15.4)
HISTORY CHECKED?,CKHIST: NORMAL
MCH RBC QN AUTO: 24.7 PG (ref 26.1–32.9)
MCHC RBC AUTO-ENTMCNC: 30.5 G/DL (ref 31.4–35)
MCV RBC AUTO: 81.1 FL (ref 79.6–97.8)
NRBC # BLD: 0 K/UL (ref 0–0.2)
PLATELET # BLD AUTO: 396 K/UL (ref 150–450)
PMV BLD AUTO: 9.7 FL (ref 9.4–12.3)
POTASSIUM SERPL-SCNC: 3.5 MMOL/L (ref 3.5–5.1)
PROT SERPL-MCNC: 7.2 G/DL (ref 6.3–8.2)
RBC # BLD AUTO: 2.59 M/UL (ref 4.05–5.2)
SODIUM SERPL-SCNC: 140 MMOL/L (ref 138–145)
WBC # BLD AUTO: 7 K/UL (ref 4.3–11.1)

## 2020-11-03 PROCEDURE — 80053 COMPREHEN METABOLIC PANEL: CPT

## 2020-11-03 PROCEDURE — 74011250636 HC RX REV CODE- 250/636: Performed by: INTERNAL MEDICINE

## 2020-11-03 PROCEDURE — 86140 C-REACTIVE PROTEIN: CPT

## 2020-11-03 PROCEDURE — 74011636637 HC RX REV CODE- 636/637: Performed by: INTERNAL MEDICINE

## 2020-11-03 PROCEDURE — 74011250636 HC RX REV CODE- 250/636: Performed by: HOSPITALIST

## 2020-11-03 PROCEDURE — 2709999900 HC NON-CHARGEABLE SUPPLY

## 2020-11-03 PROCEDURE — 74011250637 HC RX REV CODE- 250/637: Performed by: INTERNAL MEDICINE

## 2020-11-03 PROCEDURE — 30233N1 TRANSFUSION OF NONAUTOLOGOUS RED BLOOD CELLS INTO PERIPHERAL VEIN, PERCUTANEOUS APPROACH: ICD-10-PCS | Performed by: FAMILY MEDICINE

## 2020-11-03 PROCEDURE — 86923 COMPATIBILITY TEST ELECTRIC: CPT

## 2020-11-03 PROCEDURE — 86900 BLOOD TYPING SEROLOGIC ABO: CPT

## 2020-11-03 PROCEDURE — 82272 OCCULT BLD FECES 1-3 TESTS: CPT

## 2020-11-03 PROCEDURE — 74011250637 HC RX REV CODE- 250/637: Performed by: FAMILY MEDICINE

## 2020-11-03 PROCEDURE — 85018 HEMOGLOBIN: CPT

## 2020-11-03 PROCEDURE — 74011250637 HC RX REV CODE- 250/637: Performed by: HOSPITALIST

## 2020-11-03 PROCEDURE — 82962 GLUCOSE BLOOD TEST: CPT

## 2020-11-03 PROCEDURE — 74011250636 HC RX REV CODE- 250/636: Performed by: NURSE PRACTITIONER

## 2020-11-03 PROCEDURE — 74011000250 HC RX REV CODE- 250: Performed by: NURSE PRACTITIONER

## 2020-11-03 PROCEDURE — 65270000029 HC RM PRIVATE

## 2020-11-03 PROCEDURE — 36430 TRANSFUSION BLD/BLD COMPNT: CPT

## 2020-11-03 PROCEDURE — 86580 TB INTRADERMAL TEST: CPT | Performed by: INTERNAL MEDICINE

## 2020-11-03 PROCEDURE — 82550 ASSAY OF CK (CPK): CPT

## 2020-11-03 PROCEDURE — P9016 RBC LEUKOCYTES REDUCED: HCPCS

## 2020-11-03 PROCEDURE — 85652 RBC SED RATE AUTOMATED: CPT

## 2020-11-03 PROCEDURE — 74011000302 HC RX REV CODE- 302: Performed by: INTERNAL MEDICINE

## 2020-11-03 PROCEDURE — 36415 COLL VENOUS BLD VENIPUNCTURE: CPT

## 2020-11-03 PROCEDURE — 36573 INSJ PICC RS&I 5 YR+: CPT | Performed by: INTERNAL MEDICINE

## 2020-11-03 PROCEDURE — 74011000250 HC RX REV CODE- 250: Performed by: INTERNAL MEDICINE

## 2020-11-03 PROCEDURE — 02HV33Z INSERTION OF INFUSION DEVICE INTO SUPERIOR VENA CAVA, PERCUTANEOUS APPROACH: ICD-10-PCS | Performed by: INTERNAL MEDICINE

## 2020-11-03 PROCEDURE — 85027 COMPLETE CBC AUTOMATED: CPT

## 2020-11-03 PROCEDURE — C1751 CATH, INF, PER/CENT/MIDLINE: HCPCS

## 2020-11-03 RX ORDER — PREDNISONE 20 MG/1
40 TABLET ORAL
Status: DISCONTINUED | OUTPATIENT
Start: 2020-11-04 | End: 2020-11-04

## 2020-11-03 RX ORDER — SODIUM CHLORIDE 9 MG/ML
250 INJECTION, SOLUTION INTRAVENOUS AS NEEDED
Status: DISCONTINUED | OUTPATIENT
Start: 2020-11-03 | End: 2020-11-09 | Stop reason: HOSPADM

## 2020-11-03 RX ORDER — SODIUM CHLORIDE 0.9 % (FLUSH) 0.9 %
20 SYRINGE (ML) INJECTION EVERY 8 HOURS
Status: DISCONTINUED | OUTPATIENT
Start: 2020-11-03 | End: 2020-11-09 | Stop reason: HOSPADM

## 2020-11-03 RX ORDER — POLYETHYLENE GLYCOL 3350 17 G/17G
17 POWDER, FOR SOLUTION ORAL DAILY
Status: DISCONTINUED | OUTPATIENT
Start: 2020-11-03 | End: 2020-11-09 | Stop reason: HOSPADM

## 2020-11-03 RX ADMIN — SODIUM CHLORIDE 950 MG: 9 INJECTION, SOLUTION INTRAMUSCULAR; INTRAVENOUS; SUBCUTANEOUS at 16:53

## 2020-11-03 RX ADMIN — OXYCODONE 10 MG: 5 TABLET ORAL at 14:46

## 2020-11-03 RX ADMIN — FAMOTIDINE 20 MG: 20 TABLET, FILM COATED ORAL at 17:08

## 2020-11-03 RX ADMIN — ACETAMINOPHEN 650 MG: 325 TABLET, FILM COATED ORAL at 08:15

## 2020-11-03 RX ADMIN — PIOGLITAZONE HYDROCHLORIDE 15 MG: 15 TABLET ORAL at 06:09

## 2020-11-03 RX ADMIN — POLYETHYLENE GLYCOL 3350 17 G: 17 POWDER, FOR SOLUTION ORAL at 12:39

## 2020-11-03 RX ADMIN — CIPROFLOXACIN 500 MG: 500 TABLET, FILM COATED ORAL at 21:59

## 2020-11-03 RX ADMIN — Medication 20 ML: at 21:59

## 2020-11-03 RX ADMIN — Medication 20 ML: at 13:43

## 2020-11-03 RX ADMIN — VANCOMYCIN HYDROCHLORIDE 1250 MG: 10 INJECTION, POWDER, LYOPHILIZED, FOR SOLUTION INTRAVENOUS at 09:55

## 2020-11-03 RX ADMIN — CIPROFLOXACIN 500 MG: 500 TABLET, FILM COATED ORAL at 08:06

## 2020-11-03 RX ADMIN — MORPHINE SULFATE 2 MG: 2 INJECTION, SOLUTION INTRAMUSCULAR; INTRAVENOUS at 17:06

## 2020-11-03 RX ADMIN — FAMOTIDINE 20 MG: 20 TABLET, FILM COATED ORAL at 08:07

## 2020-11-03 RX ADMIN — VANCOMYCIN HYDROCHLORIDE 1250 MG: 10 INJECTION, POWDER, LYOPHILIZED, FOR SOLUTION INTRAVENOUS at 02:39

## 2020-11-03 RX ADMIN — ALTEPLASE 1 MG: 2.2 INJECTION, POWDER, LYOPHILIZED, FOR SOLUTION INTRAVENOUS at 23:35

## 2020-11-03 RX ADMIN — INSULIN HUMAN 2 UNITS: 100 INJECTION, SOLUTION PARENTERAL at 17:08

## 2020-11-03 RX ADMIN — OXYCODONE 10 MG: 5 TABLET ORAL at 08:08

## 2020-11-03 RX ADMIN — OXYCODONE 10 MG: 5 TABLET ORAL at 21:59

## 2020-11-03 RX ADMIN — TUBERCULIN PURIFIED PROTEIN DERIVATIVE 5 UNITS: 5 INJECTION, SOLUTION INTRADERMAL at 09:57

## 2020-11-03 NOTE — PROGRESS NOTES
Problem: Diabetes Self-Management  Goal: *Disease process and treatment process  Description: Define diabetes and identify own type of diabetes; list 3 options for treating diabetes. Outcome: Progressing Towards Goal  Goal: *Incorporating nutritional management into lifestyle  Description: Describe effect of type, amount and timing of food on blood glucose; list 3 methods for planning meals. Outcome: Progressing Towards Goal  Goal: *Incorporating physical activity into lifestyle  Description: State effect of exercise on blood glucose levels. Outcome: Progressing Towards Goal  Goal: *Developing strategies to promote health/change behavior  Description: Define the ABC's of diabetes; identify appropriate screenings, schedule and personal plan for screenings. Outcome: Progressing Towards Goal  Goal: *Using medications safely  Description: State effect of diabetes medications on diabetes; name diabetes medication taking, action and side effects. Outcome: Progressing Towards Goal  Goal: *Monitoring blood glucose, interpreting and using results  Description: Identify recommended blood glucose targets  and personal targets. Outcome: Progressing Towards Goal  Goal: *Prevention, detection, treatment of acute complications  Description: List symptoms of hyper- and hypoglycemia; describe how to treat low blood sugar and actions for lowering  high blood glucose level. Outcome: Progressing Towards Goal  Goal: *Prevention, detection and treatment of chronic complications  Description: Define the natural course of diabetes and describe the relationship of blood glucose levels to long term complications of diabetes.   Outcome: Progressing Towards Goal  Goal: *Developing strategies to address psychosocial issues  Description: Describe feelings about living with diabetes; identify support needed and support network  Outcome: Progressing Towards Goal  Goal: *Insulin pump training  Outcome: Progressing Towards Goal  Goal: *Sick day guidelines  Outcome: Progressing Towards Goal  Goal: *Patient Specific Goal (EDIT GOAL, INSERT TEXT)  Outcome: Progressing Towards Goal     Problem: Patient Education: Go to Patient Education Activity  Goal: Patient/Family Education  Outcome: Progressing Towards Goal     Problem: Falls - Risk of  Goal: *Absence of Falls  Description: Document Barry Wu Fall Risk and appropriate interventions in the flowsheet. Outcome: Progressing Towards Goal  Note: Fall Risk Interventions:  Mobility Interventions: Communicate number of staff needed for ambulation/transfer, Patient to call before getting OOB         Medication Interventions: Teach patient to arise slowly, Patient to call before getting OOB    Elimination Interventions: Call light in reach, Patient to call for help with toileting needs              Problem: Patient Education: Go to Patient Education Activity  Goal: Patient/Family Education  Outcome: Progressing Towards Goal     Problem: Pressure Injury - Risk of  Goal: *Prevention of pressure injury  Description: Document Alistair Scale and appropriate interventions in the flowsheet.   Outcome: Progressing Towards Goal  Note: Pressure Injury Interventions:       Moisture Interventions: Absorbent underpads    Activity Interventions: Pressure redistribution bed/mattress(bed type), Increase time out of bed    Mobility Interventions: HOB 30 degrees or less, Pressure redistribution bed/mattress (bed type)    Nutrition Interventions: Offer support with meals,snacks and hydration, Document food/fluid/supplement intake                     Problem: Patient Education: Go to Patient Education Activity  Goal: Patient/Family Education  Outcome: Progressing Towards Goal     Problem: Patient Education: Go to Patient Education Activity  Goal: Patient/Family Education  Outcome: Progressing Towards Goal

## 2020-11-03 NOTE — PROGRESS NOTES
Patient will need to continue iv abx after she is discharged - picc has been placed. Sent referral to [employer] to determine cost for patient. Spoke with Jean-Pierre Jarrell - [phone] - she stated she will process referral and call me back with a cost.     Will follow-up.     3:10pm - Jean-Pierre Jarrell with [employer] updated me that patient's iv abx would be covered at 100% meaning no cost to patient. I notified ID of this information as requested.

## 2020-11-03 NOTE — REHAB NOTE
PT note:  Treatment deferred this pm per the RN request as the patient is receiving a unit of blood at this time. Will continue PT efforts.   Garrett Ken, PTA

## 2020-11-03 NOTE — PROGRESS NOTES
Patient seen and examined this am.  The ankle wound is healing well with no real drainage noted and decreasing swelling and erythema. She may continue PT wbat on her right leg. No plans for further surgery at this point. ID plans to watch rash and find best outpatient antibiotic regimen noted.

## 2020-11-03 NOTE — PROGRESS NOTES
PICC Placement Note    PRE-PROCEDURE VERIFICATION  Correct Procedure: yes. Time out completed with assistant Cydney Herbert and all persons present in agreement with time out. Correct Site:  yes  Temperature: Temp: 97.5 °F (36.4 °C), Temperature Source: Temp Source: Oral  Recent Labs     11/03/20  0409   BUN 5*   CREA 0.45*      WBC 7.0     Allergies: Patient has no known allergies. Education materials for [facility] given to patient or family. PROCEDURE DETAIL  A double lumen PICC line was started for antibiotic therapy. The following documentation is in addition to the PICC properties in the lines/airways flowsheet :  Lot #: [id number]  xylocaine used: yes  Mid-Arm Circumference: 35 (cm)  Internal Catheter Length: 45 (cm)  Internal Catheter Total Length: 45 (cm)  Vein Selection for PICC:left basilic  Central Line Bundle followed yes  Complication Related to Insertion: none  Both the insertion guidewire and ECG guidewire were removed intact all ports have positive blood return and were flush well with normal saline. The location of the tip of the PICC is verified using ECG technology. The tip is in the SVC per ECG reading. See image below.      Line is okay to use: yes    Bessy Escobar RN VAT

## 2020-11-03 NOTE — PROGRESS NOTES
Name: Vivian Bear MRN: [id number]  : 1980  Age:40 y.o.  female  Admit Date:  10/28/2020 LOS: 6      Hospitalist Progress Note     Reason for Admission:  Septic joint ([address] [M00.9]  Right ankle pain [M25.571]  Right ankle swelling [M25.471]    Hospital Course:  Please refer to the admission H&P for details of presentation. In summary, Vivian Bear is a 45 y.o. female with medical history significant for CAD, type 2 diabetes mellitus uncontrolled, hypertension who was admitted due to progressive worsening of right ankle pain and swelling for 3 weeks with concern for septic joint. ED, patient was afebrile and work-up shows no leukocytosis but elevated CRP. Was evaluated by orthopedics. Patient underwent ankle joint washout in the OR on 10/29/2020. She has been started on IV antibiotics for suspected septic joint. Patient developed pinpoint vesicular rashes on her abdomen on  which are non pruritic and non tender. Infection disease is on boardand planning for IV abx. Subjective/24 hr Events (20) : Patient is seen and examined at bedside. No acute events reported overnight by nursing staff. Improved pain and swelling of her foot. Continues to have the rash on the abdomen which are non tender, non erythematous, non pruritic. States that it doesn't bother her. Patient denies fever, chills, chest pains, shortness of breath, n/v, abdominal pain. ROS: 10 point review of systems is otherwise negative with the exception of the elements mentioned above.     Objective:    Patient Vitals for the past 24 hrs:   Temp Pulse Resp BP SpO2   20 1440 98.4 °F (36.9 °C) 82 17 139/75 96 %   20 1344 98.3 °F (36.8 °C) 88 18 (!) 155/75 97 %   20 1244 98.2 °F (36.8 °C) 85 18 (!) 154/82 96 %   20 1226 98.4 °F (36.9 °C) 89 18 (!) 164/84 96 %   20 1101 97.9 °F (36.6 °C) 87 17 (!) 163/89 95 %   20 0708 97.5 °F (36.4 °C) 92 20 121/77 96 %   20 1922 98.7 °F (37.1 °C) 85 18 135/67 95 %   11/02/20 1521 98.2 °F (36.8 °C) 82 18 118/75 97 %     Oxygen Therapy  O2 Sat (%): 96 % (11/03/20 1440)  Pulse via Oximetry: 84 beats per minute (10/29/20 1840)  O2 Device: Nasal cannula (11/02/20 1930)  O2 Flow Rate (L/min): 2 l/min (11/02/20 1930)    Estimated body mass index is 38.69 kg/m² as calculated from the following:    Height as of this encounter: 5' 9\" (1.753 m). Weight as of this encounter: 118.8 kg (262 lb). Intake/Output Summary (Last 24 hours) at 11/3/2020 1452  Last data filed at 11/3/2020 0636  Gross per 24 hour   Intake 1237 ml   Output    Net 1237 ml       *Note that automatically entered I/Os may not be accurate; dependent on patient compliance with collection and accurate  by techs. Physical Exam:   General:     alert, awake, no acute distress. Well nourished. Obese  Head:   normocephalic, atraumatic  Eyes, Ears, nose: PERRL, EOMI. Normal conjunctiva  Neck:    supple, non-tender. Trachea midline. Lungs:   CTAB, no wheezing, rhonchi, rales  Cardiac:   RRR, Normal S1 and S2. Abdomen:   Soft, non distended, nontender, +BS, vesicular rash on mid abdomen  Extremities:   Warm, dry. No edema. Rt foot wrapped in bandage. Unable to  right hand due to pain. Rt forearm slightly swollen. Prior needle stick marks in rt antecubital fossa  Skin:   No rashes, no jaundice  Neuro:  AAOx3.  No gross focal neurological deficit  Psychiatric:  No anxiety, calm, cooperative    Data Review:  I have reviewed all labs, meds, and studies from the last 24 hours:      Labs:    Recent Results (from the past 24 hour(s))   GLUCOSE, POC    Collection Time: 11/02/20  5:21 PM   Result Value Ref Range    Glucose (POC) 136 (H) 65 - 100 mg/dL   GLUCOSE, POC    Collection Time: 11/02/20  9:06 PM   Result Value Ref Range    Glucose (POC) 152 (H) 65 - 100 mg/dL   CBC W/O DIFF    Collection Time: 11/03/20  4:09 AM   Result Value Ref Range    WBC 7.0 4.3 - 11.1 K/uL    RBC 2.59 (L) 4.05 - 5.2 M/uL    HGB 6.4 (LL) 11.7 - 15.4 g/dL    HCT 21.0 (L) 35.8 - 46.3 %    MCV 81.1 79.6 - 97.8 FL    MCH 24.7 (L) 26.1 - 32.9 PG    MCHC 30.5 (L) 31.4 - 35.0 g/dL    RDW 13.7 11.9 - 14.6 %    PLATELET 301 622 - 689 K/uL    MPV 9.7 9.4 - 12.3 FL    ABSOLUTE NRBC 0.00 0.0 - 0.2 K/uL   METABOLIC PANEL, COMPREHENSIVE    Collection Time: 11/03/20  4:09 AM   Result Value Ref Range    Sodium 140 138 - 145 mmol/L    Potassium 3.5 3.5 - 5.1 mmol/L    Chloride 105 98 - 107 mmol/L    CO2 29 21 - 32 mmol/L    Anion gap 6 (L) 7 - 16 mmol/L    Glucose 117 (H) 65 - 100 mg/dL    BUN 5 (L) 6 - 23 MG/DL    Creatinine 0.45 (L) 0.6 - 1.0 MG/DL    GFR est AA >60 >60 ml/min/1.73m2    GFR est non-AA >60 >60 ml/min/1.73m2    Calcium 8.6 8.3 - 10.4 MG/DL    Bilirubin, total 0.3 0.2 - 1.1 MG/DL    ALT (SGPT) 28 12 - 65 U/L    AST (SGOT) 19 15 - 37 U/L    Alk. phosphatase 175 (H) 50 - 136 U/L    Protein, total 7.2 6.3 - 8.2 g/dL    Albumin 1.8 (L) 3.5 - 5.0 g/dL    Globulin 5.4 (H) 2.3 - 3.5 g/dL    A-G Ratio 0.3 (L) 1.2 - 3.5     C REACTIVE PROTEIN, QT    Collection Time: 11/03/20  4:09 AM   Result Value Ref Range    C-Reactive protein 11.5 (H) 0.0 - 0.9 mg/dL   TYPE & SCREEN    Collection Time: 11/03/20  6:32 AM   Result Value Ref Range    Crossmatch Expiration 11/06/2020,2359     ABO/Rh(D) AB POSITIVE     Antibody screen NEG     Unit number [id number]     Blood component type RC LR     Unit division 00     Status of unit ISSUED     Crossmatch result Compatible    GLUCOSE, POC    Collection Time: 11/03/20  6:58 AM   Result Value Ref Range    Glucose (POC) 136 (H) 65 - 100 mg/dL   RBC, ALLOCATE    Collection Time: 11/03/20  8:15 AM   Result Value Ref Range    HISTORY CHECKED?  Historical check performed    GLUCOSE, POC    Collection Time: 11/03/20 11:02 AM   Result Value Ref Range    Glucose (POC) 155 (H) 65 - 100 mg/dL         All Micro Results     Procedure Component Value Units Date/Time    CULTURE, BLOOD [[id number]] Collected:  10/28/20 1125    Order Status:  Completed Specimen:  Blood Updated:  11/03/20 1257     Special Requests: --        RIGHT  Antecubital       Culture result: NO GROWTH 4 DAYS       CULTURE, BLOOD [[id number]] Collected:  10/28/20 1715    Order Status:  Completed Specimen:  Blood Updated:  11/02/20 0913     Special Requests: --        RIGHT  Antecubital       Culture result: NO GROWTH 5 DAYS       CULTURE, Kreg Forward STAIN [[id number]] Collected:  10/29/20 1615    Order Status:  Completed Specimen:  Ankle Updated:  11/01/20 0811     Special Requests: NO SPECIAL REQUESTS        GRAM STAIN 0 TO 1 WBCS SEEN PER OIF      NO DEFINITE ORGANISM SEEN        Culture result: NO GROWTH 2 DAYS       CULTURE, ANAEROBIC [[id number]] Collected:  10/29/20 1715    Order Status:  Completed Specimen:  Ankle Updated:  10/30/20 1109     Special Requests: NO SPECIAL REQUESTS        Culture result:       NO GROWTH AFTER SHORT PERIOD OF INCUBATION. FURTHER RESULTS TO FOLLOW AFTER OVERNIGHT INCUBATION.                 Current Meds:  Current Facility-Administered Medications   Medication Dose Route Frequency    0.9% sodium chloride infusion 250 mL  250 mL IntraVENous PRN    polyethylene glycol (MIRALAX) packet 17 g  17 g Oral DAILY    0.9% sodium chloride infusion 250 mL  250 mL IntraVENous PRN    tuberculin injection 5 Units  5 Units IntraDERMal ONCE    central line flush (saline) syringe 20 mL  20 mL InterCATHeter Q8H    [START ON 11/4/2020] predniSONE (DELTASONE) tablet 40 mg  40 mg Oral DAILY WITH BREAKFAST    DAPTOmycin (CUBICIN) 950 mg in 0.9% sodium chloride 19 mL IV Syringe  8 mg/kg IntraVENous Q24H    insulin regular (NOVOLIN R, HUMULIN R) injection   SubCUTAneous AC&HS    famotidine (PEPCID) tablet 20 mg  20 mg Oral BID    ciprofloxacin HCl (CIPRO) tablet 500 mg  500 mg Oral Q12H    morphine injection 2 mg  2 mg IntraVENous Q4H PRN    naloxone (NARCAN) injection 0.1 mg  0.1 mg IntraVENous EVERY 2 MINUTES AS NEEDED    flumazeniL (ROMAZICON) 0.1 mg/mL injection 0.2 mg  0.2 mg IntraVENous Multiple    diphenhydrAMINE (BENADRYL) injection 12.5 mg  12.5 mg IntraVENous Q15MIN PRN    oxyCODONE IR (ROXICODONE) tablet 10 mg  10 mg Oral Q4H PRN    dapagliflozin (FARXIGA) tablet 10 mg (Patient Supplied)  10 mg Oral DAILY    pioglitazone (ACTOS) tablet 15 mg  15 mg Oral ACB    sodium chloride (NS) flush 5-40 mL  5-40 mL IntraVENous Q8H    sodium chloride (NS) flush 5-40 mL  5-40 mL IntraVENous PRN    acetaminophen (TYLENOL) tablet 650 mg  650 mg Oral Q6H PRN    Or    acetaminophen (TYLENOL) suppository 650 mg  650 mg Rectal Q6H PRN    promethazine (PHENERGAN) tablet 12.5 mg  12.5 mg Oral Q6H PRN    Or    ondansetron (ZOFRAN) injection 4 mg  4 mg IntraVENous Q4H PRN    enoxaparin (LOVENOX) injection 40 mg  40 mg SubCUTAneous DAILY    potassium chloride 10 mEq in 100 ml IVPB  10 mEq IntraVENous PRN    magnesium sulfate 2 g/50 ml IVPB (premix or compounded)  2 g IntraVENous PRN    oxyCODONE IR (ROXICODONE) tablet 5 mg  5 mg Oral Q4H PRN    naloxone (NARCAN) injection 0.4 mg  0.4 mg IntraVENous PRN    nystatin (MYCOSTATIN) 100,000 unit/gram powder   Topical BID         Other Studies:  Xr Chest Pa Lat    Result Date: 10/12/2020  History: cp Two views chest Findings: The lungs are well expanded and clear. The cardiac silhouette, and mediastinal contour, and osseous structures are normal.     Impression: Unremarkable two-view chest.     Xr Ankle Rt Min 3 V    Result Date: 10/12/2020  History: Medial right ankle pain and swelling, 2 days duration 3 views right ankle FINDINGS: No acute fracture, dislocation, or additional acute bony abnormality. IMPRESSION: No acute findings.     Duplex Lower Ext Venous Right    Result Date: 10/28/2020  Right lower extremity venous ultrasound INDICATION:  Pain and swelling, chronic, moderate COMPARISON: Radiography of the right ankle 10/12/2020 and ultrasound of the leg 2/23/2020 Technique: Grayscale, color Doppler, and spectral analysis were performed on the deep veins. FINDINGS: There is no evidence of Baker's cyst. Note of a right inguinal 2.5 x 1.1 x 1.7 cm lymph node with normal morphology and no hypervascularity. This is of unclear but doubtful significance, unless clinically suspicious. There is normal flow in the common femoral, superficial femoral, greater saphenous, femoral and popliteal veins. Normal compression and augmentation demonstrated. The proximal calf veins are also patent as is the partially visualized contralateral common femoral vein. IMPRESSION: No evidence of deep venous thrombosis in the right lower extremity     Assessment:    Active Hospital Problems    Diagnosis Date Noted    Vesicular rash 11/02/2020    Pain of right hand 10/31/2020    Left axillary hidradenitis 10/30/2020    Right ankle swelling 10/28/2020    Right ankle pain 10/28/2020    Septic joint ([facility] [address] 10/28/2020    Positive for microalbuminuria 02/12/2018    DM2 (diabetes mellitus, type 2) ([facility] [address] 02/24/2014     Newly diagnosed 2/22/14      HTN (hypertension) 02/24/2014    Morbid obesity ([facility] [address] 02/24/2014       Plan:    Rt Ankle pain/swelling concerning for septic Joint  No DVT and no acute findings with XR. Concerning for septic given warmth/TTP and slight erythema palpation.   - ID following : dapto+ CTX  - follow up BCx and joint aspirate culture  - Ortho recs appreciated: no additional OR planned. Outpatient follow up in 2 weeks. Rt hand/arm swelling and pain  Rt superficial vein thrombosis (cephalic vein)  Doppler of UE showed small short segment thrombus in the cephalic vein at the antecubital fossa, a  superficial vein and Mild interstitial edema at the area of swelling of the right wrist without drainable fluid collection. Unable to  due to pain.  No numbness/tingling.  - warm compress  - no indication for [facility] given superficial nature of thrombosis    Vesicular rash on abdomen  ?drug reaction  - monitor.  - abx changed per ID  - prednisone 40mg x 7 days  - dermatology consult if not improving by tomorrow     Normocytic Anemia  Hb of 6.4 today. Possible post-op anemia vs iron deficiency  - iron, tibc, ferritin, stool occult  - transfuse 1 unit of prbc today     T2DM, uncontrolled  A1c 11.7  - hold home metformin  - consult diabetic education  - continue with home Actos and Farxiga  - ISS, diabetic diet    L axilla hidradenitis  - will need out patient surgical follow up for resection    Hypoalbuminemia: consult nutrition    Morbid Obesity: Body mass index is 38.69 kg/m². Diet:  DIET DIABETIC CONSISTENT CARB  DIET NUTRITIONAL SUPPLEMENTS  DVT PPx: lovenox  Code: Full Code  Dispo:  PT/OT Eval  Estimated Discharge: TBD based on clinical course    Labs/Imaging Reviewed. Patient is HIGH risk due to current condition and comorbid conditions as well as requiring frequent monitoring and high risk of decline. Plan discussed with staff, patient/family and are in agreement.       Signed By: Marilynn Lauren MD     November 3, 2020

## 2020-11-03 NOTE — PROGRESS NOTES
Infectious Disease Progress Note    Impression:   · Right ankle infection/septic joint. S/p (10/29) I&D, gross purulence encountered intra-op: cx negative  · She had had ankle pain for at least 3 weeks, PTA and was on clindamycin as outpatient and so all cultures are negative from blood and ankle aspiration. No cell count or crystal analysis performed on synovial fluid. · Type 2 DM: poorly controlled, Hgb A1c 10/28 11.7  · Hidradenitis suppurative right axilla with chronic inflammation and drainage  · Pustular rash on abdomen, spreading to breasts. Most likely this is secondary to a new medication, and probably an antibiotic. · Elevated ESR/CRP    Plan:   · Continue Vancomycin (VT 16.6, Cr 0.45)-currently on a Q8h dose ? change to Daptomycin   · Continue Cipro 500mg PO Q12hr  · Check APRs and trend   · Monitor rash, consult dermatology  · 4 weeks of treatment planned EOT 20; PICC has been ordered   · ID follow up 20 @ 8:30am  · OPAT orders not yet sent    Anti-infectives:   1. Vancomycin 10/28-  2. Cipro -  3. CTX 10/29-  4. Cefepime x 1 10/28    Subjective:   Denies itching, rash appears to be spreading. No fever, chills, nausea or diarrhea. PICC placed this am.    No Known Allergies     Review of Systems:  A comprehensive review of systems was negative except for that written in the History of Present Illness. Objective:   Blood pressure 135/67, pulse 85, temperature 98.7 °F (37.1 °C), resp. rate 18, height 5' 9\" (1.753 m), weight 118.8 kg (262 lb), last menstrual period 10/29/2020, SpO2 95 %.   Temp (24hrs), Av.5 °F (36.9 °C), Min:98.2 °F (36.8 °C), Max:98.7 °F (37.1 °C)     Patient examined 11/3/2020, exam remains unchanged except as noted below:    General:  Alert, cooperative, no acute distress, appears stated age   Eyes:  Anicteric, no drainage, not injected, EOMI   Throat: Mucus membranes moist    Lungs:   Clear throughout lung fields without increased work of breathing or audible wheezes   Heart:  Regular rate and rhythm, without audible murmur, rub, or gallop   Abdomen:   Soft, non-tender, no guarding, no distention, bowel sounds active   Extremities: René LE edema, R ankle dressing C/D/I, neurovascular intact   Pulses: 2+ and symmetric   Skin: Pustular rash noted to abdomen, and anterior chest/breasts, no erythema/drainage     Lines/Devices: LUE PICC         Data Review:   Recent Results (from the past 24 hour(s))   VANCOMYCIN, TROUGH    Collection Time: 11/02/20  9:59 AM   Result Value Ref Range    Vancomycin,trough 16.6 5 - 20 ug/mL   HEMOGLOBIN    Collection Time: 11/02/20  9:59 AM   Result Value Ref Range    HGB 7.1 (L) 11.7 - 15.4 g/dL   GLUCOSE, POC    Collection Time: 11/02/20 11:55 AM   Result Value Ref Range    Glucose (POC) 138 (H) 65 - 100 mg/dL   GLUCOSE, POC    Collection Time: 11/02/20  5:21 PM   Result Value Ref Range    Glucose (POC) 136 (H) 65 - 100 mg/dL   GLUCOSE, POC    Collection Time: 11/02/20  9:06 PM   Result Value Ref Range    Glucose (POC) 152 (H) 65 - 100 mg/dL   CBC W/O DIFF    Collection Time: 11/03/20  4:09 AM   Result Value Ref Range    WBC 7.0 4.3 - 11.1 K/uL    RBC 2.59 (L) 4.05 - 5.2 M/uL    HGB 6.4 (LL) 11.7 - 15.4 g/dL    HCT 21.0 (L) 35.8 - 46.3 %    MCV 81.1 79.6 - 97.8 FL    MCH 24.7 (L) 26.1 - 32.9 PG    MCHC 30.5 (L) 31.4 - 35.0 g/dL    RDW 13.7 11.9 - 14.6 %    PLATELET 570 002 - 155 K/uL    MPV 9.7 9.4 - 12.3 FL    ABSOLUTE NRBC 0.00 0.0 - 0.2 K/uL   METABOLIC PANEL, COMPREHENSIVE    Collection Time: 11/03/20  4:09 AM   Result Value Ref Range    Sodium 140 138 - 145 mmol/L    Potassium 3.5 3.5 - 5.1 mmol/L    Chloride 105 98 - 107 mmol/L    CO2 29 21 - 32 mmol/L    Anion gap 6 (L) 7 - 16 mmol/L    Glucose 117 (H) 65 - 100 mg/dL    BUN 5 (L) 6 - 23 MG/DL    Creatinine 0.45 (L) 0.6 - 1.0 MG/DL    GFR est AA >60 >60 ml/min/1.73m2    GFR est non-AA >60 >60 ml/min/1.73m2    Calcium 8.6 8.3 - 10.4 MG/DL    Bilirubin, total 0.3 0.2 - 1.1 MG/DL ALT (SGPT) 28 12 - 65 U/L    AST (SGOT) 19 15 - 37 U/L    Alk. phosphatase 175 (H) 50 - 136 U/L    Protein, total 7.2 6.3 - 8.2 g/dL    Albumin 1.8 (L) 3.5 - 5.0 g/dL    Globulin 5.4 (H) 2.3 - 3.5 g/dL    A-G Ratio 0.3 (L) 1.2 - 3.5     GLUCOSE, POC    Collection Time: 11/03/20  6:58 AM   Result Value Ref Range    Glucose (POC) 136 (H) 65 - 100 mg/dL        Microbiology:    All Micro Results     Procedure Component Value Units Date/Time    CULTURE, BLOOD [[id number]] Collected:  10/28/20 1715    Order Status:  Completed Specimen:  Blood Updated:  11/02/20 0913     Special Requests: --        RIGHT  Antecubital       Culture result: NO GROWTH 5 DAYS       CULTURE, BLOOD [[id number]] Collected:  10/28/20 1125    Order Status:  Completed Specimen:  Blood Updated:  11/02/20 0913     Special Requests: --        RIGHT  Antecubital       Culture result: NO GROWTH 3 DAYS       CULTURE, Tsosie Beat STAIN [[id number]] Collected:  10/29/20 1615    Order Status:  Completed Specimen:  Ankle Updated:  11/01/20 0811     Special Requests: NO SPECIAL REQUESTS        GRAM STAIN 0 TO 1 WBCS SEEN PER OIF      NO DEFINITE ORGANISM SEEN        Culture result: NO GROWTH 2 DAYS       CULTURE, ANAEROBIC [[id number]] Collected:  10/29/20 1715    Order Status:  Completed Specimen:  Ankle Updated:  10/30/20 1109     Special Requests: NO SPECIAL REQUESTS        Culture result:       NO GROWTH AFTER SHORT PERIOD OF INCUBATION. FURTHER RESULTS TO FOLLOW AFTER OVERNIGHT INCUBATION.                 Studies/Imaging:    Reviewed        Signed By: Ej Tian NP     November 3, 2020

## 2020-11-03 NOTE — DIABETES MGMT
Patient admitted with septic joint. Blood glucose ranged 136-157 yesterday with patient receiving Actos 15mg. Blood glucose this morning was 136. Reviewed these numbers and their significance with patient. Questioned if patient is concerned about possible hypoglycemia as per [facility] patient refused insulin last night. Patient states she didn't refuse insulin. Educated patient regular sliding scale insulin and that it is meant to help correct glucose level not cause hypoglycemia, patient verbalized understanding and states she will be comfortable taking insulin today if glucose >150mg/dL. Patient willing to take insulin at discharge if needed as A1c was 11.7%, patient prefers insulin pens. Per chart review PT has recommend STR. Patient voices no further questions today regarding diabetes management.

## 2020-11-04 LAB
ABO + RH BLD: NORMAL
ALBUMIN SERPL-MCNC: 2 G/DL (ref 3.5–5)
ALBUMIN/GLOB SERPL: 0.3 {RATIO} (ref 1.2–3.5)
ALP SERPL-CCNC: 191 U/L (ref 50–136)
ALT SERPL-CCNC: 31 U/L (ref 12–65)
ANION GAP SERPL CALC-SCNC: 5 MMOL/L (ref 7–16)
AST SERPL-CCNC: 21 U/L (ref 15–37)
BACTERIA SPEC CULT: NORMAL
BILIRUB SERPL-MCNC: 0.4 MG/DL (ref 0.2–1.1)
BLD PROD TYP BPU: NORMAL
BLOOD GROUP ANTIBODIES SERPL: NORMAL
BPU ID: NORMAL
BUN SERPL-MCNC: 7 MG/DL (ref 6–23)
CALCIUM SERPL-MCNC: 9.2 MG/DL (ref 8.3–10.4)
CHLORIDE SERPL-SCNC: 103 MMOL/L (ref 98–107)
CO2 SERPL-SCNC: 29 MMOL/L (ref 21–32)
CREAT SERPL-MCNC: 0.51 MG/DL (ref 0.6–1)
CROSSMATCH RESULT,%XM: NORMAL
ERYTHROCYTE [DISTWIDTH] IN BLOOD BY AUTOMATED COUNT: 14 % (ref 11.9–14.6)
GLOBULIN SER CALC-MCNC: 6.1 G/DL (ref 2.3–3.5)
GLUCOSE BLD STRIP.AUTO-MCNC: 140 MG/DL (ref 65–100)
GLUCOSE BLD STRIP.AUTO-MCNC: 167 MG/DL (ref 65–100)
GLUCOSE BLD STRIP.AUTO-MCNC: 207 MG/DL (ref 65–100)
GLUCOSE BLD STRIP.AUTO-MCNC: 210 MG/DL (ref 65–100)
GLUCOSE SERPL-MCNC: 149 MG/DL (ref 65–100)
HCT VFR BLD AUTO: 26 % (ref 35.8–46.3)
HGB BLD-MCNC: 8.2 G/DL (ref 11.7–15.4)
MCH RBC QN AUTO: 25.3 PG (ref 26.1–32.9)
MCHC RBC AUTO-ENTMCNC: 31.5 G/DL (ref 31.4–35)
MCV RBC AUTO: 80.2 FL (ref 79.6–97.8)
MM INDURATION POC: 0 MM (ref 0–5)
NRBC # BLD: 0.02 K/UL (ref 0–0.2)
PLATELET # BLD AUTO: 389 K/UL (ref 150–450)
PMV BLD AUTO: 9.5 FL (ref 9.4–12.3)
POTASSIUM SERPL-SCNC: 3.8 MMOL/L (ref 3.5–5.1)
PPD POC: NEGATIVE NEGATIVE
PROT SERPL-MCNC: 8.1 G/DL (ref 6.3–8.2)
RBC # BLD AUTO: 3.24 M/UL (ref 4.05–5.2)
SERVICE CMNT-IMP: NORMAL
SODIUM SERPL-SCNC: 137 MMOL/L (ref 138–145)
SPECIMEN EXP DATE BLD: NORMAL
STATUS OF UNIT,%ST: NORMAL
UNIT DIVISION, %UDIV: 0
WBC # BLD AUTO: 9.2 K/UL (ref 4.3–11.1)

## 2020-11-04 PROCEDURE — 74011636637 HC RX REV CODE- 636/637: Performed by: NURSE PRACTITIONER

## 2020-11-04 PROCEDURE — 74011636637 HC RX REV CODE- 636/637: Performed by: INTERNAL MEDICINE

## 2020-11-04 PROCEDURE — 2709999900 HC NON-CHARGEABLE SUPPLY

## 2020-11-04 PROCEDURE — 97166 OT EVAL MOD COMPLEX 45 MIN: CPT

## 2020-11-04 PROCEDURE — 80053 COMPREHEN METABOLIC PANEL: CPT

## 2020-11-04 PROCEDURE — 74011250637 HC RX REV CODE- 250/637: Performed by: HOSPITALIST

## 2020-11-04 PROCEDURE — 74011250637 HC RX REV CODE- 250/637: Performed by: FAMILY MEDICINE

## 2020-11-04 PROCEDURE — 74011250636 HC RX REV CODE- 250/636: Performed by: INTERNAL MEDICINE

## 2020-11-04 PROCEDURE — 97110 THERAPEUTIC EXERCISES: CPT

## 2020-11-04 PROCEDURE — 74011250636 HC RX REV CODE- 250/636: Performed by: HOSPITALIST

## 2020-11-04 PROCEDURE — 74011250637 HC RX REV CODE- 250/637: Performed by: INTERNAL MEDICINE

## 2020-11-04 PROCEDURE — 74011000250 HC RX REV CODE- 250: Performed by: INTERNAL MEDICINE

## 2020-11-04 PROCEDURE — 82962 GLUCOSE BLOOD TEST: CPT

## 2020-11-04 PROCEDURE — 65270000029 HC RM PRIVATE

## 2020-11-04 PROCEDURE — 85027 COMPLETE CBC AUTOMATED: CPT

## 2020-11-04 PROCEDURE — 97530 THERAPEUTIC ACTIVITIES: CPT

## 2020-11-04 RX ORDER — PREGABALIN 50 MG/1
50 CAPSULE ORAL 3 TIMES DAILY
Status: DISCONTINUED | OUTPATIENT
Start: 2020-11-04 | End: 2020-11-07

## 2020-11-04 RX ADMIN — Medication 20 ML: at 05:59

## 2020-11-04 RX ADMIN — FAMOTIDINE 20 MG: 20 TABLET, FILM COATED ORAL at 08:30

## 2020-11-04 RX ADMIN — INSULIN HUMAN 2 UNITS: 100 INJECTION, SOLUTION PARENTERAL at 12:22

## 2020-11-04 RX ADMIN — Medication 10 ML: at 22:29

## 2020-11-04 RX ADMIN — ENOXAPARIN SODIUM 40 MG: 40 INJECTION SUBCUTANEOUS at 08:30

## 2020-11-04 RX ADMIN — Medication 20 ML: at 22:28

## 2020-11-04 RX ADMIN — ACETAMINOPHEN 650 MG: 325 TABLET, FILM COATED ORAL at 08:39

## 2020-11-04 RX ADMIN — FAMOTIDINE 20 MG: 20 TABLET, FILM COATED ORAL at 17:26

## 2020-11-04 RX ADMIN — OXYCODONE 10 MG: 5 TABLET ORAL at 22:28

## 2020-11-04 RX ADMIN — ALTEPLASE 1 MG: 2.2 INJECTION, POWDER, LYOPHILIZED, FOR SOLUTION INTRAVENOUS at 04:39

## 2020-11-04 RX ADMIN — PIOGLITAZONE HYDROCHLORIDE 15 MG: 15 TABLET ORAL at 05:59

## 2020-11-04 RX ADMIN — SODIUM CHLORIDE 700 MG: 9 INJECTION, SOLUTION INTRAMUSCULAR; INTRAVENOUS; SUBCUTANEOUS at 18:11

## 2020-11-04 RX ADMIN — PREDNISONE 40 MG: 20 TABLET ORAL at 08:32

## 2020-11-04 RX ADMIN — MORPHINE SULFATE 2 MG: 2 INJECTION, SOLUTION INTRAMUSCULAR; INTRAVENOUS at 04:51

## 2020-11-04 RX ADMIN — CIPROFLOXACIN 500 MG: 500 TABLET, FILM COATED ORAL at 22:12

## 2020-11-04 RX ADMIN — INSULIN HUMAN 4 UNITS: 100 INJECTION, SOLUTION PARENTERAL at 17:29

## 2020-11-04 RX ADMIN — PREGABALIN 50 MG: 50 CAPSULE ORAL at 22:12

## 2020-11-04 RX ADMIN — INSULIN HUMAN 4 UNITS: 100 INJECTION, SOLUTION PARENTERAL at 22:12

## 2020-11-04 RX ADMIN — Medication 20 ML: at 06:02

## 2020-11-04 RX ADMIN — OXYCODONE 10 MG: 5 TABLET ORAL at 08:39

## 2020-11-04 RX ADMIN — PREGABALIN 50 MG: 50 CAPSULE ORAL at 11:41

## 2020-11-04 RX ADMIN — OXYCODONE 10 MG: 5 TABLET ORAL at 15:15

## 2020-11-04 RX ADMIN — CIPROFLOXACIN 500 MG: 500 TABLET, FILM COATED ORAL at 08:30

## 2020-11-04 RX ADMIN — Medication 20 ML: at 13:51

## 2020-11-04 NOTE — DIABETES MGMT
Patient admitted with septic joint. Blood glucose ranged 117-158 yesterday with patient receiving Regular 2 units and Actos 15mg. Blood glucose this morning was 140. Reviewed patient current regimen: Regular SSI, Actos 15mg daily, and Prednisone 40mg daily. Patient in bed, patient mood seems down, states she is not having a good day, not up for education at this time. Updated patient that she was started on steroid therapy and the relationship to hyperglycemia, patient insulin dosing may need to be adjusted pending glycemic control. Patient verbalized understanding and voices no further questions regarding diabetes management at this time. Patient willing to take insulin at discharge if needed as A1c was 11.7%, patient prefers insulin pens.

## 2020-11-04 NOTE — PROGRESS NOTES
END OF SHIFT NOTE:    INTAKE/OUTPUT  11/02 0701 - 11/03 0700  In: 6706 [I.V.:1237]  Out: 2000 [Urine:2000]  Voiding: YES  Catheter: NO  Drain:              Flatus: Patient does have flatus present. Stool:  1 occurrences. Characteristics:  Stool Assessment  Stool Color: Brown  Stool Appearance: Formed, Soft  Stool Amount: Large  Stool Source/Status: Rectum    Emesis: 0 occurrences. Characteristics:        VITAL SIGNS  Patient Vitals for the past 12 hrs:   Temp Pulse Resp BP SpO2   11/03/20 1917 98.6 °F (37 °C) 89 18 (!) 152/75 97 %   11/03/20 1526 98.2 °F (36.8 °C) 86 18 (!) 162/81 97 %   11/03/20 1440 98.4 °F (36.9 °C) 82 17 139/75 96 %   11/03/20 1344 98.3 °F (36.8 °C) 88 18 (!) 155/75 97 %   11/03/20 1244 98.2 °F (36.8 °C) 85 18 (!) 154/82 96 %   11/03/20 1226 98.4 °F (36.9 °C) 89 18 (!) 164/84 96 %   11/03/20 1101 97.9 °F (36.6 °C) 87 17 (!) 163/89 95 %       Pain Assessment  Pain Intensity 1: 4 (11/03/20 1805)  Pain Location 1: Ankle, Foot  Pain Intervention(s) 1: Medication (see MAR)  Patient Stated Pain Goal: 2    Ambulating  Yes with assistance, to [facility]    Shift report given to oncoming nurse at the bedside.     Cinthia Roth, RN

## 2020-11-04 NOTE — CONSULTS
Physiatry  Consultation; Chart reviewed,   \" Mary Ross is a 45 y.o. female with medical history significant for CAD, type 2 diabetes mellitus uncontrolled, hypertension who was admitted due to progressive worsening of right ankle pain and swelling for 3 weeks with concern for septic joint. ED, patient was afebrile and work-up shows no leukocytosis but elevated CRP. Was evaluated by orthopedics. Patient underwent ankle joint washout in the OR on 10/29/2020. She has been started on IV antibiotics for suspected septic joint. No additional inpatient plans for additional wash out by Ortho. Infectious disease is following and planning for at least 3 weeks of IV antibiotics. Patient developed pinpoint vesicular rashes on her abdomen on 11/2 which are non pruritic and non tender. It is likely due to drug reaction \"     Reviewed PT and OT eval/treatment. Pt has 3 flights of steps to ascend and descend safely. Currently, ambulating 2 ft CGA RW  - pt will require skilled rehab for more prolonged rehab to meet her functional needs. There is no medical necessity to require [facility] level of care.   -no charge    Lisette Jurado MD, Medical Director  [facility]

## 2020-11-04 NOTE — PROGRESS NOTES
This CM met with pt this day to discuss discharge planning. Per chart, pt was deciding between returning home with IV abx verse proceeding with inpatient rehab if insurance approved. This CM advised her that if she were to return home that her medication cost would be cover at 100%. At this time, she would like to review lists for inpatient rehab. Unsure if pt would qualify for LTACH so this CM provided her with that choice list as well as SNF choice list.  Encouraged pt to work with therapy when they round on her. Informed her that if she would like to proceed with rehab that we will need her preferred facilities and then we will send referrals. After that, pending on which facility pt accepts, that facility will initiate prior authorization with pt insurance for final approval determination - pt verbalized understanding. This CM to follow up with pt on preferred facilities. No additional CM needs at this time. Will continue to follow and update as needed.

## 2020-11-04 NOTE — PROGRESS NOTES
Infectious Disease Progress Note    Impression:   · Right ankle infection/septic joint. S/p (10/29) I&D, gross purulence encountered intra-op: cx negative  · She had had ankle pain for at least 3 weeks, PTA and was on clindamycin as outpatient and so all cultures are negative from blood and ankle aspiration. No cell count or crystal analysis performed on synovial fluid. · Type 2 DM: poorly controlled, Hgb A1c 10/28 11.7  · Hidradenitis suppurative right axilla with chronic inflammation and drainage  · Pustular rash on abdomen, spreading to breasts. Most likely this is secondary to a new medication, and probably an antibiotic. · Elevated ESR/CRP    Plan:   · Continue daptomycin plus cipro; planned EOT 2020  · Continue Cipro 500mg PO Q12hr  · 4 weeks of treatment planned EOT 20; PICC placed   · We again discussed the importance of better diabetes management  · ID follow up 20 @ 8:30am  · OPAT orders not yet sent  · I will stop steroids    Anti-infectives:   1. Daptomycin (11/3/2020 - )  2. Vancomycin 10/28-11/3/2020  3. Cipro -  4. CTX 10/29-  5. Cefepime x 1 10/28    Subjective:   Rash unchanged to slightly improved. No Known Allergies     Review of Systems:  A comprehensive review of systems was negative except for that written in the History of Present Illness. Objective:   Blood pressure 139/76, pulse 92, temperature 98.6 °F (37 °C), resp. rate 18, height 5' 9\" (1.753 m), weight 118.8 kg (262 lb), last menstrual period 10/29/2020, SpO2 97 %.   Temp (24hrs), Av.3 °F (36.8 °C), Min:97.9 °F (36.6 °C), Max:98.6 °F (37 °C)     Patient examined 2020, exam remains unchanged except as noted below:    General:  Alert, cooperative, no acute distress, appears stated age   Eyes:  Anicteric, no drainage, not injected, EOMI   Throat: Mucus membranes moist    Lungs:   Clear throughout lung fields without increased work of breathing or audible wheezes   Heart:  Regular rate and rhythm, without audible murmur, rub, or gallop   Abdomen:   Soft, non-tender, no guarding, no distention, bowel sounds active   Extremities: René LE edema, R ankle dressing C/D/I, neurovascular intact   Pulses: 2+ and symmetric   Skin: Pustular rash noted to abdomen, and anterior chest/breasts, no erythema/drainage     Lines/Devices: LUE PICC         Data Review:   Recent Results (from the past 24 hour(s))   GLUCOSE, POC    Collection Time: 11/03/20 11:02 AM   Result Value Ref Range    Glucose (POC) 155 (H) 65 - 100 mg/dL   OCCULT BLOOD, STOOL    Collection Time: 11/03/20  4:00 PM   Result Value Ref Range    Occult blood, stool Negative NEG     GLUCOSE, POC    Collection Time: 11/03/20  4:25 PM   Result Value Ref Range    Glucose (POC) 158 (H) 65 - 100 mg/dL   HGB & HCT    Collection Time: 11/03/20  4:58 PM   Result Value Ref Range    HGB 14.0 11.7 - 15.4 g/dL    HCT 43.6 35.8 - 46.3 %   SED RATE, AUTOMATED    Collection Time: 11/03/20  4:58 PM   Result Value Ref Range    Sed rate, automated 84 (H) 0 - 20 mm/hr   CK    Collection Time: 11/03/20  4:58 PM   Result Value Ref Range    CK 54 21 - 215 U/L   HGB & HCT    Collection Time: 11/03/20  9:05 PM   Result Value Ref Range    HGB 7.6 (L) 11.7 - 15.4 g/dL    HCT 24.4 (L) 35.8 - 46.3 %   SED RATE, AUTOMATED    Collection Time: 11/03/20  9:05 PM   Result Value Ref Range    Sed rate, automated 120 (H) 0 - 20 mm/hr   GLUCOSE, POC    Collection Time: 11/03/20  9:12 PM   Result Value Ref Range    Glucose (POC) 140 (H) 65 - 100 mg/dL        Microbiology:    All Micro Results     Procedure Component Value Units Date/Time    CULTURE, BLOOD [[id number]] Collected:  10/28/20 1125    Order Status:  Completed Specimen:  Blood Updated:  11/04/20 0654     Special Requests: --        RIGHT  Antecubital       Culture result: NO GROWTH 5 DAYS       CULTURE, BLOOD [[id number]] Collected:  10/28/20 1719    Order Status:  Completed Specimen:  Blood Updated:  11/02/20 0913     Special Requests: -- RIGHT  Antecubital       Culture result: NO GROWTH 5 DAYS       CULTURE, Osman Cordova [[id number]] Collected:  10/29/20 1615    Order Status:  Completed Specimen:  Ankle Updated:  11/01/20 0811     Special Requests: NO SPECIAL REQUESTS        GRAM STAIN 0 TO 1 WBCS SEEN PER OIF      NO DEFINITE ORGANISM SEEN        Culture result: NO GROWTH 2 DAYS       CULTURE, ANAEROBIC [[id number]] Collected:  10/29/20 1715    Order Status:  Completed Specimen:  Ankle Updated:  10/30/20 1109     Special Requests: NO SPECIAL REQUESTS        Culture result:       NO GROWTH AFTER SHORT PERIOD OF INCUBATION. FURTHER RESULTS TO FOLLOW AFTER OVERNIGHT INCUBATION.                 Studies/Imaging:    Reviewed        Signed By: Naresh Rodriguez MD     November 4, 2020

## 2020-11-04 NOTE — PROGRESS NOTES
Problem: Patient Education: Go to Patient Education Activity  Goal: Patient/Family Education  Outcome: Progressing Towards Goal  Note:   1. Patient will complete total body bathing and dressing with modified independence and adaptive equipment as needed. 2. Patient will complete toileting with modified independence and adaptive equipment as needed. 3. Patient will tolerate 30 minutes of OT treatment with up to 2 rest breaks to increase activity tolerance for ADLs. 4. Patient will complete functional transfers with modified independence and adaptive equipment as needed. 5. Patient will complete functional mobility for ADLs with modified independence and adaptive equipment as needed. 6. Patient will demonstrate modified independence with therapeutic exercise HEP to increase strength in BUEs for increased safety and independence with functional transfers. Timeframe: 7 visits        OCCUPATIONAL THERAPY: Initial Assessment, Daily Note, and PM 11/4/2020  INPATIENT: OT Visit Days: 1  Payor: Sandi Castro / Plan: [place] BLUE CROSS OUT OF STATE / Product Type: PPO /      NAME/AGE/GENDER: Christiano Galvan is a 45 y.o. female   PRIMARY DIAGNOSIS:  Septic joint ([address] [M00.9]  Right ankle pain [M25.571]  Right ankle swelling [M25.471] Septic joint ([address] Septic joint (HCC)  Procedure(s) (LRB):  EXTREMITY IRRIGATION AND DEBRIDEMENT/LEFT ANKLE (Left)  6 Days Post-Op  ICD-10: Treatment Diagnosis:    Generalized Muscle Weakness (M62.81)  Localized edema (R60.1)   Precautions/Allergies:    WBAT RLE   Patient has no known allergies. ASSESSMENT:     Ms. Carie Anderson presents with R ankle pain, septic joint s/p I&D, WBAT RLE. At baseline pt lives alone and reports independence with ADLs, IADLs, ambulation, driving. Has been utilizing RW x3 weeks due to R ankle pain. Has had RUE pain/swelling, found to have R superficial thrombosis in cephalic vein.       Upon arrival pt alert and agreeable to OT evaluation and treatment despite pain and recently returning to bed. Reports she has been doing BLE exercises seated edge of bed. Pt practiced bed mobility with SBA, intact sitting balance. Decreased strength noted in BUEs, decreased AROM in RUE proximally. Pt educated on RW management to offload RLE. Practiced functional transfer trials with CGA-SBA. Lower body dressing with SBA after set up, RLE edema so practiced steps forward then backward with L shoe donned. Required CGA/RW, cues for BUE support on RW. Seated BUE exercises with verbal/visual cueing. Pt with great participation despite pain, will benefit from continued therapy to strengthen BUEs for mobility and address 3 flights of stairs to enter apartment. Pt left with call bell within reach, RLE elevated on pillows. Pt with deficits in pain, strength, endurance, and mobility impacting I/ADLs. Pt is functioning below baseline and would benefit from continued OT to increase safety and independence. This section established at most recent assessment   PROBLEM LIST (Impairments causing functional limitations):  Decreased Strength  Decreased ADL/Functional Activities  Decreased Transfer Abilities  Decreased Ambulation Ability/Technique  Increased Pain  Decreased Activity Tolerance  Increased Fatigue  Decreased Flexibility/Joint Mobility  Edema/Girth  Decreased Skin Integrity/Hygeine  Decreased [place] with Home Exercise Program   INTERVENTIONS PLANNED: (Benefits and precautions of occupational therapy have been discussed with the patient.)  Activities of daily living training  Adaptive equipment training  Balance training  Clothing management  Community reintergration  Donning&doffing training  Hygiene training  Neuromuscular re-eduation  Re-evaluation  Therapeutic activity  Therapeutic exercise  Wheelchair management  Work reintegration     TREATMENT PLAN: Frequency/Duration: Follow patient 3x/week to address above goals.   Rehabilitation Potential For Stated Goals: Excellent     REHAB RECOMMENDATIONS (at time of discharge pending progress):    Placement: It is my opinion, based on this patient's performance to date, that Ms. Salgado may benefit from intensive therapy at [facility] after discharge due to a probable need for 24 hour rehab nursing, a probable need for multiple therapy disciplines, and potential to make ongoing and sustainable functional improvement that is of practical value. Equipment:   TBD pending progress. May need w/c, [facility]              OCCUPATIONAL PROFILE AND HISTORY:   History of Present Injury/Illness (Reason for Referral):  \"Patient is a 44-year-old -American female with history of morbid obesity, type 2 diabetes mellitus uncontrolled, hypertension who presented to the ER with 3 weeks history of constant right ankle pain and swelling which is progressively getting worse. Patient is currently a little somnolent since she got morphine in the ER for pain control. She is easily arousable and is answering questions appropriately. She reports symptoms began spontaneously with sudden onset of right ankle pain that was followed by ankle swelling. She has been to the emergency multiple times but she was told initially that it was a gout attack and then next time was told to be cellulitis and was discharged on antibiotics, colchicine, indomethacin. Over the last 2 to 3 days she has developed a blister medial aspect of right ankle. She denies having any fever but reports feeling warm every now and then with chills. Denies having any chest pain, palpitations, cough, shortness of breath, abdominal pain. Patient has been evaluated by Ortho in ER with possible plan for washout tomorrow following an MRI of right foot. Blood work showed CRP of 132 raising suspicion of septic joint. \"  Past Medical History/Comorbidities:   Ms. Deshaun Perkins  has a past medical history of Diabetes ([address] and Hypertension.   Ms. Deshaun Perkins  has a past surgical history that includes hx  section. Social History/Living Environment:   Home Environment: Apartment  # Steps to Enter: (3 flights of stairs)  One/Two Story Residence: Other (Comment)(lives on 3rd floor, no elevator access )  Living Alone: Yes  Support Systems: Friends \ neighbors  Patient Expects to be Discharged to[de-identified] Rehabilitation facility  Current DME Used/Available at Home: Walker, rolling  Prior Level of Function/Work/Activity:  At baseline pt lives alone and reports independence with ADLs, IADLs, ambulation, driving. Has been utilizing RW x3 weeks due to R ankle pain. Has had RUE pain/swelling, found to have R superficial thrombosis in cephalic vein. Personal Factors:          Sex:  female        Age:  45 y.o. Other factors that influence how disability is experienced by the patient:  Multiple co-morbidities    Number of Personal Factors/Comorbidities that affect the Plan of Care: Expanded review of therapy/medical records (1-2):  MODERATE COMPLEXITY   ASSESSMENT OF OCCUPATIONAL PERFORMANCE[de-identified]   Activities of Daily Living:   Basic ADLs (From Assessment) Complex ADLs (From Assessment)   Feeding: Independent  Oral Facial Hygiene/Grooming: Setup  Bathing: Moderate assistance  Upper Body Dressing: Setup  Lower Body Dressing: Moderate assistance  Toileting: Minimum assistance Instrumental ADL  Meal Preparation: Total assistance  Homemaking: Total assistance   Grooming/Bathing/Dressing Activities of Daily Living     Cognitive Retraining  Safety/Judgement: Awareness of environment; Fall prevention; Insight into deficits                     Lower Body Dressing Assistance  Socks: Set-up  Slip on Shoes Without Back: Set-up Bed/Mat Mobility  Supine to Sit: Stand-by assistance  Sit to Supine: Stand-by assistance  Sit to Stand: Stand-by assistance;Contact guard assistance  Stand to Sit: Contact guard assistance  Scooting: Stand-by assistance     Most Recent Physical Functioning:   Gross Assessment:  AROM: Generally decreased, functional(RUE proximally)  Strength: Generally decreased, functional(BUEs)  Coordination: Within functional limits(BUEs)  Sensation: Intact(BUEs to light touch)               Posture:  Posture (WDL): Exceptions to WDL  Posture Assessment: Forward head, Rounded shoulders  Balance:  Sitting: Intact  Standing: Impaired  Standing - Static: Fair;Constant support  Standing - Dynamic : Fair;Constant support Bed Mobility:  Supine to Sit: Stand-by assistance  Sit to Supine: Stand-by assistance  Scooting: Stand-by assistance  Wheelchair Mobility:     Transfers:  Sit to Stand: Stand-by assistance;Contact guard assistance  Stand to Sit: Contact guard assistance            Patient Vitals for the past 6 hrs:   BP SpO2 Pulse   20 1155 (!) 156/85 95 % 81       Mental Status  Neurologic State: Alert  Orientation Level: Oriented X4  Cognition: Appropriate decision making, Appropriate for age attention/concentration, Follows commands  Perception: Appears intact  Perseveration: No perseveration noted  Safety/Judgement: Awareness of environment, Fall prevention, Insight into deficits                          Physical Skills Involved:  Range of Motion  Balance  Strength  Activity Tolerance  Sensation  Gross Motor Control  Pain (acute)  Pain (Chronic)  Edema  Skin Integrity Cognitive Skills Affected (resulting in the inability to perform in a timely and safe manner):  None Psychosocial Skills Affected:  Habits/Routines  Environmental Adaptation  Social Interaction  Emotional Regulation  Self-Awareness  Awareness of Others  Social Roles   Number of elements that affect the Plan of Care: 5+:  HIGH COMPLEXITY   CLINICAL DECISION MAKIN [facility] [address] [id number] AM-PAC 6 Clicks   Daily Activity Inpatient Short Form  How much help from another person does the patient currently need. .. Total A Lot A Little None   1. Putting on and taking off regular lower body clothing?    [] 1   [x] 2   [] 3   [] 4 2.  Bathing (including washing, rinsing, drying)? [] 1   [x] 2   [] 3   [] 4   3. Toileting, which includes using toilet, bedpan or urinal?   [] 1   [] 2   [x] 3   [] 4   4. Putting on and taking off regular upper body clothing? [] 1   [] 2   [x] 3   [] 4   5. Taking care of personal grooming such as brushing teeth? [] 1   [] 2   [x] 3   [] 4   6. Eating meals? [] 1   [] 2   [] 3   [x] 4   © 2007, Trustees of [address] [place] [address] [address] [address], under license to [employer]. All rights reserved      Score:  Initial: 17 11/4/2020 Most Recent: X (Date: -- )    Interpretation of Tool:  Represents activities that are increasingly more difficult (i.e. Bed mobility, Transfers, Gait). Medical Necessity:     Patient demonstrates   good and excellent   rehab potential due to higher previous functional level. Reason for Services/Other Comments:  Patient continues to require skilled intervention due to   Inability to complete ADLs at prior level of independence  . Use of outcome tool(s) and clinical judgement create a POC that gives a: MODERATE COMPLEXITY         TREATMENT:   (In addition to Assessment/Re-Assessment sessions the following treatments were rendered)     Pre-treatment Symptoms/Complaints:    Pain: Initial:   Pain Intensity 1: 10  Pain Location 1: Ankle  Pain Orientation 1: Right  Pain Intervention(s) 1: Ambulation/Increased Activity, Repositioned, Nurse notified  Post Session:  same     Therapeutic Exercise: (15 Minutes):  Exercises per grid below to improve mobility, strength, balance, coordination, and activity tolerance . Required minimal visual and verbal cues to promote proper body alignment, promote proper body posture, and promote proper body mechanics. Progressed repetitions as indicated.      Date:  11/4/2020 Date:   Date:     Activity/Exercise Parameters Parameters Parameters   Sit <> Stand Transfers X3 reps     BUE Punches X20 reps                                       Braces/Orthotics/Lines/Etc: O2 Device: Room air  Treatment/Session Assessment:    Response to Treatment:  Limited by pain  Interdisciplinary Collaboration:   Occupational Therapist  Registered Nurse    After treatment position/precautions:   Supine in bed  Bed/Chair-wheels locked  Bed in low position  Call light within reach  RN notified   Compliance with Program/Exercises: Compliant all of the time, Will assess as treatment progresses. Recommendations/Intent for next treatment session: \"Next visit will focus on advancements to more challenging activities and reduction in assistance provided\".   Total Treatment Duration:  OT Patient Time In/Time Out  Time In: 1528  Time Out: [id number] [facility], OTR/L

## 2020-11-04 NOTE — PROGRESS NOTES
Problem: Mobility Impaired (Adult and Pediatric)  Goal: *Acute Goals and Plan of Care (Insert Text)  Description: LTG:  (1.)Ms. Salgado will move from supine to sit and sit to supine , scoot up and down, and roll side to side in flat bed with INDEPENDENT within 7 treatment day(s). (2.)Ms. Salgado will transfer from bed to chair and chair to bed with MODIFIED INDEPENDENCE using the least restrictive device within 7 treatment day(s). (3.)Ms. Salgado will ambulate with MODIFIED INDEPENDENCE for 25+ feet with the least restrictive device within 7 treatment day(s). 4.  Ms. Randy Concepcion will demonstrate 50 degrees AROM R ankle within 7 treatment days. ________________________________________________________________________________________________      Outcome: Progressing Towards Goal     PHYSICAL THERAPY: Daily Note and PM 11/4/2020  INPATIENT: PT Visit Days : 2  WBAT R LE  Payor: BLUE CROSS / [place] OUT OF STATE / Product Type: PPO /       NAME/AGE/GENDER: Destiny Candelaria is a 45 y.o. female   PRIMARY DIAGNOSIS: Septic joint ([address] [M00.9]  Right ankle pain [M25.571]  Right ankle swelling [M25.471] Septic joint ([address] Septic joint (HCC)  Procedure(s) (LRB):  EXTREMITY IRRIGATION AND DEBRIDEMENT/LEFT ANKLE (Left)  6 Days Post-Op  ICD-10: Treatment Diagnosis:    · Other abnormalities of gait and mobility (R26.89)   Precaution/Allergies:  Patient has no known allergies. ASSESSMENT:     Ms. Randy Concepcion  lives alone (3rd floor apartment) and is independent in home and community. When her ankle began hurting, she began using a RW. Patient donned socks and sat up to the EOB independently. She is anxious but ready to participate. We started with a few simple leg exercises and discussed how she can do these throughout the day to slowly work on making foot more functional.  She stood into the walker with CGA and was able to hop along the EOB, sat and rested and hopped back with mostly SBA.   She then practiced standing and hopping to the commmod and back with CGA. She is very fearful and tentative to put weight on her foot. It is no where near neutral and she does not put any weight on it while standing. We discussed how rehab would be very good for her to slowly and steadily work on getting her foot functional again and in a much better time frame than if left on her own. She is in agreement. She participated and tolerated well. This section established at most recent assessment   PROBLEM LIST (Impairments causing functional limitations):  1. Decreased Strength  2. Decreased ADL/Functional Activities  3. Decreased Transfer Abilities  4. Decreased Ambulation Ability/Technique  5. Decreased Balance  6. Increased Pain  7. Decreased Activity Tolerance   INTERVENTIONS PLANNED: (Benefits and precautions of physical therapy have been discussed with the patient.)  1. Balance Exercise  2. Bed Mobility  3. Gait Training  4. Therapeutic Activites  5. Therapeutic Exercise/Strengthening  6. Transfer Training  7. education     TREATMENT PLAN: Frequency/Duration: 5 times a week for duration of hospital stay  Rehabilitation Potential For Stated Goals: Good     REHAB RECOMMENDATIONS (at time of discharge pending progress):    Placement: It is my opinion, based on this patient's performance to date, that Ms. Salgado may benefit from intensive therapy at a [address] [place] [address] after discharge due to the functional deficits listed above that are likely to improve with skilled rehabilitation and concerns that he/she may be unsafe to be unsupervised at home due to decreased mobility .   Equipment:    None at this time              HISTORY:   History of Present Injury/Illness (Reason for Referral):  Per MD note, \"Patient is a 41-year-old -American female with history of morbid obesity, type 2 diabetes mellitus uncontrolled, hypertension who presented to the ER with 3 weeks history of constant right ankle pain and swelling which is progressively getting worse. Patient is currently a little somnolent since she got morphine in the ER for pain control. She is easily arousable and is answering questions appropriately. She reports symptoms began spontaneously with sudden onset of right ankle pain that was followed by ankle swelling. She has been to the emergency multiple times but she was told initially that it was a gout attack and then next time was told to be cellulitis and was discharged on antibiotics, colchicine, indomethacin. Over the last 2 to 3 days she has developed a blister medial aspect of right ankle. She denies having any fever but reports feeling warm every now and then with chills. Denies having any chest pain, palpitations, cough, shortness of breath, abdominal pain. Patient has been evaluated by Ortho in ER with possible plan for washout tomorrow following an MRI of right foot. Blood work showed CRP of 132 raising suspicion of septic joint. \"  Past Medical History/Comorbidities:   Ms. Constantine Borja  has a past medical history of Diabetes ([address] and Hypertension. Ms. Constantine Borja  has a past surgical history that includes hx  section. Social History/Living Environment:   Home Environment: Apartment  One/Two Story Residence: Other (Comment)  Living Alone: Yes  Support Systems: Friends \ neighbors, Family member(s)  Patient Expects to be Discharged to[de-identified] Apartment  Current DME Used/Available at Home: Walker, rolling  Prior Level of Function/Work/Activity:  She lives alone and is independent in home and community.        Number of Personal Factors/Comorbidities that affect the Plan of Care: 1-2: MODERATE COMPLEXITY   EXAMINATION:   Most Recent Physical Functioning:   Gross Assessment:                  Posture:     Balance:    Bed Mobility:  Supine to Sit: Modified independent  Wheelchair Mobility:     Transfers:  Sit to Stand: Stand-by assistance;Contact guard assistance  Stand to Sit: Stand-by assistance  Gait:  Right Side Weight Bearing: As tolerated  Gait Abnormalities: Decreased step clearance  Distance (ft): 2 Feet (ft)(along EOB and to commode)  Assistive Device: Walker, rolling  Ambulation - Level of Assistance: Stand-by assistance;Contact guard assistance      Body Structures Involved:  1. Bones  2. Joints  3. Muscles Body Functions Affected:  1. Sensory/Pain  2. Neuromusculoskeletal  3. Movement Related  4. Skin Related  5. Metobolic/Endocrine Activities and Participation Affected:  1. Mobility  2. Self Care  3. Domestic Life  4. Community, Social and [place]   Number of elements that affect the Plan of Care: 4+: HIGH COMPLEXITY   CLINICAL PRESENTATION:   Presentation: Stable and uncomplicated: LOW COMPLEXITY   CLINICAL DECISION MAKIN [facility] Inpatient Short Form  How much difficulty does the patient currently have. .. Unable A Lot A Little None   1. Turning over in bed (including adjusting bedclothes, sheets and blankets)? [] 1   [] 2   [x] 3   [] 4   2. Sitting down on and standing up from a chair with arms ( e.g., wheelchair, bedside commode, etc.)   [] 1   [] 2   [x] 3   [] 4   3. Moving from lying on back to sitting on the side of the bed? [] 1   [] 2   [x] 3   [] 4   How much help from another person does the patient currently need. .. Total A Lot A Little None   4. Moving to and from a bed to a chair (including a wheelchair)? [] 1   [] 2   [x] 3   [] 4   5. Need to walk in hospital room? [] 1   [x] 2   [] 3   [] 4   6. Climbing 3-5 steps with a railing? [] 1   [x] 2   [] 3   [] 4   © , Trustees of [address] [place] [address] [address] [address], under license to [employer]. All rights reserved      Score:  Initial: 16 Most Recent: X (Date: -- )    Interpretation of Tool:  Represents activities that are increasingly more difficult (i.e. Bed mobility, Transfers, Gait).     Medical Necessity:     · Patient is expected to demonstrate progress in · strength, range of motion, balance, coordination, and functional technique  ·  to   · increase independence with   and improve safety during all functional mobility  · . Reason for Services/Other Comments:  · Patient continues to require skilled intervention due to   · medical complications and patient unable to attend/participate in therapy as expected  · . Use of outcome tool(s) and clinical judgement create a POC that gives a: Clear prediction of patient's progress: LOW COMPLEXITY            TREATMENT:   (In addition to Assessment/Re-Assessment sessions the following treatments were rendered)   Pre-treatment Symptoms/Complaints:  none. Pain: Initial:   Pain Intensity 1: 3  Post Session:  10     Gait Training (  15):  Gait training to improve and/or restore physical functioning as related to mobility, strength, and balance. Sit to stand 2 Feet (ft)(along EOB and to commode) with Stand-by assistance;Contact guard assistance using a Walker, rolling and moderate verbal cues related to their ankle position and motion to promote proper body alignment and promote proper body posture. Instruction in performance of using UE's to self limit WBing R LE to correct  comfort level . Therapeutic Exercise: (15 Minutes):  Exercises per grid below to improve mobility. Required minimal visual and verbal cues to promote proper body mechanics. Progressed complexity of movement as indicated.      Date:  11/4/20 Date:   Date:     Activity/Exercise Parameters Parameters Parameters   Seated TKE 5x3 B     Seated AP 10x R     Seated ankle side to side 10x R                                   Braces/Orthotics/Lines/Etc:   · none  Treatment/Session Assessment:    · Response to Treatment:  Cooperative and receptive  · Interdisciplinary Collaboration:   o Physical Therapy Assistant  o Registered Nurse  o   · After treatment position/precautions:   o Bed/Chair-wheels locked  o Call light within reach  o [place] notified  o sitting EOB   · Compliance with Program/Exercises: Compliant all of the time, Will assess as treatment progresses  · Recommendations/Intent for next treatment session: \"Next visit will focus on advancements to more challenging activities and reduction in assistance provided\".   Total Treatment Duration:  PT Patient Time In/Time Out  Time In: 1305  Time Out: 1335    Mack Miller, PTA

## 2020-11-04 NOTE — PROGRESS NOTES
Name: Destiny Candelaria MRN: [id number]  : 1980  Age:40 y.o.  female  Admit Date:  10/28/2020 LOS: 7      Hospitalist Progress Note     Reason for Admission:  Septic joint ([address] [M00.9]  Right ankle pain [M25.571]  Right ankle swelling [M25.471]    Hospital Course:  Please refer to the admission H&P for details of presentation. In summary, Destiny Candelaria is a 45 y.o. female with medical history significant for CAD, type 2 diabetes mellitus uncontrolled, hypertension who was admitted due to progressive worsening of right ankle pain and swelling for 3 weeks with concern for septic joint. ED, patient was afebrile and work-up shows no leukocytosis but elevated CRP. Was evaluated by orthopedics. Patient underwent ankle joint washout in the OR on 10/29/2020. She has been started on IV antibiotics for suspected septic joint. No additional inpatient plans for additional wash out by Ortho. Infectious disease is following and planning for at least 3 weeks of IV antibiotics. Patient developed pinpoint vesicular rashes on her abdomen on  which are non pruritic and non tender. It is likely due to drug reaction     Subjective/24 hr Events (20) :  Patient is seen and examined at bedside. No acute events reported overnight by nursing staff. Improved pain and swelling of her foot. Continues to have the rash on the abdomen which are non tender, non erythematous, non pruritic. Appears slightly better  Patient denies fever, chills, chest pains, shortness of breath, n/v, abdominal pain. ROS: 10 point review of systems is otherwise negative with the exception of the elements mentioned above.     Objective:    Patient Vitals for the past 24 hrs:   Temp Pulse Resp BP SpO2   20 0038 98.6 °F (37 °C) 92 18 139/76 97 %   20 1917 98.6 °F (37 °C) 89 18 (!) 152/75 97 %   20 1526 98.2 °F (36.8 °C) 86 18 (!) 162/81 97 %   20 1440 98.4 °F (36.9 °C) 82 17 139/75 96 %   20 1344 98.3 °F (36.8 °C) 88 18 (!) 155/75 97 %   11/03/20 1244 98.2 °F (36.8 °C) 85 18 (!) 154/82 96 %   11/03/20 1226 98.4 °F (36.9 °C) 89 18 (!) 164/84 96 %   11/03/20 1101 97.9 °F (36.6 °C) 87 17 (!) 163/89 95 %     Oxygen Therapy  O2 Sat (%): 97 % (11/04/20 0038)  Pulse via Oximetry: 84 beats per minute (10/29/20 1840)  O2 Device: Room air (11/03/20 1945)  O2 Flow Rate (L/min): 2 l/min (11/02/20 1930)    Estimated body mass index is 38.69 kg/m² as calculated from the following:    Height as of this encounter: 5' 9\" (1.753 m). Weight as of this encounter: 118.8 kg (262 lb). Intake/Output Summary (Last 24 hours) at 11/4/2020 0739  Last data filed at 11/4/2020 0441  Gross per 24 hour   Intake 697.9 ml   Output 1300 ml   Net -602.1 ml       *Note that automatically entered I/Os may not be accurate; dependent on patient compliance with collection and accurate  by techs. Physical Exam:   General:     alert, awake, no acute distress. Well nourished. Obese  Head:   normocephalic, atraumatic  Eyes, Ears, nose: PERRL, EOMI. Normal conjunctiva  Neck:    supple, non-tender. Trachea midline. Lungs:   CTAB, no wheezing, rhonchi, rales  Cardiac:   RRR, Normal S1 and S2. Abdomen:   Soft, non distended, nontender, +BS, vesicular rash on mid abdomen  Extremities:   Warm, dry. No edema. Rt foot wrapped in bandage. Unable to  right hand due to pain. Rt forearm slightly swollen. Prior needle stick marks in rt antecubital fossa  Skin:   No rashes, no jaundice  Neuro:  AAOx3. No gross focal neurological deficit  Psychiatric:  No anxiety, calm, cooperative    Data Review:  I have reviewed all labs, meds, and studies from the last 24 hours:      Labs:    Recent Results (from the past 24 hour(s))   RBC, ALLOCATE    Collection Time: 11/03/20  8:15 AM   Result Value Ref Range    HISTORY CHECKED?  Historical check performed    GLUCOSE, POC    Collection Time: 11/03/20 11:02 AM   Result Value Ref Range    Glucose (POC) 155 (H) 65 - 100 mg/dL   OCCULT BLOOD, STOOL    Collection Time: 11/03/20  4:00 PM   Result Value Ref Range    Occult blood, stool Negative NEG     GLUCOSE, POC    Collection Time: 11/03/20  4:25 PM   Result Value Ref Range    Glucose (POC) 158 (H) 65 - 100 mg/dL   HGB & HCT    Collection Time: 11/03/20  4:58 PM   Result Value Ref Range    HGB 14.0 11.7 - 15.4 g/dL    HCT 43.6 35.8 - 46.3 %   SED RATE, AUTOMATED    Collection Time: 11/03/20  4:58 PM   Result Value Ref Range    Sed rate, automated 84 (H) 0 - 20 mm/hr   CK    Collection Time: 11/03/20  4:58 PM   Result Value Ref Range    CK 54 21 - 215 U/L   HGB & HCT    Collection Time: 11/03/20  9:05 PM   Result Value Ref Range    HGB 7.6 (L) 11.7 - 15.4 g/dL    HCT 24.4 (L) 35.8 - 46.3 %   SED RATE, AUTOMATED    Collection Time: 11/03/20  9:05 PM   Result Value Ref Range    Sed rate, automated 120 (H) 0 - 20 mm/hr   GLUCOSE, POC    Collection Time: 11/03/20  9:12 PM   Result Value Ref Range    Glucose (POC) 140 (H) 65 - 100 mg/dL         All Micro Results     Procedure Component Value Units Date/Time    CULTURE, BLOOD [[id number]] Collected:  10/28/20 1125    Order Status:  Completed Specimen:  Blood Updated:  11/04/20 0654     Special Requests: --        RIGHT  Antecubital       Culture result: NO GROWTH 5 DAYS       CULTURE, BLOOD [[id number]] Collected:  10/28/20 1715    Order Status:  Completed Specimen:  Blood Updated:  11/02/20 0913     Special Requests: --        RIGHT  Antecubital       Culture result: NO GROWTH 5 DAYS       CULTURE, Amos Plane STAIN [[id number]] Collected:  10/29/20 1615    Order Status:  Completed Specimen:  Ankle Updated:  11/01/20 0811     Special Requests: NO SPECIAL REQUESTS        GRAM STAIN 0 TO 1 WBCS SEEN PER OIF      NO DEFINITE ORGANISM SEEN        Culture result: NO GROWTH 2 DAYS       CULTURE, ANAEROBIC [[id number]] Collected:  10/29/20 1715    Order Status:  Completed Specimen:  Ankle Updated:  10/30/20 1109     Special Requests: NO SPECIAL REQUESTS        Culture result:       NO GROWTH AFTER SHORT PERIOD OF INCUBATION. FURTHER RESULTS TO FOLLOW AFTER OVERNIGHT INCUBATION.                 Current Meds:  Current Facility-Administered Medications   Medication Dose Route Frequency    0.9% sodium chloride infusion 250 mL  250 mL IntraVENous PRN    polyethylene glycol (MIRALAX) packet 17 g  17 g Oral DAILY    0.9% sodium chloride infusion 250 mL  250 mL IntraVENous PRN    tuberculin injection 5 Units  5 Units IntraDERMal ONCE    central line flush (saline) syringe 20 mL  20 mL InterCATHeter Q8H    predniSONE (DELTASONE) tablet 40 mg  40 mg Oral DAILY WITH BREAKFAST    DAPTOmycin (CUBICIN) 950 mg in 0.9% sodium chloride 19 mL IV Syringe  8 mg/kg IntraVENous Q24H    insulin regular (NOVOLIN R, HUMULIN R) injection   SubCUTAneous AC&HS    famotidine (PEPCID) tablet 20 mg  20 mg Oral BID    ciprofloxacin HCl (CIPRO) tablet 500 mg  500 mg Oral Q12H    morphine injection 2 mg  2 mg IntraVENous Q4H PRN    naloxone (NARCAN) injection 0.1 mg  0.1 mg IntraVENous EVERY 2 MINUTES AS NEEDED    flumazeniL (ROMAZICON) 0.1 mg/mL injection 0.2 mg  0.2 mg IntraVENous Multiple    diphenhydrAMINE (BENADRYL) injection 12.5 mg  12.5 mg IntraVENous Q15MIN PRN    oxyCODONE IR (ROXICODONE) tablet 10 mg  10 mg Oral Q4H PRN    dapagliflozin (FARXIGA) tablet 10 mg (Patient Supplied)  10 mg Oral DAILY    pioglitazone (ACTOS) tablet 15 mg  15 mg Oral ACB    sodium chloride (NS) flush 5-40 mL  5-40 mL IntraVENous Q8H    sodium chloride (NS) flush 5-40 mL  5-40 mL IntraVENous PRN    acetaminophen (TYLENOL) tablet 650 mg  650 mg Oral Q6H PRN    Or    acetaminophen (TYLENOL) suppository 650 mg  650 mg Rectal Q6H PRN    promethazine (PHENERGAN) tablet 12.5 mg  12.5 mg Oral Q6H PRN    Or    ondansetron (ZOFRAN) injection 4 mg  4 mg IntraVENous Q4H PRN    enoxaparin (LOVENOX) injection 40 mg  40 mg SubCUTAneous DAILY    potassium chloride 10 mEq in 100 ml IVPB  10 mEq IntraVENous PRN    magnesium sulfate 2 g/50 ml IVPB (premix or compounded)  2 g IntraVENous PRN    oxyCODONE IR (ROXICODONE) tablet 5 mg  5 mg Oral Q4H PRN    naloxone (NARCAN) injection 0.4 mg  0.4 mg IntraVENous PRN    nystatin (MYCOSTATIN) 100,000 unit/gram powder   Topical BID         Other Studies:  Xr Chest Pa Lat    Result Date: 10/12/2020  History: cp Two views chest Findings: The lungs are well expanded and clear. The cardiac silhouette, and mediastinal contour, and osseous structures are normal.     Impression: Unremarkable two-view chest.     Xr Ankle Rt Min 3 V    Result Date: 10/12/2020  History: Medial right ankle pain and swelling, 2 days duration 3 views right ankle FINDINGS: No acute fracture, dislocation, or additional acute bony abnormality. IMPRESSION: No acute findings. Duplex Lower Ext Venous Right    Result Date: 10/28/2020  Right lower extremity venous ultrasound INDICATION:  Pain and swelling, chronic, moderate COMPARISON: Radiography of the right ankle 10/12/2020 and ultrasound of the leg 2/23/2020 Technique: Grayscale, color Doppler, and spectral analysis were performed on the deep veins. FINDINGS: There is no evidence of Baker's cyst. Note of a right inguinal 2.5 x 1.1 x 1.7 cm lymph node with normal morphology and no hypervascularity. This is of unclear but doubtful significance, unless clinically suspicious. There is normal flow in the common femoral, superficial femoral, greater saphenous, femoral and popliteal veins. Normal compression and augmentation demonstrated. The proximal calf veins are also patent as is the partially visualized contralateral common femoral vein.      IMPRESSION: No evidence of deep venous thrombosis in the right lower extremity     Assessment:    Active Hospital Problems    Diagnosis Date Noted    Vesicular rash 11/02/2020    Pain of right hand 10/31/2020    Left axillary hidradenitis 10/30/2020    Right ankle swelling 10/28/2020    Right ankle pain 10/28/2020    Septic joint ([facility] 10/28/2020    Positive for microalbuminuria 02/12/2018    DM2 (diabetes mellitus, type 2) ([facility] 02/24/2014     Newly diagnosed 2/22/14      HTN (hypertension) 02/24/2014    Morbid obesity ([facility] 02/24/2014       Plan:    Rt Ankle pain/swelling concerning for septic Joint  No DVT and no acute findings with XR. Concerning for septic given warmth/TTP and slight erythema palpation.   - ID following : dapto+ CTX  - follow up BCx and joint aspirate culture  - Ortho recs appreciated: no additional OR planned. Outpatient follow up in 2 weeks. Rt hand/arm swelling and pain  Rt superficial vein thrombosis (cephalic vein)  Doppler of UE showed small short segment thrombus in the cephalic vein at the antecubital fossa, a  superficial vein and Mild interstitial edema at the area of swelling of the right wrist without drainable fluid collection. Unable to  due to pain. No numbness/tingling.  - warm compress  - no indication for [facility] given superficial nature of thrombosis    Vesicular rash on abdomen  ?drug reaction  - monitor.  - abx changed per ID    Normocytic Anemia  S/p 1 unit of PRBC. Possible post-op anemia vs iron deficiency. Occult stool is negative. - monitor and transfuse as needed    T2DM, uncontrolled  A1c 11.7  - hold home metformin  - consult diabetic education  - continue with home Actos and [place]  - ISS, diabetic diet    L axilla hidradenitis  - will need out patient surgical follow up for resection    Hypoalbuminemia: consult nutrition    Morbid Obesity: Body mass index is 38.69 kg/m². Diet:  DIET DIABETIC CONSISTENT CARB  DIET NUTRITIONAL SUPPLEMENTS  DVT PPx: lovenox  Code: Full Code  Dispo: patient wants to go home instead of STR  Estimated Discharge: 24-48hrs    Labs/Imaging Reviewed.    Patient is HIGH risk due to current condition and comorbid conditions as well as requiring frequent monitoring and high risk of decline. Plan discussed with staff, patient/family and are in agreement.       Signed By: Johny Bradley MD     November 4, 2020

## 2020-11-05 LAB
ALBUMIN SERPL-MCNC: 1.8 G/DL (ref 3.5–5)
ALBUMIN/GLOB SERPL: 0.3 {RATIO} (ref 1.2–3.5)
ALP SERPL-CCNC: 171 U/L (ref 50–136)
ALT SERPL-CCNC: 28 U/L (ref 12–65)
ANION GAP SERPL CALC-SCNC: 5 MMOL/L (ref 7–16)
AST SERPL-CCNC: 21 U/L (ref 15–37)
BILIRUB SERPL-MCNC: 0.2 MG/DL (ref 0.2–1.1)
BUN SERPL-MCNC: 12 MG/DL (ref 6–23)
CALCIUM SERPL-MCNC: 8.4 MG/DL (ref 8.3–10.4)
CHLORIDE SERPL-SCNC: 107 MMOL/L (ref 98–107)
CO2 SERPL-SCNC: 29 MMOL/L (ref 21–32)
CREAT SERPL-MCNC: 0.53 MG/DL (ref 0.6–1)
ERYTHROCYTE [DISTWIDTH] IN BLOOD BY AUTOMATED COUNT: 14.3 % (ref 11.9–14.6)
GLOBULIN SER CALC-MCNC: 5.5 G/DL (ref 2.3–3.5)
GLUCOSE BLD STRIP.AUTO-MCNC: 143 MG/DL (ref 65–100)
GLUCOSE BLD STRIP.AUTO-MCNC: 155 MG/DL (ref 65–100)
GLUCOSE BLD STRIP.AUTO-MCNC: 194 MG/DL (ref 65–100)
GLUCOSE BLD STRIP.AUTO-MCNC: 202 MG/DL (ref 65–100)
GLUCOSE SERPL-MCNC: 145 MG/DL (ref 65–100)
HCT VFR BLD AUTO: 23.6 % (ref 35.8–46.3)
HGB BLD-MCNC: 7.3 G/DL (ref 11.7–15.4)
HGB BLD-MCNC: 7.7 G/DL (ref 11.7–15.4)
MCH RBC QN AUTO: 25.3 PG (ref 26.1–32.9)
MCHC RBC AUTO-ENTMCNC: 30.9 G/DL (ref 31.4–35)
MCV RBC AUTO: 81.9 FL (ref 79.6–97.8)
MM INDURATION POC: 0 MM (ref 0–5)
NRBC # BLD: 0 K/UL (ref 0–0.2)
PLATELET # BLD AUTO: 429 K/UL (ref 150–450)
PMV BLD AUTO: 9.4 FL (ref 9.4–12.3)
POTASSIUM SERPL-SCNC: 3.3 MMOL/L (ref 3.5–5.1)
PPD POC: NEGATIVE NEGATIVE
PROT SERPL-MCNC: 7.3 G/DL (ref 6.3–8.2)
RBC # BLD AUTO: 2.88 M/UL (ref 4.05–5.2)
SODIUM SERPL-SCNC: 141 MMOL/L (ref 136–145)
WBC # BLD AUTO: 8.5 K/UL (ref 4.3–11.1)

## 2020-11-05 PROCEDURE — 85018 HEMOGLOBIN: CPT

## 2020-11-05 PROCEDURE — 74011250637 HC RX REV CODE- 250/637: Performed by: INTERNAL MEDICINE

## 2020-11-05 PROCEDURE — 85027 COMPLETE CBC AUTOMATED: CPT

## 2020-11-05 PROCEDURE — 82962 GLUCOSE BLOOD TEST: CPT

## 2020-11-05 PROCEDURE — 74011000250 HC RX REV CODE- 250: Performed by: INTERNAL MEDICINE

## 2020-11-05 PROCEDURE — 74011250637 HC RX REV CODE- 250/637: Performed by: FAMILY MEDICINE

## 2020-11-05 PROCEDURE — 74011636637 HC RX REV CODE- 636/637: Performed by: INTERNAL MEDICINE

## 2020-11-05 PROCEDURE — 74011250637 HC RX REV CODE- 250/637: Performed by: HOSPITALIST

## 2020-11-05 PROCEDURE — 97530 THERAPEUTIC ACTIVITIES: CPT

## 2020-11-05 PROCEDURE — 65270000029 HC RM PRIVATE

## 2020-11-05 PROCEDURE — 74011250636 HC RX REV CODE- 250/636: Performed by: INTERNAL MEDICINE

## 2020-11-05 PROCEDURE — 97110 THERAPEUTIC EXERCISES: CPT

## 2020-11-05 PROCEDURE — 2709999900 HC NON-CHARGEABLE SUPPLY

## 2020-11-05 PROCEDURE — 74011250636 HC RX REV CODE- 250/636: Performed by: HOSPITALIST

## 2020-11-05 PROCEDURE — 80053 COMPREHEN METABOLIC PANEL: CPT

## 2020-11-05 RX ORDER — POTASSIUM CHLORIDE 20 MEQ/1
40 TABLET, EXTENDED RELEASE ORAL
Status: COMPLETED | OUTPATIENT
Start: 2020-11-05 | End: 2020-11-05

## 2020-11-05 RX ADMIN — Medication 20 ML: at 21:00

## 2020-11-05 RX ADMIN — POTASSIUM CHLORIDE 40 MEQ: 1500 TABLET, EXTENDED RELEASE ORAL at 10:37

## 2020-11-05 RX ADMIN — PREGABALIN 50 MG: 50 CAPSULE ORAL at 16:00

## 2020-11-05 RX ADMIN — PIOGLITAZONE HYDROCHLORIDE 15 MG: 15 TABLET ORAL at 06:25

## 2020-11-05 RX ADMIN — CIPROFLOXACIN 500 MG: 500 TABLET, FILM COATED ORAL at 08:52

## 2020-11-05 RX ADMIN — INSULIN HUMAN 4 UNITS: 100 INJECTION, SOLUTION PARENTERAL at 17:13

## 2020-11-05 RX ADMIN — POLYETHYLENE GLYCOL 3350 17 G: 17 POWDER, FOR SOLUTION ORAL at 16:11

## 2020-11-05 RX ADMIN — OXYCODONE 10 MG: 5 TABLET ORAL at 12:36

## 2020-11-05 RX ADMIN — FAMOTIDINE 20 MG: 20 TABLET, FILM COATED ORAL at 08:54

## 2020-11-05 RX ADMIN — Medication 20 ML: at 16:00

## 2020-11-05 RX ADMIN — PREGABALIN 50 MG: 50 CAPSULE ORAL at 20:19

## 2020-11-05 RX ADMIN — Medication 20 ML: at 06:25

## 2020-11-05 RX ADMIN — INSULIN HUMAN 2 UNITS: 100 INJECTION, SOLUTION PARENTERAL at 12:36

## 2020-11-05 RX ADMIN — FAMOTIDINE 20 MG: 20 TABLET, FILM COATED ORAL at 17:14

## 2020-11-05 RX ADMIN — OXYCODONE 10 MG: 5 TABLET ORAL at 20:19

## 2020-11-05 RX ADMIN — ENOXAPARIN SODIUM 40 MG: 40 INJECTION SUBCUTANEOUS at 08:55

## 2020-11-05 RX ADMIN — CIPROFLOXACIN 500 MG: 500 TABLET, FILM COATED ORAL at 20:19

## 2020-11-05 RX ADMIN — PREGABALIN 50 MG: 50 CAPSULE ORAL at 08:54

## 2020-11-05 RX ADMIN — OXYCODONE 10 MG: 5 TABLET ORAL at 06:25

## 2020-11-05 RX ADMIN — SODIUM CHLORIDE 700 MG: 9 INJECTION, SOLUTION INTRAMUSCULAR; INTRAVENOUS; SUBCUTANEOUS at 16:10

## 2020-11-05 NOTE — PROGRESS NOTES
Infectious Disease Progress Note    Impression:   · Right ankle infection/septic joint. S/p (10/29) I&D, gross purulence encountered intra-op: cx negative  · She had had ankle pain for at least 3 weeks, PTA and was on clindamycin as outpatient and so all cultures are negative from blood and ankle aspiration. No cell count or crystal analysis performed on synovial fluid. · Type 2 DM: poorly controlled, Hgb A1c 10/28 11.7  · Hidradenitis suppurative right axilla with chronic inflammation and drainage  · Pustular drug rash on abdomen. Improved   · Elevated ESR/CRP    Plan:   · Continue daptomycin plus cipro; planned EOT 2020  · If daptomycin is a barrier to STR placement then we can rechallenge with vancomycin now that the rash is improved  · 4 weeks of treatment planned EOT 20; PICC placed   · We again discussed the importance of better diabetes management  · ID follow up 20 @ 8:30am    Anti-infectives:   1. Daptomycin (11/3/2020 - )  2. Vancomycin 10/28-11/3/2020  3. Cipro -  4. CTX 10/29-  5. Cefepime x 1 10/28    Subjective:   Rash is much better today    No Known Allergies     Review of Systems:  A comprehensive review of systems was negative except for that written in the History of Present Illness. Objective:   Blood pressure (!) 159/88, pulse 82, temperature 98.2 °F (36.8 °C), resp. rate 18, height 5' 9\" (1.753 m), weight 118.8 kg (262 lb), last menstrual period 10/29/2020, SpO2 97 %.   Temp (24hrs), Av.3 °F (36.8 °C), Min:98.2 °F (36.8 °C), Max:98.4 °F (36.9 °C)     Patient examined 2020, exam remains unchanged except as noted below:    General:  Alert, cooperative, no acute distress, appears stated age   Eyes:  Anicteric, no drainage, not injected, EOMI   Throat: Mucus membranes moist    Lungs:   Clear throughout lung fields without increased work of breathing or audible wheezes   Heart:  Regular rate and rhythm, without audible murmur, rub, or gallop   Abdomen:   Soft, non-tender, no guarding, no distention, bowel sounds active   Extremities: René LE edema, R ankle dressing C/D/I, neurovascular intact   Pulses: 2+ and symmetric   Skin: Pustular rash on abdomen is much better today     Lines/Devices: LUE PICC         Data Review:   Recent Results (from the past 24 hour(s))   GLUCOSE, POC    Collection Time: 11/04/20  5:23 PM   Result Value Ref Range    Glucose (POC) 207 (H) 65 - 100 mg/dL   GLUCOSE, POC    Collection Time: 11/04/20  8:51 PM   Result Value Ref Range    Glucose (POC) 210 (H) 65 - 100 mg/dL   CBC W/O DIFF    Collection Time: 11/05/20  4:28 AM   Result Value Ref Range    WBC 8.5 4.3 - 11.1 K/uL    RBC 2.88 (L) 4.05 - 5.2 M/uL    HGB 7.3 (L) 11.7 - 15.4 g/dL    HCT 23.6 (L) 35.8 - 46.3 %    MCV 81.9 79.6 - 97.8 FL    MCH 25.3 (L) 26.1 - 32.9 PG    MCHC 30.9 (L) 31.4 - 35.0 g/dL    RDW 14.3 11.9 - 14.6 %    PLATELET 402 640 - 601 K/uL    MPV 9.4 9.4 - 12.3 FL    ABSOLUTE NRBC 0.00 0.0 - 0.2 K/uL   METABOLIC PANEL, COMPREHENSIVE    Collection Time: 11/05/20  4:28 AM   Result Value Ref Range    Sodium 141 136 - 145 mmol/L    Potassium 3.3 (L) 3.5 - 5.1 mmol/L    Chloride 107 98 - 107 mmol/L    CO2 29 21 - 32 mmol/L    Anion gap 5 (L) 7 - 16 mmol/L    Glucose 145 (H) 65 - 100 mg/dL    BUN 12 6 - 23 MG/DL    Creatinine 0.53 (L) 0.6 - 1.0 MG/DL    GFR est AA >60 >60 ml/min/1.73m2    GFR est non-AA >60 >60 ml/min/1.73m2    Calcium 8.4 8.3 - 10.4 MG/DL    Bilirubin, total 0.2 0.2 - 1.1 MG/DL    ALT (SGPT) 28 12 - 65 U/L    AST (SGOT) 21 15 - 37 U/L    Alk.  phosphatase 171 (H) 50 - 136 U/L    Protein, total 7.3 6.3 - 8.2 g/dL    Albumin 1.8 (L) 3.5 - 5.0 g/dL    Globulin 5.5 (H) 2.3 - 3.5 g/dL    A-G Ratio 0.3 (L) 1.2 - 3.5     GLUCOSE, POC    Collection Time: 11/05/20  7:31 AM   Result Value Ref Range    Glucose (POC) 143 (H) 65 - 100 mg/dL   HEMOGLOBIN    Collection Time: 11/05/20  9:35 AM   Result Value Ref Range    HGB 7.7 (L) 11.7 - 15.4 g/dL   GLUCOSE, POC    Collection Time: 11/05/20 11:54 AM   Result Value Ref Range    Glucose (POC) 155 (H) 65 - 100 mg/dL        Microbiology:    All Micro Results     Procedure Component Value Units Date/Time    CULTURE, ANAEROBIC [[id number]] Collected:  10/29/20 1615    Order Status:  Completed Specimen:  Ankle Updated:  11/04/20 1002     Special Requests: NO SPECIAL REQUESTS        Culture result:       NO ANAEROBES ISOLATED 5 DAYS          CULTURE, BLOOD [[id number]] Collected:  10/28/20 1125    Order Status:  Completed Specimen:  Blood Updated:  11/04/20 0654     Special Requests: --        RIGHT  Antecubital       Culture result: NO GROWTH 5 DAYS       CULTURE, BLOOD [[id number]] Collected:  10/28/20 1715    Order Status:  Completed Specimen:  Blood Updated:  11/02/20 0913     Special Requests: --        RIGHT  Antecubital       Culture result: NO GROWTH 5 DAYS       CULTURE, Gerber Bark STAIN [[id number]] Collected:  10/29/20 1615    Order Status:  Completed Specimen:  Ankle Updated:  11/01/20 0811     Special Requests: NO SPECIAL REQUESTS        GRAM STAIN 0 TO 1 WBCS SEEN PER OIF      NO DEFINITE ORGANISM SEEN        Culture result: NO GROWTH 2 DAYS             Studies/Imaging:    Reviewed        Signed By: Di Ravi MD     November 5, 2020

## 2020-11-05 NOTE — PROGRESS NOTES
Comprehensive Nutrition Assessment    Type and Reason for Visit: Reassess    Nutrition Recommendations/Plan:   Continue current diet  Continue Glucerna TID - no chocolate    Nutrition Assessment:  PMH: morbid obesity, HTN, type 2 diabetes mellitus uncontrolled, DKA, hypertension who presented to the ER with 3 weeks history of constant right ankle pain and swelling. S/p right ankle washout and drainage 10/29. Patient seen up in bed with breakfast tray. Observed only ate oatmeal this am. She states that she feels like she has been eating better. She states that pain continues to be primary barrier to intake. She states that she did pretty well with dinner last evening and was able to eat about 50% of her lasagna and green beans last evening. She states she has only been able to drink par of each Glucerna and does not tolerate the chocolate. Reinforced adequate nutrition for wound healing. Encouraged her to sip on Glucerna through the day with goal of 3 per day. Encouraged increased intake of meals. Estimated Daily Nutrient Needs:  Energy (kcal): 5760-2496(70-02MMNN/SA CBW: 118.8kg); Weight Used for Energy Requirements:    Protein (g): 77-89(20% estimated needs); Weight Used for Protein Requirements:      Current Nutrition Therapies:  DIET DIABETIC CONSISTENT CARB Regular  DIET NUTRITIONAL SUPPLEMENTS All Meals; Glucerna Shake ( )    Anthropometric Measures:  · Height:  5' 9\" (175.3 cm)  · Current Body Wt:  118.8 kg (261 lb 14.5 oz)(standing scale 10/29)   · Body mass index is 38.69 kg/m². · BMI Category:  Obese class 2 (BMI 35.0-39. 9)       Nutrition Diagnosis:   · Inadequate oral intake related to (pain from septic ankle joint) as evidenced by (patient reported barrier to PO, diet recall/observation)      Nutrition Interventions:   Food and/or Nutrient Delivery: Continue current diet, Continue oral nutrition supplement  Nutrition Education and Counseling: (Reinforced kcal and protein needs to promote healing)    Goals: Increase oral intake to >75% of estimated needs       Nutrition Monitoring and Evaluation:   Food/Nutrient Intake Outcomes: Food and nutrient intake, Supplement intake    Discharge Planning:     Too soon to determine     [address] [place], LD on 11/5/2020 at 10:10 AM  Contact: [phone]

## 2020-11-05 NOTE — PROGRESS NOTES
Name: Marisol Haskins MRN: [id number]  : 1980  Age:40 y.o.  female  Admit Date:  10/28/2020 LOS: 8      Hospitalist Progress Note     Reason for Admission:  Septic joint ([address] [M00.9]  Right ankle pain [M25.571]  Right ankle swelling [M25.471]    Hospital Course:  Please refer to the admission H&P for details of presentation. In summary, Marisol Haskins is a 45 y.o. female with medical history significant for CAD, type 2 diabetes mellitus uncontrolled, hypertension who was admitted due to progressive worsening of right ankle pain and swelling for 3 weeks with concern for septic joint. ED, patient was afebrile and work-up shows no leukocytosis but elevated CRP. Was evaluated by orthopedics. Patient underwent ankle joint washout in the OR on 10/29/2020. She has been started on IV antibiotics for suspected septic joint. No additional inpatient plans for additional wash out by Ortho. Infectious disease is following and planning for at least 3 weeks of IV antibiotics. Patient developed pinpoint vesicular rashes on her abdomen on  which are non pruritic and non tender. It is likely due to drug reaction and has almost resolved by . Subjective/24 hr Events (20) :  Patient is seen and examined at bedside. No acute events reported overnight by nursing staff. Patient reports feeling \"ok\". Pain and swelling has been improving slightly each day. Vesicular rash on the abdomen has almost resolved. Patient denies fever, chills, chest pains, shortness of breath, n/v, abdominal pain. ROS: 10 point review of systems is otherwise negative with the exception of the elements mentioned above.     Objective:    Patient Vitals for the past 24 hrs:   Temp Pulse Resp BP SpO2   20 1243    (!) 159/88    20 1107 98.2 °F (36.8 °C) 82 18 (!) 168/110 97 %   20 0706 98.2 °F (36.8 °C) 77 18 (!) 166/87 96 %   20 2319 98.3 °F (36.8 °C) 91 18 (!) 140/64 96 %   20 1909 98.4 °F (36.9 °C) 99 18 (!) 149/75 95 %   11/04/20 1726  99  131/77      Oxygen Therapy  O2 Sat (%): 97 % (11/05/20 1107)  Pulse via Oximetry: 84 beats per minute (10/29/20 1840)  O2 Device: Room air (11/05/20 1600)  O2 Flow Rate (L/min): 2 l/min (11/02/20 1930)    Estimated body mass index is 38.69 kg/m² as calculated from the following:    Height as of this encounter: 5' 9\" (1.753 m). Weight as of this encounter: 118.8 kg (262 lb). No intake or output data in the 24 hours ending 11/05/20 1626    *Note that automatically entered I/Os may not be accurate; dependent on patient compliance with collection and accurate  by techs. Physical Exam:   General:     alert, awake, no acute distress. Well nourished. Obese  Head:   normocephalic, atraumatic  Eyes, Ears, nose: PERRL, EOMI. Normal conjunctiva  Neck:    supple, non-tender. Trachea midline. Lungs:   CTAB, no wheezing, rhonchi, rales  Cardiac:   RRR, Normal S1 and S2. Abdomen:   Soft, non distended, nontender, +BS  Extremities:   Warm, dry. No edema. Rt foot wrapped in bandage. Unable to  right hand due to pain. Skin:   No rashes, no jaundice  Neuro:  AAOx3.  No gross focal neurological deficit  Psychiatric:  No anxiety, calm, cooperative    Data Review:  I have reviewed all labs, meds, and studies from the last 24 hours:      Labs:    Recent Results (from the past 24 hour(s))   GLUCOSE, POC    Collection Time: 11/04/20  5:23 PM   Result Value Ref Range    Glucose (POC) 207 (H) 65 - 100 mg/dL   GLUCOSE, POC    Collection Time: 11/04/20  8:51 PM   Result Value Ref Range    Glucose (POC) 210 (H) 65 - 100 mg/dL   CBC W/O DIFF    Collection Time: 11/05/20  4:28 AM   Result Value Ref Range    WBC 8.5 4.3 - 11.1 K/uL    RBC 2.88 (L) 4.05 - 5.2 M/uL    HGB 7.3 (L) 11.7 - 15.4 g/dL    HCT 23.6 (L) 35.8 - 46.3 %    MCV 81.9 79.6 - 97.8 FL    MCH 25.3 (L) 26.1 - 32.9 PG    MCHC 30.9 (L) 31.4 - 35.0 g/dL    RDW 14.3 11.9 - 14.6 %    PLATELET 281 150 - 450 K/uL    MPV 9.4 9.4 - 12.3 FL    ABSOLUTE NRBC 0.00 0.0 - 0.2 K/uL   METABOLIC PANEL, COMPREHENSIVE    Collection Time: 11/05/20  4:28 AM   Result Value Ref Range    Sodium 141 136 - 145 mmol/L    Potassium 3.3 (L) 3.5 - 5.1 mmol/L    Chloride 107 98 - 107 mmol/L    CO2 29 21 - 32 mmol/L    Anion gap 5 (L) 7 - 16 mmol/L    Glucose 145 (H) 65 - 100 mg/dL    BUN 12 6 - 23 MG/DL    Creatinine 0.53 (L) 0.6 - 1.0 MG/DL    GFR est AA >60 >60 ml/min/1.73m2    GFR est non-AA >60 >60 ml/min/1.73m2    Calcium 8.4 8.3 - 10.4 MG/DL    Bilirubin, total 0.2 0.2 - 1.1 MG/DL    ALT (SGPT) 28 12 - 65 U/L    AST (SGOT) 21 15 - 37 U/L    Alk.  phosphatase 171 (H) 50 - 136 U/L    Protein, total 7.3 6.3 - 8.2 g/dL    Albumin 1.8 (L) 3.5 - 5.0 g/dL    Globulin 5.5 (H) 2.3 - 3.5 g/dL    A-G Ratio 0.3 (L) 1.2 - 3.5     GLUCOSE, POC    Collection Time: 11/05/20  7:31 AM   Result Value Ref Range    Glucose (POC) 143 (H) 65 - 100 mg/dL   HEMOGLOBIN    Collection Time: 11/05/20  9:35 AM   Result Value Ref Range    HGB 7.7 (L) 11.7 - 15.4 g/dL   GLUCOSE, POC    Collection Time: 11/05/20 11:54 AM   Result Value Ref Range    Glucose (POC) 155 (H) 65 - 100 mg/dL         All Micro Results     Procedure Component Value Units Date/Time    CULTURE, ANAEROBIC [[id number]] Collected:  10/29/20 1615    Order Status:  Completed Specimen:  Ankle Updated:  11/04/20 1002     Special Requests: NO SPECIAL REQUESTS        Culture result:       NO ANAEROBES ISOLATED 5 DAYS          CULTURE, BLOOD [[id number]] Collected:  10/28/20 1125    Order Status:  Completed Specimen:  Blood Updated:  11/04/20 0654     Special Requests: --        RIGHT  Antecubital       Culture result: NO GROWTH 5 DAYS       CULTURE, BLOOD [[id number]] Collected:  10/28/20 1715    Order Status:  Completed Specimen:  Blood Updated:  11/02/20 0913     Special Requests: --        RIGHT  Antecubital       Culture result: NO GROWTH 5 DAYS       CULTURE, WOUND Milderd  STAIN [[id number]] Collected:  10/29/20 1615    Order Status:  Completed Specimen:  Ankle Updated:  11/01/20 0811     Special Requests: NO SPECIAL REQUESTS        GRAM STAIN 0 TO 1 WBCS SEEN PER OIF      NO DEFINITE ORGANISM SEEN        Culture result: NO GROWTH 2 DAYS             Current Meds:  Current Facility-Administered Medications   Medication Dose Route Frequency    pregabalin (LYRICA) capsule 50 mg  50 mg Oral TID    DAPTOmycin (CUBICIN) 700 mg in 0.9% sodium chloride 14 mL IV Syringe  6 mg/kg IntraVENous Q24H    0.9% sodium chloride infusion 250 mL  250 mL IntraVENous PRN    polyethylene glycol (MIRALAX) packet 17 g  17 g Oral DAILY    0.9% sodium chloride infusion 250 mL  250 mL IntraVENous PRN    central line flush (saline) syringe 20 mL  20 mL InterCATHeter Q8H    insulin regular (NOVOLIN R, HUMULIN R) injection   SubCUTAneous AC&HS    famotidine (PEPCID) tablet 20 mg  20 mg Oral BID    ciprofloxacin HCl (CIPRO) tablet 500 mg  500 mg Oral Q12H    morphine injection 2 mg  2 mg IntraVENous Q4H PRN    naloxone (NARCAN) injection 0.1 mg  0.1 mg IntraVENous EVERY 2 MINUTES AS NEEDED    flumazeniL (ROMAZICON) 0.1 mg/mL injection 0.2 mg  0.2 mg IntraVENous Multiple    diphenhydrAMINE (BENADRYL) injection 12.5 mg  12.5 mg IntraVENous Q15MIN PRN    oxyCODONE IR (ROXICODONE) tablet 10 mg  10 mg Oral Q4H PRN    dapagliflozin (FARXIGA) tablet 10 mg (Patient Supplied)  10 mg Oral DAILY    pioglitazone (ACTOS) tablet 15 mg  15 mg Oral ACB    sodium chloride (NS) flush 5-40 mL  5-40 mL IntraVENous Q8H    sodium chloride (NS) flush 5-40 mL  5-40 mL IntraVENous PRN    acetaminophen (TYLENOL) tablet 650 mg  650 mg Oral Q6H PRN    Or    acetaminophen (TYLENOL) suppository 650 mg  650 mg Rectal Q6H PRN    promethazine (PHENERGAN) tablet 12.5 mg  12.5 mg Oral Q6H PRN    Or    ondansetron (ZOFRAN) injection 4 mg  4 mg IntraVENous Q4H PRN    enoxaparin (LOVENOX) injection 40 mg  40 mg SubCUTAneous DAILY    potassium chloride 10 mEq in 100 ml IVPB  10 mEq IntraVENous PRN    magnesium sulfate 2 g/50 ml IVPB (premix or compounded)  2 g IntraVENous PRN    oxyCODONE IR (ROXICODONE) tablet 5 mg  5 mg Oral Q4H PRN    naloxone (NARCAN) injection 0.4 mg  0.4 mg IntraVENous PRN    nystatin (MYCOSTATIN) 100,000 unit/gram powder   Topical BID         Other Studies:  Xr Chest Pa Lat    Result Date: 10/12/2020  History: cp Two views chest Findings: The lungs are well expanded and clear. The cardiac silhouette, and mediastinal contour, and osseous structures are normal.     Impression: Unremarkable two-view chest.     Xr Ankle Rt Min 3 V    Result Date: 10/12/2020  History: Medial right ankle pain and swelling, 2 days duration 3 views right ankle FINDINGS: No acute fracture, dislocation, or additional acute bony abnormality. IMPRESSION: No acute findings. Duplex Lower Ext Venous Right    Result Date: 10/28/2020  Right lower extremity venous ultrasound INDICATION:  Pain and swelling, chronic, moderate COMPARISON: Radiography of the right ankle 10/12/2020 and ultrasound of the leg 2/23/2020 Technique: Grayscale, color Doppler, and spectral analysis were performed on the deep veins. FINDINGS: There is no evidence of Baker's cyst. Note of a right inguinal 2.5 x 1.1 x 1.7 cm lymph node with normal morphology and no hypervascularity. This is of unclear but doubtful significance, unless clinically suspicious. There is normal flow in the common femoral, superficial femoral, greater saphenous, femoral and popliteal veins. Normal compression and augmentation demonstrated. The proximal calf veins are also patent as is the partially visualized contralateral common femoral vein.      IMPRESSION: No evidence of deep venous thrombosis in the right lower extremity     Assessment:    Active Hospital Problems    Diagnosis Date Noted    Vesicular rash 11/02/2020    Pain of right hand 10/31/2020    Left axillary hidradenitis 10/30/2020    Right ankle swelling 10/28/2020    Right ankle pain 10/28/2020    Septic joint ([facility] [address] 10/28/2020    Positive for microalbuminuria 02/12/2018    DM2 (diabetes mellitus, type 2) ([facility] [address] 02/24/2014     Newly diagnosed 2/22/14      HTN (hypertension) 02/24/2014    Morbid obesity ([facility] [address] 02/24/2014       Plan:    Rt Ankle pain/swelling concerning for septic Joint  S/p washout by Ortho. BCx and ankle joint fluid shows not growth. - ID following : dapto+ cipro  - Ortho recs appreciated: no additional OR planned. Outpatient follow up in 2 weeks. Rt hand/arm swelling and pain (improved)  Rt superficial vein thrombosis (cephalic vein)  Doppler of UE showed small short segment thrombus in the cephalic vein at the antecubital fossa, superficial vein and Mild interstitial edema at the area of swelling of the right wrist without drainable fluid collection. Unable to  due to pain. No numbness/tingling.  - warm compress  - [facility] [place] given superficial nature of thrombosis    Vesicular rash on abdomen  ?drug reaction  - monitor.  - abx changed per ID    Normocytic Anemia  S/p 1 unit of PRBC. Possible post-op anemia vs iron deficiency. Occult stool is negative. LLE swelling/pain improved so less suspicion for intramuscular bleeding.   - monitor and transfuse as needed    T2DM, uncontrolled  A1c 11.7  - hold home metformin  - consult diabetic education. - continue with home Actos and Farxiga  - ISS, diabetic diet    L axilla hidradenitis  - will need out patient surgical follow up for resection    Hypoalbuminemia: consult nutrition    Morbid Obesity: Body mass index is 38.69 kg/m². Diet:  DIET DIABETIC CONSISTENT CARB  DIET NUTRITIONAL SUPPLEMENTS  DVT PPx: lovenox  Code: Full Code  Dispo:SNF  Estimated Discharge: pending SNF placement    Labs/Imaging Reviewed. Patient is moderate risk due to current condition and comorbid conditions.     Plan discussed with staff, patient/family and are in agreement.       Signed By: Viktoriya Kim MD     November 5, 2020

## 2020-11-05 NOTE — PROGRESS NOTES
END OF SHIFT NOTE:    INTAKE/OUTPUT  11/03 0701 - 11/04 0700  In: 697.9 [I.V.:350]  Out: 1300 [Urine:1300]  Voiding: YES  Catheter: NO  Drain:              Flatus: Patient does have flatus present. Stool:  1 occurrences. Characteristics:  Stool Assessment  Stool Color: Brown  Stool Appearance: Loose  Stool Amount: Large  Stool Source/Status: Rectum    Emesis: 0 occurrences. Characteristics:        VITAL SIGNS  Patient Vitals for the past 12 hrs:   Temp Pulse Resp BP SpO2   11/04/20 1909 98.4 °F (36.9 °C) 99 18 (!) 149/75 95 %   11/04/20 1726  99  131/77    11/04/20 1528 97.7 °F (36.5 °C) 94 18 (!) 184/89 94 %   11/04/20 1155 98.1 °F (36.7 °C) 81 18 (!) 156/85 95 %       Pain Assessment  Pain Intensity 1: 8 (11/04/20 1615)  Pain Location 1: Ankle  Pain Intervention(s) 1: Ambulation/Increased Activity, Repositioned, Nurse notified  Patient Stated Pain Goal: 0    Ambulating  Yes, in room with assistance    Shift report given to oncoming nurse at the bedside.     Daisha Hudson RN

## 2020-11-05 NOTE — PROGRESS NOTES
This CM met with pt this day to discuss discharge planning. Reviewed 9th floor consult with pt but that she didn't meet criteria per MD note. Discussed [facility] as an option verse going to a SNF but pt does not want to go that far. Pt would like referral made to [facility]. This CM sent referral this day and [facility] able to offer [facility] bed. This CM discussed bed offer with pt and she accepts. [facility] to initiate prior authorization with pt insurance. PPD has already been placed and read. Pt will need COVID swab completed prior to discharge to SNF if insurance approves. Pt requested CM call ortho MD office to find out about length of time given for FMLA. This CM called [facility] office and left message requesting call back to discuss information. No additional CM needs at this time. Will continue to follow and update as needed.

## 2020-11-05 NOTE — PROGRESS NOTES
Problem: Mobility Impaired (Adult and Pediatric)  Goal: *Acute Goals and Plan of Care (Insert Text)  Description: LTG:  (1.)Ms. Salgado will move from supine to sit and sit to supine , scoot up and down, and roll side to side in flat bed with INDEPENDENT within 7 treatment day(s). (2.)Ms. Salgado will transfer from bed to chair and chair to bed with MODIFIED INDEPENDENCE using the least restrictive device within 7 treatment day(s). (3.)Ms. Salgado will ambulate with MODIFIED INDEPENDENCE for 25+ feet with the least restrictive device within 7 treatment day(s). 4.  Ms. Priti Velasquez will demonstrate 50 degrees AROM R ankle within 7 treatment days. ________________________________________________________________________________________________      Outcome: Progressing Towards Goal     PHYSICAL THERAPY: Daily Note and AM 11/5/2020  INPATIENT: PT Visit Days : 3  WBAT R LE  Payor: BLUE CROSS / [place]: [place] OUT OF STATE / Product Type: PPO /       NAME/AGE/GENDER: Marisol Haskins is a 45 y.o. female   PRIMARY DIAGNOSIS: Septic joint ([address] [M00.9]  Right ankle pain [M25.571]  Right ankle swelling [M25.471] Septic joint ([address] Septic joint (HCC)  Procedure(s) (LRB):  EXTREMITY IRRIGATION AND DEBRIDEMENT/LEFT ANKLE (Left)  7 Days Post-Op  ICD-10: Treatment Diagnosis:    · Other abnormalities of gait and mobility (R26.89)   Precaution/Allergies:  Patient has no known allergies. ASSESSMENT:     Ms. Priti Velasquez  lives alone (3rd floor apartment) and is independent in home and community. When her ankle began hurting, she began using a RW. Patient was sitting EOB, having pain but not wanting to take any pain meds right now. She stood using the walker to hop to the commode which she used independently. She then hopped a few feet to the recliner chair. She is still not putting any weight on her right foot with ambulation. She performed seated exercises below with better ability and confidence than yesterday. Slow steady progress and should do well at rehab. This section established at most recent assessment   PROBLEM LIST (Impairments causing functional limitations):  1. Decreased Strength  2. Decreased ADL/Functional Activities  3. Decreased Transfer Abilities  4. Decreased Ambulation Ability/Technique  5. Decreased Balance  6. Increased Pain  7. Decreased Activity Tolerance   INTERVENTIONS PLANNED: (Benefits and precautions of physical therapy have been discussed with the patient.)  1. Balance Exercise  2. Bed Mobility  3. Gait Training  4. Therapeutic Activites  5. Therapeutic Exercise/Strengthening  6. Transfer Training  7. education     TREATMENT PLAN: Frequency/Duration: 5 times a week for duration of hospital stay  Rehabilitation Potential For Stated Goals: Good     REHAB RECOMMENDATIONS (at time of discharge pending progress):    Placement: It is my opinion, based on this patient's performance to date, that Ms. Salgado may benefit from intensive therapy at a [address] [place] [address] after discharge due to the functional deficits listed above that are likely to improve with skilled rehabilitation and concerns that he/she may be unsafe to be unsupervised at home due to decreased mobility . Equipment:    None at this time              HISTORY:   History of Present Injury/Illness (Reason for Referral):  Per MD note, \"Patient is a 78-year-old -American female with history of morbid obesity, type 2 diabetes mellitus uncontrolled, hypertension who presented to the ER with 3 weeks history of constant right ankle pain and swelling which is progressively getting worse. Patient is currently a little somnolent since she got morphine in the ER for pain control. She is easily arousable and is answering questions appropriately. She reports symptoms began spontaneously with sudden onset of right ankle pain that was followed by ankle swelling.   She has been to the emergency multiple times but she was told initially that it was a gout attack and then next time was told to be cellulitis and was discharged on antibiotics, colchicine, indomethacin. Over the last 2 to 3 days she has developed a blister medial aspect of right ankle. She denies having any fever but reports feeling warm every now and then with chills. Denies having any chest pain, palpitations, cough, shortness of breath, abdominal pain. Patient has been evaluated by Ortho in ER with possible plan for washout tomorrow following an MRI of right foot. Blood work showed CRP of 132 raising suspicion of septic joint. \"  Past Medical History/Comorbidities:   Ms. Regina Torre  has a past medical history of Diabetes ([address] and Hypertension. Ms. Regina Torre  has a past surgical history that includes hx  section. Social History/Living Environment:   Home Environment: Apartment  # Steps to Enter: (3 flights of stairs)  One/Two Story Residence: Other (Comment)(lives on 3rd floor, no elevator access )  Living Alone: Yes  Support Systems: Friends \ neighbors  Patient Expects to be Discharged to[de-identified] Rehabilitation facility  Current DME Used/Available at Home: Walker, rolling  Prior Level of Function/Work/Activity:  She lives alone and is independent in home and community. Number of Personal Factors/Comorbidities that affect the Plan of Care: 1-2: MODERATE COMPLEXITY   EXAMINATION:   Most Recent Physical Functioning:   Gross Assessment:                  Posture:     Balance:    Bed Mobility:  Supine to Sit: Independent  Wheelchair Mobility:     Transfers:  Sit to Stand: Stand-by assistance  Stand to Sit: Stand-by assistance  Gait:  Right Side Weight Bearing: As tolerated  Gait Abnormalities: (not putting weight on R foot)  Distance (ft): 4 Feet (ft)  Assistive Device: Walker, rolling  Ambulation - Level of Assistance: Stand-by assistance      Body Structures Involved:  1. Bones  2. Joints  3.  Muscles Body Functions Affected:  1. Sensory/Pain  2. Neuromusculoskeletal  3. Movement Related  4. Skin Related  5. Metobolic/Endocrine Activities and Participation Affected:  1. Mobility  2. Self Care  3. Domestic Life  4. Community, Social and [place]   Number of elements that affect the Plan of Care: 4+: HIGH COMPLEXITY   CLINICAL PRESENTATION:   Presentation: Stable and uncomplicated: LOW COMPLEXITY   CLINICAL DECISION MAKIN [facility] Inpatient Short Form  How much difficulty does the patient currently have. .. Unable A Lot A Little None   1. Turning over in bed (including adjusting bedclothes, sheets and blankets)? [] 1   [] 2   [x] 3   [] 4   2. Sitting down on and standing up from a chair with arms ( e.g., wheelchair, bedside commode, etc.)   [] 1   [] 2   [x] 3   [] 4   3. Moving from lying on back to sitting on the side of the bed? [] 1   [] 2   [x] 3   [] 4   How much help from another person does the patient currently need. .. Total A Lot A Little None   4. Moving to and from a bed to a chair (including a wheelchair)? [] 1   [] 2   [x] 3   [] 4   5. Need to walk in hospital room? [] 1   [x] 2   [] 3   [] 4   6. Climbing 3-5 steps with a railing? [] 1   [x] 2   [] 3   [] 4   © , Trustees of [address] [place] [address], under license to [employer]. All rights reserved      Score:  Initial: 16 Most Recent: X (Date: -- )    Interpretation of Tool:  Represents activities that are increasingly more difficult (i.e. Bed mobility, Transfers, Gait). Medical Necessity:     · Patient is expected to demonstrate progress in   · strength, range of motion, balance, coordination, and functional technique  ·  to   · increase independence with   and improve safety during all functional mobility  · .   Reason for Services/Other Comments:  · Patient continues to require skilled intervention due to   · medical complications and patient unable to attend/participate in therapy as expected  · . Use of outcome tool(s) and clinical judgement create a POC that gives a: Clear prediction of patient's progress: LOW COMPLEXITY            TREATMENT:   (In addition to Assessment/Re-Assessment sessions the following treatments were rendered)   Pre-treatment Symptoms/Complaints: pain but not wanting pain meds  Pain: Initial:   Pain Intensity 1: 5  Post Session:  Comfortable in chair     Gait Training (  10):  Gait training to improve and/or restore physical functioning as related to mobility, strength, and balance. Sit to stand 4 Feet (ft) with Stand-by assistance using a Walker, rolling and moderate verbal cues related to their ankle position and motion to promote proper body alignment and promote proper body posture. Instruction in performance of using UE's to self limit WBing R LE to correct  comfort level . Therapeutic Exercise: (15 Minutes):  Exercises per grid below to improve mobility. Required minimal visual and verbal cues to promote proper body mechanics. Progressed complexity of movement as indicated. Date:  11/4/20 Date:  11/5/20 Date:     Activity/Exercise Parameters Parameters Parameters   Seated TKE 5x3 B 10x2 B    Seated AP 10x R 10x B into yellow thera band    Seated ankle side to side 10x R     Seated HS curls  10x B yellow thera band    Seated marching  10x B    Seated heel lift  10x B                Braces/Orthotics/Lines/Etc:   · none  Treatment/Session Assessment:    · Response to Treatment:  Cooperative and receptive  · Interdisciplinary Collaboration:   o Physical Therapy Assistant  o Registered Nurse  · After treatment position/precautions:   o Up in chair  o Bed/Chair-wheels locked  o Call light within reach  o RN notified   · Compliance with Program/Exercises: Compliant all of the time  · Recommendations/Intent for next treatment session: \"Next visit will focus on advancements to more challenging activities and reduction in assistance provided\".   Total Treatment Duration:  PT Patient Time In/Time Out  Time In: 1125  Time Out: [facility], [facility]

## 2020-11-05 NOTE — PROGRESS NOTES
Problem: Diabetes Self-Management  Goal: *Disease process and treatment process  Description: Define diabetes and identify own type of diabetes; list 3 options for treating diabetes. Outcome: Progressing Towards Goal  Goal: *Incorporating nutritional management into lifestyle  Description: Describe effect of type, amount and timing of food on blood glucose; list 3 methods for planning meals. Outcome: Progressing Towards Goal  Goal: *Incorporating physical activity into lifestyle  Description: State effect of exercise on blood glucose levels. Outcome: Progressing Towards Goal  Goal: *Developing strategies to promote health/change behavior  Description: Define the ABC's of diabetes; identify appropriate screenings, schedule and personal plan for screenings. Outcome: Progressing Towards Goal  Goal: *Using medications safely  Description: State effect of diabetes medications on diabetes; name diabetes medication taking, action and side effects. Outcome: Progressing Towards Goal  Goal: *Monitoring blood glucose, interpreting and using results  Description: Identify recommended blood glucose targets  and personal targets. Outcome: Progressing Towards Goal  Goal: *Prevention, detection, treatment of acute complications  Description: List symptoms of hyper- and hypoglycemia; describe how to treat low blood sugar and actions for lowering  high blood glucose level. Outcome: Progressing Towards Goal  Goal: *Prevention, detection and treatment of chronic complications  Description: Define the natural course of diabetes and describe the relationship of blood glucose levels to long term complications of diabetes.   Outcome: Progressing Towards Goal  Goal: *Developing strategies to address psychosocial issues  Description: Describe feelings about living with diabetes; identify support needed and support network  Outcome: Progressing Towards Goal  Goal: *Insulin pump training  Outcome: Progressing Towards Goal  Goal: *Sick day guidelines  Outcome: Progressing Towards Goal  Goal: *Patient Specific Goal (EDIT GOAL, INSERT TEXT)  Outcome: Progressing Towards Goal     Problem: Patient Education: Go to Patient Education Activity  Goal: Patient/Family Education  Outcome: Progressing Towards Goal     Problem: Falls - Risk of  Goal: *Absence of Falls  Description: Document Jennifer Pacheco Fall Risk and appropriate interventions in the flowsheet. Outcome: Progressing Towards Goal  Note: Fall Risk Interventions:  Mobility Interventions: Communicate number of staff needed for ambulation/transfer, Patient to call before getting OOB         Medication Interventions: Patient to call before getting OOB, Teach patient to arise slowly    Elimination Interventions: Call light in reach, Patient to call for help with toileting needs              Problem: Patient Education: Go to Patient Education Activity  Goal: Patient/Family Education  Outcome: Progressing Towards Goal     Problem: Pressure Injury - Risk of  Goal: *Prevention of pressure injury  Description: Document Alistair Scale and appropriate interventions in the flowsheet.   Outcome: Progressing Towards Goal  Note: Pressure Injury Interventions:       Moisture Interventions: Absorbent underpads    Activity Interventions: Pressure redistribution bed/mattress(bed type), Increase time out of bed    Mobility Interventions: Pressure redistribution bed/mattress (bed type), HOB 30 degrees or less    Nutrition Interventions: Offer support with meals,snacks and hydration, Document food/fluid/supplement intake                     Problem: Patient Education: Go to Patient Education Activity  Goal: Patient/Family Education  Outcome: Progressing Towards Goal     Problem: Patient Education: Go to Patient Education Activity  Goal: Patient/Family Education  Outcome: Progressing Towards Goal     Problem: Patient Education: Go to Patient Education Activity  Goal: Patient/Family Education  Outcome: Progressing Towards Goal

## 2020-11-06 LAB
ALBUMIN SERPL-MCNC: 2.1 G/DL (ref 3.5–5)
ALBUMIN/GLOB SERPL: 0.4 {RATIO} (ref 1.2–3.5)
ALP SERPL-CCNC: 164 U/L (ref 50–136)
ALT SERPL-CCNC: 25 U/L (ref 12–65)
ANION GAP SERPL CALC-SCNC: 3 MMOL/L (ref 7–16)
AST SERPL-CCNC: 16 U/L (ref 15–37)
BACTERIA SPEC CULT: NORMAL
BILIRUB SERPL-MCNC: 0.2 MG/DL (ref 0.2–1.1)
BUN SERPL-MCNC: 14 MG/DL (ref 6–23)
CALCIUM SERPL-MCNC: 8.4 MG/DL (ref 8.3–10.4)
CHLORIDE SERPL-SCNC: 106 MMOL/L (ref 98–107)
CO2 SERPL-SCNC: 30 MMOL/L (ref 21–32)
CREAT SERPL-MCNC: 0.62 MG/DL (ref 0.6–1)
ERYTHROCYTE [DISTWIDTH] IN BLOOD BY AUTOMATED COUNT: 14.7 % (ref 11.9–14.6)
GLOBULIN SER CALC-MCNC: 6 G/DL (ref 2.3–3.5)
GLUCOSE BLD STRIP.AUTO-MCNC: 147 MG/DL (ref 65–100)
GLUCOSE BLD STRIP.AUTO-MCNC: 162 MG/DL (ref 65–100)
GLUCOSE BLD STRIP.AUTO-MCNC: 179 MG/DL (ref 65–100)
GLUCOSE BLD STRIP.AUTO-MCNC: 193 MG/DL (ref 65–100)
GLUCOSE SERPL-MCNC: 150 MG/DL (ref 65–100)
HCT VFR BLD AUTO: 25.7 % (ref 35.8–46.3)
HGB BLD-MCNC: 8 G/DL (ref 11.7–15.4)
MCH RBC QN AUTO: 25.6 PG (ref 26.1–32.9)
MCHC RBC AUTO-ENTMCNC: 31.1 G/DL (ref 31.4–35)
MCV RBC AUTO: 82.1 FL (ref 79.6–97.8)
NRBC # BLD: 0 K/UL (ref 0–0.2)
PLATELET # BLD AUTO: 444 K/UL (ref 150–450)
PMV BLD AUTO: 9.4 FL (ref 9.4–12.3)
POTASSIUM SERPL-SCNC: 3.8 MMOL/L (ref 3.5–5.1)
PROT SERPL-MCNC: 8.1 G/DL (ref 6.3–8.2)
RBC # BLD AUTO: 3.13 M/UL (ref 4.05–5.2)
SERVICE CMNT-IMP: NORMAL
SODIUM SERPL-SCNC: 139 MMOL/L (ref 136–145)
WBC # BLD AUTO: 8.1 K/UL (ref 4.3–11.1)

## 2020-11-06 PROCEDURE — 74011250637 HC RX REV CODE- 250/637: Performed by: INTERNAL MEDICINE

## 2020-11-06 PROCEDURE — 82962 GLUCOSE BLOOD TEST: CPT

## 2020-11-06 PROCEDURE — 97110 THERAPEUTIC EXERCISES: CPT

## 2020-11-06 PROCEDURE — 74011636637 HC RX REV CODE- 636/637: Performed by: INTERNAL MEDICINE

## 2020-11-06 PROCEDURE — 85027 COMPLETE CBC AUTOMATED: CPT

## 2020-11-06 PROCEDURE — 97530 THERAPEUTIC ACTIVITIES: CPT

## 2020-11-06 PROCEDURE — 74011250637 HC RX REV CODE- 250/637: Performed by: HOSPITALIST

## 2020-11-06 PROCEDURE — 65270000029 HC RM PRIVATE

## 2020-11-06 PROCEDURE — 74011250636 HC RX REV CODE- 250/636: Performed by: INTERNAL MEDICINE

## 2020-11-06 PROCEDURE — 80053 COMPREHEN METABOLIC PANEL: CPT

## 2020-11-06 PROCEDURE — 74011250636 HC RX REV CODE- 250/636: Performed by: HOSPITALIST

## 2020-11-06 PROCEDURE — 74011000250 HC RX REV CODE- 250: Performed by: INTERNAL MEDICINE

## 2020-11-06 PROCEDURE — 2709999900 HC NON-CHARGEABLE SUPPLY

## 2020-11-06 RX ORDER — LANOLIN ALCOHOL/MO/W.PET/CERES
1 CREAM (GRAM) TOPICAL
Status: DISCONTINUED | OUTPATIENT
Start: 2020-11-07 | End: 2020-11-09 | Stop reason: HOSPADM

## 2020-11-06 RX ORDER — LISINOPRIL 5 MG/1
10 TABLET ORAL DAILY
Status: DISCONTINUED | OUTPATIENT
Start: 2020-11-06 | End: 2020-11-09 | Stop reason: HOSPADM

## 2020-11-06 RX ORDER — AMLODIPINE BESYLATE 10 MG/1
10 TABLET ORAL DAILY
Status: DISCONTINUED | OUTPATIENT
Start: 2020-11-06 | End: 2020-11-06

## 2020-11-06 RX ADMIN — Medication 20 ML: at 13:28

## 2020-11-06 RX ADMIN — PREGABALIN 50 MG: 50 CAPSULE ORAL at 21:32

## 2020-11-06 RX ADMIN — PREGABALIN 50 MG: 50 CAPSULE ORAL at 08:53

## 2020-11-06 RX ADMIN — INSULIN HUMAN 2 UNITS: 100 INJECTION, SOLUTION PARENTERAL at 08:53

## 2020-11-06 RX ADMIN — SODIUM CHLORIDE 700 MG: 9 INJECTION, SOLUTION INTRAMUSCULAR; INTRAVENOUS; SUBCUTANEOUS at 16:29

## 2020-11-06 RX ADMIN — Medication 20 ML: at 22:00

## 2020-11-06 RX ADMIN — INSULIN HUMAN 2 UNITS: 100 INJECTION, SOLUTION PARENTERAL at 21:32

## 2020-11-06 RX ADMIN — Medication 20 ML: at 05:20

## 2020-11-06 RX ADMIN — INSULIN HUMAN 2 UNITS: 100 INJECTION, SOLUTION PARENTERAL at 12:30

## 2020-11-06 RX ADMIN — CIPROFLOXACIN 500 MG: 500 TABLET, FILM COATED ORAL at 08:53

## 2020-11-06 RX ADMIN — LISINOPRIL 10 MG: 5 TABLET ORAL at 12:30

## 2020-11-06 RX ADMIN — FAMOTIDINE 20 MG: 20 TABLET, FILM COATED ORAL at 17:49

## 2020-11-06 RX ADMIN — PIOGLITAZONE HYDROCHLORIDE 15 MG: 15 TABLET ORAL at 05:20

## 2020-11-06 RX ADMIN — CIPROFLOXACIN 500 MG: 500 TABLET, FILM COATED ORAL at 21:27

## 2020-11-06 RX ADMIN — ENOXAPARIN SODIUM 40 MG: 40 INJECTION SUBCUTANEOUS at 08:54

## 2020-11-06 RX ADMIN — FAMOTIDINE 20 MG: 20 TABLET, FILM COATED ORAL at 08:53

## 2020-11-06 RX ADMIN — PREGABALIN 50 MG: 50 CAPSULE ORAL at 16:29

## 2020-11-06 NOTE — PROGRESS NOTES
END OF SHIFT NOTE:    INTAKE/OUTPUT  No intake/output data recorded. Voiding: YES  Catheter: NO  Drain:              Flatus: Patient does have flatus present. Stool:  0 occurrences. Characteristics:  Stool Assessment  Stool Color: Other (Comment)(have not observed)  Stool Appearance: Loose  Stool Amount: Large  Stool Source/Status: Rectum    Emesis: 0 occurrences. Characteristics:        VITAL SIGNS  Patient Vitals for the past 12 hrs:   Temp Pulse Resp BP SpO2   11/06/20 0720 98.2 °F (36.8 °C) 83 18 (!) 160/82 96 %       Pain Assessment  Pain Intensity 1: 7 (11/06/20 0745)  Pain Location 1: Ankle  Pain Intervention(s) 1: Rest, Repositioned  Patient Stated Pain Goal: 2    Ambulating  Yes    Shift report given to oncoming nurse at the bedside.     William Knapp

## 2020-11-06 NOTE — DIABETES MGMT
Patient admitted with septic joint. Blood glucose ranged 143-202 yesterday with patient receiving Regular 6 units. Blood glucose this morning was 162. Reviewed patient current regimen with patient: Regular SSI. Patient states she plans to discharge to rehab. Again review importance of good glycemic control on wound healing and encouraged follow up with PCP. Patient verbalized understanding and voices no further questions regarding diabetes management at this time.

## 2020-11-06 NOTE — PROGRESS NOTES
This CM continues to follow for discharge planning and/or CM needs. Plan remains at this time that pt will transition to [facility] if and when insurance authorization makes determination. Talked with Heather Harmon with [facility] and insurance authorization is still pending. This CM left another voicemail for [phone] regarding pt's FMLA this day. No additional CM needs at this time. Will continue to follow and update as needed.

## 2020-11-06 NOTE — PROGRESS NOTES
Hospitalist Note     Admit Date:  10/28/2020 11:24 AM   Name:  Nidhi Chang   Age:  45 y.o.  :  1980   MRN:  [id number]   PCP:  Ag Mccoy MD  Treatment Team: Attending Provider: Cipriano Mckeon MD; Surgeon: Eldon Early MD; : Danielle Soria; Utilization Review: Luh Delong RN; Consulting Provider: Agnieszka Moffett MD; Care Manager: Bella Vicente; Utilization Review: Mahnaz Paulino; Physical Therapy Assistant: Matt Munroe PTA    HPI/Subjective:     Please refer to the admission H&P for details of presentation. In summary, Nidhi Chang is a 45 y.o. female with medical history significant for CAD, type 2 diabetes mellitus uncontrolled, hypertension who was admitted due to progressive worsening of right ankle pain and swelling for 3 weeks with concern for septic joint. ED, patient was afebrile and work-up shows no leukocytosis but elevated CRP. Was evaluated by orthopedics. Patient underwent ankle joint washout in the OR on 10/29/2020. She has been started on IV antibiotics for suspected septic joint. No additional inpatient plans for additional wash out by Ortho. Infectious disease is following and planning for at least 3 weeks of IV antibiotics. Patient developed pinpoint vesicular rashes on her abdomen on  which are non pruritic and non tender. It is likely due to drug reaction and has almost resolved by .     - awaiting insurance approval for SNF. No complaints. Pain controlled.   No SOB, fevers    No other complaints  Objective:     Patient Vitals for the past 24 hrs:   Temp Pulse Resp BP SpO2   20 0720 98.2 °F (36.8 °C) 83 18 (!) 160/82 96 %   20 1946 98.5 °F (36.9 °C) 91 18 (!) 155/93 96 %   20 1547 98 °F (36.7 °C) 82 17 (!) 161/81 97 %   20 1243    (!) 159/88    20 1107 98.2 °F (36.8 °C) 82 18 (!) 168/110 97 %     Oxygen Therapy  O2 Sat (%): 96 % (20 0720)  Pulse via Oximetry: 84 beats per minute (10/29/20 1840)  O2 Device: Room air (11/06/20 0745)  O2 Flow Rate (L/min): 2 l/min (11/02/20 1930)    Estimated body mass index is 38.69 kg/m² as calculated from the following:    Height as of this encounter: 5' 9\" (1.753 m). Weight as of this encounter: 118.8 kg (262 lb). No intake or output data in the 24 hours ending 11/06/20 1005    *Note that automatically entered I/Os may not be accurate; dependent on patient compliance with collection and accurate  by techs. General:    Well nourished. No overt distress  CV:   RRR. No edema. No JVD  Lungs:   Even, Unlabored  Abdomen:   nondistended. Extremities: Warm and dry. No cyanosis   Skin:     No rashes. No jaundice. Normal coloration  Neuro:  No gross focal deficits.   Alert    Data Reviewed:  I have reviewed all labs, meds, and studies from the last 24 hours:  Recent Results (from the past 24 hour(s))   GLUCOSE, POC    Collection Time: 11/05/20 11:54 AM   Result Value Ref Range    Glucose (POC) 155 (H) 65 - 100 mg/dL   GLUCOSE, POC    Collection Time: 11/05/20  5:01 PM   Result Value Ref Range    Glucose (POC) 194 (H) 65 - 100 mg/dL   GLUCOSE, POC    Collection Time: 11/05/20  8:37 PM   Result Value Ref Range    Glucose (POC) 202 (H) 65 - 100 mg/dL   CBC W/O DIFF    Collection Time: 11/06/20  7:14 AM   Result Value Ref Range    WBC 8.1 4.3 - 11.1 K/uL    RBC 3.13 (L) 4.05 - 5.2 M/uL    HGB 8.0 (L) 11.7 - 15.4 g/dL    HCT 25.7 (L) 35.8 - 46.3 %    MCV 82.1 79.6 - 97.8 FL    MCH 25.6 (L) 26.1 - 32.9 PG    MCHC 31.1 (L) 31.4 - 35.0 g/dL    RDW 14.7 (H) 11.9 - 14.6 %    PLATELET 331 245 - 799 K/uL    MPV 9.4 9.4 - 12.3 FL    ABSOLUTE NRBC 0.00 0.0 - 0.2 K/uL   METABOLIC PANEL, COMPREHENSIVE    Collection Time: 11/06/20  7:14 AM   Result Value Ref Range    Sodium 139 136 - 145 mmol/L    Potassium 3.8 3.5 - 5.1 mmol/L    Chloride 106 98 - 107 mmol/L    CO2 30 21 - 32 mmol/L    Anion gap 3 (L) 7 - 16 mmol/L    Glucose 150 (H) 65 - 100 mg/dL BUN 14 6 - 23 MG/DL    Creatinine 0.62 0.6 - 1.0 MG/DL    GFR est AA >60 >60 ml/min/1.73m2    GFR est non-AA >60 >60 ml/min/1.73m2    Calcium 8.4 8.3 - 10.4 MG/DL    Bilirubin, total 0.2 0.2 - 1.1 MG/DL    ALT (SGPT) 25 12 - 65 U/L    AST (SGOT) 16 15 - 37 U/L    Alk.  phosphatase 164 (H) 50 - 136 U/L    Protein, total 8.1 6.3 - 8.2 g/dL    Albumin 2.1 (L) 3.5 - 5.0 g/dL    Globulin 6.0 (H) 2.3 - 3.5 g/dL    A-G Ratio 0.4 (L) 1.2 - 3.5     GLUCOSE, POC    Collection Time: 11/06/20  7:37 AM   Result Value Ref Range    Glucose (POC) 162 (H) 65 - 100 mg/dL       Current Meds:  Current Facility-Administered Medications   Medication Dose Route Frequency    pregabalin (LYRICA) capsule 50 mg  50 mg Oral TID    DAPTOmycin (CUBICIN) 700 mg in 0.9% sodium chloride 14 mL IV Syringe  6 mg/kg IntraVENous Q24H    0.9% sodium chloride infusion 250 mL  250 mL IntraVENous PRN    polyethylene glycol (MIRALAX) packet 17 g  17 g Oral DAILY    0.9% sodium chloride infusion 250 mL  250 mL IntraVENous PRN    central line flush (saline) syringe 20 mL  20 mL InterCATHeter Q8H    insulin regular (NOVOLIN R, HUMULIN R) injection   SubCUTAneous AC&HS    famotidine (PEPCID) tablet 20 mg  20 mg Oral BID    ciprofloxacin HCl (CIPRO) tablet 500 mg  500 mg Oral Q12H    morphine injection 2 mg  2 mg IntraVENous Q4H PRN    naloxone (NARCAN) injection 0.1 mg  0.1 mg IntraVENous EVERY 2 MINUTES AS NEEDED    flumazeniL (ROMAZICON) 0.1 mg/mL injection 0.2 mg  0.2 mg IntraVENous Multiple    diphenhydrAMINE (BENADRYL) injection 12.5 mg  12.5 mg IntraVENous Q15MIN PRN    oxyCODONE IR (ROXICODONE) tablet 10 mg  10 mg Oral Q4H PRN    dapagliflozin (FARXIGA) tablet 10 mg (Patient Supplied)  10 mg Oral DAILY    pioglitazone (ACTOS) tablet 15 mg  15 mg Oral ACB    sodium chloride (NS) flush 5-40 mL  5-40 mL IntraVENous Q8H    sodium chloride (NS) flush 5-40 mL  5-40 mL IntraVENous PRN    acetaminophen (TYLENOL) tablet 650 mg  650 mg Oral Q6H PRN    Or    acetaminophen (TYLENOL) suppository 650 mg  650 mg Rectal Q6H PRN    promethazine (PHENERGAN) tablet 12.5 mg  12.5 mg Oral Q6H PRN    Or    ondansetron (ZOFRAN) injection 4 mg  4 mg IntraVENous Q4H PRN    enoxaparin (LOVENOX) injection 40 mg  40 mg SubCUTAneous DAILY    potassium chloride 10 mEq in 100 ml IVPB  10 mEq IntraVENous PRN    magnesium sulfate 2 g/50 ml IVPB (premix or compounded)  2 g IntraVENous PRN    oxyCODONE IR (ROXICODONE) tablet 5 mg  5 mg Oral Q4H PRN    naloxone (NARCAN) injection 0.4 mg  0.4 mg IntraVENous PRN    nystatin (MYCOSTATIN) 100,000 unit/gram powder   Topical BID       Other Studies:  No results found for this visit on 10/28/20. No results found.     All Micro Results     Procedure Component Value Units Date/Time    CULTURE, ANAEROBIC [[id number]] Collected:  10/29/20 1615    Order Status:  Completed Specimen:  Ankle Updated:  11/06/20 0734     Special Requests: NO SPECIAL REQUESTS        Culture result:       NO ANAEROBES ISOLATED 7 DAYS          CULTURE, BLOOD [[id number]] Collected:  10/28/20 1125    Order Status:  Completed Specimen:  Blood Updated:  11/04/20 0654     Special Requests: --        RIGHT  Antecubital       Culture result: NO GROWTH 5 DAYS       CULTURE, BLOOD [[id number]] Collected:  10/28/20 1715    Order Status:  Completed Specimen:  Blood Updated:  11/02/20 0913     Special Requests: --        RIGHT  Antecubital       Culture result: NO GROWTH 5 DAYS       CULTURE, Shanna Smith STAIN [[id number]] Collected:  10/29/20 1615    Order Status:  Completed Specimen:  Ankle Updated:  11/01/20 0811     Special Requests: NO SPECIAL REQUESTS        GRAM STAIN 0 TO 1 WBCS SEEN PER OIF      NO DEFINITE ORGANISM SEEN        Culture result: NO GROWTH 2 DAYS             SARS-CoV-2 Lab Results  \"Novel Coronavirus\" Test: No results found for: COV2NT   \"Emergent Disease\" Test: No results found for: EDPR  \"SARS-COV-2\" Test: No results found for: XGCOVT  Rapid Test: No results found for: COVR         Assessment and Plan:     Hospital Problems as of 11/6/2020 Date Reviewed: 1/15/2018          Codes Class Noted - Resolved POA    Vesicular rash ICD-10-CM: R23.8  ICD-9-CM: 782.1  11/2/2020 - Present Unknown        Pain of right hand ICD-10-CM: M79.641  ICD-9-CM: 729.5  10/31/2020 - Present Unknown        Left axillary hidradenitis ICD-10-CM: L73.2  ICD-9-CM: 705.83  10/30/2020 - Present Unknown        Right ankle swelling ICD-10-CM: M25.471  ICD-9-CM: 719.07  10/28/2020 - Present Unknown        Right ankle pain ICD-10-CM: M25.571  ICD-9-CM: 719.47  10/28/2020 - Present Unknown        * (Principal) Septic joint ([facility] [address] ICD-10-CM: M00.9  ICD-9-CM: 711.00  10/28/2020 - Present Unknown        Positive for microalbuminuria ICD-10-CM: R80.9  ICD-9-CM: 791.0  2/12/2018 - Present Yes        DM2 (diabetes mellitus, type 2) (HCC) (Chronic) ICD-10-CM: E11.9  ICD-9-CM: 250.00  2/24/2014 - Present Yes    Overview Signed 2/24/2014  4:36 PM by Michelle Jamil     Newly diagnosed 2/22/14             Morbid obesity ([facility] [address] (Chronic) ICD-10-CM: E66.01  ICD-9-CM: 278.01  2/24/2014 - Present Yes        HTN (hypertension) (Chronic) ICD-10-CM: I10  ICD-9-CM: 401.9  2/24/2014 - Present Yes              Plan:  Rt Ankle pain/swelling concerning for septic Joint  S/p washout by Ortho. BCx and ankle joint fluid shows not growth. - ID following : dapto+ cipro. ID follow up 11/24/20 @ 8:30am  - Ortho recs appreciated: no additional OR planned. Outpatient follow up in 2 weeks    HTN  -start lisinopril     Rt hand/arm swelling and pain (improved)  Rt superficial vein thrombosis (cephalic vein)  Doppler of UE showed small short segment thrombus in the cephalic vein at the antecubital fossa, superficial vein and Mild interstitial edema at the area of swelling of the right wrist without drainable fluid collection. Unable to  due to pain.  No numbness/tingling.  - warm compress  - no indication for [facility] given superficial nature of thrombosis     Vesicular rash on abdomen  ?drug reaction  - monitor.  - abx changed per ID     Normocytic Anemia  S/p 1 unit of PRBC. Possible post-op anemia vs iron deficiency. Occult stool is negative. LLE swelling/pain improved so less suspicion for intramuscular bleeding.   - monitor and transfuse as needed     T2DM, uncontrolled  A1c 11.7  - hold home metformin  - continue with home Actos and Farxiga  - ISS, diabetic diet      Diet:  DIET DIABETIC CONSISTENT CARB  DIET NUTRITIONAL SUPPLEMENTS  DVT ppx: Other listed chronic conditions stable, cont current management.     Signed:  Mike Morales MD

## 2020-11-06 NOTE — PROGRESS NOTES
Problem: Mobility Impaired (Adult and Pediatric)  Goal: *Acute Goals and Plan of Care (Insert Text)  Description: LTG:  (1.)Ms. Salgado will move from supine to sit and sit to supine , scoot up and down, and roll side to side in flat bed with INDEPENDENT within 7 treatment day(s). (2.)Ms. Salgado will transfer from bed to chair and chair to bed with MODIFIED INDEPENDENCE using the least restrictive device within 7 treatment day(s). (3.)Ms. Salgado will ambulate with MODIFIED INDEPENDENCE for 25+ feet with the least restrictive device within 7 treatment day(s). 4.  Ms. Mariza Rosales will demonstrate 50 degrees AROM R ankle within 7 treatment days. ________________________________________________________________________________________________      Outcome: Progressing Towards Goal     PHYSICAL THERAPY: Daily Note and PM 11/6/2020  INPATIENT: PT Visit Days : 4  WBAT R LE  Payor: BLUE CROSS / [place]: [place] OUT OF STATE / Product Type: PPO /       NAME/AGE/GENDER: Merlin Kroner is a 45 y.o. female   PRIMARY DIAGNOSIS: Septic joint ([address] [M00.9]  Right ankle pain [M25.571]  Right ankle swelling [M25.471] Septic joint ([address] Septic joint (HCC)  Procedure(s) (LRB):  EXTREMITY IRRIGATION AND DEBRIDEMENT/LEFT ANKLE (Left)  8 Days Post-Op  ICD-10: Treatment Diagnosis:    · Other abnormalities of gait and mobility (R26.89)   Precaution/Allergies:  Patient has no known allergies. ASSESSMENT:     Ms. Mariza Rosales  lives alone (3rd floor apartment) and is independent in home and community. When her ankle began hurting, she began using a RW. Patient was supine, in pain but not wanting to take any pain medication. She sat up and got to the commode with supervision only. She was able to walk around the bed to the recliner putting some weight on her R foot for the first time! She performed seated exercises below with improving tolerance. Awaiting rehab which is where she needs to be.       This section established at most recent assessment   PROBLEM LIST (Impairments causing functional limitations):  1. Decreased Strength  2. Decreased ADL/Functional Activities  3. Decreased Transfer Abilities  4. Decreased Ambulation Ability/Technique  5. Decreased Balance  6. Increased Pain  7. Decreased Activity Tolerance   INTERVENTIONS PLANNED: (Benefits and precautions of physical therapy have been discussed with the patient.)  1. Balance Exercise  2. Bed Mobility  3. Gait Training  4. Therapeutic Activites  5. Therapeutic Exercise/Strengthening  6. Transfer Training  7. education     TREATMENT PLAN: Frequency/Duration: 5 times a week for duration of hospital stay  Rehabilitation Potential For Stated Goals: Good     REHAB RECOMMENDATIONS (at time of discharge pending progress):    Placement: It is my opinion, based on this patient's performance to date, that Ms. Salgado may benefit from [facility] after discharge due to the functional deficits listed above that are likely to improve with skilled rehabilitation and concerns that he/she may be unsafe to be unsupervised at home due to decreased mobility . Equipment:    None at this time              HISTORY:   History of Present Injury/Illness (Reason for Referral):  Per MD note, \"Patient is a 81-year-old -American female with history of morbid obesity, type 2 diabetes mellitus uncontrolled, hypertension who presented to the ER with 3 weeks history of constant right ankle pain and swelling which is progressively getting worse. Patient is currently a little somnolent since she got morphine in the ER for pain control. She is easily arousable and is answering questions appropriately. She reports symptoms began spontaneously with sudden onset of right ankle pain that was followed by ankle swelling.   She has been to the emergency multiple times but she was told initially that it was a gout attack and then next time was told to be cellulitis and was discharged on antibiotics, colchicine, indomethacin. Over the last 2 to 3 days she has developed a blister medial aspect of right ankle. She denies having any fever but reports feeling warm every now and then with chills. Denies having any chest pain, palpitations, cough, shortness of breath, abdominal pain. Patient has been evaluated by Ortho in ER with possible plan for washout tomorrow following an MRI of right foot. Blood work showed CRP of 132 raising suspicion of septic joint. \"  Past Medical History/Comorbidities:   Ms. Lisa Mccall  has a past medical history of Diabetes ([address] and Hypertension. Ms. Lisa Mccall  has a past surgical history that includes hx  section. Social History/Living Environment:   Home Environment: Apartment  # Steps to Enter: (3 flights of stairs)  One/Two Story Residence: Other (Comment)(lives on 3rd floor, no elevator access )  Living Alone: Yes  Support Systems: Friends \ neighbors  Patient Expects to be Discharged to[de-identified] Rehabilitation facility  Current DME Used/Available at Home: Walker, rolling  Prior Level of Function/Work/Activity:  She lives alone and is independent in home and community. Number of Personal Factors/Comorbidities that affect the Plan of Care: 1-2: MODERATE COMPLEXITY   EXAMINATION:   Most Recent Physical Functioning:   Gross Assessment:                  Posture:     Balance:    Bed Mobility:  Supine to Sit: Independent  Wheelchair Mobility:     Transfers:  Sit to Stand: Stand-by assistance  Stand to Sit: Supervision  Gait:  Right Side Weight Bearing: As tolerated  Gait Abnormalities: Antalgic;Decreased step clearance; Step to gait  Distance (ft): 10 Feet (ft)(around bed to chair)  Assistive Device: Walker, rolling  Ambulation - Level of Assistance: Stand-by assistance;Contact guard assistance      Body Structures Involved:  1. Bones  2. Joints  3. Muscles Body Functions Affected:  1. Sensory/Pain  2. Neuromusculoskeletal  3. Movement Related  4.  Skin Related  5. Metobolic/Endocrine Activities and Participation Affected:  1. Mobility  2. Self Care  3. Domestic Life  4. Community, Social and [place]   Number of elements that affect the Plan of Care: 4+: HIGH COMPLEXITY   CLINICAL PRESENTATION:   Presentation: Stable and uncomplicated: LOW COMPLEXITY   CLINICAL DECISION MAKIN [facility] Mobility Inpatient Short Form  How much difficulty does the patient currently have. .. Unable A Lot A Little None   1. Turning over in bed (including adjusting bedclothes, sheets and blankets)? [] 1   [] 2   [x] 3   [] 4   2. Sitting down on and standing up from a chair with arms ( e.g., wheelchair, bedside commode, etc.)   [] 1   [] 2   [x] 3   [] 4   3. Moving from lying on back to sitting on the side of the bed? [] 1   [] 2   [x] 3   [] 4   How much help from another person does the patient currently need. .. Total A Lot A Little None   4. Moving to and from a bed to a chair (including a wheelchair)? [] 1   [] 2   [x] 3   [] 4   5. Need to walk in hospital room? [] 1   [x] 2   [] 3   [] 4   6. Climbing 3-5 steps with a railing? [] 1   [x] 2   [] 3   [] 4   © , Trustees of [address] [place] [address], under license to [employer]. All rights reserved      Score:  Initial: 16 Most Recent: X (Date: -- )    Interpretation of Tool:  Represents activities that are increasingly more difficult (i.e. Bed mobility, Transfers, Gait). Medical Necessity:     · Patient is expected to demonstrate progress in   · strength, range of motion, balance, coordination, and functional technique  ·  to   · increase independence with   and improve safety during all functional mobility  · . Reason for Services/Other Comments:  · Patient continues to require skilled intervention due to   · medical complications and patient unable to attend/participate in therapy as expected  · .    Use of outcome tool(s) and clinical judgement create a POC that gives a: Clear prediction of patient's progress: LOW COMPLEXITY            TREATMENT:   (In addition to Assessment/Re-Assessment sessions the following treatments were rendered)   Pre-treatment Symptoms/Complaints: pain but not wanting pain meds  Pain: Initial:   Pain Intensity 1: 4  Post Session:  Comfortable in chair     Gait Training (  10):  Gait training to improve and/or restore physical functioning as related to mobility, strength, and balance. Sit to stand 10 Feet (ft)(around bed to chair) with Stand-by assistance;Contact guard assistance using a Walker, rolling and moderate verbal cues related to their ankle position and motion to promote proper body alignment and promote proper body posture. Therapeutic Exercise: (15 Minutes):  Exercises per grid below to improve mobility. Required minimal visual and verbal cues to promote proper body mechanics. Progressed complexity of movement as indicated. Date:  11/4/20 Date:  11/5/20 Date:  11/6/20   Activity/Exercise Parameters Parameters Parameters   Seated TKE 5x3 B 10x2 B 20x L  15x R   Seated AP 10x R 10x B into yellow thera band 20x L  15x R thera band   Seated ankle side to side 10x R     Seated HS curls  10x B yellow thera band 20x L  15x R thera band   Seated marching  10x B 15x B   Seated heel lift  10x B                Braces/Orthotics/Lines/Etc:   · none  Treatment/Session Assessment:    · Response to Treatment:  Cooperative and receptive  · Interdisciplinary Collaboration:   o Physical Therapy Assistant  o Registered Nurse  · After treatment position/precautions:   o Up in chair  o Bed/Chair-wheels locked  o Call light within reach  o RN notified   · Compliance with Program/Exercises: Compliant all of the time  · Recommendations/Intent for next treatment session: \"Next visit will focus on advancements to more challenging activities and reduction in assistance provided\".   Total Treatment Duration:  PT Patient Time In/Time Out  Time In: 1330  Time Out: [id number], PTA

## 2020-11-06 NOTE — PROGRESS NOTES
Patient seen and examined this am.  Her wound continues to heal well with no drainage noted and decreased swelling. A new sterile bandage was placed. ID plans noted. Will need office follow up with me in 2 weeks for suture removal and to examine the wound.

## 2020-11-06 NOTE — PROGRESS NOTES
Infectious Disease Progress Note    Impression:   · Right ankle infection/septic joint. S/p (10/29) I&D, gross purulence encountered intra-op: cx negative  · She had had ankle pain for at least 3 weeks, PTA and was on clindamycin as outpatient and so all cultures are negative from blood and ankle aspiration. No cell count or crystal analysis performed on synovial fluid. · Type 2 DM: poorly controlled, Hgb A1c 10/28 11.7  · Hidradenitis suppurative right axilla with chronic inflammation and drainage  · Pustular drug rash on abdomen. Improved   · Elevated ESR/CRP    Plan:   · Continue daptomycin plus cipro; planned EOT 2020  · If daptomycin is a barrier to STR placement then we can rechallenge with vancomycin now that the rash is improved  · 4 weeks of treatment planned EOT 20; PICC placed   · I wrote outpatient IV antibiotic orders, but now it looks as if the plan is for her to go to rehab  · We again discussed the importance of better diabetes management  · ID follow up 20 @ 8:30am    We will not see over the weekend unless called. Anti-infectives:   1. Daptomycin (11/3/2020 - )  2. Vancomycin 10/28-11/3/2020  3. Cipro -  4. CTX 10/29-  5. Cefepime x 1 10/28    Subjective:   Rash is pretty much resolved. She is walking better and better with a walker and has no complaints today. No Known Allergies     Review of Systems:  A comprehensive review of systems was negative except for that written in the History of Present Illness. Objective:   Blood pressure (!) 160/82, pulse 83, temperature 98.2 °F (36.8 °C), resp. rate 18, height 5' 9\" (1.753 m), weight 118.8 kg (262 lb), last menstrual period 10/29/2020, SpO2 96 %.   Temp (24hrs), Av.2 °F (36.8 °C), Min:98 °F (36.7 °C), Max:98.5 °F (36.9 °C)     Patient examined 2020, exam remains unchanged except as noted below:    General:  Alert, cooperative, no acute distress, appears stated age   Eyes:  Anicteric, no drainage, not injected, EOMI   Throat: Mucus membranes moist    Lungs:   Clear throughout lung fields without increased work of breathing or audible wheezes   Heart:  Regular rate and rhythm, without audible murmur, rub, or gallop   Abdomen:   Soft, non-tender, no guarding, no distention, bowel sounds active   Extremities: René LE edema, R ankle dressing C/D/I, neurovascular intact   Pulses: 2+ and symmetric   Skin: Rash has pretty much resolved. Lines/Devices: LUE PICC         Data Review:   Recent Results (from the past 24 hour(s))   GLUCOSE, POC    Collection Time: 11/05/20 11:54 AM   Result Value Ref Range    Glucose (POC) 155 (H) 65 - 100 mg/dL   GLUCOSE, POC    Collection Time: 11/05/20  5:01 PM   Result Value Ref Range    Glucose (POC) 194 (H) 65 - 100 mg/dL   GLUCOSE, POC    Collection Time: 11/05/20  8:37 PM   Result Value Ref Range    Glucose (POC) 202 (H) 65 - 100 mg/dL   CBC W/O DIFF    Collection Time: 11/06/20  7:14 AM   Result Value Ref Range    WBC 8.1 4.3 - 11.1 K/uL    RBC 3.13 (L) 4.05 - 5.2 M/uL    HGB 8.0 (L) 11.7 - 15.4 g/dL    HCT 25.7 (L) 35.8 - 46.3 %    MCV 82.1 79.6 - 97.8 FL    MCH 25.6 (L) 26.1 - 32.9 PG    MCHC 31.1 (L) 31.4 - 35.0 g/dL    RDW 14.7 (H) 11.9 - 14.6 %    PLATELET 955 135 - 785 K/uL    MPV 9.4 9.4 - 12.3 FL    ABSOLUTE NRBC 0.00 0.0 - 0.2 K/uL   METABOLIC PANEL, COMPREHENSIVE    Collection Time: 11/06/20  7:14 AM   Result Value Ref Range    Sodium 139 136 - 145 mmol/L    Potassium 3.8 3.5 - 5.1 mmol/L    Chloride 106 98 - 107 mmol/L    CO2 30 21 - 32 mmol/L    Anion gap 3 (L) 7 - 16 mmol/L    Glucose 150 (H) 65 - 100 mg/dL    BUN 14 6 - 23 MG/DL    Creatinine 0.62 0.6 - 1.0 MG/DL    GFR est AA >60 >60 ml/min/1.73m2    GFR est non-AA >60 >60 ml/min/1.73m2    Calcium 8.4 8.3 - 10.4 MG/DL    Bilirubin, total 0.2 0.2 - 1.1 MG/DL    ALT (SGPT) 25 12 - 65 U/L    AST (SGOT) 16 15 - 37 U/L    Alk.  phosphatase 164 (H) 50 - 136 U/L    Protein, total 8.1 6.3 - 8.2 g/dL    Albumin 2.1 (L) 3.5 - 5.0 g/dL    Globulin 6.0 (H) 2.3 - 3.5 g/dL    A-G Ratio 0.4 (L) 1.2 - 3.5     GLUCOSE, POC    Collection Time: 11/06/20  7:37 AM   Result Value Ref Range    Glucose (POC) 162 (H) 65 - 100 mg/dL        Microbiology:    All Micro Results     Procedure Component Value Units Date/Time    CULTURE, ANAEROBIC [[id number]] Collected:  10/29/20 1615    Order Status:  Completed Specimen:  Ankle Updated:  11/06/20 0734     Special Requests: NO SPECIAL REQUESTS        Culture result:       NO ANAEROBES ISOLATED 7 DAYS          CULTURE, BLOOD [[id number]] Collected:  10/28/20 1125    Order Status:  Completed Specimen:  Blood Updated:  11/04/20 0654     Special Requests: --        RIGHT  Antecubital       Culture result: NO GROWTH 5 DAYS       CULTURE, BLOOD [[id number]] Collected:  10/28/20 1715    Order Status:  Completed Specimen:  Blood Updated:  11/02/20 0913     Special Requests: --        RIGHT  Antecubital       Culture result: NO GROWTH 5 DAYS       CULTURE, Gerber Bark STAIN [[id number]] Collected:  10/29/20 1615    Order Status:  Completed Specimen:  Ankle Updated:  11/01/20 0811     Special Requests: NO SPECIAL REQUESTS        GRAM STAIN 0 TO 1 WBCS SEEN PER OIF      NO DEFINITE ORGANISM SEEN        Culture result: NO GROWTH 2 DAYS             Studies/Imaging:    Reviewed        Signed By: Di Ravi MD     November 6, 2020

## 2020-11-07 LAB
GLUCOSE BLD STRIP.AUTO-MCNC: 156 MG/DL (ref 65–100)
GLUCOSE BLD STRIP.AUTO-MCNC: 177 MG/DL (ref 65–100)
GLUCOSE BLD STRIP.AUTO-MCNC: 180 MG/DL (ref 65–100)
GLUCOSE BLD STRIP.AUTO-MCNC: 181 MG/DL (ref 65–100)
HGB BLD-MCNC: 7.8 G/DL (ref 11.7–15.4)

## 2020-11-07 PROCEDURE — 74011250636 HC RX REV CODE- 250/636: Performed by: INTERNAL MEDICINE

## 2020-11-07 PROCEDURE — 74011250636 HC RX REV CODE- 250/636: Performed by: HOSPITALIST

## 2020-11-07 PROCEDURE — 74011250637 HC RX REV CODE- 250/637: Performed by: INTERNAL MEDICINE

## 2020-11-07 PROCEDURE — 74011250637 HC RX REV CODE- 250/637: Performed by: HOSPITALIST

## 2020-11-07 PROCEDURE — 74011636637 HC RX REV CODE- 636/637: Performed by: INTERNAL MEDICINE

## 2020-11-07 PROCEDURE — 65270000029 HC RM PRIVATE

## 2020-11-07 PROCEDURE — 74011250637 HC RX REV CODE- 250/637: Performed by: FAMILY MEDICINE

## 2020-11-07 PROCEDURE — 2709999900 HC NON-CHARGEABLE SUPPLY

## 2020-11-07 PROCEDURE — 85018 HEMOGLOBIN: CPT

## 2020-11-07 PROCEDURE — 82962 GLUCOSE BLOOD TEST: CPT

## 2020-11-07 PROCEDURE — 74011000250 HC RX REV CODE- 250: Performed by: INTERNAL MEDICINE

## 2020-11-07 RX ORDER — PREGABALIN 75 MG/1
75 CAPSULE ORAL 3 TIMES DAILY
Status: DISCONTINUED | OUTPATIENT
Start: 2020-11-07 | End: 2020-11-09 | Stop reason: HOSPADM

## 2020-11-07 RX ADMIN — PREGABALIN 75 MG: 75 CAPSULE ORAL at 21:08

## 2020-11-07 RX ADMIN — INSULIN HUMAN 2 UNITS: 100 INJECTION, SOLUTION PARENTERAL at 12:22

## 2020-11-07 RX ADMIN — OXYCODONE 10 MG: 5 TABLET ORAL at 12:23

## 2020-11-07 RX ADMIN — PREGABALIN 75 MG: 75 CAPSULE ORAL at 15:00

## 2020-11-07 RX ADMIN — FAMOTIDINE 20 MG: 20 TABLET, FILM COATED ORAL at 09:52

## 2020-11-07 RX ADMIN — INSULIN HUMAN 2 UNITS: 100 INJECTION, SOLUTION PARENTERAL at 21:16

## 2020-11-07 RX ADMIN — CIPROFLOXACIN 500 MG: 500 TABLET, FILM COATED ORAL at 21:08

## 2020-11-07 RX ADMIN — INSULIN HUMAN 2 UNITS: 100 INJECTION, SOLUTION PARENTERAL at 09:55

## 2020-11-07 RX ADMIN — PREGABALIN 50 MG: 50 CAPSULE ORAL at 09:53

## 2020-11-07 RX ADMIN — CIPROFLOXACIN 500 MG: 500 TABLET, FILM COATED ORAL at 09:50

## 2020-11-07 RX ADMIN — FAMOTIDINE 20 MG: 20 TABLET, FILM COATED ORAL at 17:35

## 2020-11-07 RX ADMIN — ENOXAPARIN SODIUM 40 MG: 40 INJECTION SUBCUTANEOUS at 09:53

## 2020-11-07 RX ADMIN — Medication 20 ML: at 06:24

## 2020-11-07 RX ADMIN — Medication 20 ML: at 15:00

## 2020-11-07 RX ADMIN — FERROUS SULFATE TAB 325 MG (65 MG ELEMENTAL FE) 325 MG: 325 (65 FE) TAB at 09:52

## 2020-11-07 RX ADMIN — SODIUM CHLORIDE 700 MG: 9 INJECTION, SOLUTION INTRAMUSCULAR; INTRAVENOUS; SUBCUTANEOUS at 16:43

## 2020-11-07 RX ADMIN — INSULIN HUMAN 2 UNITS: 100 INJECTION, SOLUTION PARENTERAL at 16:45

## 2020-11-07 RX ADMIN — OXYCODONE 10 MG: 5 TABLET ORAL at 21:14

## 2020-11-07 RX ADMIN — LISINOPRIL 10 MG: 5 TABLET ORAL at 09:52

## 2020-11-07 RX ADMIN — PIOGLITAZONE HYDROCHLORIDE 15 MG: 15 TABLET ORAL at 06:24

## 2020-11-07 NOTE — PROGRESS NOTES
ORTH FRACTURE PROGRESS NOTE    2020  Admit Date:   10/28/2020    Post Op day: 9 Days Post-Op    Subjective:    Brady Salgado PATIENT RESTING IN BED; SAYS RIGHT ANKLE  IS SORE     PT/OT:   Gait:  Gait  Gait Abnormalities: Antalgic, Decreased step clearance, Step to gait  Ambulation - Level of Assistance: Stand-by assistance, Contact guard assistance  Distance (ft): 10 Feet (ft)(around bed to chair)  Assistive Device: Walker, rolling                 Vital Signs:    No data found.   Temp (24hrs), Av.1 °F (36.7 °C), Min:97.7 °F (36.5 °C), Max:98.2 °F (36.8 °C)      Pain Control:   Pain Assessment  Pain Scale 1: Numeric (0 - 10)  Pain Intensity 1: 2  Pain Onset 1: ongoing  Pain Location 1: Ankle  Pain Orientation 1: Right  Pain Description 1: Aching  Pain Intervention(s) 1: Rest, Repositioned    Meds:    Current Facility-Administered Medications   Medication Dose Route Frequency    lisinopriL (PRINIVIL, ZESTRIL) tablet 10 mg  10 mg Oral DAILY    ferrous sulfate tablet 325 mg  1 Tab Oral DAILY WITH BREAKFAST    pregabalin (LYRICA) capsule 50 mg  50 mg Oral TID    DAPTOmycin (CUBICIN) 700 mg in 0.9% sodium chloride 14 mL IV Syringe  6 mg/kg IntraVENous Q24H    0.9% sodium chloride infusion 250 mL  250 mL IntraVENous PRN    polyethylene glycol (MIRALAX) packet 17 g  17 g Oral DAILY    0.9% sodium chloride infusion 250 mL  250 mL IntraVENous PRN    central line flush (saline) syringe 20 mL  20 mL InterCATHeter Q8H    insulin regular (NOVOLIN R, HUMULIN R) injection   SubCUTAneous AC&HS    famotidine (PEPCID) tablet 20 mg  20 mg Oral BID    ciprofloxacin HCl (CIPRO) tablet 500 mg  500 mg Oral Q12H    morphine injection 2 mg  2 mg IntraVENous Q4H PRN    naloxone (NARCAN) injection 0.1 mg  0.1 mg IntraVENous EVERY 2 MINUTES AS NEEDED    flumazeniL (ROMAZICON) 0.1 mg/mL injection 0.2 mg  0.2 mg IntraVENous Multiple    diphenhydrAMINE (BENADRYL) injection 12.5 mg  12.5 mg IntraVENous Q15MIN PRN  oxyCODONE IR (ROXICODONE) tablet 10 mg  10 mg Oral Q4H PRN    dapagliflozin (FARXIGA) tablet 10 mg (Patient Supplied)  10 mg Oral DAILY    pioglitazone (ACTOS) tablet 15 mg  15 mg Oral ACB    sodium chloride (NS) flush 5-40 mL  5-40 mL IntraVENous Q8H    sodium chloride (NS) flush 5-40 mL  5-40 mL IntraVENous PRN    acetaminophen (TYLENOL) tablet 650 mg  650 mg Oral Q6H PRN    Or    acetaminophen (TYLENOL) suppository 650 mg  650 mg Rectal Q6H PRN    promethazine (PHENERGAN) tablet 12.5 mg  12.5 mg Oral Q6H PRN    Or    ondansetron (ZOFRAN) injection 4 mg  4 mg IntraVENous Q4H PRN    enoxaparin (LOVENOX) injection 40 mg  40 mg SubCUTAneous DAILY    potassium chloride 10 mEq in 100 ml IVPB  10 mEq IntraVENous PRN    magnesium sulfate 2 g/50 ml IVPB (premix or compounded)  2 g IntraVENous PRN    oxyCODONE IR (ROXICODONE) tablet 5 mg  5 mg Oral Q4H PRN    naloxone (NARCAN) injection 0.4 mg  0.4 mg IntraVENous PRN    nystatin (MYCOSTATIN) 100,000 unit/gram powder   Topical BID       LAB:    Recent Labs     11/07/20  0603 11/06/20  0714   HCT  --  25.7*   HGB 7.8* 8.0*       24 Hour Assessment Issues:    Oriented    Discharge Planning: HOME    Transfuse PRBC's:      Assessment & Physician's Comment:  Dressing is clean, dry, and intact  Neurovascular checks within normal limits    Principal Problem:    Septic joint ([facility] (10/28/2020)    Active Problems:    DM2 (diabetes mellitus, type 2) ([facility] (2/24/2014)      Overview: Newly diagnosed 2/22/14      Morbid obesity ([facility] (2/24/2014)      HTN (hypertension) (2/24/2014)      Positive for microalbuminuria (2/12/2018)      Right ankle swelling (10/28/2020)      Right ankle pain (10/28/2020)      Left axillary hidradenitis (10/30/2020)      Pain of right hand (10/31/2020)      Vesicular rash (11/2/2020)        Plan:  CONTINUE ANTIBIOTICS PER ID   [id number]  [facility], NP

## 2020-11-07 NOTE — PROGRESS NOTES
END OF SHIFT NOTE:    INTAKE/OUTPUT  No intake/output data recorded. Voiding: YES  Catheter: NO  Drain:              Flatus: Patient does have flatus present. Stool:  1 occurrences. Characteristics:  Stool Assessment  Stool Color: Other (Comment)(have not observed)  Stool Appearance: Loose  Stool Amount: Large  Stool Source/Status: Rectum    Emesis: 0 occurrences. Characteristics:        VITAL SIGNS  Patient Vitals for the past 12 hrs:   Temp Pulse Resp BP SpO2   11/06/20 1505 97.7 °F (36.5 °C) 82 18 (!) 158/92 99 %   11/06/20 1109 98.2 °F (36.8 °C) 82 18 (!) 168/91 96 %       Pain Assessment  Pain Intensity 1: 7 (11/06/20 1332)  Pain Location 1: Ankle  Pain Intervention(s) 1: Rest, Repositioned  Patient Stated Pain Goal: 2    Ambulating  No    Shift report given to oncoming nurse at the bedside.     Travon Canales

## 2020-11-07 NOTE — PROGRESS NOTES
END OF SHIFT NOTE:    INTAKE/OUTPUT  11/06 0701 - 11/07 0700  In: -   Out: 650 [Urine:650]  Voiding: YES  Catheter: NO  Drain:              Flatus: Patient does have flatus present. Stool:  2 occurrences. Characteristics:  Stool Assessment  Stool Color: Other (Comment)(have not observed)  Stool Appearance: Loose  Stool Amount: Large  Stool Source/Status: Rectum    Emesis: 0 occurrences. Characteristics:        VITAL SIGNS  Patient Vitals for the past 12 hrs:   Temp Pulse Resp BP SpO2   11/06/20 2010 98.2 °F (36.8 °C) 84 18 (!) 154/84 95 %       Pain Assessment  Pain Intensity 1: 2 (11/06/20 1910)  Pain Location 1: Ankle  Pain Intervention(s) 1: Rest, Repositioned  Patient Stated Pain Goal: 2    Ambulating  Yes    Shift report given to oncoming nurse at the bedside.     Kellee Ruelas

## 2020-11-07 NOTE — PROGRESS NOTES
Hospitalist Note     Admit Date:  10/28/2020 11:24 AM   Name:  Federico Mccarthy   Age:  45 y.o.  :  1980   MRN:  [id number]   PCP:  Crista Marrero MD  Treatment Team: Attending Provider: Walter Sky MD; Surgeon: Mario Alberto Kelsey MD; : Gina Norris; Utilization Review: Micheal Baker RN; Consulting Provider: Lencho Downs MD; Care Manager: Radha Wakefield; Utilization Review: Faith Sen Primary Nurse: Alessandro Wang    HPI/Subjective:     Please refer to the admission H&P for details of presentation. In summary, Federico Mccarthy is a 45 y.o. female with medical history significant for CAD, type 2 diabetes mellitus uncontrolled, hypertension who was admitted due to progressive worsening of right ankle pain and swelling for 3 weeks with concern for septic joint. ED, patient was afebrile and work-up shows no leukocytosis but elevated CRP. Was evaluated by orthopedics. Patient underwent ankle joint washout in the OR on 10/29/2020. She has been started on IV antibiotics for suspected septic joint. No additional inpatient plans for additional wash out by Ortho. Infectious disease is following and planning for at least 3 weeks of IV antibiotics. Patient developed pinpoint vesicular rashes on her abdomen on  which are non pruritic and non tender. It is likely due to drug reaction and has almost resolved by .   - awaiting insurance approval for SNF. No complaints. Pain controlled. No SOB, fevers     - feeling well. No complaints.   No fevers, CP, SOB    No other complaints  Objective:     Patient Vitals for the past 24 hrs:   Temp Pulse Resp BP SpO2   20 0700 97.9 °F (36.6 °C) 79 19 (!) 145/74 97 %   20 98.2 °F (36.8 °C) 84 18 (!) 154/84 95 %   20 1505 97.7 °F (36.5 °C) 82 18 (!) 158/92 99 %   20 1109 98.2 °F (36.8 °C) 82 18 (!) 168/91 96 %     Oxygen Therapy  O2 Sat (%): 97 % (20 0700)  Pulse via Oximetry: 84 beats per minute (10/29/20 1840)  O2 Device: Room air (11/06/20 1332)  O2 Flow Rate (L/min): 2 l/min (11/02/20 1930)    Estimated body mass index is 38.69 kg/m² as calculated from the following:    Height as of this encounter: 5' 9\" (1.753 m). Weight as of this encounter: 118.8 kg (262 lb). Intake/Output Summary (Last 24 hours) at 11/7/2020 0844  Last data filed at 11/7/2020 0415  Gross per 24 hour   Intake    Output 1250 ml   Net -1250 ml       *Note that automatically entered I/Os may not be accurate; dependent on patient compliance with collection and accurate  by techs. General:    Well nourished. No overt distress  CV:   RRR. No edema. No JVD  Lungs:   Even, Unlabored  Abdomen:   nondistended. Extremities: Warm and dry. No cyanosis. Bandage/swelling R ankle  Skin:     No rashes. No jaundice. Normal coloration  Neuro:  No gross focal deficits.   Alert    Data Reviewed:  I have reviewed all labs, meds, and studies from the last 24 hours:  Recent Results (from the past 24 hour(s))   GLUCOSE, POC    Collection Time: 11/06/20 11:48 AM   Result Value Ref Range    Glucose (POC) 179 (H) 65 - 100 mg/dL   GLUCOSE, POC    Collection Time: 11/06/20  4:43 PM   Result Value Ref Range    Glucose (POC) 147 (H) 65 - 100 mg/dL   GLUCOSE, POC    Collection Time: 11/06/20  8:43 PM   Result Value Ref Range    Glucose (POC) 193 (H) 65 - 100 mg/dL   HEMOGLOBIN    Collection Time: 11/07/20  6:03 AM   Result Value Ref Range    HGB 7.8 (L) 11.7 - 15.4 g/dL   GLUCOSE, POC    Collection Time: 11/07/20  7:14 AM   Result Value Ref Range    Glucose (POC) 181 (H) 65 - 100 mg/dL       Current Meds:  Current Facility-Administered Medications   Medication Dose Route Frequency    lisinopriL (PRINIVIL, ZESTRIL) tablet 10 mg  10 mg Oral DAILY    ferrous sulfate tablet 325 mg  1 Tab Oral DAILY WITH BREAKFAST    pregabalin (LYRICA) capsule 50 mg  50 mg Oral TID    DAPTOmycin (CUBICIN) 700 mg in 0.9% sodium chloride 14 mL IV Syringe  6 mg/kg IntraVENous Q24H    0.9% sodium chloride infusion 250 mL  250 mL IntraVENous PRN    polyethylene glycol (MIRALAX) packet 17 g  17 g Oral DAILY    0.9% sodium chloride infusion 250 mL  250 mL IntraVENous PRN    central line flush (saline) syringe 20 mL  20 mL InterCATHeter Q8H    insulin regular (NOVOLIN R, HUMULIN R) injection   SubCUTAneous AC&HS    famotidine (PEPCID) tablet 20 mg  20 mg Oral BID    ciprofloxacin HCl (CIPRO) tablet 500 mg  500 mg Oral Q12H    morphine injection 2 mg  2 mg IntraVENous Q4H PRN    naloxone (NARCAN) injection 0.1 mg  0.1 mg IntraVENous EVERY 2 MINUTES AS NEEDED    flumazeniL (ROMAZICON) 0.1 mg/mL injection 0.2 mg  0.2 mg IntraVENous Multiple    diphenhydrAMINE (BENADRYL) injection 12.5 mg  12.5 mg IntraVENous Q15MIN PRN    oxyCODONE IR (ROXICODONE) tablet 10 mg  10 mg Oral Q4H PRN    dapagliflozin (FARXIGA) tablet 10 mg (Patient Supplied)  10 mg Oral DAILY    pioglitazone (ACTOS) tablet 15 mg  15 mg Oral ACB    sodium chloride (NS) flush 5-40 mL  5-40 mL IntraVENous Q8H    sodium chloride (NS) flush 5-40 mL  5-40 mL IntraVENous PRN    acetaminophen (TYLENOL) tablet 650 mg  650 mg Oral Q6H PRN    Or    acetaminophen (TYLENOL) suppository 650 mg  650 mg Rectal Q6H PRN    promethazine (PHENERGAN) tablet 12.5 mg  12.5 mg Oral Q6H PRN    Or    ondansetron (ZOFRAN) injection 4 mg  4 mg IntraVENous Q4H PRN    enoxaparin (LOVENOX) injection 40 mg  40 mg SubCUTAneous DAILY    potassium chloride 10 mEq in 100 ml IVPB  10 mEq IntraVENous PRN    magnesium sulfate 2 g/50 ml IVPB (premix or compounded)  2 g IntraVENous PRN    oxyCODONE IR (ROXICODONE) tablet 5 mg  5 mg Oral Q4H PRN    naloxone (NARCAN) injection 0.4 mg  0.4 mg IntraVENous PRN    nystatin (MYCOSTATIN) 100,000 unit/gram powder   Topical BID       Other Studies:  No results found for this visit on 10/28/20. No results found.     All Micro Results     Procedure Component Value Units Date/Time    CULTURE, ANAEROBIC [[id number]] Collected:  10/29/20 1615    Order Status:  Completed Specimen:  Ankle Updated:  11/06/20 0734     Special Requests: NO SPECIAL REQUESTS        Culture result:       NO ANAEROBES ISOLATED 7 DAYS          CULTURE, BLOOD [[id number]] Collected:  10/28/20 1125    Order Status:  Completed Specimen:  Blood Updated:  11/04/20 0654     Special Requests: --        RIGHT  Antecubital       Culture result: NO GROWTH 5 DAYS       CULTURE, BLOOD [[id number]] Collected:  10/28/20 1715    Order Status:  Completed Specimen:  Blood Updated:  11/02/20 0913     Special Requests: --        RIGHT  Antecubital       Culture result: NO GROWTH 5 DAYS       CULTURE, Nazario Mass STAIN [[id number]] Collected:  10/29/20 1615    Order Status:  Completed Specimen:  Ankle Updated:  11/01/20 0811     Special Requests: NO SPECIAL REQUESTS        GRAM STAIN 0 TO 1 WBCS SEEN PER OIF      NO DEFINITE ORGANISM SEEN        Culture result: NO GROWTH 2 DAYS             SARS-CoV-2 Lab Results  \"Novel Coronavirus\" Test: No results found for: COV2NT   \"Emergent Disease\" Test: No results found for: EDPR  \"SARS-COV-2\" Test: No results found for: XGCOVT  Rapid Test: No results found for: COVR         Assessment and Plan:     Hospital Problems as of 11/7/2020 Date Reviewed: 1/15/2018          Codes Class Noted - Resolved POA    Vesicular rash ICD-10-CM: R23.8  ICD-9-CM: 782.1  11/2/2020 - Present Yes        Pain of right hand ICD-10-CM: M79.641  ICD-9-CM: 729.5  10/31/2020 - Present Yes        Left axillary hidradenitis ICD-10-CM: L73.2  ICD-9-CM: 705.83  10/30/2020 - Present Yes        Right ankle swelling ICD-10-CM: M25.471  ICD-9-CM: 719.07  10/28/2020 - Present Yes        Right ankle pain ICD-10-CM: M25.571  ICD-9-CM: 719.47  10/28/2020 - Present Yes        * (Principal) Septic joint ([address] ICD-10-CM: M00.9  ICD-9-CM: 711.00  10/28/2020 - Present Yes        Positive for microalbuminuria ICD-10-CM: R80.9  ICD-9-CM: 791.0  2/12/2018 - Present Yes        DM2 (diabetes mellitus, type 2) ([facility] (Chronic) ICD-10-CM: E11.9  ICD-9-CM: 250.00  2/24/2014 - Present Yes    Overview Signed 2/24/2014  4:36 PM by Clarke Schumacher     Newly diagnosed 2/22/14             Morbid obesity ([facility] (Chronic) ICD-10-CM: E66.01  ICD-9-CM: 278.01  2/24/2014 - Present Yes        HTN (hypertension) (Chronic) ICD-10-CM: I10  ICD-9-CM: 401.9  2/24/2014 - Present Yes              Plan:  Rt Ankle pain/swelling concerning for septic Joint  -S/p washout by Ortho. BCx and ankle joint fluid shows not growth. - ID following : dapto+ cipro. ID follow up 11/24/20 @ 8:30am  -can do dapto at SNF per CM  - Ortho recs appreciated: no additional OR planned. Outpatient follow up in 2 weeks    HTN  -started lisinopril     Rt hand/arm swelling and pain (improved)  Rt superficial vein thrombosis (cephalic vein)  -Doppler of UE showed small short segment thrombus in the cephalic vein at the antecubital fossa, superficial vein and Mild interstitial edema at the area of swelling of the right wrist without drainable fluid collection. Unable to  due to pain. No numbness/tingling.  - warm compress  - no indication for [facility] given superficial nature of thrombosis     Vesicular rash on abdomen  -?vanc but unclear. Normocytic Anemia  -S/p 1 unit of PRBC. -iron deficiency, now on PO iron  -FOBT is negative. - monitor     T2DM, uncontrolled  - A1c 11.7  - holding home metformin  - continue with home Actos and Farxiga  - ISS, diabetic diet    Diet:  DIET DIABETIC CONSISTENT CARB  DIET NUTRITIONAL SUPPLEMENTS  DVT ppx:  lovenox    Other listed chronic conditions stable, cont current management.     Signed:  Mili Metz MD

## 2020-11-08 LAB
GLUCOSE BLD STRIP.AUTO-MCNC: 150 MG/DL (ref 65–100)
GLUCOSE BLD STRIP.AUTO-MCNC: 176 MG/DL (ref 65–100)
GLUCOSE BLD STRIP.AUTO-MCNC: 179 MG/DL (ref 65–100)
GLUCOSE BLD STRIP.AUTO-MCNC: 185 MG/DL (ref 65–100)
HGB BLD-MCNC: 7.6 G/DL (ref 11.7–15.4)

## 2020-11-08 PROCEDURE — 74011250637 HC RX REV CODE- 250/637: Performed by: INTERNAL MEDICINE

## 2020-11-08 PROCEDURE — 74011636637 HC RX REV CODE- 636/637: Performed by: INTERNAL MEDICINE

## 2020-11-08 PROCEDURE — 82962 GLUCOSE BLOOD TEST: CPT

## 2020-11-08 PROCEDURE — 74011000250 HC RX REV CODE- 250: Performed by: INTERNAL MEDICINE

## 2020-11-08 PROCEDURE — 65270000029 HC RM PRIVATE

## 2020-11-08 PROCEDURE — 85018 HEMOGLOBIN: CPT

## 2020-11-08 PROCEDURE — 74011250636 HC RX REV CODE- 250/636: Performed by: INTERNAL MEDICINE

## 2020-11-08 PROCEDURE — 2709999900 HC NON-CHARGEABLE SUPPLY

## 2020-11-08 PROCEDURE — 74011250636 HC RX REV CODE- 250/636: Performed by: HOSPITALIST

## 2020-11-08 PROCEDURE — 74011250637 HC RX REV CODE- 250/637: Performed by: HOSPITALIST

## 2020-11-08 PROCEDURE — 74011250637 HC RX REV CODE- 250/637: Performed by: FAMILY MEDICINE

## 2020-11-08 PROCEDURE — 97110 THERAPEUTIC EXERCISES: CPT

## 2020-11-08 RX ADMIN — PREGABALIN 75 MG: 75 CAPSULE ORAL at 21:17

## 2020-11-08 RX ADMIN — LISINOPRIL 10 MG: 5 TABLET ORAL at 10:14

## 2020-11-08 RX ADMIN — INSULIN HUMAN 2 UNITS: 100 INJECTION, SOLUTION PARENTERAL at 21:18

## 2020-11-08 RX ADMIN — PIOGLITAZONE HYDROCHLORIDE 15 MG: 15 TABLET ORAL at 05:40

## 2020-11-08 RX ADMIN — OXYCODONE 10 MG: 5 TABLET ORAL at 05:48

## 2020-11-08 RX ADMIN — ENOXAPARIN SODIUM 40 MG: 40 INJECTION SUBCUTANEOUS at 10:16

## 2020-11-08 RX ADMIN — INSULIN HUMAN 2 UNITS: 100 INJECTION, SOLUTION PARENTERAL at 10:17

## 2020-11-08 RX ADMIN — Medication 20 ML: at 05:49

## 2020-11-08 RX ADMIN — CIPROFLOXACIN 500 MG: 500 TABLET, FILM COATED ORAL at 21:17

## 2020-11-08 RX ADMIN — CIPROFLOXACIN 500 MG: 500 TABLET, FILM COATED ORAL at 10:15

## 2020-11-08 RX ADMIN — PREGABALIN 75 MG: 75 CAPSULE ORAL at 10:15

## 2020-11-08 RX ADMIN — Medication 10 ML: at 17:46

## 2020-11-08 RX ADMIN — FAMOTIDINE 20 MG: 20 TABLET, FILM COATED ORAL at 10:15

## 2020-11-08 RX ADMIN — SODIUM CHLORIDE 700 MG: 9 INJECTION, SOLUTION INTRAMUSCULAR; INTRAVENOUS; SUBCUTANEOUS at 17:34

## 2020-11-08 RX ADMIN — INSULIN HUMAN 2 UNITS: 100 INJECTION, SOLUTION PARENTERAL at 17:42

## 2020-11-08 RX ADMIN — FAMOTIDINE 20 MG: 20 TABLET, FILM COATED ORAL at 17:36

## 2020-11-08 RX ADMIN — OXYCODONE 10 MG: 5 TABLET ORAL at 21:17

## 2020-11-08 RX ADMIN — INSULIN HUMAN 2 UNITS: 100 INJECTION, SOLUTION PARENTERAL at 12:41

## 2020-11-08 RX ADMIN — PREGABALIN 75 MG: 75 CAPSULE ORAL at 17:45

## 2020-11-08 RX ADMIN — Medication 10 ML: at 05:49

## 2020-11-08 NOTE — PROGRESS NOTES
END OF SHIFT NOTE:    INTAKE/OUTPUT  11/06 0701 - 11/07 0700  In: -   Out: 1250 [Urine:1250]  Voiding: YES  Catheter: NO  Drain:              Flatus: Patient does have flatus present. Stool:  1 occurrences. Characteristics:      Emesis: 0 occurrences. Characteristics:        VITAL SIGNS  Patient Vitals for the past 12 hrs:   Temp Pulse Resp BP SpO2   11/07/20 1933 99.1 °F (37.3 °C) 87 20 135/74 98 %   11/07/20 1530 98.2 °F (36.8 °C) 79 19 (!) 149/72 97 %   11/07/20 1216 98.2 °F (36.8 °C) 80 20 (!) 150/75 95 %       Pain Assessment  Pain Intensity 1: 8 (11/07/20 1320)  Pain Location 1: Ankle, Foot  Pain Intervention(s) 1: Medication (see MAR)  Patient Stated Pain Goal: 2    Ambulating  Yes with assistance    Shift report given to oncoming nurse at the bedside.     Judy Jain RN

## 2020-11-08 NOTE — PROGRESS NOTES
END OF SHIFT NOTE:    INTAKE/OUTPUT  11/07 0701 - 11/08 0700  In: -   Out: 600 [Urine:600]  Voiding: YES  Catheter: NO  Drain:              Flatus: Patient does have flatus present. Stool:  0 occurrences. Characteristics:  Stool Assessment  Stool Color: Brown  Stool Appearance: Loose, Soft(per pt)  Stool Amount: Large  Stool Source/Status: Rectum    Emesis: 1 occurrences. Characteristics:        VITAL SIGNS  Patient Vitals for the past 12 hrs:   Temp Pulse Resp BP SpO2   11/08/20 0535 98.9 °F (37.2 °C) 85 21 130/70 98 %   11/07/20 1933 99.1 °F (37.3 °C) 87 20 135/74 98 %       Pain Assessment  Pain Intensity 1: 10 (11/08/20 0548)  Pain Location 1: Ankle, Foot  Pain Intervention(s) 1: Medication (see MAR)  Patient Stated Pain Goal: 2    Ambulating  Yes    Shift report given to oncoming nurse at the bedside.     Anjum Mcnair RN

## 2020-11-08 NOTE — PROGRESS NOTES
Problem: Patient Education: Go to Patient Education Activity  Goal: Patient/Family Education  Outcome: Progressing Towards Goal  Note:   1. Patient will complete total body bathing and dressing with modified independence and adaptive equipment as needed. 2. Patient will complete toileting with modified independence and adaptive equipment as needed. 3. Patient will tolerate 30 minutes of OT treatment with up to 2 rest breaks to increase activity tolerance for ADLs. 4. Patient will complete functional transfers with modified independence and adaptive equipment as needed. 5. Patient will complete functional mobility for ADLs with modified independence and adaptive equipment as needed. 6. Patient will demonstrate modified independence with therapeutic exercise HEP to increase strength in BUEs for increased safety and independence with functional transfers. Timeframe: 7 visits        OCCUPATIONAL THERAPY: Daily Note and PM 11/8/2020  INPATIENT: OT Visit Days: 2  Payor: Jacek Belts / Plan[place]UE [place] OUT OF STATE / Product Type: PPO /      NAME/AGE/GENDER: Ival Height is a 45 y.o. female   PRIMARY DIAGNOSIS:  Septic joint ([facility] [address] [M00.9]  Right ankle pain [M25.571]  Right ankle swelling [M25.471] Septic joint (HCC) Septic joint (HCC)  Procedure(s) (LRB):  EXTREMITY IRRIGATION AND DEBRIDEMENT/LEFT ANKLE (Left)  10 Days Post-Op  ICD-10: Treatment Diagnosis:    · Generalized Muscle Weakness (M62.81)  · Localized edema (R60.1)   Precautions/Allergies:    WBAT RLE   Patient has no known allergies. ASSESSMENT:     Ms. Zenda Dance presents with R ankle pain, septic joint s/p I&D, WBAT RLE. At baseline pt lives alone and reports independence with ADLs, IADLs, ambulation, driving. Has been utilizing RW x3 weeks due to R ankle pain. Has had RUE pain/swelling, found to have R superficial thrombosis in cephalic vein.       11/8/2020 Pt was sitting in chair upon arrival. Pt completed the exercises below on B UE's using  Yellow  thera band. Good progress made. Continue POC. This section established at most recent assessment   PROBLEM LIST (Impairments causing functional limitations):  1. Decreased Strength  2. Decreased ADL/Functional Activities  3. Decreased Transfer Abilities  4. Decreased Ambulation Ability/Technique  5. Increased Pain  6. Decreased Activity Tolerance  7. Increased Fatigue  8. Decreased Flexibility/Joint Mobility  9. Edema/Girth  10. Decreased Skin Integrity/Hygeine  11. Decreased [place] with Home Exercise Program   INTERVENTIONS PLANNED: (Benefits and precautions of occupational therapy have been discussed with the patient.)  1. Activities of daily living training  2. Adaptive equipment training  3. Balance training  4. Clothing management  5. Community reintergration  6. Donning&doffing training  7. Hygiene training  8. Neuromuscular re-eduation  9. Re-evaluation  10. Therapeutic activity  11. Therapeutic exercise  12. Wheelchair management  13. Work reintegration     TREATMENT PLAN: Frequency/Duration: Follow patient 3x/week to address above goals. Rehabilitation Potential For Stated Goals: Excellent     REHAB RECOMMENDATIONS (at time of discharge pending progress):    Placement: It is my opinion, based on this patient's performance to date, that Ms. Salgado may benefit from intensive therapy at [facility] after discharge due to a probable need for 24 hour rehab nursing, a probable need for multiple therapy disciplines, and potential to make ongoing and sustainable functional improvement that is of practical value. Equipment:    TBD pending progress.  May need w/c, [facility]              OCCUPATIONAL PROFILE AND HISTORY:   History of Present Injury/Illness (Reason for Referral):  \"Patient is a 78-year-old -American female with history of morbid obesity, type 2 diabetes mellitus uncontrolled, hypertension who presented to the ER with 3 weeks history of constant right ankle pain and swelling which is progressively getting worse. Patient is currently a little somnolent since she got morphine in the ER for pain control. She is easily arousable and is answering questions appropriately. She reports symptoms began spontaneously with sudden onset of right ankle pain that was followed by ankle swelling. She has been to the emergency multiple times but she was told initially that it was a gout attack and then next time was told to be cellulitis and was discharged on antibiotics, colchicine, indomethacin. Over the last 2 to 3 days she has developed a blister medial aspect of right ankle. She denies having any fever but reports feeling warm every now and then with chills. Denies having any chest pain, palpitations, cough, shortness of breath, abdominal pain. Patient has been evaluated by Ortho in ER with possible plan for washout tomorrow following an MRI of right foot. Blood work showed CRP of 132 raising suspicion of septic joint. \"  Past Medical History/Comorbidities:   Ms. Randy Cnocepcion  has a past medical history of Diabetes ([address] and Hypertension. Ms. Randy Concepcino  has a past surgical history that includes hx  section. Social History/Living Environment:   Home Environment: Apartment  # Steps to Enter: (3 flights of stairs)  One/Two Story Residence: Other (Comment)(lives on 3rd floor, no elevator access )  Living Alone: Yes  Support Systems: Friends \ neighbors  Patient Expects to be Discharged to[de-identified] Rehabilitation facility  Current DME Used/Available at Home: Walker, rolling  Prior Level of Function/Work/Activity:  At baseline pt lives alone and reports independence with ADLs, IADLs, ambulation, driving. Has been utilizing RW x3 weeks due to R ankle pain. Has had RUE pain/swelling, found to have R superficial thrombosis in cephalic vein. Personal Factors:          Sex:  female        Age:  45 y.o.         Other factors that influence how disability is experienced by the patient:  Multiple co-morbidities    Number of Personal Factors/Comorbidities that affect the Plan of Care: Expanded review of therapy/medical records (1-2):  MODERATE COMPLEXITY   ASSESSMENT OF OCCUPATIONAL PERFORMANCE[de-identified]   Activities of Daily Living:   Basic ADLs (From Assessment) Complex ADLs (From Assessment)   Feeding: Independent  Oral Facial Hygiene/Grooming: Setup  Bathing: Moderate assistance  Upper Body Dressing: Setup  Lower Body Dressing: Moderate assistance  Toileting: Minimum assistance Instrumental ADL  Meal Preparation: Total assistance  Homemaking: Total assistance   Grooming/Bathing/Dressing Activities of Daily Living                                   Most Recent Physical Functioning:   Gross Assessment:                  Posture:  Posture (WDL): Exceptions to WDL  Posture Assessment: Forward head, Rounded shoulders  Balance:    Bed Mobility:     Wheelchair Mobility:     Transfers:               Patient Vitals for the past 6 hrs:   BP SpO2 Pulse   11/08/20 1019 126/77 96 % 77   11/08/20 1106 131/77 97 % 76       Mental Status  Neurologic State: Alert  Orientation Level: Oriented X4  Cognition: Appropriate decision making, Appropriate for age attention/concentration, Appropriate safety awareness, Follows commands  Perception: Appears intact  Perseveration: No perseveration noted  Safety/Judgement: Awareness of environment, Fall prevention, Insight into deficits                          Physical Skills Involved:  1. Range of Motion  2. Balance  3. Strength  4. Activity Tolerance  5. Sensation  6. Gross Motor Control  7. Pain (acute)  8. Pain (Chronic)  9. Edema  10. Skin Integrity Cognitive Skills Affected (resulting in the inability to perform in a timely and safe manner):  1. None Psychosocial Skills Affected:  1. Habits/Routines  2. Environmental Adaptation  3. Social Interaction  4. Emotional Regulation  5. Self-Awareness  6. Awareness of Others  7.  Social Roles Number of elements that affect the Plan of Care: 5+:  HIGH COMPLEXITY   CLINICAL DECISION MAKING:   [employer] AM-PAC 6 Clicks   Daily Activity Inpatient Short Form  How much help from another person does the patient currently need. .. Total A Lot A Little None   1. Putting on and taking off regular lower body clothing? [] 1   [x] 2   [] 3   [] 4   2. Bathing (including washing, rinsing, drying)? [] 1   [x] 2   [] 3   [] 4   3. Toileting, which includes using toilet, bedpan or urinal?   [] 1   [] 2   [x] 3   [] 4   4. Putting on and taking off regular upper body clothing? [] 1   [] 2   [x] 3   [] 4   5. Taking care of personal grooming such as brushing teeth? [] 1   [] 2   [x] 3   [] 4   6. Eating meals? [] 1   [] 2   [] 3   [x] 4   © 2007, Trustees of [facility] [employer], under license to [employer]. All rights reserved      Score:  Initial: 17 11/4/2020 Most Recent: X (Date: -- )    Interpretation of Tool:  Represents activities that are increasingly more difficult (i.e. Bed mobility, Transfers, Gait). Medical Necessity:     · Patient demonstrates   · good and excellent  ·  rehab potential due to higher previous functional level. Reason for Services/Other Comments:  · Patient continues to require skilled intervention due to   · Inability to complete ADLs at prior level of independence  · . Use of outcome tool(s) and clinical judgement create a POC that gives a: MODERATE COMPLEXITY         TREATMENT:   (In addition to Assessment/Re-Assessment sessions the following treatments were rendered)     Pre-treatment Symptoms/Complaints:    Pain: Initial:      Post Session:  same     Therapeutic Exercise: ( 10):  Exercises per grid below to improve mobility, strength, balance, coordination, and activity tolerance . Required minimal visual and verbal cues to promote proper body alignment, promote proper body posture, and promote proper body mechanics.   Progressed repetitions as indicated. Date:  11/4/2020 Date:  11/8/2020 Date:     Activity/Exercise Parameters Parameters Parameters   Sit <> Stand Transfers X3 reps     BUE Punches X20 reps 20    Elbow flex/ex   20    Shoulder horizontal abd /add  20    Shoulder flex/ex  20                    Braces/Orthotics/Lines/Etc:   · O2 Device: Room air  Treatment/Session Assessment:    · Response to Treatment:  Limited by pain  · Interdisciplinary Collaboration:   o Certified Occupational Therapy Assistant  o Registered Nurse  · After treatment position/precautions:   o Up in chair  o Bed/Chair-wheels locked  o Bed in low position  o Call light within reach  o RN notified   · Compliance with Program/Exercises: Compliant all of the time, Will assess as treatment progresses. · Recommendations/Intent for next treatment session: \"Next visit will focus on advancements to more challenging activities and reduction in assistance provided\".   Total Treatment Duration:  OT Patient Time In/Time Out  Time In: 1310  Time Out: [address]

## 2020-11-08 NOTE — PROGRESS NOTES
Hospitalist Note     Admit Date:  10/28/2020 11:24 AM   Name:  Raphael Sesay   Age:  45 y.o.  :  1980   MRN:  [id number]   PCP:  Gabriela Hernandez MD  Treatment Team: Attending Provider: Yazmin Garcia MD; Surgeon: Agusto Vail MD; : Nessa Waite; Utilization Review: Natalie Shay RN; Consulting Provider: Lynn Weinstein MD; Care Manager: Miladys Arriola; Utilization Review: Renaye Osler; Occupational Therapy Assistant: Royal Eli    HPI/Subjective:     Please refer to the admission H&P for details of presentation. In summary, Raphael Sesay is a 45 y.o. female with medical history significant for CAD, type 2 diabetes mellitus uncontrolled, hypertension who was admitted due to progressive worsening of right ankle pain and swelling for 3 weeks with concern for septic joint. ED, patient was afebrile and work-up shows no leukocytosis but elevated CRP. Was evaluated by orthopedics. Patient underwent ankle joint washout in the OR on 10/29/2020. She has been started on IV antibiotics for suspected septic joint. No additional inpatient plans for additional wash out by Ortho. Infectious disease is following and planning for at least 3 weeks of IV antibiotics. Patient developed pinpoint vesicular rashes on her abdomen on  which are non pruritic and non tender. It is likely due to drug reaction and has almost resolved by .   - awaiting insurance approval for SNF. No complaints. Pain controlled. No SOB, fevers   - feeling well. No complaints. No fevers, CP, SOB     - awaiting placement/precert from insurance. lyrica increased yest and she says her neuropathic pain in BLE improved.   No fevers, pain currently    No other complaints  Objective:     Patient Vitals for the past 24 hrs:   Temp Pulse Resp BP SpO2   20 0535 98.9 °F (37.2 °C) 85 21 130/70 98 %   20 1933 99.1 °F (37.3 °C) 87 20 135/74 98 %   20 1530 98.2 °F (36.8 °C) 79 19 (!) 149/72 97 %   11/07/20 1216 98.2 °F (36.8 °C) 80 20 (!) 150/75 95 %     Oxygen Therapy  O2 Sat (%): 98 % (11/08/20 0535)  Pulse via Oximetry: 84 beats per minute (10/29/20 1840)  O2 Device: Room air (11/07/20 2105)  O2 Flow Rate (L/min): 2 l/min (11/02/20 1930)    Estimated body mass index is 39.16 kg/m² as calculated from the following:    Height as of this encounter: 5' 9\" (1.753 m). Weight as of this encounter: 120.3 kg (265 lb 3.2 oz). Intake/Output Summary (Last 24 hours) at 11/8/2020 0846  Last data filed at 11/7/2020 1933  Gross per 24 hour   Intake    Output 600 ml   Net -600 ml       *Note that automatically entered I/Os may not be accurate; dependent on patient compliance with collection and accurate  by techs. General:    Well nourished. No overt distress  CV:   RRR. No edema. No JVD  Lungs:   Even, Unlabored  Abdomen:   nondistended. Extremities: Warm and dry. No cyanosis. Bandage/swelling R ankle  Skin:     No rashes. No jaundice. Normal coloration  Neuro:  No gross focal deficits.   Alert    Data Reviewed:  I have reviewed all labs, meds, and studies from the last 24 hours:  Recent Results (from the past 24 hour(s))   GLUCOSE, POC    Collection Time: 11/07/20 11:50 AM   Result Value Ref Range    Glucose (POC) 180 (H) 65 - 100 mg/dL   GLUCOSE, POC    Collection Time: 11/07/20  4:15 PM   Result Value Ref Range    Glucose (POC) 156 (H) 65 - 100 mg/dL   GLUCOSE, POC    Collection Time: 11/07/20  8:52 PM   Result Value Ref Range    Glucose (POC) 177 (H) 65 - 100 mg/dL   HEMOGLOBIN    Collection Time: 11/08/20  5:40 AM   Result Value Ref Range    HGB 7.6 (L) 11.7 - 15.4 g/dL   GLUCOSE, POC    Collection Time: 11/08/20  7:49 AM   Result Value Ref Range    Glucose (POC) 176 (H) 65 - 100 mg/dL       Current Meds:  Current Facility-Administered Medications   Medication Dose Route Frequency    pregabalin (LYRICA) capsule 75 mg  75 mg Oral TID    lisinopriL (PRINIVIL, ZESTRIL) tablet 10 mg  10 mg Oral DAILY    ferrous sulfate tablet 325 mg  1 Tab Oral DAILY WITH BREAKFAST    DAPTOmycin (CUBICIN) 700 mg in 0.9% sodium chloride 14 mL IV Syringe  6 mg/kg IntraVENous Q24H    0.9% sodium chloride infusion 250 mL  250 mL IntraVENous PRN    polyethylene glycol (MIRALAX) packet 17 g  17 g Oral DAILY    0.9% sodium chloride infusion 250 mL  250 mL IntraVENous PRN    central line flush (saline) syringe 20 mL  20 mL InterCATHeter Q8H    insulin regular (NOVOLIN R, HUMULIN R) injection   SubCUTAneous AC&HS    famotidine (PEPCID) tablet 20 mg  20 mg Oral BID    ciprofloxacin HCl (CIPRO) tablet 500 mg  500 mg Oral Q12H    morphine injection 2 mg  2 mg IntraVENous Q4H PRN    naloxone (NARCAN) injection 0.1 mg  0.1 mg IntraVENous EVERY 2 MINUTES AS NEEDED    flumazeniL (ROMAZICON) 0.1 mg/mL injection 0.2 mg  0.2 mg IntraVENous Multiple    diphenhydrAMINE (BENADRYL) injection 12.5 mg  12.5 mg IntraVENous Q15MIN PRN    oxyCODONE IR (ROXICODONE) tablet 10 mg  10 mg Oral Q4H PRN    dapagliflozin (FARXIGA) tablet 10 mg (Patient Supplied)  10 mg Oral DAILY    pioglitazone (ACTOS) tablet 15 mg  15 mg Oral ACB    sodium chloride (NS) flush 5-40 mL  5-40 mL IntraVENous Q8H    sodium chloride (NS) flush 5-40 mL  5-40 mL IntraVENous PRN    acetaminophen (TYLENOL) tablet 650 mg  650 mg Oral Q6H PRN    Or    acetaminophen (TYLENOL) suppository 650 mg  650 mg Rectal Q6H PRN    promethazine (PHENERGAN) tablet 12.5 mg  12.5 mg Oral Q6H PRN    Or    ondansetron (ZOFRAN) injection 4 mg  4 mg IntraVENous Q4H PRN    enoxaparin (LOVENOX) injection 40 mg  40 mg SubCUTAneous DAILY    potassium chloride 10 mEq in 100 ml IVPB  10 mEq IntraVENous PRN    magnesium sulfate 2 g/50 ml IVPB (premix or compounded)  2 g IntraVENous PRN    oxyCODONE IR (ROXICODONE) tablet 5 mg  5 mg Oral Q4H PRN    naloxone (NARCAN) injection 0.4 mg  0.4 mg IntraVENous PRN    nystatin (MYCOSTATIN) 100,000 unit/gram powder   Topical BID       Other Studies:  No results found for this visit on 10/28/20. No results found.     All Micro Results     Procedure Component Value Units Date/Time    CULTURE, ANAEROBIC [[id number]] Collected:  10/29/20 1615    Order Status:  Completed Specimen:  Ankle Updated:  11/06/20 0734     Special Requests: NO SPECIAL REQUESTS        Culture result:       NO ANAEROBES ISOLATED 7 DAYS          CULTURE, BLOOD [[id number]] Collected:  10/28/20 1125    Order Status:  Completed Specimen:  Blood Updated:  11/04/20 0654     Special Requests: --        RIGHT  Antecubital       Culture result: NO GROWTH 5 DAYS       CULTURE, BLOOD [[id number]] Collected:  10/28/20 1715    Order Status:  Completed Specimen:  Blood Updated:  11/02/20 0913     Special Requests: --        RIGHT  Antecubital       Culture result: NO GROWTH 5 DAYS       CULTURE, Tsosie Beat STAIN [[id number]] Collected:  10/29/20 1615    Order Status:  Completed Specimen:  Ankle Updated:  11/01/20 0811     Special Requests: NO SPECIAL REQUESTS        GRAM STAIN 0 TO 1 WBCS SEEN PER OIF      NO DEFINITE ORGANISM SEEN        Culture result: NO GROWTH 2 DAYS             SARS-CoV-2 Lab Results  \"Novel Coronavirus\" Test: No results found for: COV2NT   \"Emergent Disease\" Test: No results found for: EDPR  \"SARS-COV-2\" Test: No results found for: XGCOVT  Rapid Test: No results found for: COVR         Assessment and Plan:     Hospital Problems as of 11/8/2020 Date Reviewed: 1/15/2018          Codes Class Noted - Resolved POA    Vesicular rash ICD-10-CM: R23.8  ICD-9-CM: 782.1  11/2/2020 - Present Yes        Pain of right hand ICD-10-CM: M79.641  ICD-9-CM: 729.5  10/31/2020 - Present Yes        Left axillary hidradenitis ICD-10-CM: L73.2  ICD-9-CM: 705.83  10/30/2020 - Present Yes        Right ankle swelling ICD-10-CM: M25.471  ICD-9-CM: 719.07  10/28/2020 - Present Yes        Right ankle pain ICD-10-CM: M25.571  ICD-9-CM: 719.47  10/28/2020 - Present Yes        * (Principal) Septic joint ([facility] [address] ICD-10-CM: M00.9  ICD-9-CM: 711.00  10/28/2020 - Present Yes        Positive for microalbuminuria ICD-10-CM: R80.9  ICD-9-CM: 791.0  2/12/2018 - Present Yes        DM2 (diabetes mellitus, type 2) (HCC) (Chronic) ICD-10-CM: E11.9  ICD-9-CM: 250.00  2/24/2014 - Present Yes    Overview Signed 2/24/2014  4:36 PM by Lyndon Roman     Newly diagnosed 2/22/14             Morbid obesity ([facility] [address] (Chronic) ICD-10-CM: E66.01  ICD-9-CM: 278.01  2/24/2014 - Present Yes        HTN (hypertension) (Chronic) ICD-10-CM: I10  ICD-9-CM: 401.9  2/24/2014 - Present Yes              Plan:  Rt Ankle pain/swelling concerning for septic Joint  -S/p washout by Ortho. BCx and ankle joint fluid shows not growth. - ID following : dapto+ cipro. ID follow up 11/24/20 @ 8:30am  -can do dapto at SNF per CM  - Ortho recs appreciated: no additional OR planned. Outpatient follow up in 2 weeks    HTN  -improved on lisinopril.     Rt hand/arm swelling and pain (improved)  Rt superficial vein thrombosis (cephalic vein)  - [facility] given superficial nature of thrombosis     Vesicular rash on abdomen  -?vanc but unclear. Normocytic Anemia  -S/p 1 unit of PRBC  -iron deficiency, now on PO iron  -FOBT negative. - monitor     T2DM  - A1c 11.7  - holding home metformin  - continue with home Actos and Farxiga  - ISS, diabetic diet    Diet:  DIET DIABETIC CONSISTENT CARB  DIET NUTRITIONAL SUPPLEMENTS  DVT ppx:  lovenox    Other listed chronic conditions stable, cont current management.     Signed:  Suzanna Trammell MD

## 2020-11-09 ENCOUNTER — HOME HEALTH ADMISSION (OUTPATIENT)
Dept: HOME HEALTH SERVICES | Facility: HOME HEALTH | Age: 40
End: 2020-11-09
Payer: COMMERCIAL

## 2020-11-09 VITALS
SYSTOLIC BLOOD PRESSURE: 144 MMHG | TEMPERATURE: 98.1 F | RESPIRATION RATE: 18 BRPM | BODY MASS INDEX: 39.19 KG/M2 | HEART RATE: 98 BPM | OXYGEN SATURATION: 99 % | WEIGHT: 264.6 LBS | DIASTOLIC BLOOD PRESSURE: 75 MMHG | HEIGHT: 69 IN

## 2020-11-09 LAB
GLUCOSE BLD STRIP.AUTO-MCNC: 146 MG/DL (ref 65–100)
GLUCOSE BLD STRIP.AUTO-MCNC: 218 MG/DL (ref 65–100)
HGB BLD-MCNC: 7.8 G/DL (ref 11.7–15.4)

## 2020-11-09 PROCEDURE — 74011250637 HC RX REV CODE- 250/637: Performed by: INTERNAL MEDICINE

## 2020-11-09 PROCEDURE — 82962 GLUCOSE BLOOD TEST: CPT

## 2020-11-09 PROCEDURE — 2709999900 HC NON-CHARGEABLE SUPPLY

## 2020-11-09 PROCEDURE — 74011636637 HC RX REV CODE- 636/637: Performed by: INTERNAL MEDICINE

## 2020-11-09 PROCEDURE — 85018 HEMOGLOBIN: CPT

## 2020-11-09 PROCEDURE — 74011250637 HC RX REV CODE- 250/637: Performed by: HOSPITALIST

## 2020-11-09 PROCEDURE — 94760 N-INVAS EAR/PLS OXIMETRY 1: CPT

## 2020-11-09 PROCEDURE — 74011250636 HC RX REV CODE- 250/636: Performed by: HOSPITALIST

## 2020-11-09 RX ORDER — LISINOPRIL 10 MG/1
10 TABLET ORAL DAILY
Qty: 30 TAB | Refills: 2 | Status: SHIPPED | OUTPATIENT
Start: 2020-11-10 | End: 2020-11-09

## 2020-11-09 RX ORDER — OXYCODONE HYDROCHLORIDE 5 MG/1
5 TABLET ORAL
Qty: 20 TAB | Refills: 0 | Status: SHIPPED | OUTPATIENT
Start: 2020-11-09 | End: 2020-11-14

## 2020-11-09 RX ORDER — LANOLIN ALCOHOL/MO/W.PET/CERES
325 CREAM (GRAM) TOPICAL
Qty: 30 TAB | Refills: 2 | Status: SHIPPED | OUTPATIENT
Start: 2020-11-10 | End: 2020-11-09

## 2020-11-09 RX ORDER — LANOLIN ALCOHOL/MO/W.PET/CERES
325 CREAM (GRAM) TOPICAL
Qty: 30 TAB | Refills: 2 | Status: SHIPPED | OUTPATIENT
Start: 2020-11-10 | End: 2021-12-20

## 2020-11-09 RX ORDER — PREGABALIN 75 MG/1
75 CAPSULE ORAL 3 TIMES DAILY
Qty: 90 CAP | Refills: 1 | Status: SHIPPED | OUTPATIENT
Start: 2020-11-09 | End: 2021-12-20

## 2020-11-09 RX ORDER — CIPROFLOXACIN 500 MG/1
500 TABLET ORAL EVERY 12 HOURS
Qty: 34 TAB | Refills: 0 | Status: SHIPPED | OUTPATIENT
Start: 2020-11-09 | End: 2020-11-09

## 2020-11-09 RX ORDER — PREGABALIN 75 MG/1
75 CAPSULE ORAL 3 TIMES DAILY
Qty: 90 CAP | Refills: 1 | Status: SHIPPED | OUTPATIENT
Start: 2020-11-09 | End: 2020-11-09

## 2020-11-09 RX ORDER — OXYCODONE HYDROCHLORIDE 5 MG/1
5 TABLET ORAL
Qty: 20 TAB | Refills: 0 | Status: SHIPPED | OUTPATIENT
Start: 2020-11-09 | End: 2020-11-09 | Stop reason: SDUPTHER

## 2020-11-09 RX ORDER — INSULIN GLARGINE 100 [IU]/ML
10 INJECTION, SOLUTION SUBCUTANEOUS
Status: DISCONTINUED | OUTPATIENT
Start: 2020-11-09 | End: 2020-11-09 | Stop reason: HOSPADM

## 2020-11-09 RX ORDER — LISINOPRIL 10 MG/1
10 TABLET ORAL DAILY
Qty: 30 TAB | Refills: 2 | Status: SHIPPED | OUTPATIENT
Start: 2020-11-10 | End: 2021-12-20

## 2020-11-09 RX ORDER — CIPROFLOXACIN 500 MG/1
500 TABLET ORAL EVERY 12 HOURS
Qty: 34 TAB | Refills: 0 | Status: SHIPPED | OUTPATIENT
Start: 2020-11-09 | End: 2020-11-26

## 2020-11-09 RX ADMIN — PREGABALIN 75 MG: 75 CAPSULE ORAL at 09:13

## 2020-11-09 RX ADMIN — LISINOPRIL 10 MG: 5 TABLET ORAL at 09:12

## 2020-11-09 RX ADMIN — Medication 10 ML: at 13:34

## 2020-11-09 RX ADMIN — CIPROFLOXACIN 500 MG: 500 TABLET, FILM COATED ORAL at 09:13

## 2020-11-09 RX ADMIN — FERROUS SULFATE TAB 325 MG (65 MG ELEMENTAL FE) 325 MG: 325 (65 FE) TAB at 09:13

## 2020-11-09 RX ADMIN — Medication 20 ML: at 13:34

## 2020-11-09 RX ADMIN — INSULIN HUMAN 4 UNITS: 100 INJECTION, SOLUTION PARENTERAL at 09:13

## 2020-11-09 RX ADMIN — FAMOTIDINE 20 MG: 20 TABLET, FILM COATED ORAL at 09:13

## 2020-11-09 RX ADMIN — ENOXAPARIN SODIUM 40 MG: 40 INJECTION SUBCUTANEOUS at 09:14

## 2020-11-09 RX ADMIN — PIOGLITAZONE HYDROCHLORIDE 15 MG: 15 TABLET ORAL at 05:41

## 2020-11-09 NOTE — PROGRESS NOTES
Tried multiple times to contact ortho team to obtain dressing change orders. No one has gotten back with nurse. Per supervisor just leave current dressing in place and have pt follow up with home health regarding dressing changes. No acute signs of distress.

## 2020-11-09 NOTE — DISCHARGE INSTRUCTIONS
[place] [facility] [place]: Activity as tolerated  Diet: DIET DIABETIC CONSISTENT CARB Regular  DIET NUTRITIONAL SUPPLEMENTS All Meals; Glucerna Shake ( )  Open Drainage of a Joint: What to Expect at [address] had surgery to clean out an infection in one or more of your joints. The doctor removed fluid and material from the joint. You may feel sore and have some swelling at the surgical site. Your doctor will give you specific instructions on when you can do your normal activities again, such as driving and going back to work. This care sheet gives you a general idea about how long it will take for you to recover. But each person recovers at a different pace. Follow the steps below to get better as quickly as possible. How can you care for yourself at home? Activity    · Rest when you feel tired.     · You may be able to take showers, if your doctor says it is okay.     · If you do have a drain, follow your doctor's instructions to empty and care for it. Diet    · You can eat your normal diet. If your stomach is upset, try bland, low-fat foods like plain rice, broiled chicken, toast, and yogurt. Medicines    · Your doctor will tell you if and when you can restart your medicines. He or she will also give you instructions about taking any new medicines.     · If you take aspirin or some other blood thinner, ask your doctor if and when to start taking it again. Make sure that you understand exactly what your doctor wants you to do.     · Be safe with medicines. Read and follow all instructions on the label. ? If the doctor gave you a prescription medicine for pain, take it as prescribed. ? If you are not taking a prescription pain medicine, ask your doctor if you can take an over-the-counter medicine.     · If your doctor prescribed antibiotics, take them as directed. Do not stop taking them just because you feel better. You need to take the full course of antibiotics. Incision care    · You will have a dressing over the cut (incision). A dressing helps the incision heal and protects it. Your doctor will tell you how to take care of this.     · Wash the area daily with warm, soapy water, and pat it dry. Don't use hydrogen peroxide or alcohol. They can slow healing.     · Your doctor will tell you when to come back if your wound dressing needs to be changed or removed. Exercise    · Your doctor will give you instructions on what activities are okay for you to do. Ice and elevation    · Put ice or a cold pack on your joint for 10 to 20 minutes at a time. Try to do this every 1 to 2 hours for the next 3 days (when you are awake) or until the swelling goes down. Put a thin cloth between the ice and your skin.     · If possible, prop up the sore joint on a pillow when you ice it or anytime you sit or lie down during the next 3 days. Try to keep it above the level of your heart. This will help reduce swelling. Other instructions    · You may need to use crutches after surgery if the joint is used for walking. Use crutches for as long as your doctor tells you to. Your doctor will tell you when you can put weight on the joint. It may help to use a backpack or wear clothes with a lot of pockets to carry items.     · You may wear a sling or brace if you had surgery on the upper part of your body. The doctor will tell you how long you need to wear a support device. Follow-up care is a key part of your treatment and safety. Be sure to make and go to all appointments, and call your doctor if you are having problems. It's also a good idea to know your test results and keep a list of the medicines you take. When should you call for help? Call 911 anytime you think you may need emergency care. For example, call if:    · You passed out (lost consciousness).     · You have severe trouble breathing.     · You have sudden chest pain and shortness of breath, or you cough up blood.    Call your doctor now or seek immediate medical care if:    · You have pain that does not get better after you take pain medicine.     · You have loose stitches, or your incision comes open.     · You have a drain, and it comes out.     · You are bleeding through your dressing. A small amount of blood is normal.     · You have symptoms of a blood clot in your arm or leg (called a deep vein thrombosis). These may include:  ? Pain in the arm, calf, back of the knee, thigh, or groin. ? Redness and swelling in the arm, leg, or groin.     · You have signs of infection, such as:  ? Increased pain, swelling, warmth, or redness. ? Red streaks leading from the incision. ? Pus draining from the incision. ? A fever. Watch closely for changes in your health, and be sure to contact your doctor if:    · You do not get better as expected. Where can you learn more? Go to http://www.gray.com/  Enter D258 in the search box to learn more about \"Open Drainage of a Joint: What to Expect at Home. \"  Current as of: June 26, 2019               Content Version: 12.6  © 8710-5606 [employer]. Care instructions adapted under license by [employer] (which disclaims liability or warranty for this information). If you have questions about a medical condition or this instruction, always ask your healthcare professional. [address] any warranty or liability for your use of this information.

## 2020-11-09 NOTE — PROGRESS NOTES
Pt d/c to family. Pt d/c from floor down stairs via wheelchair. No acute signs of distress at this time.

## 2020-11-09 NOTE — PROGRESS NOTES
END OF SHIFT NOTE:    INTAKE/OUTPUT  11/08 0701 - 11/09 0700  In: -   Out: 1000 [Urine:1000]  Voiding: YES  Catheter: NO  Drain:              Flatus: Patient does have flatus present. Stool:  1 occurrences. Characteristics:  Stool Assessment  Stool Color: Brown  Stool Appearance: Soft, Formed  Stool Amount: Large  Stool Source/Status: Rectum    Emesis: 0 occurrences. Characteristics:        VITAL SIGNS  Patient Vitals for the past 12 hrs:   Temp Pulse Resp BP SpO2   11/09/20 0536 97.9 °F (36.6 °C) 86 16 137/82 95 %   11/08/20 1900 98 °F (36.7 °C) 87 18 (!) 148/76 98 %       Pain Assessment  Pain Intensity 1: 10 (11/08/20 2117)  Pain Location 1: Ankle, Foot  Pain Intervention(s) 1: Medication (see MAR)  Patient Stated Pain Goal: 2    Ambulating  Yes    Shift report given to oncoming nurse at the bedside.     Za Kirby RN

## 2020-11-09 NOTE — DIABETES MGMT
Patient admitted with septic joint. Blood glucose ranged 150-185 yesterday with patient receiving Regular 8 units and Actos 15mg. Blood glucose this morning was 218. Reviewed patient current regimen: Regular SSI and Actos 15mg daily. Patient would likely benefit from Lantus to help improve fasting blood glucose if stricter glycemic control is desired. Noted patient is to discharge home on home oral medications, updated provider via [employer] A1c 11.7 on admission, patient glucose levels likely diet driven per conversations between patient and educator regarding home diet. Provider plans to also send patient home with Regular SSI insulin pens and pen needles. Patient updated regarding discharge diabetes medications. Encouraged compliance with discharge regimen. Encouraged patient to continue to work on lifestyle modifications and to follow up with primary care provider for further titration of regimen. Patient verbalized understanding and voices no further questions regarding diabetes management at this time.

## 2020-11-09 NOTE — PROGRESS NOTES
Pt with discharge orders this day. Pt has decided she would like to return home instead of transition to SNF for STR. This CM spoke with [facility] with [facility] regarding home IV antibiotics - she is to provide education to pt at the hospital prior to pt discharge. This CM spoke with pt regarding home health at discharge - she is agreeable to referral.  [facility] - she is agreeable to [facility] referral.  Referral made this day. No additional CM needs at discharge. Milestones met. Care Management Interventions  PCP Verified by CM: Yes  Last Visit to PCP: 02/10/20  Mode of Transport at Discharge:  Other (see comment)(family)  Transition of Care Consult (CM Consult): Discharge Planning, [address]: Yes  Discharge Durable Medical Equipment: No  Physical Therapy Consult: Yes  Occupational Therapy Consult: Yes  Speech Therapy Consult: No  Current Support Network: Own Home, Lives Alone  Confirm Follow Up Transport: Family  The Plan for Transition of Care is Related to the Following Treatment Goals : Home with [facility] & IV antibiotics  The Patient and/or Patient Representative was Provided with a Choice of Provider and Agrees with the Discharge Plan?: Yes  Name of the Patient Representative Who was Provided with a Choice of Provider and Agrees with the Discharge Plan: Ms. Anushka Shetty of Choice List was Provided with Basic Dialogue that Supports the Patient's Individualized Plan of Care/Goals, Treatment Preferences and Shares the Quality Data Associated with the Providers?: Yes   Resource Information Provided?: No  Discharge Location  Discharge Placement: Home with home health(& IV antibiotics)

## 2020-11-09 NOTE — DISCHARGE SUMMARY
Hospitalist Discharge Summary     Admit Date:  10/28/2020 11:24 AM   DC note date: 2020  Name:  Antonio Francis   Age:  45 y.o.  :  1980   MRN:  [id number]   PCP:  Kevin Leal MD  Treatment Team: Attending Provider: Mono Garland MD; Surgeon: Emily Covarrubias MD; : Winnie Flores; Utilization Review: Georgia Tellez RN; Consulting Provider: Ag García MD; Care Manager: Michael Melgar; Utilization Review: Eitan Cruz; Physical Therapist: Dorys Kennedy, PT, DPT    Problem List for this Hospitalization:  Hospital Problems as of 2020 Date Reviewed: 1/15/2018          Codes Class Noted - Resolved POA    Vesicular rash ICD-10-CM: R23.8  ICD-9-CM: 782.1  2020 - Present Yes        Pain of right hand ICD-10-CM: M79.641  ICD-9-CM: 729.5  10/31/2020 - Present Yes        Left axillary hidradenitis ICD-10-CM: L73.2  ICD-9-CM: 705.83  10/30/2020 - Present Yes        Right ankle swelling ICD-10-CM: M25.471  ICD-9-CM: 719.07  10/28/2020 - Present Yes        Right ankle pain ICD-10-CM: M25.571  ICD-9-CM: 719.47  10/28/2020 - Present Yes        * (Principal) Septic joint ([facility] ICD-10-CM: M00.9  ICD-9-CM: 711.00  10/28/2020 - Present Yes        Positive for microalbuminuria ICD-10-CM: R80.9  ICD-9-CM: 791.0  2018 - Present Yes        DM2 (diabetes mellitus, type 2) ([facility] (Chronic) ICD-10-CM: E11.9  ICD-9-CM: 250.00  2014 - Present Yes    Overview Signed 2014  4:36 PM by Deshaun Beaver     Newly diagnosed 14             Morbid obesity ([facility] (Chronic) ICD-10-CM: E66.01  ICD-9-CM: 278.01  2014 - Present Yes        HTN (hypertension) (Chronic) ICD-10-CM: I10  ICD-9-CM: 401.9  2014 - Present Yes                Admission HPI from 10/28/2020:    \"Patient is a 81-year-old -American female with history of morbid obesity, type 2 diabetes mellitus uncontrolled, hypertension who presented to the ER with 3 weeks history of constant right ankle pain and swelling which is progressively getting worse. Patient is currently a little somnolent since she got morphine in the ER for pain control. She is easily arousable and is answering questions appropriately. She reports symptoms began spontaneously with sudden onset of right ankle pain that was followed by ankle swelling. She has been to the emergency multiple times but she was told initially that it was a gout attack and then next time was told to be cellulitis and was discharged on antibiotics, colchicine, indomethacin. Over the last 2 to 3 days she has developed a blister medial aspect of right ankle. She denies having any fever but reports feeling warm every now and then with chills. Denies having any chest pain, palpitations, cough, shortness of breath, abdominal pain. Patient has been evaluated by Ortho in ER with possible plan for washout tomorrow following an MRI of right foot. Blood work showed CRP of 132 raising suspicion of septic joint. [facility] Course:  Patient underwent ankle joint washout in the OR on 10/29/2020.  She was started on IV antibiotics for suspected septic joint. No additional inpatient plans for additional wash out by Ortho. ID consulted. Patient developed pinpoint vesicular rashes on her abdomen on 11/2 which are non pruritic and non tender. It is possibly due to vanc drug reaction and resolved by 11/6. She was switched to dapto and has tolerated. See final ID recs below    Discharge held an extra 2-3 days over weekend due to pt changing her mind about dispo. Original plan was for [facility], then she opted for SNF, then she changed her mind again. She will go home with [facility] today.     Also needs PCP follow up for better DM control as this will be crucial going forward    Final ID recs:  Plan:   · Continue daptomycin plus cipro; planned EOT 11/26/2020  · If daptomycin is a barrier to STR placement then we can rechallenge with vancomycin now that the rash is improved  · 4 weeks of treatment planned EOT 11/26/20; PICC placed   · I wrote outpatient IV antibiotic orders, but now it looks as if the plan is for her to go to rehab  · We again discussed the importance of better diabetes management  · ID follow up 11/24/20 @ 8:30am         [facility]  Activity: Activity as tolerated  Diet: DIET DIABETIC CONSISTENT CARB Regular  DIET NUTRITIONAL SUPPLEMENTS All Meals; Glucerna Shake ( )  Code Status: Full Code    Follow Up Orders: Follow-up Appointments   Procedures    FOLLOW UP VISIT Appointment in: One Week pcp for better diabetes control     pcp for better diabetes control     Standing Status:   Standing     Number of Occurrences:   1     Order Specific Question:   Appointment in     Answer: One Week       Follow-up Information     Follow up With Specialties Details Why [facility] Internal Medicine Infectious Disease  On 11/24/2020 8:30am for follow up [facility]    Nitin Galvan MD Family Medicine On 11/17/2020 diabetes control   AT 11:00 [address]  [place] [id number] [facility]  OFFICE [address] [place] [address],[address]  [phone]            Discharge meds at bottom of this note. Plan was discussed with pt. All questions answered. Patient was stable at time of discharge. Given instructions to call a physician or return if any concerns. Discharge summary and encounter summary was sent to PCP electronically via \"Comm Mgt\" link in [facility], if possible. Diagnostic Imaging/Tests:   Xr Chest Pa Lat    Result Date: 10/12/2020  History: cp Two views chest Findings: The lungs are well expanded and clear.  The cardiac silhouette, and mediastinal contour, and osseous structures are normal.     Impression: Unremarkable two-view chest.     Xr Ankle Rt Min 3 V    Result Date: 10/12/2020  History: Medial right ankle pain and swelling, 2 days duration 3 views right ankle FINDINGS: No acute fracture, dislocation, or additional acute bony abnormality. IMPRESSION: No acute findings. Duplex Lower Ext Venous Right    Result Date: 10/28/2020  Right lower extremity venous ultrasound INDICATION:  Pain and swelling, chronic, moderate COMPARISON: Radiography of the right ankle 10/12/2020 and ultrasound of the leg 2/23/2020 Technique: Grayscale, color Doppler, and spectral analysis were performed on the deep veins. FINDINGS: There is no evidence of Baker's cyst. Note of a right inguinal 2.5 x 1.1 x 1.7 cm lymph node with normal morphology and no hypervascularity. This is of unclear but doubtful significance, unless clinically suspicious. There is normal flow in the common femoral, superficial femoral, greater saphenous, femoral and popliteal veins. Normal compression and augmentation demonstrated. The proximal calf veins are also patent as is the partially visualized contralateral common femoral vein. IMPRESSION: No evidence of deep venous thrombosis in the right lower extremity     Duplex Upper Ext Venous Right    Result Date: 10/31/2020  EXAMINATION: Right upper extemity doppler ultrasound 10/31/2020 3:49 PM INDICATION: Right forearm swelling and pain COMPARISON: None available. TECHNIQUE: Doppler Ultrasound of the right upper extremity veins was performed. FINDINGS: Internal Jugular and innominate: Patent Subclavian: Patent Axillary: Patent Basilic: Patent Cephalic: Small area of thrombus at the antecubital fossa. Brachial: Patent Forearm: Patent radial and ulnar veins. Mild subcutaneous edema seen in the area of swelling at the right lateral wrist.     IMPRESSION: 1. Small short segment thrombus in the cephalic vein at the antecubital fossa, a superficial vein 2.  Mild interstitial edema at the area of swelling of the right wrist without drainable fluid collection. Echocardiogram/EKG results:  No results found for this visit on 10/28/20.     Results for orders placed or performed during the hospital encounter of 10/12/20   EKG, 12 LEAD, INITIAL   Result Value Ref Range    Ventricular Rate 99 BPM    Atrial Rate 99 BPM    P-R Interval 156 ms    QRS Duration 90 ms    Q-T Interval 328 ms    QTC Calculation (Bezet) 420 ms    Calculated P Axis 53 degrees    Calculated R Axis 65 degrees    Calculated T Axis -5 degrees    Diagnosis         Normal sinus rhythm  Nonspecific T wave abnormality  Abnormal ECG  When compared with ECG of 12-OCT-2020 19:18,  No significant change was found  Confirmed by Parag Colon ([id number]) on 10/13/2020 6:02:42 AM         Procedures done this admission:  Procedure(s):  EXTREMITY IRRIGATION AND DEBRIDEMENT/LEFT ANKLE    All Micro Results     Procedure Component Value Units Date/Time    CULTURE, ANAEROBIC [[id number]] Collected:  10/29/20 1615    Order Status:  Completed Specimen:  Ankle Updated:  11/06/20 0734     Special Requests: NO SPECIAL REQUESTS        Culture result:       NO ANAEROBES ISOLATED 7 DAYS          CULTURE, BLOOD [[id number]] Collected:  10/28/20 1125    Order Status:  Completed Specimen:  Blood Updated:  11/04/20 0654     Special Requests: --        RIGHT  Antecubital       Culture result: NO GROWTH 5 DAYS       CULTURE, BLOOD [[id number]] Collected:  10/28/20 1715    Order Status:  Completed Specimen:  Blood Updated:  11/02/20 0913     Special Requests: --        RIGHT  Antecubital       Culture result: NO GROWTH 5 DAYS       CULTURE, Veatrice People STAIN [[id number]] Collected:  10/29/20 1615    Order Status:  Completed Specimen:  Ankle Updated:  11/01/20 0811     Special Requests: NO SPECIAL REQUESTS        GRAM STAIN 0 TO 1 WBCS SEEN PER OIF      NO DEFINITE ORGANISM SEEN        Culture result: NO GROWTH 2 DAYS             SARS-CoV-2 Lab Results  \"Novel Coronavirus\" Test: No results found for: COV2NT   \"Emergent Disease\" Test: No results found for: EDPR  \"SARS-COV-2\" Test: No results found for: XGCOVT  Rapid Test: No results found for: COVR         Labs: Results:       BMP, Mg, Phos No results for input(s): NA, K, CL, CO2, AGAP, BUN, CREA, CA, GLU, MG, PHOS in the last 72 hours. CBC Recent Labs     11/09/20  0541 11/08/20  0540 11/07/20  0603   HGB 7.8* 7.6* 7.8*      LFT No results for input(s): ALT, TBIL, AP, TP, ALB, GLOB, AGRAT in the last 72 hours.     No lab exists for component: SGOT, GPT   Cardiac Testing Lab Results   Component Value Date/Time    CK 54 11/03/2020 04:58 PM    Troponin-I, Qt. <0.02 (L) 02/23/2020 02:15 PM    Troponin-I, Qt. <0.02 (L) 02/23/2020 12:19 PM      Coagulation Tests No results found for: PTP, INR, APTT, INREXT, INREXT   A1c Lab Results   Component Value Date/Time    Hemoglobin A1c 11.7 (H) 10/28/2020 11:51 AM    Hemoglobin A1c 13.5 06/22/2020 12:11 PM    Hemoglobin A1c 7.5 (H) 05/07/2018 09:39 AM      Lipid Panel Lab Results   Component Value Date/Time    Cholesterol, total 109 02/25/2014 05:20 AM    HDL Cholesterol 56 02/25/2014 05:20 AM    DIRECT LDL CHOLESTEROL 77 05/07/2018 09:39 AM    LDL, calculated 44.4 02/25/2014 05:20 AM    VLDL, calculated 8.6 02/25/2014 05:20 AM    Triglyceride 43 02/25/2014 05:20 AM    CHOL/HDL Ratio 1.9 02/25/2014 05:20 AM      Thyroid Panel No results found for: TSH, T4, FT4, TT3, T3U, TSHEXT, TSHEXT     Most Recent UA Lab Results   Component Value Date/Time    Color CRYS 10/28/2020 07:00 PM    Appearance CLOUDY 10/28/2020 07:00 PM    Specific gravity 1.019 10/28/2020 07:00 PM    pH (UA) 6.0 10/28/2020 07:00 PM    Protein 30 (A) 10/28/2020 07:00 PM    Glucose Negative 10/28/2020 07:00 PM    Ketone TRACE (A) 10/28/2020 07:00 PM    Bilirubin Negative 10/28/2020 07:00 PM    Blood LARGE (A) 10/28/2020 07:00 PM    Urobilinogen 0.2 10/28/2020 07:00 PM    Nitrites Negative 10/28/2020 07:00 PM    Leukocyte Esterase SMALL (A) 10/28/2020 07:00 PM    WBC 0-3 10/28/2020 07:00 PM RBC 20-50 10/28/2020 07:00 PM    Epithelial cells 0-3 10/28/2020 07:00 PM    Bacteria 0 10/28/2020 07:00 PM    Casts 0-3 07/28/2014 11:30 PM    Crystals, urine 0 07/08/2014 01:39 AM    Mucus 0 07/08/2014 01:39 AM    Other observations RESULTS VERIFIED MANUALLY 10/28/2020 07:00 PM        No Known Allergies  Immunization History   Administered Date(s) Administered    TB Skin Test (PPD) Intradermal 11/03/2020       All Labs from Last 24 Hrs:  Recent Results (from the past 24 hour(s))   GLUCOSE, POC    Collection Time: 11/08/20 11:40 AM   Result Value Ref Range    Glucose (POC) 150 (H) 65 - 100 mg/dL   GLUCOSE, POC    Collection Time: 11/08/20  4:42 PM   Result Value Ref Range    Glucose (POC) 179 (H) 65 - 100 mg/dL   GLUCOSE, POC    Collection Time: 11/08/20  8:30 PM   Result Value Ref Range    Glucose (POC) 185 (H) 65 - 100 mg/dL   HEMOGLOBIN    Collection Time: 11/09/20  5:41 AM   Result Value Ref Range    HGB 7.8 (L) 11.7 - 15.4 g/dL   GLUCOSE, POC    Collection Time: 11/09/20  7:43 AM   Result Value Ref Range    Glucose (POC) 218 (H) 65 - 100 mg/dL       Discharge Exam:  Patient Vitals for the past 24 hrs:   Temp Pulse Resp BP SpO2   11/09/20 0828     97 %   11/09/20 0725 98.1 °F (36.7 °C) 81  121/64 96 %   11/09/20 0536 97.9 °F (36.6 °C) 86 16 137/82 95 %   11/08/20 1900 98 °F (36.7 °C) 87 18 (!) 148/76 98 %   11/08/20 1543 98.1 °F (36.7 °C) 83 16 139/88 97 %     Oxygen Therapy  O2 Sat (%): 97 % (11/09/20 0828)  Pulse via Oximetry: 81 beats per minute (11/09/20 0828)  O2 Device: Room air (11/09/20 0828)  O2 Flow Rate (L/min): 2 l/min (11/02/20 1930)    Estimated body mass index is 39.07 kg/m² as calculated from the following:    Height as of this encounter: 5' 9\" (1.753 m). Weight as of this encounter: 120 kg (264 lb 9.6 oz).     Intake/Output Summary (Last 24 hours) at 11/9/2020 1137  Last data filed at 11/8/2020 1543  Gross per 24 hour   Intake    Output 1000 ml   Net -1000 ml       *Note that automatically entered I/Os may not be accurate; dependent on patient compliance with collection and accurate  by assistants. General:    Well nourished. No overt distress  Eyes:   Normal sclerae. Extraocular movements intact. ENT:  Normocephalic, atraumatic. Moist mucous membranes  CV:   Regular rate and rhythm. No m/r/g. No edema. Lungs:  CTAB. No wheezing, rhonchi, or rales. Unlabored  Abdomen: Soft, nontender, nondistended. Extremities: Warm and dry. No cyanosis or clubbing  Neurologic: CN II-XII grossly intact. No gross focal deficits. Alert. Skin:     No rashes. No jaundice. Psych:  Normal mood and affect.     Current Med List in Hospital:   Current Facility-Administered Medications   Medication Dose Route Frequency    insulin glargine (LANTUS) injection 10 Units  10 Units SubCUTAneous QHS    influenza vaccine 2020-21 (6 mos+)(PF) (FLUARIX/FLULAVAL/FLUZONE QUAD) injection 0.5 mL  0.5 mL IntraMUSCular PRIOR TO DISCHARGE    pregabalin (LYRICA) capsule 75 mg  75 mg Oral TID    lisinopriL (PRINIVIL, ZESTRIL) tablet 10 mg  10 mg Oral DAILY    ferrous sulfate tablet 325 mg  1 Tab Oral DAILY WITH BREAKFAST    DAPTOmycin (CUBICIN) 700 mg in 0.9% sodium chloride 14 mL IV Syringe  6 mg/kg IntraVENous Q24H    0.9% sodium chloride infusion 250 mL  250 mL IntraVENous PRN    polyethylene glycol (MIRALAX) packet 17 g  17 g Oral DAILY    0.9% sodium chloride infusion 250 mL  250 mL IntraVENous PRN    central line flush (saline) syringe 20 mL  20 mL InterCATHeter Q8H    insulin regular (NOVOLIN R, HUMULIN R) injection   SubCUTAneous AC&HS    famotidine (PEPCID) tablet 20 mg  20 mg Oral BID    ciprofloxacin HCl (CIPRO) tablet 500 mg  500 mg Oral Q12H    morphine injection 2 mg  2 mg IntraVENous Q4H PRN    naloxone (NARCAN) injection 0.1 mg  0.1 mg IntraVENous EVERY 2 MINUTES AS NEEDED    flumazeniL (ROMAZICON) 0.1 mg/mL injection 0.2 mg  0.2 mg IntraVENous Multiple    diphenhydrAMINE (BENADRYL) injection 12.5 mg  12.5 mg IntraVENous Q15MIN PRN    oxyCODONE IR (ROXICODONE) tablet 10 mg  10 mg Oral Q4H PRN    dapagliflozin (FARXIGA) tablet 10 mg (Patient Supplied)  10 mg Oral DAILY    pioglitazone (ACTOS) tablet 15 mg  15 mg Oral ACB    sodium chloride (NS) flush 5-40 mL  5-40 mL IntraVENous Q8H    sodium chloride (NS) flush 5-40 mL  5-40 mL IntraVENous PRN    acetaminophen (TYLENOL) tablet 650 mg  650 mg Oral Q6H PRN    Or    acetaminophen (TYLENOL) suppository 650 mg  650 mg Rectal Q6H PRN    promethazine (PHENERGAN) tablet 12.5 mg  12.5 mg Oral Q6H PRN    Or    ondansetron (ZOFRAN) injection 4 mg  4 mg IntraVENous Q4H PRN    enoxaparin (LOVENOX) injection 40 mg  40 mg SubCUTAneous DAILY    potassium chloride 10 mEq in 100 ml IVPB  10 mEq IntraVENous PRN    magnesium sulfate 2 g/50 ml IVPB (premix or compounded)  2 g IntraVENous PRN    oxyCODONE IR (ROXICODONE) tablet 5 mg  5 mg Oral Q4H PRN    naloxone (NARCAN) injection 0.4 mg  0.4 mg IntraVENous PRN    nystatin (MYCOSTATIN) 100,000 unit/gram powder   Topical BID       Discharge Info:   Current Discharge Medication List      START taking these medications    Details   DAPTOmycin (CUBICIN) 50 mg/mL IV Syringe 14.256 mL by IntraVENous route every twenty-four (24) hours for 17 days. Qty: 242. 352 mL, Refills: 0      insulin regular human 100 unit/mL (3 mL) inpn INSULIN CORRECTIVE PROTOCOL: For Blood Sugar (mg/dL) of:  Less than 150 =  0 units. 150 -199 =  2 units. 200 -249 =  4 units. 250 -299 =  6 units. 300 -349 =  8 units. 350 and above = 10 units and call primary care physician  Qty: 1 Syringe, Refills: 2      Insulin Needles, Disposable, 30 gauge x 1/3\" by SubCUTAneous route See Admin Instructions. Qty: 1 Package, Refills: 11      lisinopriL (PRINIVIL, ZESTRIL) 10 mg tablet Take 1 Tab by mouth daily.  Indications: high blood pressure  Qty: 30 Tab, Refills: 2      pregabalin (LYRICA) 75 mg capsule Take 1 Cap by mouth three (3) times daily. Max Daily Amount: 225 mg. Indications: peripheral neuropathy  Qty: 90 Cap, Refills: 1    Associated Diagnoses: Septic arthritis of left ankle, due to unspecified organism (HCC)      ferrous sulfate 325 mg (65 mg iron) tablet Take 1 Tab by mouth daily (with breakfast). Indications: anemia from inadequate iron  Qty: 30 Tab, Refills: 2      ciprofloxacin HCl (CIPRO) 500 mg tablet Take 1 Tab by mouth every twelve (12) hours for 17 days. Qty: 34 Tab, Refills: 0         CONTINUE these medications which have CHANGED    Details   oxyCODONE IR (ROXICODONE) 5 mg immediate release tablet Take 1 Tab by mouth every six (6) hours as needed for Pain for up to 5 days. Max Daily Amount: 20 mg.  Qty: 20 Tab, Refills: 0    Associated Diagnoses: Septic arthritis of left ankle, due to unspecified organism ([address]         CONTINUE these medications which have NOT CHANGED    Details   pioglitazone (ACTOS) 30 mg tablet Take  by mouth daily. dapagliflozin (FARXIGA) 10 mg tab tablet Take 10 mg by mouth daily. metFORMIN (GLUCOPHAGE) 1,000 mg tablet Take 1,000 mg by mouth. L-Mfolate-B6 Phos-Methyl-B12 (METANX) 3-35-2 mg tab tab Take 1 Tab by mouth daily. Insulin Needles, Disposable, (STEPHANIE PEN NEEDLE) 32 gauge x 5/32\" ndle To be used with Insulin pen. Qty: 50 Pen Needle, Refills: 5    Associated Diagnoses: High blood sugar         STOP taking these medications       clindamycin (CLEOCIN) 150 mg capsule Comments:   Reason for Stopping:         indomethacin (INDOCIN) 25 mg capsule Comments:   Reason for Stopping:         colchicine (MITIGARE) 0.6 mg capsule Comments:   Reason for Stopping:         gabapentin (Neurontin) 300 mg capsule Comments:   Reason for Stopping:                 Time spent in patient discharge planning and coordination 35 minutes.     Signed:  Lashanda Cody MD

## 2020-11-10 ENCOUNTER — PATIENT OUTREACH (OUTPATIENT)
Dept: CASE MANAGEMENT | Age: 40
End: 2020-11-10

## 2020-11-10 ENCOUNTER — HOME CARE VISIT (OUTPATIENT)
Dept: SCHEDULING | Facility: HOME HEALTH | Age: 40
End: 2020-11-10
Payer: COMMERCIAL

## 2020-11-10 PROCEDURE — 400013 HH SOC

## 2020-11-10 PROCEDURE — G0299 HHS/HOSPICE OF RN EA 15 MIN: HCPCS

## 2020-11-10 NOTE — PROGRESS NOTES
RNCM attempted to reach pt, no answer and no name on vm. RNCM called [facility] and confirmed start of care for today, PT tomorrow. Will continue attempts to reach pt.

## 2020-11-11 ENCOUNTER — PATIENT OUTREACH (OUTPATIENT)
Dept: CASE MANAGEMENT | Age: 40
End: 2020-11-11

## 2020-11-11 ENCOUNTER — HOME CARE VISIT (OUTPATIENT)
Dept: SCHEDULING | Facility: HOME HEALTH | Age: 40
End: 2020-11-11
Payer: COMMERCIAL

## 2020-11-11 VITALS
RESPIRATION RATE: 14 BRPM | HEART RATE: 88 BPM | SYSTOLIC BLOOD PRESSURE: 142 MMHG | TEMPERATURE: 98.5 F | DIASTOLIC BLOOD PRESSURE: 80 MMHG

## 2020-11-11 VITALS
BODY MASS INDEX: 37.77 KG/M2 | OXYGEN SATURATION: 98 % | HEIGHT: 69 IN | RESPIRATION RATE: 18 BRPM | WEIGHT: 255 LBS | TEMPERATURE: 98.3 F | SYSTOLIC BLOOD PRESSURE: 130 MMHG | HEART RATE: 84 BPM | DIASTOLIC BLOOD PRESSURE: 80 MMHG

## 2020-11-11 PROCEDURE — G0151 HHCP-SERV OF PT,EA 15 MIN: HCPCS

## 2020-11-11 NOTE — PROGRESS NOTES
Pt bathing, requested RNCM call back later in day. RNCM attempted call back, however there was no answer and vm is full. RNCM will make one additional attempt and close episode if unable to be reached.

## 2020-11-12 ENCOUNTER — HOME CARE VISIT (OUTPATIENT)
Dept: SCHEDULING | Facility: HOME HEALTH | Age: 40
End: 2020-11-12
Payer: COMMERCIAL

## 2020-11-12 PROCEDURE — A6252 ABSORPT DRG >16 <=48 W/O BDR: HCPCS

## 2020-11-12 PROCEDURE — A4216 STERILE WATER/SALINE, 10 ML: HCPCS

## 2020-11-12 PROCEDURE — A6454 SELF-ADHER BAND W>=3" <5"/YD: HCPCS

## 2020-11-12 PROCEDURE — A6446 CONFORM BAND S W>=3" <5"/YD: HCPCS

## 2020-11-12 PROCEDURE — A9270 NON-COVERED ITEM OR SERVICE: HCPCS

## 2020-11-13 ENCOUNTER — HOME CARE VISIT (OUTPATIENT)
Dept: SCHEDULING | Facility: HOME HEALTH | Age: 40
End: 2020-11-13
Payer: COMMERCIAL

## 2020-11-13 VITALS
HEART RATE: 94 BPM | DIASTOLIC BLOOD PRESSURE: 86 MMHG | SYSTOLIC BLOOD PRESSURE: 142 MMHG | OXYGEN SATURATION: 98 % | RESPIRATION RATE: 18 BRPM | TEMPERATURE: 97.9 F

## 2020-11-13 PROCEDURE — G0299 HHS/HOSPICE OF RN EA 15 MIN: HCPCS

## 2020-11-15 NOTE — PROGRESS NOTES
OT scheduled visit with patient on 11/11/2020. OT showed up to patients house and she did not open the door. OT attempted to call patient twice with no response to phone call.

## 2020-11-16 ENCOUNTER — HOSPITAL ENCOUNTER (OUTPATIENT)
Dept: LAB | Age: 40
Discharge: HOME | End: 2020-11-16
Payer: COMMERCIAL

## 2020-11-16 ENCOUNTER — HOME CARE VISIT (OUTPATIENT)
Dept: SCHEDULING | Facility: HOME HEALTH | Age: 40
End: 2020-11-16
Payer: COMMERCIAL

## 2020-11-16 VITALS
HEART RATE: 84 BPM | RESPIRATION RATE: 17 BRPM | OXYGEN SATURATION: 98 % | DIASTOLIC BLOOD PRESSURE: 76 MMHG | TEMPERATURE: 98.2 F | SYSTOLIC BLOOD PRESSURE: 118 MMHG

## 2020-11-16 LAB
ALBUMIN SERPL-MCNC: 2.9 G/DL (ref 3.5–5)
ALBUMIN/GLOB SERPL: 0.5 {RATIO} (ref 1.2–3.5)
ALP SERPL-CCNC: 180 U/L (ref 50–130)
ALT SERPL-CCNC: 73 U/L (ref 12–65)
AST SERPL-CCNC: 101 U/L (ref 15–37)
BASOPHILS # BLD: 0 K/UL (ref 0–0.2)
BASOPHILS NFR BLD: 0 % (ref 0–2)
BILIRUB DIRECT SERPL-MCNC: 0.1 MG/DL
BILIRUB SERPL-MCNC: 0.4 MG/DL (ref 0.2–1.1)
CK SERPL-CCNC: 1880 U/L (ref 21–215)
CREAT SERPL-MCNC: 0.58 MG/DL (ref 0.6–1)
CRP SERPL-MCNC: 1.8 MG/DL (ref 0–0.9)
DIFFERENTIAL METHOD BLD: ABNORMAL
EOSINOPHIL # BLD: 0.1 K/UL (ref 0–0.8)
EOSINOPHIL NFR BLD: 1 % (ref 0.5–7.8)
ERYTHROCYTE [DISTWIDTH] IN BLOOD BY AUTOMATED COUNT: 15.4 % (ref 11.9–14.6)
ERYTHROCYTE [SEDIMENTATION RATE] IN BLOOD: 119 MM/HR (ref 0–20)
GLOBULIN SER CALC-MCNC: 6 G/DL (ref 2.3–3.5)
HCT VFR BLD AUTO: 31.3 % (ref 35.8–46.3)
HGB BLD-MCNC: 9.6 G/DL (ref 11.7–15.4)
IMM GRANULOCYTES # BLD AUTO: 0 K/UL (ref 0–0.5)
IMM GRANULOCYTES NFR BLD AUTO: 0 % (ref 0–5)
LYMPHOCYTES # BLD: 1.5 K/UL (ref 0.5–4.6)
LYMPHOCYTES NFR BLD: 20 % (ref 13–44)
MCH RBC QN AUTO: 25.4 PG (ref 26.1–32.9)
MCHC RBC AUTO-ENTMCNC: 30.7 G/DL (ref 31.4–35)
MCV RBC AUTO: 82.8 FL (ref 79.6–97.8)
MONOCYTES # BLD: 0.4 K/UL (ref 0.1–1.3)
MONOCYTES NFR BLD: 5 % (ref 4–12)
NEUTS SEG # BLD: 5.6 K/UL (ref 1.7–8.2)
NEUTS SEG NFR BLD: 73 % (ref 43–78)
NRBC # BLD: 0 K/UL (ref 0–0.2)
PLATELET # BLD AUTO: 366 K/UL (ref 150–450)
PMV BLD AUTO: 10.4 FL (ref 9.4–12.3)
PROT SERPL-MCNC: 8.9 G/DL (ref 6.3–8.2)
RBC # BLD AUTO: 3.78 M/UL (ref 4.05–5.2)
WBC # BLD AUTO: 7.6 K/UL (ref 4.3–11.1)

## 2020-11-16 PROCEDURE — 82565 ASSAY OF CREATININE: CPT

## 2020-11-16 PROCEDURE — 86140 C-REACTIVE PROTEIN: CPT

## 2020-11-16 PROCEDURE — 85025 COMPLETE CBC W/AUTO DIFF WBC: CPT

## 2020-11-16 PROCEDURE — 80076 HEPATIC FUNCTION PANEL: CPT

## 2020-11-16 PROCEDURE — 82550 ASSAY OF CK (CPK): CPT

## 2020-11-16 PROCEDURE — 85652 RBC SED RATE AUTOMATED: CPT

## 2020-11-16 PROCEDURE — G0299 HHS/HOSPICE OF RN EA 15 MIN: HCPCS

## 2020-11-17 ENCOUNTER — HOME CARE VISIT (OUTPATIENT)
Dept: SCHEDULING | Facility: HOME HEALTH | Age: 40
End: 2020-11-17
Payer: COMMERCIAL

## 2020-11-17 VITALS
OXYGEN SATURATION: 98 % | RESPIRATION RATE: 16 BRPM | SYSTOLIC BLOOD PRESSURE: 110 MMHG | DIASTOLIC BLOOD PRESSURE: 70 MMHG | TEMPERATURE: 98.2 F | HEART RATE: 88 BPM

## 2020-11-17 PROCEDURE — G0299 HHS/HOSPICE OF RN EA 15 MIN: HCPCS

## 2020-11-25 ENCOUNTER — APPOINTMENT (RX ONLY)
Dept: URBAN - NONMETROPOLITAN AREA CLINIC 1 | Facility: CLINIC | Age: 40
Setting detail: DERMATOLOGY
End: 2020-11-25

## 2020-11-25 DIAGNOSIS — L73.2 HIDRADENITIS SUPPURATIVA: ICD-10-CM | Status: INADEQUATELY CONTROLLED

## 2020-11-25 PROCEDURE — 99203 OFFICE O/P NEW LOW 30 MIN: CPT

## 2020-11-25 PROCEDURE — ? COUNSELING

## 2020-11-25 PROCEDURE — ? ORDER TESTS

## 2020-11-25 PROCEDURE — ? PHOTO-DOCUMENTATION

## 2020-11-25 PROCEDURE — ? PRESCRIPTION

## 2020-11-25 PROCEDURE — ? ADDITIONAL NOTES

## 2020-11-25 RX ORDER — DOXYCYCLINE 100 MG/1
TABLET, FILM COATED ORAL
Qty: 60 | Refills: 3 | Status: ERX | COMMUNITY
Start: 2020-11-25

## 2020-11-25 RX ORDER — ADALIMUMAB 80MG/0.8ML
KIT SUBCUTANEOUS
Qty: 1 | Refills: 12 | Status: ERX | COMMUNITY
Start: 2020-11-25

## 2020-11-25 RX ADMIN — DOXYCYCLINE: 100 TABLET, FILM COATED ORAL at 00:00

## 2020-11-25 RX ADMIN — ADALIMUMAB: KIT at 00:00

## 2020-11-25 ASSESSMENT — LOCATION DETAILED DESCRIPTION DERM: LOCATION DETAILED: RIGHT AXILLARY VAULT

## 2020-11-25 ASSESSMENT — LOCATION ZONE DERM: LOCATION ZONE: AXILLAE

## 2020-11-25 ASSESSMENT — LOCATION SIMPLE DESCRIPTION DERM: LOCATION SIMPLE: RIGHT AXILLARY VAULT

## 2020-11-25 NOTE — PROCEDURE: ADDITIONAL NOTES
Detail Level: Simple
Additional Notes: Patient consent was obtained to proceed with the visit and recommended plan of care after discussion of all risks and benefits, including the risks of COVID-19 exposure.
Additional Notes: [place] Kenalog 3.5mg mix injected to left axilla Lot#[id number]\\nHeight 5’9 weight 260lbs\\nPatient is  coming in Monday 30th TB test

## 2020-11-25 NOTE — HPI: RASH (HIDRADENITIS SUPPURATIVA)
How Severe Is It?: severe
Is This A New Presentation, Or A Follow-Up?: Rash
Additional History: Pt dx with HS in 2019- pt rx several antibiotics over the years with no results. Currently using dove soap. Surgery to site in April with recurrence.

## 2020-12-01 ENCOUNTER — RX ONLY (OUTPATIENT)
Age: 40
Setting detail: RX ONLY
End: 2020-12-01

## 2020-12-01 RX ORDER — SULFAMETHOXAZOLE AND TRIMETHOPRIM 800; 160 MG/1; MG/1
TABLET ORAL
Qty: 14 | Refills: 1 | Status: ERX | COMMUNITY
Start: 2020-12-01

## 2021-01-08 ENCOUNTER — RX ONLY (OUTPATIENT)
Age: 41
Setting detail: RX ONLY
End: 2021-01-08

## 2021-01-08 RX ORDER — ADALIMUMAB 80MG/0.8ML
KIT SUBCUTANEOUS
Qty: 1 | Refills: 12 | Status: ERX

## 2021-01-16 ENCOUNTER — HOSPITAL ENCOUNTER (EMERGENCY)
Age: 41
Discharge: HOME | End: 2021-01-16
Attending: EMERGENCY MEDICINE | Admitting: EMERGENCY MEDICINE
Payer: COMMERCIAL

## 2021-01-16 VITALS
DIASTOLIC BLOOD PRESSURE: 113 MMHG | SYSTOLIC BLOOD PRESSURE: 149 MMHG | HEIGHT: 69 IN | OXYGEN SATURATION: 99 % | HEART RATE: 88 BPM | TEMPERATURE: 99 F | BODY MASS INDEX: 37.66 KG/M2 | RESPIRATION RATE: 16 BRPM

## 2021-01-16 DIAGNOSIS — R60.9 PERIPHERAL EDEMA: ICD-10-CM

## 2021-01-16 DIAGNOSIS — E13.42 DIABETIC POLYNEUROPATHY ASSOCIATED WITH OTHER SPECIFIED DIABETES MELLITUS (HCC): Primary | ICD-10-CM

## 2021-01-16 LAB
ALBUMIN SERPL-MCNC: 3 G/DL (ref 3.5–5)
ALBUMIN/GLOB SERPL: 0.5 {RATIO} (ref 1.2–3.5)
ALP SERPL-CCNC: 220 U/L (ref 50–136)
ALT SERPL-CCNC: 60 U/L (ref 12–65)
ANION GAP SERPL CALC-SCNC: 4 MMOL/L (ref 7–16)
AST SERPL-CCNC: 34 U/L (ref 15–37)
BASOPHILS # BLD: 0 K/UL (ref 0–0.2)
BASOPHILS NFR BLD: 0 % (ref 0–2)
BILIRUB SERPL-MCNC: 0.2 MG/DL (ref 0.2–1.1)
BNP SERPL-MCNC: 62 PG/ML (ref 5–125)
BUN SERPL-MCNC: 16 MG/DL (ref 6–23)
CALCIUM SERPL-MCNC: 9.5 MG/DL (ref 8.3–10.4)
CHLORIDE SERPL-SCNC: 104 MMOL/L (ref 98–107)
CO2 SERPL-SCNC: 28 MMOL/L (ref 21–32)
CREAT SERPL-MCNC: 0.71 MG/DL (ref 0.6–1)
DIFFERENTIAL METHOD BLD: ABNORMAL
EOSINOPHIL # BLD: 0.1 K/UL (ref 0–0.8)
EOSINOPHIL NFR BLD: 1 % (ref 0.5–7.8)
ERYTHROCYTE [DISTWIDTH] IN BLOOD BY AUTOMATED COUNT: 15.9 % (ref 11.9–14.6)
GLOBULIN SER CALC-MCNC: 6.3 G/DL (ref 2.3–3.5)
GLUCOSE SERPL-MCNC: 108 MG/DL (ref 65–100)
HCT VFR BLD AUTO: 32 % (ref 35.8–46.3)
HGB BLD-MCNC: 9.9 G/DL (ref 11.7–15.4)
IMM GRANULOCYTES # BLD AUTO: 0 K/UL (ref 0–0.5)
IMM GRANULOCYTES NFR BLD AUTO: 0 % (ref 0–5)
LYMPHOCYTES # BLD: 1.8 K/UL (ref 0.5–4.6)
LYMPHOCYTES NFR BLD: 23 % (ref 13–44)
MCH RBC QN AUTO: 25.4 PG (ref 26.1–32.9)
MCHC RBC AUTO-ENTMCNC: 30.9 G/DL (ref 31.4–35)
MCV RBC AUTO: 82.3 FL (ref 79.6–97.8)
MONOCYTES # BLD: 0.4 K/UL (ref 0.1–1.3)
MONOCYTES NFR BLD: 6 % (ref 4–12)
NEUTS SEG # BLD: 5.5 K/UL (ref 1.7–8.2)
NEUTS SEG NFR BLD: 70 % (ref 43–78)
NRBC # BLD: 0 K/UL (ref 0–0.2)
PLATELET # BLD AUTO: 361 K/UL (ref 150–450)
PMV BLD AUTO: 10.2 FL (ref 9.4–12.3)
POTASSIUM SERPL-SCNC: 3.7 MMOL/L (ref 3.5–5.1)
PROT SERPL-MCNC: 9.3 G/DL (ref 6.3–8.2)
RBC # BLD AUTO: 3.89 M/UL (ref 4.05–5.2)
SODIUM SERPL-SCNC: 136 MMOL/L (ref 136–145)
WBC # BLD AUTO: 7.9 K/UL (ref 4.3–11.1)

## 2021-01-16 PROCEDURE — 83880 ASSAY OF NATRIURETIC PEPTIDE: CPT

## 2021-01-16 PROCEDURE — 80053 COMPREHEN METABOLIC PANEL: CPT

## 2021-01-16 PROCEDURE — 99283 EMERGENCY DEPT VISIT LOW MDM: CPT

## 2021-01-16 PROCEDURE — 85025 COMPLETE CBC W/AUTO DIFF WBC: CPT

## 2021-01-16 PROCEDURE — 74011250637 HC RX REV CODE- 250/637: Performed by: EMERGENCY MEDICINE

## 2021-01-16 RX ORDER — FUROSEMIDE 20 MG/1
20 TABLET ORAL DAILY
Qty: 15 TAB | Refills: 0 | Status: SHIPPED | OUTPATIENT
Start: 2021-01-16 | End: 2021-01-16 | Stop reason: SDUPTHER

## 2021-01-16 RX ORDER — HYDROCODONE BITARTRATE AND ACETAMINOPHEN 5; 325 MG/1; MG/1
1 TABLET ORAL
Status: DISCONTINUED | OUTPATIENT
Start: 2021-01-16 | End: 2021-01-16 | Stop reason: HOSPADM

## 2021-01-16 RX ORDER — HYDROCODONE BITARTRATE AND ACETAMINOPHEN 5; 325 MG/1; MG/1
1 TABLET ORAL
Qty: 15 TAB | Refills: 0 | Status: SHIPPED | OUTPATIENT
Start: 2021-01-16 | End: 2021-01-19

## 2021-01-16 RX ORDER — FUROSEMIDE 20 MG/1
20 TABLET ORAL DAILY
Qty: 10 TAB | Refills: 0 | Status: SHIPPED | OUTPATIENT
Start: 2021-01-16 | End: 2021-01-26

## 2021-01-16 RX ADMIN — HYDROCODONE BITARTRATE AND ACETAMINOPHEN 1 TABLET: 5; 325 TABLET ORAL at 16:34

## 2021-01-16 NOTE — ED PROVIDER NOTES
Patient is a 23-year-old female presenting emerge department today complaining of bilateral peripheral neuropathy. The patient states that she has had this for a long time now and she has tried multiple different medications but says that none of them helped. Patient states \"I just could not take the pain anymore so had to come in. The patient has been on gabapentin and Lyrica but does not take either anymore. The patient also taken meloxicam, Anaprox and over-the-counter NSAIDs also with no relief of her symptoms. The patient denies any new or acute changes. She does have peripheral edema in the lower extremities and has compression socks which she says she wears on a regular basis. She has not been on a diuretic. She says she is trying to manage her blood sugars as best as possible.            Past Medical History:   Diagnosis Date    Diabetes [facility])        Past Surgical History:   Procedure Laterality Date    HX  SECTION      HX ORTHOPAEDIC  10/28/2020    right ankle         Family History:   Problem Relation Age of Onset    Diabetes Mother     Other Mother         Dontae Brito       Social History     Socioeconomic History    Marital status: SINGLE     Spouse name: Not on file    Number of children: Not on file    Years of education: Not on file    Highest education level: Not on file   Occupational History    Not on file   Social Needs    Financial resource strain: Not on file    Food insecurity     Worry: Not on file     Inability: Not on file    Transportation needs     Medical: Not on file     Non-medical: Not on file   Tobacco Use    Smoking status: Never Smoker    Smokeless tobacco: Never Used   Substance and Sexual Activity    Alcohol use: Yes     Comment: occasionally    Drug use: No    Sexual activity: Not Currently   Lifestyle    Physical activity     Days per week: Not on file     Minutes per session: Not on file    Stress: Not on file   Relationships    Social connections     Talks on phone: Not on file     Gets together: Not on file     Attends Latter-day service: Not on file     Active member of club or organization: Not on file     Attends meetings of clubs or organizations: Not on file     Relationship status: Not on file    Intimate partner violence     Fear of current or ex partner: Not on file     Emotionally abused: Not on file     Physically abused: Not on file     Forced sexual activity: Not on file   Other Topics Concern    Not on file   Social History Narrative    Not on file         ALLERGIES: Patient has no known allergies. Review of Systems   Cardiovascular: Positive for leg swelling. Musculoskeletal: Negative. Skin: Negative. Neurological: Positive for numbness. Hematological: Negative. All other systems reviewed and are negative. Vitals:    01/16/21 1424 01/16/21 1625   BP: (!) 149/113    Pulse: 88    Resp: 16    Temp: 99 °F (37.2 °C)    SpO2: 100% 99%   Height: 5' 9\" (1.753 m)             Physical Exam     GENERAL:The patient has Body mass index is 37.66 kg/m². Well-hydrated. No acute distress. VITAL SIGNS: Heart rate, blood pressure, respiratory rate reviewed as recorded in  nurse's notes  EYES: Pupils reactive. Extraocular motion intact. No conjunctival redness or drainage. LUNGS: No accessory muscle use  CARDIOVASCULAR: Regular rate and rhythm  EXTREMITIES: Pt moving all 4 extremities with out limitations. Normal muscle tone. Significant edema in the lower extremities below her tights in the dorsal aspect of the feet and ankles. Nonpitting edema in nature. NEUROLOGIC: Cranial nerve exam reveals face is symmetrical, tongue is midline  speech is clear. No focal deficits noted  SKIN: No rash or petechiae. Good skin turgor palpated. PSYCHIATRIC: Alert and oriented. Appropriate behavior and judgment.        MDM  Number of Diagnoses or Management Options  Diagnosis management comments: Peripheral edema, diabetic peripheral neuropathy, chronic leg pain,       Amount and/or Complexity of Data Reviewed  Clinical lab tests: reviewed and ordered  Tests in the medicine section of CPT®: ordered and reviewed  Review and summarize past medical records: yes           Procedures

## 2021-01-16 NOTE — DISCHARGE INSTRUCTIONS
Take the furosemide once daily for the next 5 to 7 days then as needed after that. Wear your compression stockings. Take the Norco only as needed for severe pain. Please talk to your orthopedic or your family doctor about your pain.

## 2021-01-16 NOTE — ED TRIAGE NOTES
Patient advises that she has been having bilateral lower extremity swelling, non-pitting. Patient advises a bilateral numbness and tightness present. Patient advises started with some blurred vision yesterday morning. Mask on during triage.

## 2021-01-16 NOTE — ED NOTES
I have reviewed discharge instructions with the patient. The patient verbalized understanding. Patient left ED via Discharge Method: ambulatory to Home with (self). Opportunity for questions and clarification provided. Patient given 2 scripts. To continue your aftercare when you leave the hospital, you may receive an automated call from our care team to check in on how you are doing. This is a free service and part of our promise to provide the best care and service to meet your aftercare needs.  If you have questions, or wish to unsubscribe from this service please call [phone]. Thank you for Choosing our [address] [place] [address] Emergency Department.

## 2021-04-06 ENCOUNTER — RX ONLY (OUTPATIENT)
Age: 41
Setting detail: RX ONLY
End: 2021-04-06

## 2021-04-06 RX ORDER — ADALIMUMAB 40MG/0.4ML
KIT SUBCUTANEOUS
Qty: 2 | Refills: 11 | Status: ERX | COMMUNITY
Start: 2021-04-06

## 2021-04-14 ENCOUNTER — APPOINTMENT (RX ONLY)
Dept: URBAN - NONMETROPOLITAN AREA CLINIC 1 | Facility: CLINIC | Age: 41
Setting detail: DERMATOLOGY
End: 2021-04-14

## 2021-04-14 DIAGNOSIS — Z02.9 ENCOUNTER FOR ADMINISTRATIVE EXAMINATIONS, UNSPECIFIED: ICD-10-CM

## 2021-08-10 ENCOUNTER — APPOINTMENT (RX ONLY)
Dept: URBAN - NONMETROPOLITAN AREA CLINIC 1 | Facility: CLINIC | Age: 41
Setting detail: DERMATOLOGY
End: 2021-08-10

## 2021-08-10 DIAGNOSIS — L73.2 HIDRADENITIS SUPPURATIVA: ICD-10-CM | Status: INADEQUATELY CONTROLLED

## 2021-08-10 PROCEDURE — ? ADDITIONAL NOTES

## 2021-08-10 PROCEDURE — ? COUNSELING

## 2021-08-10 PROCEDURE — ? TREATMENT REGIMEN

## 2021-08-10 PROCEDURE — 99213 OFFICE O/P EST LOW 20 MIN: CPT

## 2021-08-10 PROCEDURE — ? ORDER TESTS

## 2021-08-10 ASSESSMENT — LOCATION DETAILED DESCRIPTION DERM
LOCATION DETAILED: LEFT AXILLARY TAIL OF BREAST
LOCATION DETAILED: LEFT ANTERIOR SHOULDER
LOCATION DETAILED: RIGHT AXILLARY TAIL OF BREAST

## 2021-08-10 ASSESSMENT — LOCATION SIMPLE DESCRIPTION DERM
LOCATION SIMPLE: RIGHT BREAST
LOCATION SIMPLE: LEFT BREAST
LOCATION SIMPLE: LEFT SHOULDER

## 2021-08-10 ASSESSMENT — LOCATION ZONE DERM
LOCATION ZONE: TRUNK
LOCATION ZONE: ARM

## 2021-08-10 NOTE — PROCEDURE: ORDER TESTS
Performing Laboratory: 0
Bill For Surgical Tray: no
Expected Date Of Service: 08/10/2021
Billing Type: Third-Party Bill

## 2021-08-10 NOTE — PROCEDURE: ADDITIONAL NOTES
Detail Level: Simple
Additional Notes: Patient consent was obtained to proceed with the visit and recommended plan of care after discussion of all risks and benefits, including the risks of COVID-19 exposure.
Render Risk Assessment In Note?: no
Detail Level: Zone
Additional Notes: Began Humira in the beginning of March & patient is still currently using Humira.\\nPatient has seen improvement since using Humira, left side has closed up & the right side is still open.

## 2021-12-20 ENCOUNTER — HOSPITAL ENCOUNTER (EMERGENCY)
Age: 41
Discharge: HOME | End: 2021-12-20
Attending: EMERGENCY MEDICINE
Payer: COMMERCIAL

## 2021-12-20 ENCOUNTER — APPOINTMENT (OUTPATIENT)
Dept: GENERAL RADIOLOGY | Age: 41
End: 2021-12-20
Attending: EMERGENCY MEDICINE
Payer: COMMERCIAL

## 2021-12-20 VITALS
DIASTOLIC BLOOD PRESSURE: 72 MMHG | OXYGEN SATURATION: 96 % | RESPIRATION RATE: 18 BRPM | HEIGHT: 69 IN | TEMPERATURE: 98.5 F | HEART RATE: 94 BPM | BODY MASS INDEX: 43.4 KG/M2 | SYSTOLIC BLOOD PRESSURE: 158 MMHG | WEIGHT: 293 LBS

## 2021-12-20 DIAGNOSIS — E11.65 TYPE 2 DIABETES MELLITUS WITH HYPERGLYCEMIA, WITHOUT LONG-TERM CURRENT USE OF INSULIN (HCC): ICD-10-CM

## 2021-12-20 DIAGNOSIS — R00.0 TACHYCARDIA: ICD-10-CM

## 2021-12-20 DIAGNOSIS — R05.9 COUGH: ICD-10-CM

## 2021-12-20 DIAGNOSIS — U07.1 COVID-19: Primary | ICD-10-CM

## 2021-12-20 LAB
ALBUMIN SERPL-MCNC: 2.4 G/DL (ref 3.5–5)
ALBUMIN/GLOB SERPL: 0.4 {RATIO} (ref 1.2–3.5)
ALP SERPL-CCNC: 180 U/L (ref 50–130)
ALT SERPL-CCNC: 21 U/L (ref 12–65)
ANION GAP SERPL CALC-SCNC: 6 MMOL/L (ref 7–16)
AST SERPL-CCNC: 31 U/L (ref 15–37)
BASOPHILS # BLD: 0 K/UL (ref 0–0.2)
BASOPHILS NFR BLD: 0 % (ref 0–2)
BILIRUB SERPL-MCNC: 0.3 MG/DL (ref 0.2–1.1)
BUN SERPL-MCNC: 11 MG/DL (ref 6–23)
CALCIUM SERPL-MCNC: 8.9 MG/DL (ref 8.3–10.4)
CHLORIDE SERPL-SCNC: 95 MMOL/L (ref 98–107)
CO2 SERPL-SCNC: 26 MMOL/L (ref 21–32)
COVID-19 RAPID TEST, COVR: DETECTED
CREAT SERPL-MCNC: 0.91 MG/DL (ref 0.6–1)
DIFFERENTIAL METHOD BLD: ABNORMAL
EOSINOPHIL # BLD: 0 K/UL (ref 0–0.8)
EOSINOPHIL NFR BLD: 0 % (ref 0.5–7.8)
ERYTHROCYTE [DISTWIDTH] IN BLOOD BY AUTOMATED COUNT: 15.6 % (ref 11.9–14.6)
GLOBULIN SER CALC-MCNC: 6.7 G/DL (ref 2.3–3.5)
GLUCOSE SERPL-MCNC: 361 MG/DL (ref 65–100)
HCT VFR BLD AUTO: 34.6 % (ref 35.8–46.3)
HGB BLD-MCNC: 10.7 G/DL (ref 11.7–15.4)
IMM GRANULOCYTES # BLD AUTO: 0 K/UL (ref 0–0.5)
IMM GRANULOCYTES NFR BLD AUTO: 0 % (ref 0–5)
LYMPHOCYTES # BLD: 0.9 K/UL (ref 0.5–4.6)
LYMPHOCYTES NFR BLD: 18 % (ref 13–44)
MCH RBC QN AUTO: 25.2 PG (ref 26.1–32.9)
MCHC RBC AUTO-ENTMCNC: 30.9 G/DL (ref 31.4–35)
MCV RBC AUTO: 81.6 FL (ref 79.6–97.8)
MONOCYTES # BLD: 0.4 K/UL (ref 0.1–1.3)
MONOCYTES NFR BLD: 7 % (ref 4–12)
NEUTS SEG # BLD: 3.9 K/UL (ref 1.7–8.2)
NEUTS SEG NFR BLD: 75 % (ref 43–78)
NRBC # BLD: 0 K/UL (ref 0–0.2)
PLATELET # BLD AUTO: 254 K/UL (ref 150–450)
PMV BLD AUTO: 10.5 FL (ref 9.4–12.3)
POTASSIUM SERPL-SCNC: 4.5 MMOL/L (ref 3.5–5.1)
PROT SERPL-MCNC: 9.1 G/DL (ref 6.3–8.2)
RBC # BLD AUTO: 4.24 M/UL (ref 4.05–5.2)
SODIUM SERPL-SCNC: 127 MMOL/L (ref 136–145)
SOURCE, COVRS: ABNORMAL
WBC # BLD AUTO: 5.3 K/UL (ref 4.3–11.1)

## 2021-12-20 PROCEDURE — 87635 SARS-COV-2 COVID-19 AMP PRB: CPT

## 2021-12-20 PROCEDURE — 74011000636 HC RX REV CODE- 636: Performed by: PHYSICIAN ASSISTANT

## 2021-12-20 PROCEDURE — 99284 EMERGENCY DEPT VISIT MOD MDM: CPT

## 2021-12-20 PROCEDURE — 96361 HYDRATE IV INFUSION ADD-ON: CPT

## 2021-12-20 PROCEDURE — 80053 COMPREHEN METABOLIC PANEL: CPT

## 2021-12-20 PROCEDURE — 85025 COMPLETE CBC W/AUTO DIFF WBC: CPT

## 2021-12-20 PROCEDURE — M0243 CASIRIVI AND IMDEVI INFUSION: HCPCS

## 2021-12-20 PROCEDURE — 74011000258 HC RX REV CODE- 258: Performed by: PHYSICIAN ASSISTANT

## 2021-12-20 PROCEDURE — 96374 THER/PROPH/DIAG INJ IV PUSH: CPT

## 2021-12-20 PROCEDURE — 71046 X-RAY EXAM CHEST 2 VIEWS: CPT

## 2021-12-20 PROCEDURE — 74011250636 HC RX REV CODE- 250/636: Performed by: PHYSICIAN ASSISTANT

## 2021-12-20 RX ORDER — PIOGLITAZONEHYDROCHLORIDE 30 MG/1
30 TABLET ORAL DAILY
COMMUNITY
End: 2022-03-25 | Stop reason: SDUPTHER

## 2021-12-20 RX ORDER — ONDANSETRON 2 MG/ML
8 INJECTION INTRAMUSCULAR; INTRAVENOUS
Status: COMPLETED | OUTPATIENT
Start: 2021-12-20 | End: 2021-12-20

## 2021-12-20 RX ADMIN — SODIUM CHLORIDE 1000 ML: 900 INJECTION, SOLUTION INTRAVENOUS at 09:44

## 2021-12-20 RX ADMIN — ONDANSETRON 8 MG: 2 INJECTION INTRAMUSCULAR; INTRAVENOUS at 09:43

## 2021-12-20 RX ADMIN — CASIRIVIMAB AND IMDEVIMAB: 600; 600 INJECTION, SOLUTION, CONCENTRATE INTRAVENOUS at 10:13

## 2021-12-20 NOTE — ED NOTES
I have reviewed discharge instructions with the patient. The patient verbalized understanding. Patient left ED via Discharge Method: ambulatory to Home with (self). Opportunity for questions and clarification provided. Patient given 0 scripts. No esign         To continue your aftercare when you leave the hospital, you may receive an automated call from our care team to check in on how you are doing. This is a free service and part of our promise to provide the best care and service to meet your aftercare needs.  If you have questions, or wish to unsubscribe from this service please call [phone]. Thank you for Choosing our [address] [place] [address] Emergency Department.

## 2021-12-20 NOTE — ED PROVIDER NOTES
45-year-old well-appearing female presents today for evaluation of headache, loss of smell, active cough, and nausea for the past 4 days. She reports subjective fever, chills, and body aches. She reports associated fatigue. Denies any neck pain or stiffness, photophobia, vomiting or other signs of meningitis. Denies any shortness of breath, chest pain, pleuritic pain. Denies any abdominal pain or diarrhea. She states that she recently returned to work after working from home and believes that she may have been exposed to sick contacts. Admits that she has not received her influenza or COVID-19 vaccines. Reports underlying type 2 diabetes. Past Medical History:   Diagnosis Date    Diabetes [facility])        Past Surgical History:   Procedure Laterality Date    HX  SECTION      HX ORTHOPAEDIC  10/28/2020    right ankle         Family History:   Problem Relation Age of Onset    Diabetes Mother     Other Mother         Kylie [place]       Social History     Socioeconomic History    Marital status: SINGLE     Spouse name: Not on file    Number of children: Not on file    Years of education: Not on file    Highest education level: Not on file   Occupational History    Not on file   Tobacco Use    Smoking status: Never Smoker    Smokeless tobacco: Never Used   Substance and Sexual Activity    Alcohol use: Yes     Comment: occasionally    Drug use: No    Sexual activity: Not Currently   Other Topics Concern    Not on file   Social History Narrative    Not on file     Social Determinants of Health     Financial Resource Strain:     Difficulty of Paying Living Expenses: Not on file   Food Insecurity:     Worried About [address] in the Last Year: Not on file    [place] of Food in the Last Year: Not on file   Transportation Needs:     Lack of Transportation (Medical): Not on file    Lack of Transportation (Non-Medical):  Not on file   Physical Activity:     Days of Exercise per Week: Not on file    Minutes of Exercise per Session: Not on file   Stress:     Feeling of Stress : Not on file   Social Connections:     Frequency of Communication with Friends and Family: Not on file    Frequency of Social Gatherings with Friends and Family: Not on file    Attends Denominational Services: Not on file    Active Member of [address] [place] [address] or Organizations: Not on file    Attends Club or Organization Meetings: Not on file    Marital Status: Not on file   Intimate Partner Violence:     Fear of Current or Ex-Partner: Not on file    Emotionally Abused: Not on file    Physically Abused: Not on file    Sexually Abused: Not on file   Housing Stability:     Unable to Pay for Housing in the Last Year: Not on file    Number of [place] in the Last Year: Not on file    Unstable Housing in the Last Year: Not on file         ALLERGIES: Patient has no known allergies. Review of Systems   Constitutional: Positive for chills, fatigue and fever. HENT: Negative for congestion, ear pain, postnasal drip, rhinorrhea, sinus pain, sneezing, sore throat and trouble swallowing. Respiratory: Positive for cough. Negative for chest tightness, shortness of breath and wheezing. Cardiovascular: Negative for chest pain and palpitations. Gastrointestinal: Positive for nausea. Negative for abdominal pain, diarrhea and vomiting. Musculoskeletal: Positive for arthralgias. Negative for back pain, neck pain and neck stiffness. Skin: Negative for rash. Neurological: Positive for headaches. Negative for dizziness, weakness and light-headedness. Vitals:    12/20/21 0846   BP: (!) 141/84   Pulse: (!) 106   Resp: 16   Temp: 99.8 °F (37.7 °C)   SpO2: 98%   Weight: 133.8 kg (295 lb)   Height: 5' 9\" (1.753 m)            Physical Exam  Vitals and nursing note reviewed. Constitutional:       General: She is not in acute distress. Appearance: She is well-developed and well-groomed. She is not toxic-appearing. HENT:      Head: Normocephalic and atraumatic. No right periorbital erythema or left periorbital erythema. Right Ear: Tympanic membrane and ear canal normal. Tympanic membrane is not erythematous or bulging. Left Ear: Tympanic membrane and ear canal normal. Tympanic membrane is not erythematous or bulging. Nose: Nose normal. No congestion or rhinorrhea. Mouth/Throat:      Lips: Pink. Mouth: Mucous membranes are moist.      Pharynx: Oropharynx is clear. Uvula midline. No pharyngeal swelling, oropharyngeal exudate or posterior oropharyngeal erythema. Tonsils: No tonsillar exudate or tonsillar abscesses. Neck:      Trachea: Trachea and phonation normal.   Cardiovascular:      Rate and Rhythm: Normal rate and regular rhythm. Heart sounds: Normal heart sounds, S1 normal and S2 normal. No murmur heard. No friction rub. No gallop. Pulmonary:      Effort: Pulmonary effort is normal. No accessory muscle usage or respiratory distress. Breath sounds: Decreased breath sounds present. No wheezing, rhonchi or rales. Abdominal:      General: Bowel sounds are normal.      Tenderness: There is no abdominal tenderness. There is no guarding or rebound. Musculoskeletal:      Cervical back: Normal range of motion and neck supple. No rigidity. Lymphadenopathy:      Cervical: No cervical adenopathy. Skin:     General: Skin is warm and dry. Coloration: Skin is not pale. Comments: No rash   Neurological:      Mental Status: She is alert and oriented to person, place, and time.    Psychiatric:         Mood and Affect: Mood normal.         Speech: Speech normal.         Behavior: Behavior normal.          MDM  Number of Diagnoses or Management Options  Cough  COVID-19  Tachycardia  Type 2 diabetes mellitus with hyperglycemia, without long-term current use of insulin ([facility])  Diagnosis management comments: Viral infection, mononucleosis, COVID-19, Acute sinusitis, chronic sinusitis,    OM, serous OM, otitis externa,    Pharyngitis, Strep Throat, adenitis, uvulitis, rhinitis, postnasal drainage, nasal congestion    Bronchitis, pneumonia      In summary this is a 69-year-old -American female with underlying type 2 diabetes presenting today for cough, headache, nausea, loss of smell for the last 4 days. On arrival vitals are stable other than mild tachycardia. Physical exam grossly unremarkable other than decreased air movement on lung auscultation though no audible wheezing or rails. No evidence of increased work of breathing or accessory muscle use to suggest respiratory distress. Covid test is positive and chest x-ray shows no evidence of pneumonia. CMP shows corrected sodium of 133, hyperglycemia likely secondary to patient not taking her medications this morning as well as drinking orange juice at home over the last several days due to her symptoms. Patient treated with Regeneron and IV fluids. She will self quarantine at home for 10 days from symptom onset and follow-up with her primary care for management of her chronic conditions. She was educated on signs and symptoms that would require emergent reevaluation. Amount and/or Complexity of Data Reviewed  Clinical lab tests: reviewed and ordered  Tests in the radiology section of CPT®: reviewed and ordered  Decide to obtain previous medical records or to obtain history from someone other than the patient: yes      ED Course as of 12/20/21 1030   Mon Dec 20, 2021   1000 COVID-19 rapid test(!!): Detected [KE]   1009 IMPRESSION  Low lung volumes. No focal airspace consolidation. [KE]   1009 HGB(!): 10.7  Patient reports history of iron deficiency anemia, discussed results with patient and she will follow up with her PCP for management. [KE]   1015 Glucose(!): 361 [KE]   1016 Sodium(!): 127  Corrected sodium 133 [KE]   1029 Discussed abnormal lab results with patient.   She admits that she has not taken her at home medications yet this morning. She also admits that over the last several days she has been drinking more orange juice than normal due to her symptoms which likely explains her elevated glucose levels today.  [KE]      ED Course User Index  [KE] Boogie Haley, [address]

## 2021-12-20 NOTE — DISCHARGE INSTRUCTIONS
COVID test is positive today. You need to quarantine for 10 days from when your symptoms began. You were treated with monoclonal antibodies because of your positive COVID test.     Take your home medications as prescribed and monitor blood glucose at home. Follow up with your PCP. Return to [facility] for any new or worsening symptoms. [FreeTextEntry1] : Ms. MEREDITH  presents for colposcopy. She has her annual GYN exam on 2/5/21 and her pap was significant for LSIL. \par \par She is nervous for the procedure. Emotional support was provided.\par \par \par

## 2021-12-20 NOTE — ED TRIAGE NOTES
Pt to ed with cough, fatigue, loss of smell and bodyaches. Possible covid exposure. Coughing up green sputum. Hx of DM.

## 2021-12-20 NOTE — Clinical Note
[address] [address] EMERGENCY DEPT  [address] [address]  [phone]    Work/School Note    Date: 12/20/2021     To Whom It May concern: Ag Wade was evaluated by the following provider(s):  Attending Provider: Adan Buenrostro DO  Physician Assistant: Aditya Ramirez, [address] [place] [address] virus is suspected. Per the CDC guidelines we recommend home isolation until the following conditions are all met:    1. At least 10 days have passed since symptoms first appeared and  2. At least 24 hours have passed since last fever without the use of fever-reducing medications and  3.  Symptoms (e.g., cough, shortness of breath) have improved      Sincerely,          Margo Jim

## 2022-03-18 PROBLEM — R80.9 POSITIVE FOR MICROALBUMINURIA: Status: ACTIVE | Noted: 2018-02-12

## 2022-03-18 PROBLEM — M25.471 RIGHT ANKLE SWELLING: Status: ACTIVE | Noted: 2020-10-28

## 2022-03-19 PROBLEM — M79.641 PAIN OF RIGHT HAND: Status: ACTIVE | Noted: 2020-10-31

## 2022-03-19 PROBLEM — L73.2 LEFT AXILLARY HIDRADENITIS: Status: ACTIVE | Noted: 2020-10-30

## 2022-03-19 PROBLEM — M25.571 RIGHT ANKLE PAIN: Status: ACTIVE | Noted: 2020-10-28

## 2022-03-19 PROBLEM — R23.8 VESICULAR RASH: Status: ACTIVE | Noted: 2020-11-02

## 2022-03-19 PROBLEM — L73.2 HYDRADENITIS: Status: ACTIVE | Noted: 2017-09-14

## 2022-03-20 PROBLEM — M00.9 SEPTIC JOINT (HCC): Status: ACTIVE | Noted: 2020-10-28

## 2022-03-25 ENCOUNTER — HOSPITAL ENCOUNTER (EMERGENCY)
Age: 42
Discharge: HOME | End: 2022-03-25
Attending: EMERGENCY MEDICINE
Payer: COMMERCIAL

## 2022-03-25 ENCOUNTER — APPOINTMENT (OUTPATIENT)
Dept: CT IMAGING | Age: 42
End: 2022-03-25
Attending: PHYSICIAN ASSISTANT
Payer: COMMERCIAL

## 2022-03-25 VITALS
TEMPERATURE: 99 F | HEIGHT: 69 IN | WEIGHT: 283 LBS | SYSTOLIC BLOOD PRESSURE: 159 MMHG | BODY MASS INDEX: 41.92 KG/M2 | HEART RATE: 86 BPM | DIASTOLIC BLOOD PRESSURE: 59 MMHG | RESPIRATION RATE: 16 BRPM | OXYGEN SATURATION: 100 %

## 2022-03-25 DIAGNOSIS — L03.311 CELLULITIS OF ABDOMINAL WALL: ICD-10-CM

## 2022-03-25 DIAGNOSIS — R73.9 HYPERGLYCEMIA: ICD-10-CM

## 2022-03-25 DIAGNOSIS — H10.31 ACUTE CONJUNCTIVITIS OF RIGHT EYE, UNSPECIFIED ACUTE CONJUNCTIVITIS TYPE: Primary | ICD-10-CM

## 2022-03-25 LAB
ALBUMIN SERPL-MCNC: 2.8 G/DL (ref 3.5–5)
ALBUMIN/GLOB SERPL: 0.5 {RATIO} (ref 1.2–3.5)
ALP SERPL-CCNC: 173 U/L (ref 50–136)
ALT SERPL-CCNC: 22 U/L (ref 12–65)
ANION GAP SERPL CALC-SCNC: 5 MMOL/L (ref 7–16)
AST SERPL-CCNC: 19 U/L (ref 15–37)
BASOPHILS # BLD: 0 K/UL (ref 0–0.2)
BASOPHILS NFR BLD: 0 % (ref 0–2)
BILIRUB SERPL-MCNC: 0.6 MG/DL (ref 0.2–1.1)
BUN SERPL-MCNC: 9 MG/DL (ref 6–23)
CALCIUM SERPL-MCNC: 9.2 MG/DL (ref 8.3–10.4)
CHLORIDE SERPL-SCNC: 104 MMOL/L (ref 98–107)
CO2 SERPL-SCNC: 25 MMOL/L (ref 21–32)
CREAT SERPL-MCNC: 0.65 MG/DL (ref 0.6–1)
DIFFERENTIAL METHOD BLD: ABNORMAL
EOSINOPHIL # BLD: 0.2 K/UL (ref 0–0.8)
EOSINOPHIL NFR BLD: 2 % (ref 0.5–7.8)
ERYTHROCYTE [DISTWIDTH] IN BLOOD BY AUTOMATED COUNT: 14.9 % (ref 11.9–14.6)
GLOBULIN SER CALC-MCNC: 5.7 G/DL (ref 2.3–3.5)
GLUCOSE SERPL-MCNC: 263 MG/DL (ref 65–100)
HCG UR QL: NEGATIVE
HCT VFR BLD AUTO: 35.2 % (ref 35.8–46.3)
HGB BLD-MCNC: 10.7 G/DL (ref 11.7–15.4)
IMM GRANULOCYTES # BLD AUTO: 0 K/UL (ref 0–0.5)
IMM GRANULOCYTES NFR BLD AUTO: 0 % (ref 0–5)
LACTATE SERPL-SCNC: 1.1 MMOL/L (ref 0.4–2)
LYMPHOCYTES # BLD: 2.2 K/UL (ref 0.5–4.6)
LYMPHOCYTES NFR BLD: 26 % (ref 13–44)
MCH RBC QN AUTO: 24.9 PG (ref 26.1–32.9)
MCHC RBC AUTO-ENTMCNC: 30.4 G/DL (ref 31.4–35)
MCV RBC AUTO: 82.1 FL (ref 79.6–97.8)
MONOCYTES # BLD: 0.4 K/UL (ref 0.1–1.3)
MONOCYTES NFR BLD: 4 % (ref 4–12)
NEUTS SEG # BLD: 5.7 K/UL (ref 1.7–8.2)
NEUTS SEG NFR BLD: 68 % (ref 43–78)
NRBC # BLD: 0 K/UL (ref 0–0.2)
PLATELET # BLD AUTO: 351 K/UL (ref 150–450)
PMV BLD AUTO: 10.5 FL (ref 9.4–12.3)
POTASSIUM SERPL-SCNC: 4.3 MMOL/L (ref 3.5–5.1)
PROT SERPL-MCNC: 8.5 G/DL (ref 6.3–8.2)
RBC # BLD AUTO: 4.29 M/UL (ref 4.05–5.2)
SODIUM SERPL-SCNC: 134 MMOL/L (ref 136–145)
WBC # BLD AUTO: 8.4 K/UL (ref 4.3–11.1)

## 2022-03-25 PROCEDURE — 85025 COMPLETE CBC W/AUTO DIFF WBC: CPT

## 2022-03-25 PROCEDURE — 74011250637 HC RX REV CODE- 250/637: Performed by: PHYSICIAN ASSISTANT

## 2022-03-25 PROCEDURE — 80053 COMPREHEN METABOLIC PANEL: CPT

## 2022-03-25 PROCEDURE — 87040 BLOOD CULTURE FOR BACTERIA: CPT

## 2022-03-25 PROCEDURE — 74177 CT ABD & PELVIS W/CONTRAST: CPT

## 2022-03-25 PROCEDURE — 83605 ASSAY OF LACTIC ACID: CPT

## 2022-03-25 PROCEDURE — 96365 THER/PROPH/DIAG IV INF INIT: CPT

## 2022-03-25 PROCEDURE — 74011000636 HC RX REV CODE- 636: Performed by: EMERGENCY MEDICINE

## 2022-03-25 PROCEDURE — 81003 URINALYSIS AUTO W/O SCOPE: CPT

## 2022-03-25 PROCEDURE — 74011250636 HC RX REV CODE- 250/636: Performed by: PHYSICIAN ASSISTANT

## 2022-03-25 PROCEDURE — 96375 TX/PRO/DX INJ NEW DRUG ADDON: CPT

## 2022-03-25 PROCEDURE — 99285 EMERGENCY DEPT VISIT HI MDM: CPT

## 2022-03-25 PROCEDURE — 81025 URINE PREGNANCY TEST: CPT

## 2022-03-25 PROCEDURE — 74011000258 HC RX REV CODE- 258: Performed by: EMERGENCY MEDICINE

## 2022-03-25 RX ORDER — SODIUM CHLORIDE 9 MG/ML
1000 INJECTION, SOLUTION INTRAVENOUS ONCE
Status: COMPLETED | OUTPATIENT
Start: 2022-03-25 | End: 2022-03-25

## 2022-03-25 RX ORDER — METFORMIN HYDROCHLORIDE 1000 MG/1
1000 TABLET ORAL 2 TIMES DAILY WITH MEALS
Qty: 120 TABLET | Refills: 0 | Status: SHIPPED | OUTPATIENT
Start: 2022-03-25 | End: 2022-05-24

## 2022-03-25 RX ORDER — ERYTHROMYCIN 5 MG/G
OINTMENT OPHTHALMIC
Qty: 3.5 G | Refills: 0 | Status: SHIPPED | OUTPATIENT
Start: 2022-03-25 | End: 2022-04-01

## 2022-03-25 RX ORDER — ERYTHROMYCIN 5 MG/G
OINTMENT OPHTHALMIC
Status: COMPLETED | OUTPATIENT
Start: 2022-03-25 | End: 2022-03-25

## 2022-03-25 RX ORDER — CLINDAMYCIN PHOSPHATE 900 MG/50ML
900 INJECTION INTRAVENOUS
Status: COMPLETED | OUTPATIENT
Start: 2022-03-25 | End: 2022-03-25

## 2022-03-25 RX ORDER — CLINDAMYCIN HYDROCHLORIDE 300 MG/1
300 CAPSULE ORAL 4 TIMES DAILY
Qty: 40 CAPSULE | Refills: 0 | Status: SHIPPED | OUTPATIENT
Start: 2022-03-25 | End: 2022-04-04

## 2022-03-25 RX ORDER — PIOGLITAZONEHYDROCHLORIDE 30 MG/1
30 TABLET ORAL DAILY
Qty: 30 TABLET | Refills: 0 | Status: SHIPPED | OUTPATIENT
Start: 2022-03-25

## 2022-03-25 RX ORDER — KETOROLAC TROMETHAMINE 30 MG/ML
30 INJECTION, SOLUTION INTRAMUSCULAR; INTRAVENOUS
Status: COMPLETED | OUTPATIENT
Start: 2022-03-25 | End: 2022-03-25

## 2022-03-25 RX ORDER — SODIUM CHLORIDE 0.9 % (FLUSH) 0.9 %
10 SYRINGE (ML) INJECTION
Status: COMPLETED | OUTPATIENT
Start: 2022-03-25 | End: 2022-03-25

## 2022-03-25 RX ADMIN — IOPAMIDOL 100 ML: 755 INJECTION, SOLUTION INTRAVENOUS at 17:28

## 2022-03-25 RX ADMIN — ERYTHROMYCIN: 5 OINTMENT OPHTHALMIC at 17:55

## 2022-03-25 RX ADMIN — CLINDAMYCIN PHOSPHATE 900 MG: 900 INJECTION, SOLUTION INTRAVENOUS at 16:32

## 2022-03-25 RX ADMIN — SODIUM CHLORIDE 1000 ML: 0.9 INJECTION, SOLUTION INTRAVENOUS at 16:32

## 2022-03-25 RX ADMIN — SODIUM CHLORIDE 100 ML: 9 INJECTION, SOLUTION INTRAVENOUS at 17:28

## 2022-03-25 RX ADMIN — KETOROLAC TROMETHAMINE 30 MG: 30 INJECTION, SOLUTION INTRAMUSCULAR at 16:32

## 2022-03-25 RX ADMIN — Medication 10 ML: at 17:28

## 2022-03-25 NOTE — Clinical Note
[address] [address] EMERGENCY DEPT  [address] [facility] [address] [phone]    Work/School Note    Date: 3/25/2022    To Whom It May concern: Allen Joya was seen and treated today in the emergency room by the following provider(s):  Attending Provider: Stephanie Chaidez MD  Physician Assistant: CHASTITY Weinberg. Allen Joya is excused from work/school on 3/25/2022 through 3/27/2022. She is medically clear to return to work/school on 3/28/2022.          Sincerely,          CHASTITY Baltazar

## 2022-03-25 NOTE — ED TRIAGE NOTES
Pt states pain with redness in right eye since this morning. Denies injury. States she has a lump that is draining on left flank area that she would like to have evaluated. Masked for triage.

## 2022-03-25 NOTE — ED PROVIDER NOTES
Patient is here with redness, swelling, pain and drainage to the left side of her abdomen in a pannus. This has been going on for a while but getting worse. She is a diabetic and does not have a primary care physician nor is she taking any medication. She also has a red, tearing, itchy and painful right eye that started at work today. She wears glasses for distance. No headache, visual changes, neck pain, chest pain, shortness of breath, abdominal pain, dizziness, weakness, dyspnea exertion, orthopnea, swelling/tingling or weakness to her arms or legs, trouble with urination or bowel movements or other new symptoms. She did ambulate to the room without difficulty and is well-hydrated. The history is provided by the patient. Wound Check  This is a new problem. The current episode started more than 1 week ago. The problem occurs constantly. The problem has been gradually worsening. Associated symptoms include abdominal pain. Pertinent negatives include no chest pain, no headaches and no shortness of breath. Nothing aggravates the symptoms. Nothing relieves the symptoms. She has tried nothing for the symptoms. Red Eye  This is a new problem. The current episode started 3 to 5 hours ago. The problem occurs constantly. The problem has been gradually worsening. Associated symptoms include abdominal pain. Pertinent negatives include no chest pain, no headaches and no shortness of breath. Nothing aggravates the symptoms. Nothing relieves the symptoms. She has tried nothing for the symptoms.         Past Medical History:   Diagnosis Date    Diabetes [facility])        Past Surgical History:   Procedure Laterality Date    HX  SECTION      HX ORTHOPAEDIC  10/28/2020    right ankle         Family History:   Problem Relation Age of Onset    Diabetes Mother     Other Mother         Hunter Ortega       Social History     Socioeconomic History    Marital status: SINGLE     Spouse name: Not on file    Number of children: Not on file    Years of education: Not on file    Highest education level: Not on file   Occupational History    Not on file   Tobacco Use    Smoking status: Never Smoker    Smokeless tobacco: Never Used   Substance and Sexual Activity    Alcohol use: Yes     Comment: occasionally    Drug use: No    Sexual activity: Not Currently   Other Topics Concern    Not on file   Social History Narrative    Not on file     Social Determinants of Health     Financial Resource Strain:     Difficulty of Paying Living Expenses: Not on file   Food Insecurity:     Worried About Running Out of Food in the Last Year: Not on file    Tam[place] of Food in the Last Year: Not on file   Transportation Needs:     Lack of Transportation (Medical): Not on file    Lack of Transportation (Non-Medical): Not on file   Physical Activity:     Days of Exercise per Week: Not on file    Minutes of Exercise per Session: Not on file   Stress:     Feeling of Stress : Not on file   Social Connections:     Frequency of Communication with Friends and Family: Not on file    Frequency of Social Gatherings with Friends and Family: Not on file    Attends Sabianist Services: Not on file    Active Member of [address] [place] [address] or Organizations: Not on file    Attends Club or Organization Meetings: Not on file    Marital Status: Not on file   Intimate Partner Violence:     Fear of Current or Ex-Partner: Not on file    Emotionally Abused: Not on file    Physically Abused: Not on file    Sexually Abused: Not on file   Housing Stability:     Unable to Pay for Housing in the Last Year: Not on file    Number of [place] in the Last Year: Not on file    Unstable Housing in the Last Year: Not on file         ALLERGIES: Patient has no known allergies. Review of Systems   Constitutional: Negative. Negative for chills. HENT: Negative. Eyes: Positive for photophobia, pain, discharge, redness and itching. Negative for visual disturbance. Respiratory: Negative. Negative for shortness of breath. Cardiovascular: Negative. Negative for chest pain. Gastrointestinal: Positive for abdominal pain. Negative for nausea and vomiting. Genitourinary: Negative. Musculoskeletal: Negative. Skin: Negative. Neurological: Negative. Negative for headaches. Psychiatric/Behavioral: Negative. All other systems reviewed and are negative. Vitals:    03/25/22 1438   BP: (!) 166/61   Pulse: 84   Resp: 16   Temp: 99 °F (37.2 °C)   SpO2: 100%   Weight: 128.4 kg (283 lb)   Height: 5' 9\" (1.753 m)            Physical Exam  Vitals and nursing note reviewed. Constitutional:       Appearance: She is well-developed. HENT:      Head: Normocephalic and atraumatic. Right Ear: Tympanic membrane, ear canal and external ear normal.      Left Ear: Tympanic membrane, ear canal and external ear normal.      Nose: Nose normal.      Mouth/Throat:      Mouth: Mucous membranes are moist.   Eyes:      General: Lids are normal. Lids are everted, no foreign bodies appreciated. Vision grossly intact. Gaze aligned appropriately. Extraocular Movements: Extraocular movements intact. Conjunctiva/sclera:      Right eye: Right conjunctiva is injected. No chemosis, exudate or hemorrhage. Left eye: Left conjunctiva is not injected. No chemosis, exudate or hemorrhage. Pupils: Pupils are equal, round, and reactive to light. Cardiovascular:      Rate and Rhythm: Normal rate and regular rhythm. Heart sounds: Normal heart sounds. Pulmonary:      Effort: Pulmonary effort is normal.      Breath sounds: Normal breath sounds. Abdominal:      General: Bowel sounds are normal.      Palpations: Abdomen is soft. Musculoskeletal:         General: Normal range of motion. Cervical back: Normal range of motion and neck supple. Skin:     General: Skin is warm and dry.    Neurological:      Mental Status: She is alert and oriented to person, place, and time. Deep Tendon Reflexes: Reflexes are normal and symmetric. Psychiatric:         Behavior: Behavior normal.         Thought Content: Thought content normal.         Judgment: Judgment normal.          MDM  Number of Diagnoses or Management Options  Risk of Complications, Morbidity, and/or Mortality  Presenting problems: moderate  Diagnostic procedures: moderate  Management options: moderate           Procedures                The patient was observed in the ED. Results Reviewed:      Recent Results (from the past 24 hour(s))   CBC WITH AUTOMATED DIFF    Collection Time: 03/25/22  4:25 PM   Result Value Ref Range    WBC 8.4 4.3 - 11.1 K/uL    RBC 4.29 4.05 - 5.2 M/uL    HGB 10.7 (L) 11.7 - 15.4 g/dL    HCT 35.2 (L) 35.8 - 46.3 %    MCV 82.1 79.6 - 97.8 FL    MCH 24.9 (L) 26.1 - 32.9 PG    MCHC 30.4 (L) 31.4 - 35.0 g/dL    RDW 14.9 (H) 11.9 - 14.6 %    PLATELET 249 487 - 580 K/uL    MPV 10.5 9.4 - 12.3 FL    ABSOLUTE NRBC 0.00 0.0 - 0.2 K/uL    DF AUTOMATED      NEUTROPHILS 68 43 - 78 %    LYMPHOCYTES 26 13 - 44 %    MONOCYTES 4 4.0 - 12.0 %    EOSINOPHILS 2 0.5 - 7.8 %    BASOPHILS 0 0.0 - 2.0 %    IMMATURE GRANULOCYTES 0 0.0 - 5.0 %    ABS. NEUTROPHILS 5.7 1.7 - 8.2 K/UL    ABS. LYMPHOCYTES 2.2 0.5 - 4.6 K/UL    ABS. MONOCYTES 0.4 0.1 - 1.3 K/UL    ABS. EOSINOPHILS 0.2 0.0 - 0.8 K/UL    ABS. BASOPHILS 0.0 0.0 - 0.2 K/UL    ABS. IMM.  GRANS. 0.0 0.0 - 0.5 K/UL   METABOLIC PANEL, COMPREHENSIVE    Collection Time: 03/25/22  4:25 PM   Result Value Ref Range    Sodium 134 (L) 136 - 145 mmol/L    Potassium 4.3 3.5 - 5.1 mmol/L    Chloride 104 98 - 107 mmol/L    CO2 25 21 - 32 mmol/L    Anion gap 5 (L) 7 - 16 mmol/L    Glucose 263 (H) 65 - 100 mg/dL    BUN 9 6 - 23 MG/DL    Creatinine 0.65 0.6 - 1.0 MG/DL    GFR est AA >60 >60 ml/min/1.73m2    GFR est non-AA >60 >60 ml/min/1.73m2    Calcium 9.2 8.3 - 10.4 MG/DL    Bilirubin, total 0.6 0.2 - 1.1 MG/DL    ALT (SGPT) 22 12 - 65 U/L    AST (SGOT) 19 15 - 37 U/L    Alk. phosphatase 173 (H) 50 - 136 U/L    Protein, total 8.5 (H) 6.3 - 8.2 g/dL    Albumin 2.8 (L) 3.5 - 5.0 g/dL    Globulin 5.7 (H) 2.3 - 3.5 g/dL    A-G Ratio 0.5 (L) 1.2 - 3.5     LACTIC ACID    Collection Time: 03/25/22  4:25 PM   Result Value Ref Range    Lactic acid 1.1 0.4 - 2.0 MMOL/L     CT ABD PELV W CONT   Final Result   Small amount of free pelvic fluid. Patient's visual acuity is normal.  Her eye appears to be a conjunctivitis. No injury to the area. I will prescribe erythromycin ointment and we have applied some here. She has a cellulitic area that is draining and not an abscess on the left side of the abdomen between 2 pannus. She was instructed to gently wash the areas 2-3 times a day with soap and water, blot dry and apply Neosporin. Finish all the antibiotics. I given her her first dose here. She is not having any nausea, vomiting or fever. She was instructed to return to the ED if worsening in any way. I did refer her to a new primary care physician at her request.  I have written her for her Metformin as she does not have any. She is stable for discharge and ambulatory out of the ED without difficulty at this time. I discussed the results of all labs, procedures, radiographs, and treatments with the patient and available family. Treatment plan is agreed upon and the patient is ready for discharge. All voiced understanding of the discharge plan and medication instructions or changes as appropriate. Questions about treatment in the ED were answered. All were encouraged to return should symptoms worsen or new problems develop.

## 2022-03-25 NOTE — DISCHARGE INSTRUCTIONS
Do not rub/touch eyes, wash hands frequently with soap and water, use the antibiotic drops/ointment as directed and follow up with PCP or opthamologist. Wash wound two-three times daily with soap and water, blot dry, apply neosporin and a clean dressing. Watch for redness, swelling, pus, increasing pain, fever and return if any of those symptoms begin. Finish all of the antibiotics. Return to the ED if worse.

## 2022-03-25 NOTE — Clinical Note
[id number] [address] EMERGENCY DEPT  [address] [facility] [address] [phone]    Work/School Note    Date: 3/25/2022    To Whom It May concern: Vera Fontanez was seen and treated today in the emergency room by the following provider(s):  Attending Provider: Mony Harvey MD  Physician Assistant: CHASTITY Craig. Vera Fontanez is excused from work/school on 3/25/2022 through 3/27/2022. She is medically clear to return to work/school on 3/28/2022.          Sincerely,          Cory Galvan RN

## 2022-03-30 LAB
BACTERIA SPEC CULT: NORMAL
BACTERIA SPEC CULT: NORMAL
SERVICE CMNT-IMP: NORMAL
SERVICE CMNT-IMP: NORMAL

## 2022-06-08 ENCOUNTER — HOSPITAL ENCOUNTER (EMERGENCY)
Dept: GENERAL RADIOLOGY | Age: 42
Discharge: HOME | End: 2022-06-11
Payer: COMMERCIAL

## 2022-06-08 ENCOUNTER — HOSPITAL ENCOUNTER (EMERGENCY)
Age: 42
Discharge: HOME | End: 2022-06-08
Attending: EMERGENCY MEDICINE
Payer: COMMERCIAL

## 2022-06-08 VITALS
DIASTOLIC BLOOD PRESSURE: 88 MMHG | BODY MASS INDEX: 42.48 KG/M2 | WEIGHT: 286.8 LBS | HEIGHT: 69 IN | SYSTOLIC BLOOD PRESSURE: 164 MMHG | HEART RATE: 88 BPM | OXYGEN SATURATION: 98 % | TEMPERATURE: 99 F | RESPIRATION RATE: 15 BRPM

## 2022-06-08 DIAGNOSIS — J06.9 VIRAL UPPER RESPIRATORY TRACT INFECTION: Primary | ICD-10-CM

## 2022-06-08 LAB
FLUAV AG NPH QL IA: NEGATIVE
FLUBV AG NPH QL IA: NEGATIVE
SARS-COV-2 RDRP RESP QL NAA+PROBE: NOT DETECTED
SOURCE: NORMAL
SPECIMEN SOURCE: NORMAL
STREP, MOLECULAR: NOT DETECTED

## 2022-06-08 PROCEDURE — 87635 SARS-COV-2 COVID-19 AMP PRB: CPT

## 2022-06-08 PROCEDURE — 87804 INFLUENZA ASSAY W/OPTIC: CPT

## 2022-06-08 PROCEDURE — 71046 X-RAY EXAM CHEST 2 VIEWS: CPT

## 2022-06-08 PROCEDURE — 87651 STREP A DNA AMP PROBE: CPT

## 2022-06-08 PROCEDURE — 99284 EMERGENCY DEPT VISIT MOD MDM: CPT

## 2022-06-08 RX ORDER — BROMPHENIRAMINE MALEATE, PSEUDOEPHEDRINE HYDROCHLORIDE, AND DEXTROMETHORPHAN HYDROBROMIDE 2; 30; 10 MG/5ML; MG/5ML; MG/5ML
5 SYRUP ORAL 3 TIMES DAILY PRN
Qty: 75 ML | Refills: 0 | Status: SHIPPED | OUTPATIENT
Start: 2022-06-08 | End: 2022-06-13

## 2022-06-08 ASSESSMENT — ENCOUNTER SYMPTOMS
VOMITING: 0
SHORTNESS OF BREATH: 0
RHINORRHEA: 0
ABDOMINAL PAIN: 0
COUGH: 1
NAUSEA: 0
SORE THROAT: 1

## 2022-06-08 ASSESSMENT — PAIN SCALES - GENERAL: PAINLEVEL_OUTOF10: 10

## 2022-06-08 ASSESSMENT — PAIN DESCRIPTION - FREQUENCY: FREQUENCY: CONTINUOUS

## 2022-06-08 ASSESSMENT — PAIN DESCRIPTION - ONSET: ONSET: SUDDEN

## 2022-06-08 ASSESSMENT — PAIN DESCRIPTION - DESCRIPTORS: DESCRIPTORS: ACHING

## 2022-06-08 ASSESSMENT — PAIN DESCRIPTION - PAIN TYPE: TYPE: ACUTE PAIN

## 2022-06-08 ASSESSMENT — PAIN DESCRIPTION - LOCATION: LOCATION: THROAT

## 2022-06-08 ASSESSMENT — PAIN - FUNCTIONAL ASSESSMENT
PAIN_FUNCTIONAL_ASSESSMENT: 0-10
PAIN_FUNCTIONAL_ASSESSMENT: PREVENTS OR INTERFERES SOME ACTIVE ACTIVITIES AND ADLS

## 2022-06-09 NOTE — ED NOTES
I have reviewed discharge instructions with the patient. The patient verbalized understanding. Patient left ED via Discharge Method: ambulatory to Home with (insert name of family/friend, self, transportself). Opportunity for questions and clarification provided. Patient given 1 scripts. To continue your aftercare when you leave the hospital, you may receive an automated call from our care team to check in on how you are doing. This is a free service and part of our promise to provide the best care and service to meet your aftercare needs.  If you have questions, or wish to unsubscribe from this service please call [phone]. Thank you for Choosing our [address] [place] [address] Emergency Department.         Sandy Craig RN  06/08/22 4057

## 2022-06-09 NOTE — ED PROVIDER NOTES
[facility] Emergency Department Provider Note                     PCP:                Supriya Jansen MD               Age: 37 y.o. Sex: female           ICD-10-CM    1. Viral upper respiratory tract infection  J06.9        DISCHARGED     MDM  Number of Diagnoses or Management Options  Viral upper respiratory tract infection  Diagnosis management comments: Patient is a 73-year-old female who presents to facility today with signs and symptoms consistent with upper respiratory infection. COVID, flu, and strep were all negative. Chest x-ray normal.  Patient is afebrile with stable vital signs. I highly suspect the patient has a viral upper respiratory infection. We have prescribed some cough medication for the patient to utilize and as well as encouraged her to start a decongestant and drink plenty of fluids. Patient reports understanding of the findings here today as well as the proposed plan of care and is agreeable to proceed as discussed. Return protocols discussed with the patient. Patient ambulatory upon discharge in stable condition. Voice dictation software was used during the making of this note. This software is not perfect and grammatical and other typographical errors may be present. This note has been proofread, but may still contain errors.   Gabriel Salgado PA-C; 6/8/2022 @8:43 PM   ===================================================================         Amount and/or Complexity of Data Reviewed  Clinical lab tests: ordered and reviewed  Tests in the radiology section of CPT®: ordered and reviewed  Tests in the medicine section of CPT®: ordered and reviewed  Review and summarize past medical records: yes  Independent visualization of images, tracings, or specimens: yes    Risk of Complications, Morbidity, and/or Mortality  Presenting problems: moderate  Diagnostic procedures: moderate  Management options: low  General comments: XR CHEST (2 VW)   Final Result    Normal chest x-ray.      The patient was observed in the ED. Results Reviewed:      Recent Results (from the past 24 hour(s))  -COVID-19, Rapid:   Collection Time: 06/08/22  6:30 PM  Specimen: Nasopharyngeal       Result                      Value             Ref Range           Source                      NASAL SWAB                            SARS-CoV-2, Rapid           Not detected      NOTD           -Rapid influenza A/B antigens:   Collection Time: 06/08/22  6:30 PM  Specimen: Nasal Washing       Result                      Value             Ref Range           Influenza A Ag              Negative          NEG                 Influenza B Ag              Negative          NEG                 Source                                                        Nasopharyngeal  -Group A Strep Screen By PCR:   Collection Time: 06/08/22  6:30 PM  Specimen: Throat       Result                      Value             Ref Range           Strep, Molecular            Not detected                       I discussed the results of all labs, procedures, radiographs, and treatments with the patient and available family. Treatment plan is agreed upon and the patient is ready for discharge. All voiced understanding of the discharge plan and medication instructions or changes as appropriate. Questions about treatment in the ED were answered. All were encouraged to return should symptoms worsen or new problems develop. Patient Progress  Patient progress: stable      Orders Placed This Encounter   Procedures    COVID-19, Rapid    Rapid influenza A/B antigens    Group A Strep Screen By PCR    XR CHEST (2 VW)        Lisbet Mercer is a 37 y.o. female who presents to the Emergency Department with chief complaint of    Chief Complaint   Patient presents with    Fever    Pharyngitis    Cough      Patient presents to the facility today with complaint of cough, congestion, and fever for the past 2 days.   She reports she recently was at a  for her brother and states that a lot of people were present and were coughing and she is unsure if she pick something up from there. She states she just feels very poorly. She denies any chest pain, abdominal pain, nausea, vomiting, headache, body aches, dysuria, frequency, shortness of breath pain with inspiration, or any other symptoms. The history is provided by the patient. Review of Systems   Constitutional: Positive for fatigue and fever. Negative for chills. HENT: Positive for congestion and sore throat. Negative for rhinorrhea. Respiratory: Positive for cough. Negative for shortness of breath. Cardiovascular: Negative for chest pain. Gastrointestinal: Negative for abdominal pain, nausea and vomiting. Genitourinary: Negative for dysuria, frequency and urgency. Musculoskeletal: Negative for gait problem. Skin: Negative for rash and wound. Neurological: Negative for dizziness, syncope and headaches. Psychiatric/Behavioral: Negative for agitation and behavioral problems. All other systems reviewed and are negative. All other systems reviewed and are negative. @[email or web address]@     [facility] Center[facility]@    @[email or web address]@        Social Connections:     Frequency of Communication with Friends and Family: Not on file    Frequency of Social Gatherings with Friends and Family: Not on file    Attends Confucianism Services: Not on file    Active Member of Clubs or Organizations: Not on file    Attends Club or Organization Meetings: Not on file    Marital Status: Not on file        No Known Allergies     Vitals signs and nursing note reviewed. Patient Vitals for the past 4 hrs:   Temp Pulse Resp BP SpO2   22 2030 99 °F (37.2 °C) 88 15 (!) 164/88 98 %   22 1820 100.2 °F (37.9 °C) 92 16 (!) 159/92 98 %          Physical Exam  Vitals and nursing note reviewed. Constitutional:       General: She is not in acute distress. Appearance: She is well-developed.  She is not ill-appearing. HENT:      Head: Normocephalic and atraumatic. Nose: No congestion or rhinorrhea. Mouth/Throat:      Mouth: Mucous membranes are moist. No oral lesions. Pharynx: Oropharynx is clear. Uvula midline. No pharyngeal swelling, oropharyngeal exudate or posterior oropharyngeal erythema. Eyes:      Conjunctiva/sclera: Conjunctivae normal.   Cardiovascular:      Rate and Rhythm: Normal rate and regular rhythm. Heart sounds: Normal heart sounds. Pulmonary:      Effort: Pulmonary effort is normal.      Breath sounds: Normal breath sounds. Abdominal:      General: Bowel sounds are normal.      Palpations: Abdomen is soft. Musculoskeletal:      Cervical back: Normal range of motion. Skin:     General: Skin is warm and dry. Capillary Refill: Capillary refill takes less than 2 seconds. Neurological:      General: No focal deficit present. Mental Status: She is alert and oriented to person, place, and time. Psychiatric:         Mood and Affect: Mood normal.         Behavior: Behavior normal.              Procedures    [unfilled]     XR CHEST (2 VW)   Final Result   Normal chest x-ray. Voice dictation software was used during the making of this note. This software is not perfect and grammatical and other typographical errors may be present. This note has not been completely proofread for errors.        Jorge Luis Trevino PA-C  06/08/22 1886

## 2022-06-21 ENCOUNTER — OFFICE VISIT (OUTPATIENT)
Dept: FAMILY MEDICINE CLINIC | Facility: CLINIC | Age: 42
End: 2022-06-21
Payer: COMMERCIAL

## 2022-06-21 VITALS
DIASTOLIC BLOOD PRESSURE: 83 MMHG | HEART RATE: 98 BPM | BODY MASS INDEX: 39.4 KG/M2 | TEMPERATURE: 98.7 F | WEIGHT: 266 LBS | SYSTOLIC BLOOD PRESSURE: 127 MMHG | HEIGHT: 69 IN

## 2022-06-21 DIAGNOSIS — E11.65 TYPE 2 DIABETES MELLITUS WITH HYPERGLYCEMIA, WITHOUT LONG-TERM CURRENT USE OF INSULIN (HCC): ICD-10-CM

## 2022-06-21 DIAGNOSIS — Z12.31 ENCOUNTER FOR SCREENING MAMMOGRAM FOR MALIGNANT NEOPLASM OF BREAST: ICD-10-CM

## 2022-06-21 DIAGNOSIS — L08.9 SKIN INFECTION: ICD-10-CM

## 2022-06-21 DIAGNOSIS — E11.65 TYPE 2 DIABETES MELLITUS WITH HYPERGLYCEMIA, WITHOUT LONG-TERM CURRENT USE OF INSULIN (HCC): Primary | ICD-10-CM

## 2022-06-21 DIAGNOSIS — L73.2 HYDRADENITIS: ICD-10-CM

## 2022-06-21 DIAGNOSIS — F32.9 REACTIVE DEPRESSION: ICD-10-CM

## 2022-06-21 LAB
ALBUMIN SERPL-MCNC: 3 G/DL (ref 3.5–5)
ALBUMIN/GLOB SERPL: 0.6 {RATIO} (ref 1.2–3.5)
ALP SERPL-CCNC: 142 U/L (ref 50–136)
ALT SERPL-CCNC: 15 U/L (ref 12–65)
ANION GAP SERPL CALC-SCNC: 9 MMOL/L (ref 7–16)
AST SERPL-CCNC: 10 U/L (ref 15–37)
BILIRUB SERPL-MCNC: 0.4 MG/DL (ref 0.2–1.1)
BUN SERPL-MCNC: 13 MG/DL (ref 6–23)
CALCIUM SERPL-MCNC: 8.9 MG/DL (ref 8.3–10.4)
CHLORIDE SERPL-SCNC: 103 MMOL/L (ref 98–107)
CHOLEST SERPL-MCNC: 167 MG/DL
CO2 SERPL-SCNC: 27 MMOL/L (ref 21–32)
CREAT SERPL-MCNC: 0.8 MG/DL (ref 0.6–1)
CREAT UR-MCNC: >400 MG/DL
GLOBULIN SER CALC-MCNC: 5.4 G/DL (ref 2.3–3.5)
GLUCOSE SERPL-MCNC: 110 MG/DL (ref 65–100)
GLUCOSE, POC: 121 MG/DL
HDLC SERPL-MCNC: 64 MG/DL (ref 40–60)
HDLC SERPL: 2.6 {RATIO}
LDLC SERPL CALC-MCNC: 86.4 MG/DL
MICROALBUMIN UR-MCNC: 244 MG/DL
MICROALBUMIN/CREAT UR-RTO: ABNORMAL MG/G
POTASSIUM SERPL-SCNC: 4.2 MMOL/L (ref 3.5–5.1)
PROT SERPL-MCNC: 8.4 G/DL (ref 6.3–8.2)
SODIUM SERPL-SCNC: 139 MMOL/L (ref 136–145)
TRIGL SERPL-MCNC: 83 MG/DL (ref 35–150)
TSH, 3RD GENERATION: 1.04 UIU/ML (ref 0.36–3.74)
VLDLC SERPL CALC-MCNC: 16.6 MG/DL (ref 6–23)

## 2022-06-21 PROCEDURE — 82962 GLUCOSE BLOOD TEST: CPT | Performed by: NURSE PRACTITIONER

## 2022-06-21 PROCEDURE — 99204 OFFICE O/P NEW MOD 45 MIN: CPT | Performed by: NURSE PRACTITIONER

## 2022-06-21 RX ORDER — ESCITALOPRAM OXALATE 10 MG/1
10 TABLET ORAL DAILY
Qty: 30 TABLET | Refills: 3 | Status: SHIPPED | OUTPATIENT
Start: 2022-06-21 | End: 2022-08-15 | Stop reason: SDUPTHER

## 2022-06-21 RX ORDER — AMOXICILLIN AND CLAVULANATE POTASSIUM 875; 125 MG/1; MG/1
1 TABLET, FILM COATED ORAL 2 TIMES DAILY
Qty: 14 TABLET | Refills: 0 | Status: SHIPPED | OUTPATIENT
Start: 2022-06-21 | End: 2022-06-28

## 2022-06-21 RX ORDER — SULFAMETHOXAZOLE AND TRIMETHOPRIM 800; 160 MG/1; MG/1
1 TABLET ORAL 2 TIMES DAILY
Qty: 14 TABLET | Refills: 0 | Status: SHIPPED | OUTPATIENT
Start: 2022-06-21 | End: 2022-06-28

## 2022-06-21 RX ORDER — ORAL SEMAGLUTIDE 3 MG/1
3 TABLET ORAL DAILY
Qty: 30 TABLET | Refills: 0
Start: 2022-06-21 | End: 2022-07-11 | Stop reason: DRUGHIGH

## 2022-06-21 SDOH — HEALTH STABILITY: PHYSICAL HEALTH: ON AVERAGE, HOW MANY DAYS PER WEEK DO YOU ENGAGE IN MODERATE TO STRENUOUS EXERCISE (LIKE A BRISK WALK)?: 5 DAYS

## 2022-06-21 SDOH — ECONOMIC STABILITY: FOOD INSECURITY: WITHIN THE PAST 12 MONTHS, THE FOOD YOU BOUGHT JUST DIDN'T LAST AND YOU DIDN'T HAVE MONEY TO GET MORE.: NEVER TRUE

## 2022-06-21 SDOH — HEALTH STABILITY: PHYSICAL HEALTH: ON AVERAGE, HOW MANY MINUTES DO YOU ENGAGE IN EXERCISE AT THIS LEVEL?: 20 MIN

## 2022-06-21 SDOH — ECONOMIC STABILITY: HOUSING INSECURITY
IN THE LAST 12 MONTHS, WAS THERE A TIME WHEN YOU DID NOT HAVE A STEADY PLACE TO SLEEP OR SLEPT IN A SHELTER (INCLUDING NOW)?: NO

## 2022-06-21 SDOH — ECONOMIC STABILITY: TRANSPORTATION INSECURITY
IN THE PAST 12 MONTHS, HAS THE LACK OF TRANSPORTATION KEPT YOU FROM MEDICAL APPOINTMENTS OR FROM GETTING MEDICATIONS?: NO

## 2022-06-21 SDOH — ECONOMIC STABILITY: INCOME INSECURITY: IN THE LAST 12 MONTHS, WAS THERE A TIME WHEN YOU WERE NOT ABLE TO PAY THE MORTGAGE OR RENT ON TIME?: NO

## 2022-06-21 SDOH — ECONOMIC STABILITY: FOOD INSECURITY: WITHIN THE PAST 12 MONTHS, YOU WORRIED THAT YOUR FOOD WOULD RUN OUT BEFORE YOU GOT MONEY TO BUY MORE.: NEVER TRUE

## 2022-06-21 SDOH — ECONOMIC STABILITY: TRANSPORTATION INSECURITY
IN THE PAST 12 MONTHS, HAS LACK OF TRANSPORTATION KEPT YOU FROM MEETINGS, WORK, OR FROM GETTING THINGS NEEDED FOR DAILY LIVING?: NO

## 2022-06-21 ASSESSMENT — SOCIAL DETERMINANTS OF HEALTH (SDOH)
WITHIN THE LAST YEAR, HAVE YOU BEEN KICKED, HIT, SLAPPED, OR OTHERWISE PHYSICALLY HURT BY YOUR PARTNER OR EX-PARTNER?: NO
WITHIN THE LAST YEAR, HAVE YOU BEEN HUMILIATED OR EMOTIONALLY ABUSED IN OTHER WAYS BY YOUR PARTNER OR EX-PARTNER?: NO
ARE YOU MARRIED, WIDOWED, DIVORCED, SEPARATED, NEVER MARRIED, OR LIVING WITH A PARTNER?: NEVER MARRIED
WITHIN THE LAST YEAR, HAVE TO BEEN RAPED OR FORCED TO HAVE ANY KIND OF SEXUAL ACTIVITY BY YOUR PARTNER OR EX-PARTNER?: NO
IN A TYPICAL WEEK, HOW MANY TIMES DO YOU TALK ON THE PHONE WITH FAMILY, FRIENDS, OR NEIGHBORS?: ONCE A WEEK
HOW HARD IS IT FOR YOU TO PAY FOR THE VERY BASICS LIKE FOOD, HOUSING, MEDICAL CARE, AND HEATING?: NOT HARD AT ALL
DO YOU BELONG TO ANY CLUBS OR ORGANIZATIONS SUCH AS CHURCH GROUPS UNIONS, FRATERNAL OR ATHLETIC GROUPS, OR SCHOOL GROUPS?: NO
WITHIN THE LAST YEAR, HAVE YOU BEEN AFRAID OF YOUR PARTNER OR EX-PARTNER?: NO
HOW OFTEN DO YOU GET TOGETHER WITH FRIENDS OR RELATIVES?: ONCE A WEEK
HOW OFTEN DO YOU ATTEND CHURCH OR RELIGIOUS SERVICES?: NEVER
HOW OFTEN DO YOU ATTENT MEETINGS OF THE CLUB OR ORGANIZATION YOU BELONG TO?: NEVER

## 2022-06-21 ASSESSMENT — PATIENT HEALTH QUESTIONNAIRE - PHQ9
SUM OF ALL RESPONSES TO PHQ9 QUESTIONS 1 & 2: 6
7. TROUBLE CONCENTRATING ON THINGS, SUCH AS READING THE NEWSPAPER OR WATCHING TELEVISION: 0
2. FEELING DOWN, DEPRESSED OR HOPELESS: 3
9. THOUGHTS THAT YOU WOULD BE BETTER OFF DEAD, OR OF HURTING YOURSELF: 0
SUM OF ALL RESPONSES TO PHQ QUESTIONS 1-9: 14
3. TROUBLE FALLING OR STAYING ASLEEP: 3
5. POOR APPETITE OR OVEREATING: 1
SUM OF ALL RESPONSES TO PHQ QUESTIONS 1-9: 14
10. IF YOU CHECKED OFF ANY PROBLEMS, HOW DIFFICULT HAVE THESE PROBLEMS MADE IT FOR YOU TO DO YOUR WORK, TAKE CARE OF THINGS AT HOME, OR GET ALONG WITH OTHER PEOPLE: 3
4. FEELING TIRED OR HAVING LITTLE ENERGY: 3
8. MOVING OR SPEAKING SO SLOWLY THAT OTHER PEOPLE COULD HAVE NOTICED. OR THE OPPOSITE, BEING SO FIGETY OR RESTLESS THAT YOU HAVE BEEN MOVING AROUND A LOT MORE THAN USUAL: 1
6. FEELING BAD ABOUT YOURSELF - OR THAT YOU ARE A FAILURE OR HAVE LET YOURSELF OR YOUR FAMILY DOWN: 0
SUM OF ALL RESPONSES TO PHQ QUESTIONS 1-9: 14
1. LITTLE INTEREST OR PLEASURE IN DOING THINGS: 3
SUM OF ALL RESPONSES TO PHQ QUESTIONS 1-9: 14

## 2022-06-21 ASSESSMENT — LIFESTYLE VARIABLES: HOW OFTEN DO YOU HAVE A DRINK CONTAINING ALCOHOL: NEVER

## 2022-06-21 NOTE — PROGRESS NOTES
Subjective:      Patient ID: Ana Rizvi is a 37 y.o. female. New patient here to get established for care. Many issues  Drainage from  lumps in groin and under arms for months  Diabetes dx 6 years ago not treated for over a year not on med's not checking sugars. 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 Also has draining areas in groin, under right breat and on left side . Thick malodorous drainage . No fever no chills  Depression-Mood got worse after death of mom 4 years ago and even worse now after her brother passed  on 5th of last month then an aunt  2 weeks later no suicidal thoughts. Was on Cymbalta once in past did not help. Has no history of bipolar disorder in family, no excessive spending, no insomnia, no impulsivity  HPI    Review of Systems   Constitutional: Positive for unexpected weight change. Negative for chills and fever. Cardiovascular: Negative for chest pain. Endocrine: Positive for polyphagia and polyuria. Skin:        multiple draining skin abscesses   Psychiatric/Behavioral:        Depression       Objective:   Physical Exam  Vitals and nursing note reviewed. Exam conducted with a chaperone present. Constitutional:       Appearance: She is obese. HENT:      Head: Normocephalic. Cardiovascular:      Rate and Rhythm: Normal rate and regular rhythm. Pulses: Normal pulses. Heart sounds: Normal heart sounds. Pulmonary:      Effort: Pulmonary effort is normal.      Breath sounds: Normal breath sounds. Musculoskeletal:         General: Normal range of motion. Cervical back: Normal range of motion and neck supple. Skin:     General: Skin is warm and dry. Capillary Refill: Capillary refill takes less than 2 seconds. Comments: Multiple draining lesions in right axillary area. With scarring and pitting.    large area of redness and swelling on bottom of right breast with drainage thick whit malodorous  Draining lesion left side of abdomen and also in left groin rashawn   Neurological:      General: No focal deficit present. Mental Status: She is alert and oriented to person, place, and time. Psychiatric:      Comments: Tearful at times         Assessment:   /83 (Site: Left Upper Arm, Position: Sitting, Cuff Size: Large Adult)   Pulse 98   Temp 98.7 °F (37.1 °C) (Temporal)   Ht 5' 9\" (1.753 m)   Wt 266 lb (120.7 kg)   LMP 06/12/2022   Breastfeeding No   BMI 39.28 kg/m²       Plan:      Type 2 diabetes mellitus with hyperglycemia, without long-term current use of insulin ([facility] [address]  -     Lipid Panel; Future  -     Comprehensive Metabolic Panel; Future  -     Hemoglobin A1C; Future  -     TSH; Future  -     Microalbumin / Creatinine Urine Ratio; Future  -     Semaglutide (RYBELSUS) 3 MG TABS; Take 3 mg by mouth daily, Disp-30 tablet, R-0NO PRINT  -     AMB POC GLUCOSE BLOOD, BY GLUCOSE MONITORING DEVICE  Encounter for screening mammogram for malignant neoplasm of breast  -     [facility] MARYBEL DIGITAL SCREEN BILATERAL; Future  Skin infection  -     CBC with Auto Differential; Future  -     amoxicillin-clavulanate (AUGMENTIN) 875-125 MG per tablet; Take 1 tablet by mouth 2 times daily for 7 days, Disp-14 tablet, R-0Normal  -     sulfamethoxazole-trimethoprim (BACTRIM DS;SEPTRA DS) 800-160 MG per tablet; Take 1 tablet by mouth 2 times daily for 7 days, Disp-14 tablet, R-0Normal  Reactive depression  -     escitalopram (LEXAPRO) 10 MG tablet; Take 1 tablet by mouth daily, Disp-30 tablet, R-3Normal  Hydradenitis  -     [facility]  -     amoxicillin-clavulanate (AUGMENTIN) 875-125 MG per tablet; Take 1 tablet by mouth 2 times daily for 7 days, Disp-14 tablet, R-0Normal  -     sulfamethoxazole-trimethoprim (BACTRIM DS;SEPTRA DS) 800-160 MG per tablet;  Take 1 tablet by mouth 2 times daily for 7 days, Disp-14 tablet, R-0Normal  Rules of DM care reviewed. Pt given written instructions is to start jardiance today and depending on labs may star reybelius and depending on renal function may start metformin as well. Started on lexapro 10 and is to return for recheck in 3 weeks if has worsening depression suicidal thoughts go to er  Many sin abscesses history of sepsis will start on bactrim and Augmentin and get surgical consult ASAP luckily BS is 121 today so hopefully can turn thins around. Is to wash with antibacterial soap and is to apply warm moist compresses until seen by surgery.  Labs are pending        RADHA Wong - NP

## 2022-06-22 ENCOUNTER — TELEPHONE (OUTPATIENT)
Dept: FAMILY MEDICINE CLINIC | Facility: CLINIC | Age: 42
End: 2022-06-22

## 2022-06-22 LAB
BASOPHILS # BLD: 0 K/UL (ref 0–0.2)
BASOPHILS NFR BLD: 0 % (ref 0–2)
DIFFERENTIAL METHOD BLD: ABNORMAL
EOSINOPHIL # BLD: 0.2 K/UL (ref 0–0.8)
EOSINOPHIL NFR BLD: 2 % (ref 0.5–7.8)
ERYTHROCYTE [DISTWIDTH] IN BLOOD BY AUTOMATED COUNT: 15.9 % (ref 11.9–14.6)
HCT VFR BLD AUTO: 35.4 % (ref 35.8–46.3)
HGB BLD-MCNC: 10.3 G/DL (ref 11.7–15.4)
IMM GRANULOCYTES # BLD AUTO: 0 K/UL (ref 0–0.5)
IMM GRANULOCYTES NFR BLD AUTO: 0 % (ref 0–5)
LYMPHOCYTES # BLD: 1.5 K/UL (ref 0.5–4.6)
LYMPHOCYTES NFR BLD: 17 % (ref 13–44)
MCH RBC QN AUTO: 23.5 PG (ref 26.1–32.9)
MCHC RBC AUTO-ENTMCNC: 29.1 G/DL (ref 31.4–35)
MCV RBC AUTO: 80.8 FL (ref 79.6–97.8)
MONOCYTES # BLD: 0.4 K/UL (ref 0.1–1.3)
MONOCYTES NFR BLD: 5 % (ref 4–12)
NEUTS SEG # BLD: 6.8 K/UL (ref 1.7–8.2)
NEUTS SEG NFR BLD: 76 % (ref 43–78)
NRBC # BLD: 0 K/UL (ref 0–0.2)
PLATELET # BLD AUTO: 450 K/UL (ref 150–450)
PMV BLD AUTO: 9.7 FL (ref 9.4–12.3)
RBC # BLD AUTO: 4.38 M/UL (ref 4.05–5.2)
WBC # BLD AUTO: 8.9 K/UL (ref 4.3–11.1)

## 2022-06-22 NOTE — TELEPHONE ENCOUNTER
----- Message from RADHA Harrison NP sent at 6/22/2022  1:56 PM EDT -----  Unfortunately the HgA1c is not back and will be back tomorrow so no direction on adding any additional DM med at this time. Hope you got the apt with the surgeon.  Your HBG is low as looks  as it has been and we will discuss at your follow up

## 2022-06-28 ENCOUNTER — OFFICE VISIT (OUTPATIENT)
Dept: FAMILY MEDICINE CLINIC | Facility: CLINIC | Age: 42
End: 2022-06-28
Payer: COMMERCIAL

## 2022-06-28 ENCOUNTER — HOSPITAL ENCOUNTER (OUTPATIENT)
Dept: LAB | Age: 42
Discharge: HOME | End: 2022-07-01
Payer: COMMERCIAL

## 2022-06-28 VITALS
DIASTOLIC BLOOD PRESSURE: 85 MMHG | WEIGHT: 258 LBS | SYSTOLIC BLOOD PRESSURE: 129 MMHG | TEMPERATURE: 98 F | HEART RATE: 99 BPM | BODY MASS INDEX: 38.21 KG/M2 | HEIGHT: 69 IN

## 2022-06-28 DIAGNOSIS — L73.2 HYDRADENITIS: ICD-10-CM

## 2022-06-28 DIAGNOSIS — E11.65 TYPE 2 DIABETES MELLITUS WITH HYPERGLYCEMIA, WITHOUT LONG-TERM CURRENT USE OF INSULIN (HCC): Primary | ICD-10-CM

## 2022-06-28 DIAGNOSIS — E11.65 TYPE 2 DIABETES MELLITUS WITH HYPERGLYCEMIA, WITHOUT LONG-TERM CURRENT USE OF INSULIN (HCC): ICD-10-CM

## 2022-06-28 DIAGNOSIS — L08.9 SKIN INFECTION: ICD-10-CM

## 2022-06-28 PROBLEM — L02.419 ABSCESS OF AXILLARY REGION: Status: ACTIVE | Noted: 2022-06-28

## 2022-06-28 PROBLEM — N61.1 ABSCESS OF BREAST: Status: ACTIVE | Noted: 2022-06-28

## 2022-06-28 PROBLEM — N61.0 CELLULITIS OF RIGHT BREAST: Status: ACTIVE | Noted: 2022-06-28

## 2022-06-28 PROBLEM — L91.0 SCARRING, KELOID: Status: ACTIVE | Noted: 2022-06-28

## 2022-06-28 LAB
EST. AVERAGE GLUCOSE BLD GHB EST-MCNC: 171 MG/DL
HBA1C MFR BLD: 7.6 % (ref 4.2–6.3)

## 2022-06-28 PROCEDURE — 99214 OFFICE O/P EST MOD 30 MIN: CPT | Performed by: NURSE PRACTITIONER

## 2022-06-28 PROCEDURE — 83036 HEMOGLOBIN GLYCOSYLATED A1C: CPT

## 2022-06-28 NOTE — PROGRESS NOTES
Subjective:      Patient ID: Santhosh Thornton is a 37 y.o. female. Here for follow up for   DM not checking sugars machine does not work. Started metformen and rybelsus. HA1c last week not done lost ???? Mood not better but not worse   Abscesses still draining not improved has surgical apt on Thursday of this week No fever no chills no s/s of systemic illness    HPI    Review of Systems  Oozing abcesses  Objective:   Physical Exam  Vitals and nursing note reviewed. Constitutional:       Appearance: She is obese. Cardiovascular:      Rate and Rhythm: Normal rate and regular rhythm. Pulses: Normal pulses. Heart sounds: Normal heart sounds. Musculoskeletal:      Cervical back: Normal range of motion. Skin:     Comments: Firm mass of oozing its in right axillary area. Oozing area left abdomen right lower breast inner thighs   Neurological:      Mental Status: She is alert. Assessment:      /85 (Site: Left Upper Arm, Position: Sitting, Cuff Size: Large Adult)   Pulse 99   Temp 98 °F (36.7 °C) (Temporal)   Ht 5' 9\" (1.753 m)   Wt 258 lb (117 kg)   LMP 06/12/2022   Breastfeeding No   BMI 38.10 kg/m²       Plan:      Type 2 diabetes mellitus with hyperglycemia, without long-term current use of insulin ([facility])  -     Hemoglobin A1C; Future  -     Semaglutide 7 MG TABS; Take 7 mg by mouth daily, Disp-30 tablet, R-0Print  Hydradenitis  Skin infection  continue metfomen and rybelsis. Hopefullly will be getting HA1c sometime soon  Keep surgical apt continue antibiotics as ordered to er for s.s. of systemic illness fever chills .  Follow up in 3 weeks for mood        RADHA Giordano - NP

## 2022-06-30 ENCOUNTER — OFFICE VISIT (OUTPATIENT)
Dept: SURGERY | Age: 42
End: 2022-06-30
Payer: COMMERCIAL

## 2022-06-30 VITALS
HEIGHT: 69 IN | SYSTOLIC BLOOD PRESSURE: 120 MMHG | WEIGHT: 261 LBS | DIASTOLIC BLOOD PRESSURE: 74 MMHG | BODY MASS INDEX: 38.66 KG/M2

## 2022-06-30 DIAGNOSIS — L73.2 HYDRADENITIS: Primary | ICD-10-CM

## 2022-06-30 PROCEDURE — 99204 OFFICE O/P NEW MOD 45 MIN: CPT | Performed by: STUDENT IN AN ORGANIZED HEALTH CARE EDUCATION/TRAINING PROGRAM

## 2022-06-30 RX ORDER — AMOXICILLIN AND CLAVULANATE POTASSIUM 875; 125 MG/1; MG/1
1 TABLET, FILM COATED ORAL 2 TIMES DAILY
COMMUNITY
End: 2022-07-07

## 2022-06-30 RX ORDER — CLINDAMYCIN PHOSPHATE 11.9 MG/ML
SOLUTION TOPICAL
Qty: 30 ML | Refills: 3 | Status: SHIPPED | OUTPATIENT
Start: 2022-06-30 | End: 2022-07-30

## 2022-06-30 RX ORDER — SULFAMETHOXAZOLE AND TRIMETHOPRIM 800; 160 MG/1; MG/1
1 TABLET ORAL 2 TIMES DAILY
COMMUNITY
End: 2022-07-07

## 2022-06-30 RX ORDER — CHLORHEXIDINE GLUCONATE 4 G/100ML
SOLUTION TOPICAL
Qty: 1 EACH | Refills: 3 | Status: SHIPPED | OUTPATIENT
Start: 2022-06-30 | End: 2022-07-11 | Stop reason: CLARIF

## 2022-06-30 NOTE — PROGRESS NOTES
General Surgery New Patient Office Note:    6/30/2022    Magdi Horton  MRN: [id number]      CHIEF COMPLAINT: Hidradenitis    PRIMARY CARE PHYSICIAN: RADHA Lane NP    HISTORY: Patient presents with a longstanding history of hidradenitis. Patient states she has never had surgical excision of these areas. She has been on some antibiotics since last week by her primary care physician. Patient states that they are draining. They cause her some discomfort. REVIEW OF SYSTEMS: 10 Point ROS negative except what is in HPI      Past Medical History:   Diagnosis Date    Axillary hidradenitis suppurativa     Diabetes (HCC)        Current Outpatient Medications   Medication Sig Dispense Refill    amoxicillin-clavulanate (AUGMENTIN) 875-125 MG per tablet Take 1 tablet by mouth 2 times daily      sulfamethoxazole-trimethoprim (BACTRIM DS;SEPTRA DS) 800-160 MG per tablet Take 1 tablet by mouth 2 times daily      chlorhexidine (HIBICLENS) 4 % external liquid Apply topically daily as needed. 1 each 3    clindamycin (CLEOCIN-T) 1 % external solution Apply topically 2 times daily. 30 mL 3    Semaglutide 7 MG TABS Take 7 mg by mouth daily 30 tablet 0    Semaglutide (RYBELSUS) 3 MG TABS Take 3 mg by mouth daily 30 tablet 0    escitalopram (LEXAPRO) 10 MG tablet Take 1 tablet by mouth daily 30 tablet 3     No current facility-administered medications for this visit.        Family History   Problem Relation Age of Onset    Other Mother         Khushboo Hoffman    Diabetes Mother     Diabetes Brother     Mult Sclerosis Brother        Social History     Socioeconomic History    Marital status: Single     Spouse name: None    Number of children: None    Years of education: None    Highest education level: None   Occupational History    Occupation: dispatcher   Tobacco Use    Smoking status: Never Smoker    Smokeless tobacco: Never Used   Vaping Use    Vaping Use: Never used   Substance and Sexual Activity or tenderness, respiratory effort normal, no use of accessory muscles. Cardiovascular RRR. No chest wall tenderness. Gastrointestinal Abdomen soft, nontender, nondistended. BS x4. No rebound, guarding, or rigidity present. No palpable masses. No CVA tenderness  Lymphatics No palpable lymphadenopathy, no hepatosplenomegaly  Musculoskeletal No joint tenderness, deformity or swelling. Full ROM UE/LE. Distal pulses intact UE/LE. No edema, cyanosis, or venous stasis changes. Skin Normal coloration and turgor, no rashes, no suspicious skin lesions noted, extensive right axillary hidradenitis, right inframammary fold/abdominal wall hidradenitis, bilateral inguinal hidradenitis, left flank hidradenitis  Neurological alert, oriented, normal speech, no focal findings or movement disorder noted, CN II-XII intact  Psychiatric Alert and oriented, appropriate affect      STUDIES: None    IMPRESSION:  Hidradenitis    PLAN:  Discussed with the patient that the right axillary hidradenitis is very extensive and this is going to be a large dissection in this area. This may ultimately need some skin grafting. The rest of the hidradenitis I will plan to surgically excise and then closed primarily. I am going to start her on some Hibiclens and clindamycin topical to see if we can get some of this under control before going to surgery. She will also likely need to remain on this postoperatively to decrease her chances of this coming back. Risks of surgery discussed with pt and/or family present include risks of anesthesia (cardiopulmonary complications), MI, CVA, DVT,pain, bleeding, infection, injury to local viscera, wound problems, conversion to open procedure if laparoscopic procedure planned, and incomplete symptom resolution. They understand and agree to proceed.

## 2022-07-01 ENCOUNTER — PREP FOR PROCEDURE (OUTPATIENT)
Dept: SURGERY | Age: 42
End: 2022-07-01

## 2022-07-01 RX ORDER — SODIUM CHLORIDE 0.9 % (FLUSH) 0.9 %
5-40 SYRINGE (ML) INJECTION PRN
Status: CANCELLED | OUTPATIENT
Start: 2022-07-01

## 2022-07-01 RX ORDER — SODIUM CHLORIDE 9 MG/ML
INJECTION, SOLUTION INTRAVENOUS PRN
Status: CANCELLED | OUTPATIENT
Start: 2022-07-01

## 2022-07-01 RX ORDER — SODIUM CHLORIDE 0.9 % (FLUSH) 0.9 %
5-40 SYRINGE (ML) INJECTION EVERY 12 HOURS SCHEDULED
Status: CANCELLED | OUTPATIENT
Start: 2022-07-01

## 2022-07-07 NOTE — PERIOP NOTE
Directly informed patient and or family member of pre op arrival time 0800 on 7/8. All questions answered. Pre op instructions reviewed. Left contact information for any additional questions or needs.

## 2022-07-07 NOTE — PERIOP NOTE
Directly informed patient and or family member of pre op arrival time Joaquín Radha on 7/8/2022. All questions answered. Pre op instructions reviewed. Left contact information for any additional questions or needs.

## 2022-07-07 NOTE — PERIOP NOTE
Patient verified name and . Order for consent NOT found in EHR at time of PAT visit. Unable to verify case posting against order; surgery verified by patient. Type 1B surgery, PAT phone assessment complete. Orders not received. Labs per surgeon: unknown; no orders received    Labs per anesthesia protocol: hgb 10.3 22 WDL of anesthesia protocol    Patient answered medical/surgical history questions at their best of ability. All prior to admission medications documented in [facility]. Patient instructed to take the following medications the day of surgery according to anesthesia guidelines with a small sip of water: Lexapro   Hold all vitamins 7 days prior to surgery and NSAIDS 5 days prior to surgery. Prescription meds to hold:Do not take Rybelsus on morning of procedure  Patient instructed on the following:    > Arrive at [facility], time of arrival to be called the day before by 1700  > NPO after midnight, unless otherwise indicated, including gum, mints, and ice chips  > Responsible adult must drive patient to the hospital, stay during surgery, and patient will need supervision 24 hours after anesthesia  > Use antibacterial soap in shower the night before surgery and on the morning of surgery  > All piercings must be removed prior to arrival.    > Leave all valuables (money and jewelry) at home but bring insurance card and ID on DOS.   > You may be required to pay a deductible or co-pay on the day of your procedure. You can pre-pay by calling [phone] if your surgery is at the [facility] [place] or [phone] if your surgery is at the [facility]. > Do not wear make-up, nail polish, lotions, cologne, perfumes, powders, or oil on skin. Artificial nails are not permitted.

## 2022-07-08 ENCOUNTER — ANESTHESIA (OUTPATIENT)
Dept: SURGERY | Age: 42
End: 2022-07-08
Payer: COMMERCIAL

## 2022-07-08 ENCOUNTER — HOSPITAL ENCOUNTER (OUTPATIENT)
Age: 42
Setting detail: OUTPATIENT SURGERY
Discharge: HOME | End: 2022-07-08
Attending: STUDENT IN AN ORGANIZED HEALTH CARE EDUCATION/TRAINING PROGRAM | Admitting: STUDENT IN AN ORGANIZED HEALTH CARE EDUCATION/TRAINING PROGRAM
Payer: COMMERCIAL

## 2022-07-08 ENCOUNTER — ANESTHESIA EVENT (OUTPATIENT)
Dept: SURGERY | Age: 42
End: 2022-07-08
Payer: COMMERCIAL

## 2022-07-08 VITALS
OXYGEN SATURATION: 100 % | DIASTOLIC BLOOD PRESSURE: 57 MMHG | HEART RATE: 89 BPM | TEMPERATURE: 97.5 F | HEIGHT: 69 IN | SYSTOLIC BLOOD PRESSURE: 108 MMHG | BODY MASS INDEX: 39.1 KG/M2 | WEIGHT: 264 LBS | RESPIRATION RATE: 15 BRPM

## 2022-07-08 DIAGNOSIS — L73.2 HYDRADENITIS: Primary | ICD-10-CM

## 2022-07-08 DIAGNOSIS — L73.2 HIDRADENITIS: ICD-10-CM

## 2022-07-08 LAB
GLUCOSE BLD STRIP.AUTO-MCNC: 150 MG/DL (ref 65–100)
HCG UR QL: NEGATIVE
SERVICE CMNT-IMP: ABNORMAL

## 2022-07-08 PROCEDURE — 97605 NEG PRS WND THER DME<=50SQCM: CPT | Performed by: STUDENT IN AN ORGANIZED HEALTH CARE EDUCATION/TRAINING PROGRAM

## 2022-07-08 PROCEDURE — 88305 TISSUE EXAM BY PATHOLOGIST: CPT

## 2022-07-08 PROCEDURE — 2709999900 HC NON-CHARGEABLE SUPPLY: Performed by: STUDENT IN AN ORGANIZED HEALTH CARE EDUCATION/TRAINING PROGRAM

## 2022-07-08 PROCEDURE — 7100000011 HC PHASE II RECOVERY - ADDTL 15 MIN: Performed by: STUDENT IN AN ORGANIZED HEALTH CARE EDUCATION/TRAINING PROGRAM

## 2022-07-08 PROCEDURE — 6360000002 HC RX W HCPCS: Performed by: NURSE ANESTHETIST, CERTIFIED REGISTERED

## 2022-07-08 PROCEDURE — 88304 TISSUE EXAM BY PATHOLOGIST: CPT

## 2022-07-08 PROCEDURE — 3700000000 HC ANESTHESIA ATTENDED CARE: Performed by: STUDENT IN AN ORGANIZED HEALTH CARE EDUCATION/TRAINING PROGRAM

## 2022-07-08 PROCEDURE — 6370000000 HC RX 637 (ALT 250 FOR IP): Performed by: ANESTHESIOLOGY

## 2022-07-08 PROCEDURE — 6360000002 HC RX W HCPCS: Performed by: ANESTHESIOLOGY

## 2022-07-08 PROCEDURE — 3600000002 HC SURGERY LEVEL 2 BASE: Performed by: STUDENT IN AN ORGANIZED HEALTH CARE EDUCATION/TRAINING PROGRAM

## 2022-07-08 PROCEDURE — 11462 EXC SKN HDRDNT ING SMPL/NTRM: CPT | Performed by: STUDENT IN AN ORGANIZED HEALTH CARE EDUCATION/TRAINING PROGRAM

## 2022-07-08 PROCEDURE — 11406 EXC TR-EXT B9+MARG >4.0 CM: CPT | Performed by: STUDENT IN AN ORGANIZED HEALTH CARE EDUCATION/TRAINING PROGRAM

## 2022-07-08 PROCEDURE — 14040 TIS TRNFR F/C/C/M/N/A/G/H/F: CPT | Performed by: STUDENT IN AN ORGANIZED HEALTH CARE EDUCATION/TRAINING PROGRAM

## 2022-07-08 PROCEDURE — 6360000002 HC RX W HCPCS: Performed by: STUDENT IN AN ORGANIZED HEALTH CARE EDUCATION/TRAINING PROGRAM

## 2022-07-08 PROCEDURE — 7100000000 HC PACU RECOVERY - FIRST 15 MIN: Performed by: STUDENT IN AN ORGANIZED HEALTH CARE EDUCATION/TRAINING PROGRAM

## 2022-07-08 PROCEDURE — 7100000010 HC PHASE II RECOVERY - FIRST 15 MIN: Performed by: STUDENT IN AN ORGANIZED HEALTH CARE EDUCATION/TRAINING PROGRAM

## 2022-07-08 PROCEDURE — 2720000010 HC SURG SUPPLY STERILE: Performed by: STUDENT IN AN ORGANIZED HEALTH CARE EDUCATION/TRAINING PROGRAM

## 2022-07-08 PROCEDURE — 82962 GLUCOSE BLOOD TEST: CPT

## 2022-07-08 PROCEDURE — 3700000001 HC ADD 15 MINUTES (ANESTHESIA): Performed by: STUDENT IN AN ORGANIZED HEALTH CARE EDUCATION/TRAINING PROGRAM

## 2022-07-08 PROCEDURE — 7100000001 HC PACU RECOVERY - ADDTL 15 MIN: Performed by: STUDENT IN AN ORGANIZED HEALTH CARE EDUCATION/TRAINING PROGRAM

## 2022-07-08 PROCEDURE — 2500000003 HC RX 250 WO HCPCS: Performed by: NURSE ANESTHETIST, CERTIFIED REGISTERED

## 2022-07-08 PROCEDURE — 2500000003 HC RX 250 WO HCPCS: Performed by: STUDENT IN AN ORGANIZED HEALTH CARE EDUCATION/TRAINING PROGRAM

## 2022-07-08 PROCEDURE — 2580000003 HC RX 258: Performed by: ANESTHESIOLOGY

## 2022-07-08 PROCEDURE — 14041 TIS TRNFR F/C/C/M/N/A/G/H/F: CPT | Performed by: STUDENT IN AN ORGANIZED HEALTH CARE EDUCATION/TRAINING PROGRAM

## 2022-07-08 PROCEDURE — 81025 URINE PREGNANCY TEST: CPT

## 2022-07-08 PROCEDURE — 88307 TISSUE EXAM BY PATHOLOGIST: CPT

## 2022-07-08 PROCEDURE — 3600000012 HC SURGERY LEVEL 2 ADDTL 15MIN: Performed by: STUDENT IN AN ORGANIZED HEALTH CARE EDUCATION/TRAINING PROGRAM

## 2022-07-08 RX ORDER — KETAMINE HCL IN NACL, ISO-OSM 20 MG/2 ML
20 SYRINGE (ML) INJECTION ONCE
Status: COMPLETED | OUTPATIENT
Start: 2022-07-08 | End: 2022-07-08

## 2022-07-08 RX ORDER — OXYCODONE HYDROCHLORIDE AND ACETAMINOPHEN 5; 325 MG/1; MG/1
1 TABLET ORAL EVERY 4 HOURS PRN
Qty: 15 TABLET | Refills: 0 | Status: SHIPPED | OUTPATIENT
Start: 2022-07-08 | End: 2022-07-13

## 2022-07-08 RX ORDER — ROCURONIUM BROMIDE 10 MG/ML
INJECTION, SOLUTION INTRAVENOUS PRN
Status: DISCONTINUED | OUTPATIENT
Start: 2022-07-08 | End: 2022-07-08 | Stop reason: SDUPTHER

## 2022-07-08 RX ORDER — HYDROMORPHONE HCL 110MG/55ML
PATIENT CONTROLLED ANALGESIA SYRINGE INTRAVENOUS PRN
Status: DISCONTINUED | OUTPATIENT
Start: 2022-07-08 | End: 2022-07-08 | Stop reason: SDUPTHER

## 2022-07-08 RX ORDER — GLYCOPYRROLATE 0.2 MG/ML
INJECTION INTRAMUSCULAR; INTRAVENOUS PRN
Status: DISCONTINUED | OUTPATIENT
Start: 2022-07-08 | End: 2022-07-08 | Stop reason: SDUPTHER

## 2022-07-08 RX ORDER — LIDOCAINE HYDROCHLORIDE 20 MG/ML
INJECTION, SOLUTION EPIDURAL; INFILTRATION; INTRACAUDAL; PERINEURAL PRN
Status: DISCONTINUED | OUTPATIENT
Start: 2022-07-08 | End: 2022-07-08 | Stop reason: SDUPTHER

## 2022-07-08 RX ORDER — PROCHLORPERAZINE EDISYLATE 5 MG/ML
5 INJECTION INTRAMUSCULAR; INTRAVENOUS
Status: DISCONTINUED | OUTPATIENT
Start: 2022-07-08 | End: 2022-07-08 | Stop reason: HOSPADM

## 2022-07-08 RX ORDER — SODIUM CHLORIDE 0.9 % (FLUSH) 0.9 %
5-40 SYRINGE (ML) INJECTION PRN
Status: DISCONTINUED | OUTPATIENT
Start: 2022-07-08 | End: 2022-07-08 | Stop reason: HOSPADM

## 2022-07-08 RX ORDER — DOCUSATE SODIUM 100 MG/1
100 CAPSULE, LIQUID FILLED ORAL 2 TIMES DAILY
Qty: 60 CAPSULE | Refills: 0 | Status: SHIPPED | OUTPATIENT
Start: 2022-07-08 | End: 2022-08-07

## 2022-07-08 RX ORDER — SODIUM CHLORIDE 0.9 % (FLUSH) 0.9 %
5-40 SYRINGE (ML) INJECTION EVERY 12 HOURS SCHEDULED
Status: DISCONTINUED | OUTPATIENT
Start: 2022-07-08 | End: 2022-07-08 | Stop reason: HOSPADM

## 2022-07-08 RX ORDER — IPRATROPIUM BROMIDE AND ALBUTEROL SULFATE 2.5; .5 MG/3ML; MG/3ML
1 SOLUTION RESPIRATORY (INHALATION)
Status: DISCONTINUED | OUTPATIENT
Start: 2022-07-08 | End: 2022-07-08 | Stop reason: HOSPADM

## 2022-07-08 RX ORDER — ACETAMINOPHEN 500 MG
1000 TABLET ORAL ONCE
Status: COMPLETED | OUTPATIENT
Start: 2022-07-08 | End: 2022-07-08

## 2022-07-08 RX ORDER — NEOSTIGMINE METHYLSULFATE 1 MG/ML
INJECTION, SOLUTION INTRAVENOUS PRN
Status: DISCONTINUED | OUTPATIENT
Start: 2022-07-08 | End: 2022-07-08 | Stop reason: SDUPTHER

## 2022-07-08 RX ORDER — FENTANYL CITRATE 50 UG/ML
INJECTION, SOLUTION INTRAMUSCULAR; INTRAVENOUS PRN
Status: DISCONTINUED | OUTPATIENT
Start: 2022-07-08 | End: 2022-07-08 | Stop reason: SDUPTHER

## 2022-07-08 RX ORDER — OXYCODONE HYDROCHLORIDE 5 MG/1
5 TABLET ORAL
Status: COMPLETED | OUTPATIENT
Start: 2022-07-08 | End: 2022-07-08

## 2022-07-08 RX ORDER — BUPIVACAINE HYDROCHLORIDE 2.5 MG/ML
INJECTION, SOLUTION EPIDURAL; INFILTRATION; INTRACAUDAL PRN
Status: DISCONTINUED | OUTPATIENT
Start: 2022-07-08 | End: 2022-07-08 | Stop reason: ALTCHOICE

## 2022-07-08 RX ORDER — SODIUM CHLORIDE, SODIUM LACTATE, POTASSIUM CHLORIDE, CALCIUM CHLORIDE 600; 310; 30; 20 MG/100ML; MG/100ML; MG/100ML; MG/100ML
INJECTION, SOLUTION INTRAVENOUS CONTINUOUS
Status: DISCONTINUED | OUTPATIENT
Start: 2022-07-08 | End: 2022-07-08 | Stop reason: HOSPADM

## 2022-07-08 RX ORDER — SODIUM CHLORIDE 9 MG/ML
INJECTION, SOLUTION INTRAVENOUS PRN
Status: DISCONTINUED | OUTPATIENT
Start: 2022-07-08 | End: 2022-07-08 | Stop reason: HOSPADM

## 2022-07-08 RX ORDER — PROPOFOL 10 MG/ML
INJECTION, EMULSION INTRAVENOUS PRN
Status: DISCONTINUED | OUTPATIENT
Start: 2022-07-08 | End: 2022-07-08 | Stop reason: SDUPTHER

## 2022-07-08 RX ORDER — MIDAZOLAM HYDROCHLORIDE 2 MG/2ML
2 INJECTION, SOLUTION INTRAMUSCULAR; INTRAVENOUS
Status: COMPLETED | OUTPATIENT
Start: 2022-07-08 | End: 2022-07-08

## 2022-07-08 RX ORDER — ONDANSETRON 2 MG/ML
INJECTION INTRAMUSCULAR; INTRAVENOUS PRN
Status: DISCONTINUED | OUTPATIENT
Start: 2022-07-08 | End: 2022-07-08 | Stop reason: SDUPTHER

## 2022-07-08 RX ORDER — HYDROMORPHONE HYDROCHLORIDE 2 MG/ML
0.5 INJECTION, SOLUTION INTRAMUSCULAR; INTRAVENOUS; SUBCUTANEOUS EVERY 5 MIN PRN
Status: COMPLETED | OUTPATIENT
Start: 2022-07-08 | End: 2022-07-08

## 2022-07-08 RX ORDER — FENTANYL CITRATE 50 UG/ML
100 INJECTION, SOLUTION INTRAMUSCULAR; INTRAVENOUS
Status: DISCONTINUED | OUTPATIENT
Start: 2022-07-08 | End: 2022-07-08 | Stop reason: HOSPADM

## 2022-07-08 RX ORDER — SUCCINYLCHOLINE CHLORIDE 20 MG/ML
INJECTION INTRAMUSCULAR; INTRAVENOUS PRN
Status: DISCONTINUED | OUTPATIENT
Start: 2022-07-08 | End: 2022-07-08 | Stop reason: SDUPTHER

## 2022-07-08 RX ORDER — HALOPERIDOL 5 MG/ML
1 INJECTION INTRAMUSCULAR
Status: DISCONTINUED | OUTPATIENT
Start: 2022-07-08 | End: 2022-07-08 | Stop reason: HOSPADM

## 2022-07-08 RX ADMIN — ROCURONIUM BROMIDE 5 MG: 50 INJECTION, SOLUTION INTRAVENOUS at 09:06

## 2022-07-08 RX ADMIN — ACETAMINOPHEN 1000 MG: 500 TABLET, FILM COATED ORAL at 08:26

## 2022-07-08 RX ADMIN — HYDROMORPHONE HYDROCHLORIDE 0.5 MG: 2 INJECTION INTRAMUSCULAR; INTRAVENOUS; SUBCUTANEOUS at 11:14

## 2022-07-08 RX ADMIN — Medication 2000 MG: at 09:15

## 2022-07-08 RX ADMIN — HYDROMORPHONE HYDROCHLORIDE 0.5 MG: 2 INJECTION INTRAMUSCULAR; INTRAVENOUS; SUBCUTANEOUS at 12:30

## 2022-07-08 RX ADMIN — HYDROMORPHONE HYDROCHLORIDE 0.5 MG: 2 INJECTION INTRAMUSCULAR; INTRAVENOUS; SUBCUTANEOUS at 11:55

## 2022-07-08 RX ADMIN — Medication 20 MG: at 13:36

## 2022-07-08 RX ADMIN — Medication 160 MG: at 09:06

## 2022-07-08 RX ADMIN — OXYCODONE 5 MG: 5 TABLET ORAL at 12:25

## 2022-07-08 RX ADMIN — ROCURONIUM BROMIDE 25 MG: 50 INJECTION, SOLUTION INTRAVENOUS at 09:15

## 2022-07-08 RX ADMIN — HYDROMORPHONE HYDROCHLORIDE 0.5 MG: 2 INJECTION INTRAMUSCULAR; INTRAVENOUS; SUBCUTANEOUS at 12:20

## 2022-07-08 RX ADMIN — ROCURONIUM BROMIDE 20 MG: 50 INJECTION, SOLUTION INTRAVENOUS at 09:40

## 2022-07-08 RX ADMIN — ONDANSETRON 4 MG: 2 INJECTION INTRAMUSCULAR; INTRAVENOUS at 11:41

## 2022-07-08 RX ADMIN — HYDROMORPHONE HYDROCHLORIDE 0.5 MG: 2 INJECTION INTRAMUSCULAR; INTRAVENOUS; SUBCUTANEOUS at 12:05

## 2022-07-08 RX ADMIN — FENTANYL CITRATE 50 MCG: 50 INJECTION, SOLUTION INTRAMUSCULAR; INTRAVENOUS at 10:01

## 2022-07-08 RX ADMIN — Medication 20 MG: at 12:47

## 2022-07-08 RX ADMIN — HYDROMORPHONE HYDROCHLORIDE 0.5 MG: 2 INJECTION INTRAMUSCULAR; INTRAVENOUS; SUBCUTANEOUS at 10:49

## 2022-07-08 RX ADMIN — SODIUM CHLORIDE, POTASSIUM CHLORIDE, SODIUM LACTATE AND CALCIUM CHLORIDE: 600; 310; 30; 20 INJECTION, SOLUTION INTRAVENOUS at 08:26

## 2022-07-08 RX ADMIN — GLYCOPYRROLATE 0.4 MG: 0.2 INJECTION, SOLUTION INTRAMUSCULAR; INTRAVENOUS at 11:47

## 2022-07-08 RX ADMIN — LIDOCAINE HYDROCHLORIDE 100 MG: 20 INJECTION, SOLUTION EPIDURAL; INFILTRATION; INTRACAUDAL; PERINEURAL at 09:06

## 2022-07-08 RX ADMIN — PROPOFOL 200 MG: 10 INJECTION, EMULSION INTRAVENOUS at 09:06

## 2022-07-08 RX ADMIN — MIDAZOLAM HYDROCHLORIDE 2 MG: 1 INJECTION, SOLUTION INTRAMUSCULAR; INTRAVENOUS at 08:25

## 2022-07-08 RX ADMIN — FENTANYL CITRATE 100 MCG: 50 INJECTION, SOLUTION INTRAMUSCULAR; INTRAVENOUS at 09:06

## 2022-07-08 RX ADMIN — HYDROMORPHONE HYDROCHLORIDE 0.5 MG: 2 INJECTION INTRAMUSCULAR; INTRAVENOUS; SUBCUTANEOUS at 10:46

## 2022-07-08 RX ADMIN — FENTANYL CITRATE 50 MCG: 50 INJECTION, SOLUTION INTRAMUSCULAR; INTRAVENOUS at 10:03

## 2022-07-08 RX ADMIN — Medication 3 MG: at 11:47

## 2022-07-08 RX ADMIN — HYDROMORPHONE HYDROCHLORIDE 0.5 MG: 2 INJECTION INTRAMUSCULAR; INTRAVENOUS; SUBCUTANEOUS at 12:10

## 2022-07-08 ASSESSMENT — PAIN SCALES - GENERAL
PAINLEVEL_OUTOF10: 10
PAINLEVEL_OUTOF10: 6
PAINLEVEL_OUTOF10: 10

## 2022-07-08 ASSESSMENT — PAIN - FUNCTIONAL ASSESSMENT
PAIN_FUNCTIONAL_ASSESSMENT: 0-10

## 2022-07-08 ASSESSMENT — PAIN DESCRIPTION - LOCATION
LOCATION: GENERALIZED
LOCATION: GENERALIZED

## 2022-07-08 ASSESSMENT — PAIN DESCRIPTION - DESCRIPTORS: DESCRIPTORS: ACHING

## 2022-07-08 NOTE — ANESTHESIA PROCEDURE NOTES
Airway  Date/Time: 7/8/2022 9:09 AM  Urgency: elective      General Information and Staff    Patient location during procedure: OR  Anesthesiologist: Army Belem MD  Resident/CRNA: RADHA Vital CRNA  Performed: resident/CRNA     Indications and Patient Condition  Indications for airway management: anesthesia  Spontaneous Ventilation: absent  Sedation level: deep  Preoxygenated: yes  Patient position: sniffing  MILS maintained throughout  Mask difficulty assessment: vent by bag mask + OA or adjuvant +/- NMBA    Final Airway Details  Final airway type: endotracheal airway      Successful airway: ETT  Cuffed: yes   Successful intubation technique: video laryngoscopy  Facilitating devices/methods: intubating stylet  Endotracheal tube insertion site: oral  Blade: Kaitlynn  Blade size: #3  ETT size (mm): 7.0  Cormack-Lehane Classification: grade III - view of epiglottis only  Placement verified by: chest auscultation and capnometry   Measured from: teeth  ETT to teeth (cm): 22  Number of attempts at approach: 1  Ventilation between attempts: bag mask  Number of other approaches attempted: 1

## 2022-07-08 NOTE — OP NOTE
Excision of hidradenitis Procedure Note      Name:  Oleg Leigh Date/Time of Admission: 2022  7:22 AM  CSN: [id number]  Attending Provider: [facility] *  MRN:  [id number]  : 1980 37 y.o. Pre-operative Diagnosis: extensive right axillary hidradenitis, bilateral inguinal hidradenitis, left flank, right abdominal and breast hidradenitits    Post-operative Diagnosis: Same    Procedure:  Excision of right axillary hidradenitis 15x12 cm with creation of bilateral advancement flaps, complex closure, placement of incisional wound vac  Excision of right breast hidradenitis 7x3 cm  Excision of right abdominal hidradenitis 5x3  Excision of right inguinal 7x3  Excision of right inguinal 3x3  Excision of left pannus 7x5  Excision of left inguinal 5x3  Excision of Left flank 8x5    Surgeon: [facility], *    Anesthesia: general anesthesia    Specimen: mass     INDICATION: The patient is a 45-year-old female who complains of extensive hidradenitis. They would like it surgically removed. DESCRIPTION OF PROCEDURE: The patient was correctly identified in preoperative holding area. Consent was confirmed and placed on the chart. Preoperative antibiotics were administered per SCIP protocol. The patient was then taken back to the operative suite and placed in the supine position. General anesthesia was performed per anesthesia. The patient's was then prepped and draped in the usual sterile fashion. A timeout was performed and all members of the team that were present were in agreement. The procedure began by making an elliptical incision encompassing all of the pits of the left flank. I then used electric cautery to remove all of the diseased tissue to healthy subcuticular fat. It was removed in its entirety. Further inspection of the cavity noted no other acute abnormalities. Hemostasis was achieved with electric cautery. The wound was irrigated with normal saline.  3-0 Monocryl suture was used to close the skin in a simple interrupted fashion. Steri strips was placed on the incision. I then proceeded to do this same technique for the remainder of the hidradenitis. When it came to the axilla I had to use electric cautery to creat bilateral advancement flaps. I then utilized a combination of simple interrupted 2-0 prolene and horizontal mattress suture to take off some of the tension. I then placed a provena over the area to help with healing. At this time, the procedure was complete. At the conclusion of the case all sponge, needles and instrument counts were correct. The patient tolerated well and was taken to the [address] [place] [address]. Unit.   They will be readmitted to the floor for further postoperative care    Demetria Hanna [address] Kalyn Wolfe DO

## 2022-07-08 NOTE — ANESTHESIA POSTPROCEDURE EVALUATION
Department of Anesthesiology  Postprocedure Note    Patient: Abram Mitchell  MRN: [id number]  YOB: 1980  Date of evaluation: 7/8/2022      Procedure Summary     Date: 07/08/22 Room / Location: [facility] MAIN OR  / [facility] MAIN OR    Anesthesia Start: 6941 Anesthesia Stop: 3401    Procedure: RIGHT AXILLARY HYDRADENITIS EXCISION/ RIGHT ABDOMEN, BACK INGUINAL, & LEFT FLANK, (Right Axilla) Diagnosis:       Hidradenitis      (Hidradenitis [L73.2])    Providers: Aracelis Brar DO Responsible Provider: Dg Duff MD    Anesthesia Type: general ASA Status: 2          Anesthesia Type: No value filed. Leah Phase I: Leah Score: 8    Leah Phase II: Leah Score: 10      Anesthesia Post Evaluation    Patient location during evaluation: bedside  Patient participation: complete - patient participated  Level of consciousness: awake and alert  Pain score: 4  Airway patency: patent  Nausea & Vomiting: no vomiting  Complications: no  Cardiovascular status: hemodynamically stable  Respiratory status: acceptable  Hydration status: euvolemic  Comments: Patient with difficult to treat pain after surgery. Multimodal agents used. At this time patient in state ok to discharge.

## 2022-07-08 NOTE — ANESTHESIA PRE PROCEDURE
Department of Anesthesiology  Preprocedure Note       Name:  Masood Polanco   Age:  37 y.o.  :  1980                                          MRN:  [id number]         Date:  2022      Surgeon: Helga Doyle):  Ursula Cardona DO    Procedure: Procedure(s):  RIGHT AXILLARY HYDRADENITIS EXCISION/ RIGHT ABDOMEN, BACK INGUINAL, & LEFT FLANK,  POSS SKIN GRAFT    Medications prior to admission:   Prior to Admission medications    Medication Sig Start Date End Date Taking? Authorizing Provider   chlorhexidine (HIBICLENS) 4 % external liquid Apply topically daily as needed. Patient not taking: Reported on 2022  Ursula Cardona DO   clindamycin (CLEOCIN-T) 1 % external solution Apply topically 2 times daily.   Patient not taking: Reported on 2022  Ursula Cardona DO   Semaglutide 7 MG TABS Take 7 mg by mouth daily 22   RADHA Vázquez NP   Semaglutide (RYBELSUS) 3 MG TABS Take 3 mg by mouth daily 22   RADHA Vázquez NP   escitalopram (LEXAPRO) 10 MG tablet Take 1 tablet by mouth daily 22   RADHA Vázquez NP       Current medications:    Current Facility-Administered Medications   Medication Dose Route Frequency Provider Last Rate Last Admin    ceFAZolin (ANCEF) 2000 mg in sterile water 20 mL IV syringe  2,000 mg IntraVENous Once Ursula Cardona DO        lidocaine PF 1 % injection 1 mL  1 mL IntraDERmal Once PRN Jacki Tidwell MD        acetaminophen (TYLENOL) tablet 1,000 mg  1,000 mg Oral Once Jacki Tidwell MD        fentaNYL (SUBLIMAZE) injection 100 mcg  100 mcg IntraVENous Once PRN Jacki Tidwell MD        lactated ringers infusion   IntraVENous Continuous Jacki Tidwell MD        sodium chloride flush 0.9 % injection 5-40 mL  5-40 mL IntraVENous 2 times per day Jacki Tidwell MD        sodium chloride flush 0.9 % injection 5-40 mL  5-40 mL IntraVENous PRN Jacki Tidwell MD       [facility] 0.9 % sodium chloride infusion   IntraVENous PRN Manjinder Vivas MD        midazolam PF (VERSED) injection 2 mg  2 mg IntraVENous Once PRN Manjinder Vivas MD           Allergies:  No Known Allergies    Problem List:    Patient Active Problem List   Diagnosis Code    Positive for microalbuminuria R80.9    Right ankle swelling M25.471    Left axillary hidradenitis L73.2    Vesicular rash R23.8    Morbid obesity (HCC) E66.01    Hyponatremia E87.1    Pain of right hand M79.641    Right ankle pain M25.571    Hydradenitis L73.2    HTN (hypertension) I10    DM2 (diabetes mellitus, type 2) ([facility]) E11.9    Septic joint ([facility] [address] M00.9    Abscess of axillary region L02.419    Abscess of breast N61.1    Cellulitis of right breast N61.0    Scarring, keloid L91.0       Past Medical History:        Diagnosis Date    Axillary hidradenitis suppurativa     Diabetes ([address]     oral med; avg fasting ; last A1C 7.6; denies hypo sx    Hidradenitis suppurativa     History of blood transfusion     Personal history of COVID-2021    not hospitalized       Past Surgical History:        Procedure Laterality Date    ANKLE SURGERY Right      SECTION         Social History:    Social History     Tobacco Use    Smoking status: Never Smoker    Smokeless tobacco: Never Used   Substance Use Topics    Alcohol use: Not Currently                                Counseling given: Not Answered      Vital Signs (Current):   Vitals:    22 0843   Weight: 255 lb (115.7 kg)   Height: 5' 8\" (1.727 m)                                              BP Readings from Last 3 Encounters:   22 120/74   22 129/85   22 127/83       NPO Status:                                                                                 BMI:   Wt Readings from Last 3 Encounters:   22 255 lb (115.7 kg)   22 261 lb (118.4 kg)   22 258 lb (117 kg)     Body mass index is 38.77 kg/m².     CBC:   Lab Results Component Value Date/Time    WBC 8.9 06/21/2022 04:17 PM    RBC 4.38 06/21/2022 04:17 PM    HGB 10.3 06/21/2022 04:17 PM    HCT 35.4 06/21/2022 04:17 PM    MCV 80.8 06/21/2022 04:17 PM    RDW 15.9 06/21/2022 04:17 PM     06/21/2022 04:17 PM       CMP:   Lab Results   Component Value Date/Time     06/21/2022 04:17 PM    K 4.2 06/21/2022 04:17 PM     06/21/2022 04:17 PM    CO2 27 06/21/2022 04:17 PM    BUN 13 06/21/2022 04:17 PM    CREATININE 0.80 06/21/2022 04:17 PM    GFRAA >60 06/21/2022 04:17 PM    AGRATIO 0.5 03/25/2022 04:25 PM    LABGLOM >60 06/21/2022 04:17 PM    GLUCOSE 110 06/21/2022 04:17 PM    PROT 8.4 06/21/2022 04:17 PM    CALCIUM 8.9 06/21/2022 04:17 PM    BILITOT 0.4 06/21/2022 04:17 PM    ALKPHOS 142 06/21/2022 04:17 PM    ALKPHOS 173 03/25/2022 04:25 PM    AST 10 06/21/2022 04:17 PM    ALT 15 06/21/2022 04:17 PM       POC Tests: No results for input(s): POCGLU, POCNA, POCK, POCCL, POCBUN, POCHEMO, POCHCT in the last 72 hours.     Coags: No results found for: PROTIME, INR, APTT    HCG (If Applicable): No results found for: PREGTESTUR, PREGSERUM, HCG, HCGQUANT     ABGs: No results found for: PHART, PO2ART, WAA3BAV, PQA8IGR, BEART, Q6IOPXOE     Type & Screen (If Applicable):  No results found for: LABABO, LABRH    Drug/Infectious Status (If Applicable):  No results found for: HIV, HEPCAB    COVID-19 Screening (If Applicable):   Lab Results   Component Value Date/Time    COVID19 Not detected 06/08/2022 06:30 PM           Anesthesia Evaluation  Patient summary reviewed and Nursing notes reviewed no history of anesthetic complications:   Airway: Mallampati: II  TM distance: >3 FB   Neck ROM: full  Mouth opening: > = 3 FB   Dental: normal exam         Pulmonary:Negative Pulmonary ROS breath sounds clear to auscultation                             Cardiovascular:  Exercise tolerance: good (>4 METS),   (+) hypertension: mild,                   Neuro/Psych:   Negative Neuro/Psych ROS  (+) depression/anxiety             GI/Hepatic/Renal:   (+) morbid obesity          Endo/Other:    (+) DiabetesType II DM, , .                 Abdominal:             Vascular: negative vascular ROS. Other Findings:           Anesthesia Plan      general     ASA 2       Induction: intravenous. Anesthetic plan and risks discussed with patient.                         Laine Lanier MD   7/8/2022

## 2022-07-11 ENCOUNTER — OFFICE VISIT (OUTPATIENT)
Dept: FAMILY MEDICINE CLINIC | Facility: CLINIC | Age: 42
End: 2022-07-11
Payer: COMMERCIAL

## 2022-07-11 VITALS
HEART RATE: 96 BPM | DIASTOLIC BLOOD PRESSURE: 65 MMHG | TEMPERATURE: 98.3 F | HEIGHT: 69 IN | SYSTOLIC BLOOD PRESSURE: 99 MMHG | BODY MASS INDEX: 39.69 KG/M2 | WEIGHT: 268 LBS

## 2022-07-11 DIAGNOSIS — N92.0 MENORRHAGIA WITH REGULAR CYCLE: ICD-10-CM

## 2022-07-11 DIAGNOSIS — E11.9 TYPE 2 DIABETES MELLITUS WITHOUT COMPLICATION, WITHOUT LONG-TERM CURRENT USE OF INSULIN (HCC): Primary | ICD-10-CM

## 2022-07-11 DIAGNOSIS — D50.0 IRON DEFICIENCY ANEMIA DUE TO CHRONIC BLOOD LOSS: ICD-10-CM

## 2022-07-11 DIAGNOSIS — E11.9 TYPE 2 DIABETES MELLITUS WITHOUT COMPLICATION, WITHOUT LONG-TERM CURRENT USE OF INSULIN (HCC): ICD-10-CM

## 2022-07-11 LAB
ANION GAP SERPL CALC-SCNC: 1 MMOL/L (ref 7–16)
BUN SERPL-MCNC: 10 MG/DL (ref 6–23)
CALCIUM SERPL-MCNC: 8.4 MG/DL (ref 8.3–10.4)
CHLORIDE SERPL-SCNC: 108 MMOL/L (ref 98–107)
CO2 SERPL-SCNC: 25 MMOL/L (ref 21–32)
CREAT SERPL-MCNC: 0.6 MG/DL (ref 0.6–1)
GLUCOSE SERPL-MCNC: 122 MG/DL (ref 65–100)
POTASSIUM SERPL-SCNC: 4 MMOL/L (ref 3.5–5.1)
SODIUM SERPL-SCNC: 134 MMOL/L (ref 136–145)

## 2022-07-11 PROCEDURE — 3051F HG A1C>EQUAL 7.0%<8.0%: CPT | Performed by: NURSE PRACTITIONER

## 2022-07-11 PROCEDURE — 99214 OFFICE O/P EST MOD 30 MIN: CPT | Performed by: NURSE PRACTITIONER

## 2022-07-11 RX ORDER — BLOOD-GLUCOSE METER
EACH MISCELLANEOUS
COMMUNITY
Start: 2022-06-28

## 2022-07-11 RX ORDER — BLOOD SUGAR DIAGNOSTIC
1 STRIP MISCELLANEOUS 2 TIMES DAILY
COMMUNITY
Start: 2022-06-29

## 2022-07-11 RX ORDER — LANCETS 33 GAUGE
EACH MISCELLANEOUS 2 TIMES DAILY
COMMUNITY
Start: 2022-06-29

## 2022-07-11 NOTE — PROGRESS NOTES
Subjective:      Patient ID: Unknown Yasmin is a 37 y.o. female. She is here for follow up for  Skin infection has had surgery last week and still recovering from multiple incisions and is in a bit of pain has not been taking pain med as she hs to go back to work to get a paycheck  DM taking rybelsus has run out of Jardiance samples. Is having BS in 120 range. Anemia continues to have -10 days very heavy periods and is weak. Has not seen gyn for this yet. Mood still depressed but a bit more hopeful determined to better care for herself. No suicidal ideation but not a lot improved  HPI    Review of Systems    Objective:   Physical Exam  Vitals and nursing note reviewed. Constitutional:       Appearance: She is obese. Comments: Looks to be in pain. Large dressing under right axillary area and other various other parts of her body   Cardiovascular:      Rate and Rhythm: Tachycardia present. Pulses: Normal pulses. Heart sounds: Normal heart sounds. Comments: Rate 100  Pulmonary:      Effort: Pulmonary effort is normal.      Breath sounds: Normal breath sounds. Musculoskeletal:         General: Swelling (1+ in lower legs) present. Cervical back: Normal range of motion. Skin:     General: Skin is warm and dry. Capillary Refill: Capillary refill takes less than 2 seconds. Neurological:      General: No focal deficit present. Psychiatric:         Mood and Affect: Mood normal.         Behavior: Behavior normal.         Assessment:      BP 99/65 (Site: Left Upper Arm, Position: Sitting, Cuff Size: Large Adult)   Pulse 96   Temp 98.3 °F (36.8 °C) (Temporal)   Ht 5' 9\" (1.753 m)   Wt 268 lb (121.6 kg)   LMP 07/10/2022   Breastfeeding No   BMI 39.58 kg/m²       Plan:      Type 2 diabetes mellitus without complication, without long-term current use of insulin (HCC)  -     Basic Metabolic Panel;  Future  Iron deficiency anemia due to chronic blood loss  -     CBC with Auto Differential; Future  Menorrhagia with regular cycle  -     Memo Ng MD, Gynecology, Yady     checking labs will omit Jardiance due to hypotension she is to push fluids. Will also have her see gyn for her heavy periods and recheck cbc as may has lowered with recent surgery. Follow up with surgeon as scheduled on Thursday. With me in one month hopefully mood will be improved by then    7/12 8am pt notified by phone hgkori low asked to go to [facility]. Report called to her surgeon and to Rn at er. At noon pt still not at er not answering phone asked [facility] department to do a wellness check. Pt declines ems transport Ma called and spoke to pt urge to go for evaluation for bleeding at er.   RADHA Montero - NP

## 2022-07-12 ENCOUNTER — TELEPHONE (OUTPATIENT)
Dept: FAMILY MEDICINE CLINIC | Facility: CLINIC | Age: 42
End: 2022-07-12

## 2022-07-12 LAB
BASOPHILS # BLD: 0 K/UL (ref 0–0.2)
BASOPHILS NFR BLD: 1 % (ref 0–2)
DIFFERENTIAL METHOD BLD: ABNORMAL
EOSINOPHIL # BLD: 0.1 K/UL (ref 0–0.8)
EOSINOPHIL NFR BLD: 2 % (ref 0.5–7.8)
ERYTHROCYTE [DISTWIDTH] IN BLOOD BY AUTOMATED COUNT: 16.7 % (ref 11.9–14.6)
HCT VFR BLD AUTO: 23.7 % (ref 35.8–46.3)
HGB BLD-MCNC: 6.8 G/DL (ref 11.7–15.4)
IMM GRANULOCYTES # BLD AUTO: 0 K/UL (ref 0–0.5)
IMM GRANULOCYTES NFR BLD AUTO: 0 % (ref 0–5)
LYMPHOCYTES # BLD: 0.7 K/UL (ref 0.5–4.6)
LYMPHOCYTES NFR BLD: 10 % (ref 13–44)
MCH RBC QN AUTO: 23.2 PG (ref 26.1–32.9)
MCHC RBC AUTO-ENTMCNC: 28.7 G/DL (ref 31.4–35)
MCV RBC AUTO: 80.9 FL (ref 79.6–97.8)
MONOCYTES # BLD: 0.4 K/UL (ref 0.1–1.3)
MONOCYTES NFR BLD: 5 % (ref 4–12)
NEUTS SEG # BLD: 5.5 K/UL (ref 1.7–8.2)
NEUTS SEG NFR BLD: 83 % (ref 43–78)
NRBC # BLD: 0 K/UL (ref 0–0.2)
PLATELET # BLD AUTO: 323 K/UL (ref 150–450)
PMV BLD AUTO: 9.8 FL (ref 9.4–12.3)
RBC # BLD AUTO: 2.93 M/UL (ref 4.05–5.2)
WBC # BLD AUTO: 6.6 K/UL (ref 4.3–11.1)

## 2022-07-12 NOTE — TELEPHONE ENCOUNTER
----- Message from RADHA Prajapati NP sent at 7/12/2022  8:14 AM EDT -----  Call her and tell her that her hemoglobin is dangerously low likely due to her chronic anemia then her surgery loss.  Have her go to the ER for evaluation and possible blood transfusion have her go to [facility] as her surgeon would be there to check on her wounds I was able to get the patient

## 2022-07-12 NOTE — TELEPHONE ENCOUNTER
Pt finally called back after we have been trying to reach her about her extremely low Hgb. I told her [facility] Oliva was sending the EMS out to pick her up to take her downtown to the hospital. I told her José Luis Oliva has called report to the ER downtown and spoke with the doctor who done her surgery. She said she told them no they don't have to come get her. She said after she spoke with José Luis Oliva this morning and she went straight to sleep. I told her that is why she is very exhausted and her Hgb is very dangerously low and need blood transfusions done. I told her she need to go because this is very dangerous for her hgb to be this low. She said she is going to get her coworker to take her since her son is at work. If her son knew about this I am sure he would have been taking her to the ER. She said she is definitely going to go downtown to the ER.

## 2022-07-12 NOTE — TELEPHONE ENCOUNTER
I called the patients cell phone and her voicemail is full.  I just saw that Masha  has spoken with her about her labs etc.

## 2022-07-12 NOTE — TELEPHONE ENCOUNTER
----- Message from RADHA Patel NP sent at 7/12/2022  8:14 AM EDT -----  Call her and tell her that her hemoglobin is dangerously low likely due to her chronic anemia then her surgery loss.  Have her go to the ER for evaluation and possible blood transfusion have her go to [facility] as her surgeon would be there to check on her wounds I ws able to get the patient

## 2022-07-14 ENCOUNTER — OFFICE VISIT (OUTPATIENT)
Dept: SURGERY | Age: 42
End: 2022-07-14

## 2022-07-14 VITALS — WEIGHT: 268 LBS | BODY MASS INDEX: 39.69 KG/M2 | HEIGHT: 69 IN

## 2022-07-14 DIAGNOSIS — Z09 POSTOPERATIVE EXAMINATION: Primary | ICD-10-CM

## 2022-07-14 PROCEDURE — 99024 POSTOP FOLLOW-UP VISIT: CPT

## 2022-07-14 RX ORDER — OXYCODONE HYDROCHLORIDE AND ACETAMINOPHEN 5; 325 MG/1; MG/1
1 TABLET ORAL EVERY 8 HOURS PRN
Qty: 15 TABLET | Refills: 0 | Status: SHIPPED | OUTPATIENT
Start: 2022-07-14 | End: 2022-07-19

## 2022-07-14 NOTE — PROGRESS NOTES
[facility]  [phone]        poDate: 2022      Name: Kaveh Gibson      MRN: [id number]       : 1980       Age: 37 y.o. Sex: female        Parish Hightower, APRN - NP       CC:  No chief complaint on file. HPI:  The patient presents for a post-op visit s/pExcision of right axillary hidradenitis 15x12 cm with creation of bilateral advancement flaps, complex closure, placement of incisional wound vac  Excision of right breast hidradenitis 7x3 cm  Excision of right abdominal hidradenitis 5x3  Excision of right inguinal 7x3  Excision of right inguinal 3x3  Excision of left pannus 7x5  Excision of left inguinal 5x3  Excision of Left flank 8x5  Sammy Flat[place], 23     Physical Exam:     [facility] 07/10/2022     General: Alert, oriented, cooperative and in no acute distress. Neck: Supple, trachea midline, no appreciable thyromegaly  Resp: No JVD. Breathing is  non-labored. Lungs clear to auscultation without wheezing or rhonchi   CV: RRR. No murmurs, rubs or gallops appreciated. Abd: soft non-tender and non-distended without peritoneal signs. +bs    Skin/incision: left lateral abdomen 7.5 cm  X 1 X 2.5 with healthy wound bed no signs of infection. Packed with 5 inches saline soaked kerlix and covered with abd pad  Right axilla with stitches intact and no signs of infection. Small open area at the 2.5 cm iza posterior to anterior axillary incision. No signs of infection. abd x2 placed. Right inferior breast with open area  5cm  x 1x 2.5 with 2 in saline soaked kerlix and abd placed   Bilateral inguinal folds 12.5 cm with too small for measurement on width and depth. No signs of infection.      Assessment/Plan:  Kaveh Gibson is a 37 y.o. female who is s/p Excision of right axillary hidradenitis 15x12 cm with creation of bilateral advancement flaps, complex closure, placement of incisional wound vac  Excision of right breast hidradenitis 7x3 cm  Excision of right abdominal hidradenitis 5x3  Excision of right inguinal 7x3  Excision of right inguinal 3x3  Excision of left pannus 7x5  Excision of left inguinal 5x3  Excision of Left flank 8x5  Marlene Urbina, 7/8/22    1. Follow-up in 1 week     2. Limit reaching over your head, full rotation and repetitive movements of the right arm. Do not lift anything heavier than 10 lbs until surgical incisions are healed. 3. Regular diet    4. Until santyl arrives, Apply medihoney to open areas once daily and cover with nonadherent dressing. 5. When santyl arrives, apply santyl to open areas once daily and cover with transparent film dressing I.e. tegaderm   6. Interdry fabric  to inguinal folds daily.       Signed: RADHA Vasquez NP    7/14/2022  10:55 AM     -

## 2022-07-14 NOTE — PATIENT INSTRUCTIONS
1. Follow-up in 1 week     2. Limit reaching over your head, full rotation and repetitive movements of the right arm. Do not lift anything heavier than 10 lbs until surgical incisions are healed. 3. Regular diet    4. Until santyl arrives, Apply medihoney to open areas once daily and cover with nonadherent dressing. 5. When santyl arrives, apply santyl to open areas once daily and cover with transparent film dressing I.e. tegaderm   6. Interdry fabric  to inguinal folds daily.

## 2022-07-15 ENCOUNTER — CLINICAL DOCUMENTATION (OUTPATIENT)
Dept: SURGERY | Age: 42
End: 2022-07-15

## 2022-07-15 ENCOUNTER — TELEPHONE (OUTPATIENT)
Dept: FAMILY MEDICINE CLINIC | Facility: CLINIC | Age: 42
End: 2022-07-15

## 2022-07-15 DIAGNOSIS — Z09 POSTOPERATIVE EXAMINATION: Primary | ICD-10-CM

## 2022-07-15 NOTE — PROGRESS NOTES
7/14/22 12:30 I was Notified by Mervat Stewart NP that pt's hgb was 6.8 on 7/11. Hgb was discovered at an office visit with Enrique Stewart. Pt's hgb was 10.3 6/21/22. Danisha Kern attempted to direct the pt to the ED on 7/11/22 but the pt was resistant to presenting for anemia. Called pt 7/14/22 13:00 to discuss follow up and wound care instructions as she was not able to wait in the office for instructions. The pt had another appointment after my postoperative follow up appointment that she did not want to be late for. I discussed her low hgb and suggested that she return to OP center for recheck cbc. I also explained that her hgb on 7/11 was low enough to merit PRBC transfusion. The pt did not want to come in for lab work. The pt denied SOB, activity intolerance, or dizziness.  Pt stated, \"I'm a little tired, but I'm fine\"    Will order CBC for post surgical follow up next week 7/21/22

## 2022-07-15 NOTE — TELEPHONE ENCOUNTER
Spoke with sunny Alberto on /14 about my concern that her low hbg was not addressed at her post op visit.

## 2022-07-21 ENCOUNTER — OFFICE VISIT (OUTPATIENT)
Dept: SURGERY | Age: 42
End: 2022-07-21

## 2022-07-21 VITALS
HEIGHT: 69 IN | DIASTOLIC BLOOD PRESSURE: 80 MMHG | HEART RATE: 91 BPM | BODY MASS INDEX: 39.69 KG/M2 | SYSTOLIC BLOOD PRESSURE: 138 MMHG | WEIGHT: 268 LBS | OXYGEN SATURATION: 95 %

## 2022-07-21 DIAGNOSIS — Z09 POSTOPERATIVE EXAMINATION: Primary | ICD-10-CM

## 2022-07-21 PROCEDURE — 99024 POSTOP FOLLOW-UP VISIT: CPT

## 2022-07-21 NOTE — PROGRESS NOTES
[address] [facility]  [phone]        poDate: 2022      Name: Gopal Jaimes      MRN: [id number]       : 1980       Age: 37 y.o. Sex: female        Dorna Severs, APRN - NP       CC:    Chief Complaint   Patient presents with    Post-Op Check       HPI:  The patient presents for a post-op visit s/p Excision of right axillary hidradenitis 15x12 cm with creation of bilateral advancement flaps, complex closure, placement of incisional wound vac  Excision of right breast hidradenitis 7x3 cm  Excision of right abdominal hidradenitis 5x3  Excision of right inguinal 7x3  Excision of right inguinal 3x3  Excision of left pannus 7x5  Excision of left inguinal 5x3  Excision of Left flank 8x5   [place], 23    Physical Exam:     /80   Pulse 91   Ht 5' 9\" (1.753 m)   Wt 268 lb (121.6 kg)   LMP 07/10/2022   SpO2 95%   BMI 39.58 kg/m²     General: Alert, oriented, cooperative and in no acute distress. Neck: Supple, trachea midline, no appreciable thyromegaly  Resp: No JVD. Breathing is  non-labored. Lungs clear to auscultation without wheezing or rhonchi   CV: RRR. No murmurs, rubs or gallops appreciated. Abd: soft non-tender and non-distended without peritoneal signs. +bs    Skin/incision:  left lateral abdomen 7.5 cm  X 2.5 X 2 with healthy wound bed no signs of infection. Wet to dry with 4x4 guaze covered with abd pad  Right axilla with stitches intact and no signs of infection. Small open area at the 2.5 cm iza posterior to anterior axillary incision. No signs of infection. Wet to dry 4x4 guaze and abd x2 placed. Right inferior breast with open area  5cm  x 1x 2.5 with 2 in saline soaked kerlix and abd placed  Bilateral inguinal folds 10 cm with too small for measurement on width and depth. No signs of infection.      Assessment/Plan:  Gopal Jaimes is a 37 y.o. female who is s/p Excision of right axillary hidradenitis 15x12 cm with creation of bilateral advancement flaps, complex closure, placement of incisional wound vac  Excision of right breast hidradenitis 7x3 cm  Excision of right abdominal hidradenitis 5x3  Excision of right inguinal 7x3  Excision of right inguinal 3x3  Excision of left pannus 7x5  Excision of left inguinal 5x3  Excision of Left flank 8x5   Ros Moncada, 7/8/22    1. Follow-up in 1 week  with dr. Neo Aaron     2. Limit reaching over your head, full rotation and repetitive movements of the right arm. Do not lift anything heavier than 10 lbs until surgical incisions are healed. 3.         Regular diet     4. apply santyl to open areas once daily and cover with transparent film dressing I.e. tegaderm  6. Interdry fabric  to inguinal folds daily.   7. Ordered pain medication for prolonged dressing changes and related pain   Signed: RADHA Coffman NP    7/21/2022  11:18 AM

## 2022-07-21 NOTE — PATIENT INSTRUCTIONS
1.         Follow-up in 1 week  with dr. Estefani Arredondo     2. Limit reaching over your head, full rotation and repetitive movements of the right arm. Do not lift anything heavier than 10 lbs until surgical incisions are healed. 3.         Regular diet     4. apply santyl to open areas once daily and cover with transparent film dressing I.e. tegaderm  6. Interdry fabric  to inguinal folds daily.   7. Ordered pain medication for prolonged dressing changes and related pain

## 2022-07-28 ENCOUNTER — OFFICE VISIT (OUTPATIENT)
Dept: SURGERY | Age: 42
End: 2022-07-28
Payer: COMMERCIAL

## 2022-07-28 VITALS — WEIGHT: 268 LBS | BODY MASS INDEX: 39.69 KG/M2 | HEIGHT: 69 IN

## 2022-07-28 DIAGNOSIS — Z09 POSTOPERATIVE EXAMINATION: Primary | ICD-10-CM

## 2022-07-28 PROCEDURE — 99024 POSTOP FOLLOW-UP VISIT: CPT | Performed by: STUDENT IN AN ORGANIZED HEALTH CARE EDUCATION/TRAINING PROGRAM

## 2022-07-28 NOTE — PROGRESS NOTES
General Surgery Office Visit:    Pt presents s/p excision of extensive hidradenitis. Pt overall doing well, minimal pain. Tolerating diet and having bowel function. Medications:   Current Outpatient Medications   Medication Sig Dispense Refill    Wound Dressings (MEDIHONEY) GEL gel Apply 1 each topically as needed (for open wounds left lateral abdomen and inferior right breast open area) 1 each 1    Blood Glucose Monitoring Suppl (ONE TOUCH ULTRA 2) w/Device KIT USE TO CHECK BLOOD SUGAR      ONETOUCH ULTRA strip Inject 1 each into the skin 2 times daily       OneTouch Delica Lancets 55W MISC Inject into the skin in the morning and at bedtime       docusate sodium (COLACE) 100 MG capsule Take 1 capsule by mouth 2 times daily 60 capsule 0    clindamycin (CLEOCIN-T) 1 % external solution Apply topically 2 times daily. 30 mL 3    Semaglutide 7 MG TABS Take 7 mg by mouth daily 30 tablet 0    escitalopram (LEXAPRO) 10 MG tablet Take 1 tablet by mouth daily 30 tablet 3     No current facility-administered medications for this visit.        Allergies: No Known Allergies     Past History:  Past Medical History:   Diagnosis Date    Axillary hidradenitis suppurativa     Diabetes ([address]     oral med; avg fasting ; last A1C 7.6; denies hypo sx    Hidradenitis suppurativa     History of blood transfusion     Personal history of COVID-19 2021    not hospitalized      Past Surgical History:   Procedure Laterality Date    ANKLE SURGERY Right     AXILLARY SURGERY Right 07/08/2022    RIGHT AXILLARY HYDRADENITIS EXCISION/ RIGHT ABDOMEN, BACK INGUINAL, & LEFT FLANK, performed by Jose A Obrien DO at [address] [place] [address] HISTORY  07/08/2022    Excision of right axillary hidradenitis 15x12 cm with creation of bilateral advancement flaps, complex closure; hidranitis excision of right abd, bilateral inguinal, left pannus, and left flank        Family and Social History:  Family History Problem Relation Age of Onset    Other Mother         Stacie Husain    Diabetes Mother     Diabetes Brother     Mult Sclerosis Brother      Social History     Socioeconomic History    Marital status: Single     Spouse name: Not on file    Number of children: Not on file    Years of education: Not on file    Highest education level: Not on file   Occupational History    Occupation: dispatcher   Tobacco Use    Smoking status: Never    Smokeless tobacco: Never   Vaping Use    Vaping Use: Never used   Substance and Sexual Activity    Alcohol use: Not Currently    Drug use: No    Sexual activity: Not Currently     Partners: Male   Other Topics Concern    Not on file   Social History Narrative    Lives with son who works at [employer] 3/1/03    Single never      Social Determinants of Health     Financial Resource Strain: Low Risk     Difficulty of Paying Living Expenses: Not hard at all   Food Insecurity: No Food Insecurity    Worried About [address] in the Last Year: Never true    [address] in the Last Year: Never true   Transportation Needs: No Transportation Needs    Lack of Transportation (Medical): No    Lack of Transportation (Non-Medical):  No   Physical Activity: Insufficiently Active    Days of Exercise per Week: 5 days    Minutes of Exercise per Session: 20 min   Stress: Stress Concern Present    Feeling of Stress : Very much   Social Connections: Socially Isolated    Frequency of Communication with Friends and Family: Once a week    Frequency of Social Gatherings with Friends and Family: Once a week    Attends Cheondoism Services: Never    Active Member of Clubs or Organizations: No    Attends Club or Organization Meetings: Never    Marital Status: Never    Intimate Partner Violence: Not At Risk    Fear of Current or Ex-Partner: No    Emotionally Abused: No    Physically Abused: No    Sexually Abused: No   Housing Stability: Unknown    Unable to Pay for Housing in the Last Year: No    Number of Places Lived in the Last Year: Not on file    Unstable Housing in the Last Year: No        REVIEW OF SYSTEMS: 10 Point ROS negative except what is in HPI    PHYSICAL EXAMINATION:    Ht 5' 9\" (1.753 m)   Wt 268 lb (121.6 kg)   LMP 07/10/2022   BMI 39.58 kg/m²     General Appearance AOx3, in no acute distress  Respiratory No chest wall deformities or tenderness, respiratory effort normal, no use of accessory muscles. Cardiovascular RRR. No chest wall tenderness. Gastrointestinal Abdomen soft, nontender, nondistended. BS x4. No rebound, guarding, or rigidity present. No palpable masses. No CVA tenderness   Incisions: The majority of her wounds have opened up but they have good granulation tissue with no signs of infection  Right axillary incision approximately 5 cm with good granulation tissue    Assessment:  Hidradenitis    Plan:  Pt overall doing well. We will continue with Medihoney to the wound until her Santyl arrives. Then I have instructed her to place the [facility] on twice daily and keep the areas clean and dry. I have instructed her to  some Interdry in order to keep her folds as dry as possible.   I will see her back in 2 weeks

## 2022-07-29 NOTE — ED TRIAGE NOTES
Pt has a ballon in her abscess and she come in to have it checked. Ote[employer]la Pregnancy And Lactation Text: This medication is Pregnancy Category C and it isn't known if it is safe during pregnancy. It is unknown if it is excreted in breast milk.

## 2022-08-11 ENCOUNTER — OFFICE VISIT (OUTPATIENT)
Dept: SURGERY | Age: 42
End: 2022-08-11

## 2022-08-11 DIAGNOSIS — Z09 POSTOPERATIVE EXAMINATION: Primary | ICD-10-CM

## 2022-08-11 PROCEDURE — 99024 POSTOP FOLLOW-UP VISIT: CPT | Performed by: STUDENT IN AN ORGANIZED HEALTH CARE EDUCATION/TRAINING PROGRAM

## 2022-08-11 RX ORDER — SPIRONOLACTONE 50 MG/1
50 TABLET, FILM COATED ORAL DAILY
Qty: 90 TABLET | Refills: 1 | Status: SHIPPED | OUTPATIENT
Start: 2022-08-11 | End: 2022-10-31

## 2022-08-11 NOTE — PROGRESS NOTES
General Surgery Office Visit:    Pt presents s/p excision of hidradenitis. Pt overall doing well, minimal pain. Tolerating diet and having bowel function. Patient has been utilizing Santyl    Medications:   Current Outpatient Medications   Medication Sig Dispense Refill    spironolactone (ALDACTONE) 50 MG tablet Take 1 tablet by mouth in the morning. 90 tablet 1    Wound Dressings (MEDIHONEY) GEL gel Apply 1 each topically as needed (for open wounds left lateral abdomen and inferior right breast open area) 1 each 1    Blood Glucose Monitoring Suppl (ONE TOUCH ULTRA 2) w/Device KIT USE TO CHECK BLOOD SUGAR      ONETOUCH ULTRA strip Inject 1 each into the skin 2 times daily       OneTouch Delica Lancets 12J MISC Inject into the skin in the morning and at bedtime       Semaglutide 7 MG TABS Take 7 mg by mouth daily 30 tablet 0    escitalopram (LEXAPRO) 10 MG tablet Take 1 tablet by mouth daily 30 tablet 3     No current facility-administered medications for this visit.        Allergies: No Known Allergies     Past History:  Past Medical History:   Diagnosis Date    Axillary hidradenitis suppurativa     Diabetes ([address]     oral med; avg fasting ; last A1C 7.6; denies hypo sx    Hidradenitis suppurativa     History of blood transfusion     Personal history of COVID-19 2021    not hospitalized      Past Surgical History:   Procedure Laterality Date    ANKLE SURGERY Right     AXILLARY SURGERY Right 07/08/2022    RIGHT AXILLARY HYDRADENITIS EXCISION/ RIGHT ABDOMEN, BACK INGUINAL, & LEFT FLANK, performed by Sal Ortiz DO at [address] [place] [address]  07/08/2022    Excision of right axillary hidradenitis 15x12 cm with creation of bilateral advancement flaps, complex closure; hidranitis excision of right abd, bilateral inguinal, left pannus, and left flank        Family and Social History:  Family History   Problem Relation Age of Onset    Other Mother         Guru Haleigh Diabetes Mother     Diabetes Brother     Mult Sclerosis Brother      Social History     Socioeconomic History    Marital status: Single     Spouse name: Not on file    Number of children: Not on file    Years of education: Not on file    Highest education level: Not on file   Occupational History    Occupation: dispatcher   Tobacco Use    Smoking status: Never    Smokeless tobacco: Never   Vaping Use    Vaping Use: Never used   Substance and Sexual Activity    Alcohol use: Not Currently    Drug use: No    Sexual activity: Not Currently     Partners: Male   Other Topics Concern    Not on file   Social History Narrative    Lives with son who works at [employer] 3/1/03    Single never      Social Determinants of Health     Financial Resource Strain: Low Risk     Difficulty of Paying Living Expenses: Not hard at all   Food Insecurity: No Food Insecurity    Worried About [address] in the Last Year: Never true    [address] in the Last Year: Never true   Transportation Needs: No Transportation Needs    Lack of Transportation (Medical): No    Lack of Transportation (Non-Medical):  No   Physical Activity: Insufficiently Active    Days of Exercise per Week: 5 days    Minutes of Exercise per Session: 20 min   Stress: Stress Concern Present    Feeling of Stress : Very much   Social Connections: Socially Isolated    Frequency of Communication with Friends and Family: Once a week    Frequency of Social Gatherings with Friends and Family: Once a week    Attends Restoration Services: Never    Active Member of Clubs or Organizations: No    Attends Club or Organization Meetings: Never    Marital Status: Never    Intimate Partner Violence: Not At Risk    Fear of Current or Ex-Partner: No    Emotionally Abused: No    Physically Abused: No    Sexually Abused: No   Housing Stability: Unknown    Unable to Pay for Housing in the Last Year: No    Number of Places Lived in the Last Year: Not on file    Unstable Housing in the Last Year: No        REVIEW OF SYSTEMS: 10 Point ROS negative except what is in HPI    PHYSICAL EXAMINATION:    There were no vitals taken for this visit. General Appearance AOx3, in no acute distress  Respiratory No chest wall deformities or tenderness, respiratory effort normal, no use of accessory muscles. Cardiovascular RRR. No chest wall tenderness. Gastrointestinal Abdomen soft, nontender, nondistended. BS x4. No rebound, guarding, or rigidity present. No palpable masses. No CVA tenderness   Incisions: healing appropriately, good granulation tissue    Assessment:  Hidradenitis    Plan:  Pt overall doing well. Continue to slowly increase their activities of daily living. Overall her wounds are looking very good with good granulation tissue they have dramatically reduced in size utilizing the Santyl. I have explained her to continue doing this. She is concerned about some new areas I have told her to utilize the Hibiclens and the clindamycin ointment. I also cannot put her on spironolactone to see if we can get some of her hidradenitis under control.

## 2022-08-15 ENCOUNTER — OFFICE VISIT (OUTPATIENT)
Dept: FAMILY MEDICINE CLINIC | Facility: CLINIC | Age: 42
End: 2022-08-15
Payer: COMMERCIAL

## 2022-08-15 VITALS
DIASTOLIC BLOOD PRESSURE: 74 MMHG | HEIGHT: 69 IN | TEMPERATURE: 97.6 F | BODY MASS INDEX: 39.1 KG/M2 | WEIGHT: 264 LBS | HEART RATE: 90 BPM | SYSTOLIC BLOOD PRESSURE: 110 MMHG

## 2022-08-15 DIAGNOSIS — E11.65 TYPE 2 DIABETES MELLITUS WITH HYPERGLYCEMIA, WITHOUT LONG-TERM CURRENT USE OF INSULIN (HCC): ICD-10-CM

## 2022-08-15 DIAGNOSIS — F32.1 CURRENT MODERATE EPISODE OF MAJOR DEPRESSIVE DISORDER WITHOUT PRIOR EPISODE (HCC): Primary | ICD-10-CM

## 2022-08-15 DIAGNOSIS — D64.9 ANEMIA, UNSPECIFIED TYPE: ICD-10-CM

## 2022-08-15 DIAGNOSIS — F32.9 REACTIVE DEPRESSION: ICD-10-CM

## 2022-08-15 LAB
ALBUMIN SERPL-MCNC: 2.7 G/DL (ref 3.5–5)
ALBUMIN/GLOB SERPL: 0.6 {RATIO} (ref 1.2–3.5)
ALP SERPL-CCNC: 140 U/L (ref 50–136)
ALT SERPL-CCNC: 25 U/L (ref 12–65)
ANION GAP SERPL CALC-SCNC: 4 MMOL/L (ref 7–16)
AST SERPL-CCNC: 21 U/L (ref 15–37)
BASOPHILS # BLD: 0 K/UL (ref 0–0.2)
BASOPHILS NFR BLD: 0 % (ref 0–2)
BILIRUB SERPL-MCNC: 0.2 MG/DL (ref 0.2–1.1)
BUN SERPL-MCNC: 9 MG/DL (ref 6–23)
CALCIUM SERPL-MCNC: 8.8 MG/DL (ref 8.3–10.4)
CHLORIDE SERPL-SCNC: 107 MMOL/L (ref 98–107)
CO2 SERPL-SCNC: 26 MMOL/L (ref 21–32)
CREAT SERPL-MCNC: 0.6 MG/DL (ref 0.6–1)
DIFFERENTIAL METHOD BLD: ABNORMAL
EOSINOPHIL # BLD: 0.1 K/UL (ref 0–0.8)
EOSINOPHIL NFR BLD: 2 % (ref 0.5–7.8)
ERYTHROCYTE [DISTWIDTH] IN BLOOD BY AUTOMATED COUNT: 19.3 % (ref 11.9–14.6)
EST. AVERAGE GLUCOSE BLD GHB EST-MCNC: 126 MG/DL
GLOBULIN SER CALC-MCNC: 4.7 G/DL (ref 2.3–3.5)
GLUCOSE SERPL-MCNC: 112 MG/DL (ref 65–100)
HBA1C MFR BLD: 6 % (ref 4.8–5.6)
HCT VFR BLD AUTO: 29.3 % (ref 35.8–46.3)
HGB BLD-MCNC: 7.9 G/DL (ref 11.7–15.4)
IMM GRANULOCYTES # BLD AUTO: 0 K/UL (ref 0–0.5)
IMM GRANULOCYTES NFR BLD AUTO: 0 % (ref 0–5)
LYMPHOCYTES # BLD: 1.4 K/UL (ref 0.5–4.6)
LYMPHOCYTES NFR BLD: 19 % (ref 13–44)
MCH RBC QN AUTO: 22.5 PG (ref 26.1–32.9)
MCHC RBC AUTO-ENTMCNC: 27 G/DL (ref 31.4–35)
MCV RBC AUTO: 83.5 FL (ref 79.6–97.8)
MONOCYTES # BLD: 0.3 K/UL (ref 0.1–1.3)
MONOCYTES NFR BLD: 4 % (ref 4–12)
NEUTS SEG # BLD: 5.4 K/UL (ref 1.7–8.2)
NEUTS SEG NFR BLD: 75 % (ref 43–78)
NRBC # BLD: 0 K/UL (ref 0–0.2)
PLATELET # BLD AUTO: 444 K/UL (ref 150–450)
PMV BLD AUTO: 9.7 FL (ref 9.4–12.3)
POTASSIUM SERPL-SCNC: 4.1 MMOL/L (ref 3.5–5.1)
PROT SERPL-MCNC: 7.4 G/DL (ref 6.3–8.2)
RBC # BLD AUTO: 3.51 M/UL (ref 4.05–5.2)
SODIUM SERPL-SCNC: 137 MMOL/L (ref 136–145)
WBC # BLD AUTO: 7.3 K/UL (ref 4.3–11.1)

## 2022-08-15 PROCEDURE — 99214 OFFICE O/P EST MOD 30 MIN: CPT | Performed by: NURSE PRACTITIONER

## 2022-08-15 PROCEDURE — 3051F HG A1C>EQUAL 7.0%<8.0%: CPT | Performed by: NURSE PRACTITIONER

## 2022-08-15 RX ORDER — COLLAGENASE SANTYL 250 [ARB'U]/G
OINTMENT TOPICAL
COMMUNITY
Start: 2022-07-26

## 2022-08-15 RX ORDER — BUPROPION HYDROCHLORIDE 150 MG/1
150 TABLET ORAL EVERY MORNING
Qty: 30 TABLET | Refills: 3 | Status: SHIPPED | OUTPATIENT
Start: 2022-08-15

## 2022-08-15 RX ORDER — ESCITALOPRAM OXALATE 10 MG/1
10 TABLET ORAL DAILY
Qty: 30 TABLET | Refills: 3 | Status: SHIPPED | OUTPATIENT
Start: 2022-08-15

## 2022-08-15 ASSESSMENT — ENCOUNTER SYMPTOMS: SHORTNESS OF BREATH: 0

## 2022-08-15 ASSESSMENT — PATIENT HEALTH QUESTIONNAIRE - PHQ9
SUM OF ALL RESPONSES TO PHQ9 QUESTIONS 1 & 2: 3
7. TROUBLE CONCENTRATING ON THINGS, SUCH AS READING THE NEWSPAPER OR WATCHING TELEVISION: 0
SUM OF ALL RESPONSES TO PHQ QUESTIONS 1-9: 15
SUM OF ALL RESPONSES TO PHQ QUESTIONS 1-9: 15
9. THOUGHTS THAT YOU WOULD BE BETTER OFF DEAD, OR OF HURTING YOURSELF: 0
10. IF YOU CHECKED OFF ANY PROBLEMS, HOW DIFFICULT HAVE THESE PROBLEMS MADE IT FOR YOU TO DO YOUR WORK, TAKE CARE OF THINGS AT HOME, OR GET ALONG WITH OTHER PEOPLE: 3
4. FEELING TIRED OR HAVING LITTLE ENERGY: 3
SUM OF ALL RESPONSES TO PHQ QUESTIONS 1-9: 15
5. POOR APPETITE OR OVEREATING: 3
3. TROUBLE FALLING OR STAYING ASLEEP: 3
8. MOVING OR SPEAKING SO SLOWLY THAT OTHER PEOPLE COULD HAVE NOTICED. OR THE OPPOSITE, BEING SO FIGETY OR RESTLESS THAT YOU HAVE BEEN MOVING AROUND A LOT MORE THAN USUAL: 3
SUM OF ALL RESPONSES TO PHQ QUESTIONS 1-9: 15
6. FEELING BAD ABOUT YOURSELF - OR THAT YOU ARE A FAILURE OR HAVE LET YOURSELF OR YOUR FAMILY DOWN: 0
1. LITTLE INTEREST OR PLEASURE IN DOING THINGS: 0
2. FEELING DOWN, DEPRESSED OR HOPELESS: 3

## 2022-08-15 NOTE — PROGRESS NOTES
Subjective:      Patient ID: Carolyn John is a 37 y.o. female. Here for follow up for Depression   Still very depressed very unmotivated crying often does not feel the lexapro has helped. Has called for a counselor thru work. Also here for refills of med's for DM BS is running less than 120. Needs refills of med for semaglutide. Tolerating well no side effects  Still seeing surgery for the wounds that are healing was started on a new med for control of her hydranitits. Hope it helps    HPI    Review of Systems   Constitutional:         Continues to lose weight deliberately   Respiratory:  Negative for shortness of breath. Cardiovascular:  Negative for chest pain. Psychiatric/Behavioral:          Depression     Objective:   Physical Exam  Vitals and nursing note reviewed. Constitutional:       Appearance: Normal appearance. She is obese. HENT:      Head: Normocephalic. Nose:      Comments: mask     Mouth/Throat:      Comments: mask  Cardiovascular:      Rate and Rhythm: Normal rate and regular rhythm. Pulses: Normal pulses. Heart sounds: Normal heart sounds. Pulmonary:      Effort: Pulmonary effort is normal.      Breath sounds: Normal breath sounds. Abdominal:      General: Abdomen is flat. Bowel sounds are normal.      Palpations: Abdomen is soft. Musculoskeletal:         General: Normal range of motion. Cervical back: Normal range of motion. Neurological:      General: No focal deficit present. Mental Status: She is alert and oriented to person, place, and time.    Psychiatric:      Comments: Depressed crying at times       Assessment:      /74 (Site: Left Upper Arm, Position: Sitting, Cuff Size: Large Adult)   Pulse 90   Temp 97.6 °F (36.4 °C) (Temporal)   Ht 5' 9\" (1.753 m)   Wt 264 lb (119.7 kg)   LMP 08/12/2022   BMI 38.99 kg/m²        Plan:      Current moderate episode of major depressive disorder without prior episode (HCC)  Type 2 diabetes

## 2022-08-18 ENCOUNTER — TELEPHONE (OUTPATIENT)
Dept: FAMILY MEDICINE CLINIC | Facility: CLINIC | Age: 42
End: 2022-08-18

## 2022-08-18 NOTE — TELEPHONE ENCOUNTER
----- Message from RADHA Patel NP sent at 8/16/2022  7:40 AM EDT -----  HgA1c looks good It can however be low as your HGB remains low but higher at 7.9. I can write an rx for iron for you to continue to take if you like or if you are happy with otc product and it is not bothering our stomach keep it up. It is increasing your number.

## 2022-08-29 ENCOUNTER — OFFICE VISIT (OUTPATIENT)
Dept: FAMILY MEDICINE CLINIC | Facility: CLINIC | Age: 42
End: 2022-08-29
Payer: COMMERCIAL

## 2022-08-29 VITALS
HEART RATE: 91 BPM | TEMPERATURE: 97.6 F | SYSTOLIC BLOOD PRESSURE: 120 MMHG | HEIGHT: 69 IN | BODY MASS INDEX: 38.06 KG/M2 | DIASTOLIC BLOOD PRESSURE: 79 MMHG | WEIGHT: 257 LBS

## 2022-08-29 DIAGNOSIS — N92.0 MENORRHAGIA WITH REGULAR CYCLE: Primary | ICD-10-CM

## 2022-08-29 DIAGNOSIS — F32.9 REACTIVE DEPRESSION: ICD-10-CM

## 2022-08-29 PROCEDURE — 99214 OFFICE O/P EST MOD 30 MIN: CPT | Performed by: NURSE PRACTITIONER

## 2022-08-29 RX ORDER — QUETIAPINE FUMARATE 25 MG/1
25 TABLET, FILM COATED ORAL 2 TIMES DAILY
Qty: 60 TABLET | Refills: 3 | Status: SHIPPED | OUTPATIENT
Start: 2022-08-29

## 2022-08-29 ASSESSMENT — PATIENT HEALTH QUESTIONNAIRE - PHQ9
10. IF YOU CHECKED OFF ANY PROBLEMS, HOW DIFFICULT HAVE THESE PROBLEMS MADE IT FOR YOU TO DO YOUR WORK, TAKE CARE OF THINGS AT HOME, OR GET ALONG WITH OTHER PEOPLE: 3
1. LITTLE INTEREST OR PLEASURE IN DOING THINGS: 0
SUM OF ALL RESPONSES TO PHQ QUESTIONS 1-9: 15
6. FEELING BAD ABOUT YOURSELF - OR THAT YOU ARE A FAILURE OR HAVE LET YOURSELF OR YOUR FAMILY DOWN: 0
8. MOVING OR SPEAKING SO SLOWLY THAT OTHER PEOPLE COULD HAVE NOTICED. OR THE OPPOSITE, BEING SO FIGETY OR RESTLESS THAT YOU HAVE BEEN MOVING AROUND A LOT MORE THAN USUAL: 3
SUM OF ALL RESPONSES TO PHQ QUESTIONS 1-9: 15
7. TROUBLE CONCENTRATING ON THINGS, SUCH AS READING THE NEWSPAPER OR WATCHING TELEVISION: 0
SUM OF ALL RESPONSES TO PHQ QUESTIONS 1-9: 15
SUM OF ALL RESPONSES TO PHQ QUESTIONS 1-9: 15
4. FEELING TIRED OR HAVING LITTLE ENERGY: 3
2. FEELING DOWN, DEPRESSED OR HOPELESS: 3
SUM OF ALL RESPONSES TO PHQ9 QUESTIONS 1 & 2: 3
3. TROUBLE FALLING OR STAYING ASLEEP: 3
5. POOR APPETITE OR OVEREATING: 3
9. THOUGHTS THAT YOU WOULD BE BETTER OFF DEAD, OR OF HURTING YOURSELF: 0

## 2022-08-29 ASSESSMENT — COLUMBIA-SUICIDE SEVERITY RATING SCALE - C-SSRS
2. HAVE YOU ACTUALLY HAD ANY THOUGHTS OF KILLING YOURSELF?: NO
6. HAVE YOU EVER DONE ANYTHING, STARTED TO DO ANYTHING, OR PREPARED TO DO ANYTHING TO END YOUR LIFE?: NO
1. WITHIN THE PAST MONTH, HAVE YOU WISHED YOU WERE DEAD OR WISHED YOU COULD GO TO SLEEP AND NOT WAKE UP?: NO

## 2022-08-29 NOTE — PROGRESS NOTES
Subjective:      Patient ID: Hanna Carrasquillo is a 37 y.o. female. Here  for follow up  for depression   States the additional Wellbutrin has not helped yet. Has seen a counselor thru work but plans to see one in person. Has paperwork to complete to protect her ob. As has missed work due to surgical apt for wounds and for her absences due to her depression. Has missed 2 days a week due to her depression . Her brother fabiano  [de-identified] birthday was this past week and that was hard   Is not sleeping well only gets 4 hours of sleep  and has been for that 4 year  Has been in her bedroom all  weekend crying but unable to sleep   States needs FMLA paperwork to protect her job due to her frequent absences. Has to have surgery apt every 2 weeks due to her various wounds from surgery and also follow up with us for her mental health  HPI    Review of Systems   Constitutional:  Positive for fatigue. Negative for chills. Cardiovascular:  Negative for chest pain. Endocrine: Negative for polydipsia and polyphagia. Psychiatric/Behavioral:  Positive for sleep disturbance. Negative for suicidal ideas. Depression     Objective:   Physical Exam  Vitals and nursing note reviewed. Constitutional:       Appearance: Normal appearance. HENT:      Head: Normocephalic. Cardiovascular:      Rate and Rhythm: Normal rate and regular rhythm. Pulses: Normal pulses. Heart sounds: Normal heart sounds. Pulmonary:      Effort: Pulmonary effort is normal.      Breath sounds: Normal breath sounds. Musculoskeletal:         General: Normal range of motion. Cervical back: Normal range of motion and neck supple. Skin:     General: Skin is warm and dry. Capillary Refill: Capillary refill takes less than 2 seconds. Neurological:      General: No focal deficit present. Mental Status: She is alert and oriented to person, place, and time. Psychiatric:      Comments:   At intervals       Assessment:      BP 120/79 (Site: Left Upper Arm, Position: Sitting, Cuff Size: Large Adult)   Pulse 91   Temp 97.6 °F (36.4 °C) (Temporal)   Ht 5' 9\" (1.753 m)   Wt 257 lb (116.6 kg)   LMP 08/26/2022   BMI 37.95 kg/m²        Plan:      1. Menorrhagia with regular cycle  -     [address]  2. Reactive depression  -     QUEtiapine (SEROQUEL) 25 MG tablet; Take 1 tablet by mouth 2 times daily, Disp-60 tablet, R-3Normal  -     External Referral to Psychiatry       Added seroquel for insomnia depression Is to follow up in 2 weeks. Referral to gyn for anemia and heavy periods. Is to follow up in 2 weeks.   [facility] paperwork completed as requested  RADHA Deutsch - DEXTER

## 2022-09-08 ENCOUNTER — OFFICE VISIT (OUTPATIENT)
Dept: SURGERY | Age: 42
End: 2022-09-08

## 2022-09-08 VITALS — WEIGHT: 257 LBS | HEIGHT: 69 IN | BODY MASS INDEX: 38.06 KG/M2

## 2022-09-08 DIAGNOSIS — Z09 POSTOPERATIVE EXAMINATION: Primary | ICD-10-CM

## 2022-09-08 PROCEDURE — 99024 POSTOP FOLLOW-UP VISIT: CPT | Performed by: STUDENT IN AN ORGANIZED HEALTH CARE EDUCATION/TRAINING PROGRAM

## 2022-09-08 NOTE — PROGRESS NOTES
General Surgery Office Visit:    Pt presents s/p excision of hidradenitis. Pt overall doing well, minimal pain. Tolerating diet and having bowel function. Medications:   Current Outpatient Medications   Medication Sig Dispense Refill    QUEtiapine (SEROQUEL) 25 MG tablet Take 1 tablet by mouth 2 times daily 60 tablet 3    SANTYL 250 UNIT/GM ointment APPLY TO WOUND ONCE A DAY OR MORE FREQUENTLY IF THE DRESSING BECOMES SOILED FOR 30 DAYS      Semaglutide 7 MG TABS Take 7 mg by mouth daily 30 tablet 5    buPROPion (WELLBUTRIN XL) 150 MG extended release tablet Take 1 tablet by mouth every morning 30 tablet 3    escitalopram (LEXAPRO) 10 MG tablet Take 1 tablet by mouth in the morning. 30 tablet 3    spironolactone (ALDACTONE) 50 MG tablet Take 1 tablet by mouth in the morning. 90 tablet 1    Wound Dressings (MEDIHONEY) GEL gel Apply 1 each topically as needed (for open wounds left lateral abdomen and inferior right breast open area) 1 each 1    Blood Glucose Monitoring Suppl (ONE TOUCH ULTRA 2) w/Device KIT USE TO CHECK BLOOD SUGAR      ONETOUCH ULTRA strip Inject 1 each into the skin 2 times daily       OneTouch Delica Lancets 14D MISC Inject into the skin in the morning and at bedtime        No current facility-administered medications for this visit.        Allergies: No Known Allergies     Past History:  Past Medical History:   Diagnosis Date    Axillary hidradenitis suppurativa     Diabetes ([address]     oral med; avg fasting ; last A1C 7.6; denies hypo sx    Hidradenitis suppurativa     History of blood transfusion     Personal history of COVID-19 2021    not hospitalized      Past Surgical History:   Procedure Laterality Date    ANKLE SURGERY Right     AXILLARY SURGERY Right 07/08/2022    RIGHT AXILLARY HYDRADENITIS EXCISION/ RIGHT ABDOMEN, BACK INGUINAL, & LEFT FLANK, performed by Matilde Holloway DO at [facility] HISTORY  07/08/2022    Excision of right axillary hidradenitis 15x12 cm with creation of bilateral advancement flaps, complex closure; hidranitis excision of right abd, bilateral inguinal, left pannus, and left flank        Family and Social History:  Family History   Problem Relation Age of Onset    Other Mother         Mahsa Challenger    Diabetes Mother     Diabetes Brother     Mult Sclerosis Brother      Social History     Socioeconomic History    Marital status: Single     Spouse name: Not on file    Number of children: Not on file    Years of education: Not on file    Highest education level: Not on file   Occupational History    Occupation: dispatcher   Tobacco Use    Smoking status: Never    Smokeless tobacco: Never   Vaping Use    Vaping Use: Never used   Substance and Sexual Activity    Alcohol use: Not Currently    Drug use: No    Sexual activity: Not Currently     Partners: Male   Other Topics Concern    Not on file   Social History Narrative    Lives with son who works at [employer] 3/1/03    Single never      Social Determinants of Health     Financial Resource Strain: Low Risk     Difficulty of Paying Living Expenses: Not hard at all   Food Insecurity: No Food Insecurity    Worried About [address] in the Last Year: Never true    [address] in the Last Year: Never true   Transportation Needs: No Transportation Needs    Lack of Transportation (Medical): No    Lack of Transportation (Non-Medical):  No   Physical Activity: Insufficiently Active    Days of Exercise per Week: 5 days    Minutes of Exercise per Session: 20 min   Stress: Stress Concern Present    Feeling of Stress : Very much   Social Connections: Socially Isolated    Frequency of Communication with Friends and Family: Once a week    Frequency of Social Gatherings with Friends and Family: Once a week    Attends Restorationism Services: Never    Active Member of Clubs or Organizations: No    Attends Club or Organization Meetings: Never    Marital Status: Never  Intimate Partner Violence: Not At Risk    Fear of Current or Ex-Partner: No    Emotionally Abused: No    Physically Abused: No    Sexually Abused: No   Housing Stability: Unknown    Unable to Pay for Housing in the Last Year: No    Number of Places Lived in the Last Year: Not on file    Unstable Housing in the Last Year: No        REVIEW OF SYSTEMS: 10 Point ROS negative except what is in HPI    PHYSICAL EXAMINATION:    Ht 5' 9\" (1.753 m)   Wt 257 lb (116.6 kg)   LMP 08/26/2022   BMI 37.95 kg/m²     General Appearance AOx3, in no acute distress  Respiratory No chest wall deformities or tenderness, respiratory effort normal, no use of accessory muscles. Cardiovascular RRR. No chest wall tenderness. Gastrointestinal Abdomen soft, nontender, nondistended. BS x4. No rebound, guarding, or rigidity present. No palpable masses. No CVA tenderness   Incisions: Wounds are healing nicely with good granulation tissue. They are all superficial in nature and have decreased dramatically in size    Assessment:  Hidradenitis    Plan:  Pt overall doing well. I have instructed her to continue with her Hibiclens and clindamycin. She will continue with Santyl to her open wounds. She will continue with her spironolactone to decrease her outbreaks. I will see her back in 4 weeks.

## 2022-10-31 RX ORDER — SPIRONOLACTONE 50 MG/1
TABLET, FILM COATED ORAL
Qty: 30 TABLET | Refills: 5 | Status: SHIPPED | OUTPATIENT
Start: 2022-10-31

## 2023-01-05 ENCOUNTER — OFFICE VISIT (OUTPATIENT)
Dept: FAMILY MEDICINE CLINIC | Facility: CLINIC | Age: 43
End: 2023-01-05
Payer: COMMERCIAL

## 2023-01-05 VITALS
TEMPERATURE: 98.3 F | DIASTOLIC BLOOD PRESSURE: 82 MMHG | HEIGHT: 69 IN | HEART RATE: 86 BPM | BODY MASS INDEX: 40.73 KG/M2 | WEIGHT: 275 LBS | SYSTOLIC BLOOD PRESSURE: 128 MMHG

## 2023-01-05 DIAGNOSIS — F32.9 REACTIVE DEPRESSION: ICD-10-CM

## 2023-01-05 DIAGNOSIS — N92.0 MENORRHAGIA WITH REGULAR CYCLE: ICD-10-CM

## 2023-01-05 DIAGNOSIS — D64.9 ANEMIA, UNSPECIFIED TYPE: ICD-10-CM

## 2023-01-05 DIAGNOSIS — F32.1 CURRENT MODERATE EPISODE OF MAJOR DEPRESSIVE DISORDER WITHOUT PRIOR EPISODE (HCC): ICD-10-CM

## 2023-01-05 DIAGNOSIS — L73.2 HYDRADENITIS: Primary | ICD-10-CM

## 2023-01-05 DIAGNOSIS — I10 PRIMARY HYPERTENSION: ICD-10-CM

## 2023-01-05 DIAGNOSIS — E11.65 TYPE 2 DIABETES MELLITUS WITH HYPERGLYCEMIA, WITHOUT LONG-TERM CURRENT USE OF INSULIN (HCC): ICD-10-CM

## 2023-01-05 DIAGNOSIS — E66.01 OBESITY, CLASS III, BMI 40-49.9 (MORBID OBESITY) (HCC): ICD-10-CM

## 2023-01-05 LAB
ALBUMIN SERPL-MCNC: 3.1 G/DL (ref 3.5–5)
ALBUMIN/GLOB SERPL: 0.6 (ref 0.4–1.6)
ALP SERPL-CCNC: 157 U/L (ref 50–136)
ALT SERPL-CCNC: 25 U/L (ref 12–65)
ANION GAP SERPL CALC-SCNC: 4 MMOL/L (ref 2–11)
AST SERPL-CCNC: 13 U/L (ref 15–37)
BASOPHILS # BLD: 0 K/UL (ref 0–0.2)
BASOPHILS NFR BLD: 1 % (ref 0–2)
BILIRUB SERPL-MCNC: 0.3 MG/DL (ref 0.2–1.1)
BUN SERPL-MCNC: 13 MG/DL (ref 6–23)
CALCIUM SERPL-MCNC: 8.5 MG/DL (ref 8.3–10.4)
CHLORIDE SERPL-SCNC: 108 MMOL/L (ref 101–110)
CO2 SERPL-SCNC: 26 MMOL/L (ref 21–32)
CREAT SERPL-MCNC: 0.7 MG/DL (ref 0.6–1)
DIFFERENTIAL METHOD BLD: ABNORMAL
EOSINOPHIL # BLD: 0.2 K/UL (ref 0–0.8)
EOSINOPHIL NFR BLD: 3 % (ref 0.5–7.8)
ERYTHROCYTE [DISTWIDTH] IN BLOOD BY AUTOMATED COUNT: 17.9 % (ref 11.9–14.6)
EST. AVERAGE GLUCOSE BLD GHB EST-MCNC: 137 MG/DL
GLOBULIN SER CALC-MCNC: 5 G/DL (ref 2.8–4.5)
GLUCOSE SERPL-MCNC: 150 MG/DL (ref 65–100)
HBA1C MFR BLD: 6.4 % (ref 4.8–5.6)
HCT VFR BLD AUTO: 32.4 % (ref 35.8–46.3)
HGB BLD-MCNC: 9.4 G/DL (ref 11.7–15.4)
IMM GRANULOCYTES # BLD AUTO: 0 K/UL (ref 0–0.5)
IMM GRANULOCYTES NFR BLD AUTO: 0 % (ref 0–5)
LYMPHOCYTES # BLD: 1.5 K/UL (ref 0.5–4.6)
LYMPHOCYTES NFR BLD: 21 % (ref 13–44)
MCH RBC QN AUTO: 23.2 PG (ref 26.1–32.9)
MCHC RBC AUTO-ENTMCNC: 29 G/DL (ref 31.4–35)
MCV RBC AUTO: 79.8 FL (ref 82–102)
MONOCYTES # BLD: 0.3 K/UL (ref 0.1–1.3)
MONOCYTES NFR BLD: 4 % (ref 4–12)
NEUTS SEG # BLD: 5.2 K/UL (ref 1.7–8.2)
NEUTS SEG NFR BLD: 72 % (ref 43–78)
NRBC # BLD: 0 K/UL (ref 0–0.2)
PLATELET # BLD AUTO: 379 K/UL (ref 150–450)
PMV BLD AUTO: 10.1 FL (ref 9.4–12.3)
POTASSIUM SERPL-SCNC: 4 MMOL/L (ref 3.5–5.1)
PROT SERPL-MCNC: 8.1 G/DL (ref 6.3–8.2)
RBC # BLD AUTO: 4.06 M/UL (ref 4.05–5.2)
SODIUM SERPL-SCNC: 138 MMOL/L (ref 133–143)
TSH, 3RD GENERATION: 1.62 UIU/ML (ref 0.36–3.74)
WBC # BLD AUTO: 7.3 K/UL (ref 4.3–11.1)

## 2023-01-05 PROCEDURE — 3079F DIAST BP 80-89 MM HG: CPT | Performed by: NURSE PRACTITIONER

## 2023-01-05 PROCEDURE — 3074F SYST BP LT 130 MM HG: CPT | Performed by: NURSE PRACTITIONER

## 2023-01-05 PROCEDURE — 99214 OFFICE O/P EST MOD 30 MIN: CPT | Performed by: NURSE PRACTITIONER

## 2023-01-05 RX ORDER — QUETIAPINE FUMARATE 50 MG/1
50 TABLET, FILM COATED ORAL 2 TIMES DAILY
Qty: 180 TABLET | Refills: 0 | Status: SHIPPED | OUTPATIENT
Start: 2023-01-05

## 2023-01-05 RX ORDER — ESCITALOPRAM OXALATE 10 MG/1
10 TABLET ORAL DAILY
Qty: 90 TABLET | Refills: 1 | Status: SHIPPED | OUTPATIENT
Start: 2023-01-05

## 2023-01-05 RX ORDER — BUPROPION HYDROCHLORIDE 150 MG/1
150 TABLET ORAL EVERY MORNING
Qty: 90 TABLET | Refills: 1 | Status: SHIPPED | OUTPATIENT
Start: 2023-01-05

## 2023-01-05 RX ORDER — LISINOPRIL 10 MG/1
10 TABLET ORAL DAILY
Qty: 90 TABLET | Refills: 1 | Status: SHIPPED | OUTPATIENT
Start: 2023-01-05

## 2023-01-05 ASSESSMENT — PATIENT HEALTH QUESTIONNAIRE - PHQ9
7. TROUBLE CONCENTRATING ON THINGS, SUCH AS READING THE NEWSPAPER OR WATCHING TELEVISION: 0
4. FEELING TIRED OR HAVING LITTLE ENERGY: 3
9. THOUGHTS THAT YOU WOULD BE BETTER OFF DEAD, OR OF HURTING YOURSELF: 0
SUM OF ALL RESPONSES TO PHQ QUESTIONS 1-9: 12
SUM OF ALL RESPONSES TO PHQ QUESTIONS 1-9: 12
8. MOVING OR SPEAKING SO SLOWLY THAT OTHER PEOPLE COULD HAVE NOTICED. OR THE OPPOSITE, BEING SO FIGETY OR RESTLESS THAT YOU HAVE BEEN MOVING AROUND A LOT MORE THAN USUAL: 0
2. FEELING DOWN, DEPRESSED OR HOPELESS: 3
5. POOR APPETITE OR OVEREATING: 3
SUM OF ALL RESPONSES TO PHQ QUESTIONS 1-9: 12
3. TROUBLE FALLING OR STAYING ASLEEP: 3
10. IF YOU CHECKED OFF ANY PROBLEMS, HOW DIFFICULT HAVE THESE PROBLEMS MADE IT FOR YOU TO DO YOUR WORK, TAKE CARE OF THINGS AT HOME, OR GET ALONG WITH OTHER PEOPLE: 1
6. FEELING BAD ABOUT YOURSELF - OR THAT YOU ARE A FAILURE OR HAVE LET YOURSELF OR YOUR FAMILY DOWN: 0
SUM OF ALL RESPONSES TO PHQ QUESTIONS 1-9: 12
SUM OF ALL RESPONSES TO PHQ9 QUESTIONS 1 & 2: 3
1. LITTLE INTEREST OR PLEASURE IN DOING THINGS: 0

## 2023-01-05 NOTE — PROGRESS NOTES
Subjective:      Patient ID: Corwin Krabbe is a 40 y.o. female. Here for refills of med's for   Anxiety depression insomnia still having racing thoughts and has difficulty sleeping would like to try a higher dose of Seroquel  Needs fmla paperwork completed still unable to go to work for several days a month due to her depression was seeing counselor ran out of free visit but now can return as has more visits in the new year. Has been using a hot line for her stress. Taking semaglutide but has gained weight due to stress eating states has not been consistently taking weight up over 20 lbs. New issues BP is up no longer taking spironolactone states did not help her skin condition has a new area under  her left breast has upcoming apt with surgeon for this. Has never gone to the gyn for her excessive menses. HPI    Review of Systems   Genitourinary:         Continues to have very heavy periods with clots   Skin:         New skin lesion under left breast   Psychiatric/Behavioral:          Continue to have racing thoughts insomnia and depression     Objective:   Physical Exam  Vitals and nursing note reviewed. Constitutional:       Appearance: Normal appearance. HENT:      Head: Normocephalic. Cardiovascular:      Rate and Rhythm: Normal rate and regular rhythm. Pulses: Normal pulses. Heart sounds: Normal heart sounds. Pulmonary:      Effort: Pulmonary effort is normal.      Breath sounds: Normal breath sounds. Musculoskeletal:         General: Normal range of motion. Skin:     General: Skin is warm and dry. Neurological:      General: No focal deficit present. Mental Status: She is alert and oriented to person, place, and time.    Psychiatric:         Mood and Affect: Mood normal.       Assessment:      /82 (Site: Right Upper Arm, Position: Sitting, Cuff Size: Large Adult)   Pulse 86   Temp 98.3 °F (36.8 °C) (Temporal)   Ht 5' 9\" (1.753 m)   Wt 275 lb (124.7 kg) LMP 01/03/2023   BMI 40.61 kg/m²        Plan:      1. Hydradenitis  2. Type 2 diabetes mellitus with hyperglycemia, without long-term current use of insulin (HCC)  -     Semaglutide 14 MG TABS; Take 1 tablet by mouth daily, Disp-90 tablet, R-0Normal  -     Comprehensive Metabolic Panel; Future  -     TSH; Future  -     Hemoglobin A1C; Future  3. Reactive depression  -     buPROPion (WELLBUTRIN XL) 150 MG extended release tablet; Take 1 tablet by mouth every morning, Disp-90 tablet, R-1Normal  -     escitalopram (LEXAPRO) 10 MG tablet; Take 1 tablet by mouth daily, Disp-90 tablet, R-1Normal  -     QUEtiapine (SEROQUEL) 50 MG tablet; Take 1 tablet by mouth 2 times daily, Disp-180 tablet, R-0Normal  -     TSH; Future  4. Current moderate episode of major depressive disorder without prior episode ([facility])  5. Obesity, Class III, BMI 40-49.9 (morbid obesity) ([facility]  6. Menorrhagia with regular cycle  -     [facility]     CBC with Auto Differential; Future  7. Anemia, unspecified type  -     [facility]     CBC with Auto Differential; Future     Referral to derm for her hydranitis. Started on BP med as BP too high checking labs. Increased med for DM. Completed paperwork for her FMLA. Urged to go to gyn for her menses.  Checking cbc as not checked recently follow up in 3 months    Dorna Severs, APRN - NP

## 2023-01-06 ENCOUNTER — TELEPHONE (OUTPATIENT)
Dept: FAMILY MEDICINE CLINIC | Facility: CLINIC | Age: 43
End: 2023-01-06

## 2023-01-06 NOTE — TELEPHONE ENCOUNTER
----- Message from RADHA Dc - NP sent at 1/6/2023  7:40 AM EST -----  HgA1c is 6.4 so still good.  TSH normal good Hgb low at 9.4 please see the gyn if you do not get an apt call please let us know in all not as bad as we feared

## 2023-03-11 ENCOUNTER — APPOINTMENT (OUTPATIENT)
Dept: GENERAL RADIOLOGY | Age: 43
End: 2023-03-11
Payer: COMMERCIAL

## 2023-03-11 ENCOUNTER — APPOINTMENT (OUTPATIENT)
Dept: ULTRASOUND IMAGING | Age: 43
End: 2023-03-11
Payer: COMMERCIAL

## 2023-03-11 ENCOUNTER — HOSPITAL ENCOUNTER (EMERGENCY)
Age: 43
Discharge: HOME | End: 2023-03-11
Attending: EMERGENCY MEDICINE
Payer: COMMERCIAL

## 2023-03-11 VITALS
DIASTOLIC BLOOD PRESSURE: 89 MMHG | SYSTOLIC BLOOD PRESSURE: 157 MMHG | WEIGHT: 276 LBS | RESPIRATION RATE: 18 BRPM | OXYGEN SATURATION: 100 % | BODY MASS INDEX: 40.88 KG/M2 | TEMPERATURE: 98.2 F | HEIGHT: 69 IN | HEART RATE: 88 BPM

## 2023-03-11 DIAGNOSIS — M79.645 THUMB PAIN, LEFT: Primary | ICD-10-CM

## 2023-03-11 LAB
ALBUMIN SERPL-MCNC: 3.1 G/DL (ref 3.5–5)
ALBUMIN/GLOB SERPL: 0.6 (ref 0.4–1.6)
ALP SERPL-CCNC: 168 U/L (ref 50–130)
ALT SERPL-CCNC: 44 U/L (ref 12–65)
ANION GAP SERPL CALC-SCNC: 2 MMOL/L (ref 2–11)
AST SERPL-CCNC: 33 U/L (ref 15–37)
BASOPHILS # BLD: 0 K/UL (ref 0–0.2)
BASOPHILS NFR BLD: 0 % (ref 0–2)
BILIRUB SERPL-MCNC: 0.3 MG/DL (ref 0.2–1.1)
BUN SERPL-MCNC: 15 MG/DL (ref 6–23)
CALCIUM SERPL-MCNC: 8.9 MG/DL (ref 8.3–10.4)
CHLORIDE SERPL-SCNC: 109 MMOL/L (ref 101–110)
CO2 SERPL-SCNC: 28 MMOL/L (ref 21–32)
CREAT SERPL-MCNC: 0.7 MG/DL (ref 0.6–1)
CRP SERPL-MCNC: 2.1 MG/DL (ref 0–0.9)
DIFFERENTIAL METHOD BLD: ABNORMAL
EOSINOPHIL # BLD: 0.2 K/UL (ref 0–0.8)
EOSINOPHIL NFR BLD: 3 % (ref 0.5–7.8)
ERYTHROCYTE [DISTWIDTH] IN BLOOD BY AUTOMATED COUNT: 17.9 % (ref 11.9–14.6)
ERYTHROCYTE [SEDIMENTATION RATE] IN BLOOD: 124 MM/HR (ref 0–20)
GLOBULIN SER CALC-MCNC: 5.6 G/DL (ref 2.8–4.5)
GLUCOSE SERPL-MCNC: 112 MG/DL (ref 65–100)
HCT VFR BLD AUTO: 29.8 % (ref 35.8–46.3)
HGB BLD-MCNC: 9 G/DL (ref 11.7–15.4)
IMM GRANULOCYTES # BLD AUTO: 0 K/UL (ref 0–0.5)
IMM GRANULOCYTES NFR BLD AUTO: 0 % (ref 0–5)
LYMPHOCYTES # BLD: 2.1 K/UL (ref 0.5–4.6)
LYMPHOCYTES NFR BLD: 29 % (ref 13–44)
MCH RBC QN AUTO: 23.1 PG (ref 26.1–32.9)
MCHC RBC AUTO-ENTMCNC: 30.2 G/DL (ref 31.4–35)
MCV RBC AUTO: 76.6 FL (ref 82–102)
MONOCYTES # BLD: 0.4 K/UL (ref 0.1–1.3)
MONOCYTES NFR BLD: 5 % (ref 4–12)
NEUTS SEG # BLD: 4.5 K/UL (ref 1.7–8.2)
NEUTS SEG NFR BLD: 62 % (ref 43–78)
NRBC # BLD: 0 K/UL (ref 0–0.2)
PLATELET # BLD AUTO: 355 K/UL (ref 150–450)
PMV BLD AUTO: 9.5 FL (ref 9.4–12.3)
POTASSIUM SERPL-SCNC: 3.7 MMOL/L (ref 3.5–5.1)
PROT SERPL-MCNC: 8.7 G/DL (ref 6.3–8.2)
RBC # BLD AUTO: 3.89 M/UL (ref 4.05–5.2)
SODIUM SERPL-SCNC: 139 MMOL/L (ref 133–143)
URATE SERPL-MCNC: 4.4 MG/DL (ref 2.6–6)
WBC # BLD AUTO: 7.3 K/UL (ref 4.3–11.1)

## 2023-03-11 PROCEDURE — 73130 X-RAY EXAM OF HAND: CPT

## 2023-03-11 PROCEDURE — 80053 COMPREHEN METABOLIC PANEL: CPT

## 2023-03-11 PROCEDURE — 2580000003 HC RX 258: Performed by: PHYSICIAN ASSISTANT

## 2023-03-11 PROCEDURE — 85025 COMPLETE CBC W/AUTO DIFF WBC: CPT

## 2023-03-11 PROCEDURE — 84550 ASSAY OF BLOOD/URIC ACID: CPT

## 2023-03-11 PROCEDURE — 99284 EMERGENCY DEPT VISIT MOD MDM: CPT | Performed by: PHYSICIAN ASSISTANT

## 2023-03-11 PROCEDURE — 86140 C-REACTIVE PROTEIN: CPT

## 2023-03-11 PROCEDURE — 96374 THER/PROPH/DIAG INJ IV PUSH: CPT | Performed by: PHYSICIAN ASSISTANT

## 2023-03-11 PROCEDURE — 93971 EXTREMITY STUDY: CPT

## 2023-03-11 PROCEDURE — 96375 TX/PRO/DX INJ NEW DRUG ADDON: CPT | Performed by: PHYSICIAN ASSISTANT

## 2023-03-11 PROCEDURE — 85652 RBC SED RATE AUTOMATED: CPT

## 2023-03-11 PROCEDURE — 6360000002 HC RX W HCPCS: Performed by: PHYSICIAN ASSISTANT

## 2023-03-11 RX ORDER — TRAMADOL HYDROCHLORIDE 50 MG/1
50 TABLET ORAL EVERY 6 HOURS PRN
Qty: 12 TABLET | Refills: 0 | Status: SHIPPED | OUTPATIENT
Start: 2023-03-11 | End: 2023-03-11 | Stop reason: ALTCHOICE

## 2023-03-11 RX ORDER — DEXAMETHASONE SODIUM PHOSPHATE 10 MG/ML
10 INJECTION INTRAMUSCULAR; INTRAVENOUS
Status: COMPLETED | OUTPATIENT
Start: 2023-03-11 | End: 2023-03-11

## 2023-03-11 RX ORDER — MORPHINE SULFATE 15 MG/1
15 TABLET ORAL EVERY 6 HOURS PRN
Qty: 12 TABLET | Refills: 0 | Status: SHIPPED | OUTPATIENT
Start: 2023-03-11 | End: 2023-03-14

## 2023-03-11 RX ORDER — 0.9 % SODIUM CHLORIDE 0.9 %
1000 INTRAVENOUS SOLUTION INTRAVENOUS
Status: COMPLETED | OUTPATIENT
Start: 2023-03-11 | End: 2023-03-11

## 2023-03-11 RX ORDER — CEPHALEXIN 500 MG/1
500 CAPSULE ORAL 4 TIMES DAILY
Qty: 28 CAPSULE | Refills: 0 | Status: SHIPPED | OUTPATIENT
Start: 2023-03-11 | End: 2023-03-17 | Stop reason: ALTCHOICE

## 2023-03-11 RX ORDER — DICLOFENAC POTASSIUM 50 MG/1
50 TABLET, FILM COATED ORAL 3 TIMES DAILY PRN
Qty: 30 TABLET | Refills: 0 | Status: SHIPPED | OUTPATIENT
Start: 2023-03-11

## 2023-03-11 RX ORDER — MORPHINE SULFATE 4 MG/ML
4 INJECTION INTRAVENOUS ONCE
Status: COMPLETED | OUTPATIENT
Start: 2023-03-11 | End: 2023-03-11

## 2023-03-11 RX ORDER — ONDANSETRON 2 MG/ML
4 INJECTION INTRAMUSCULAR; INTRAVENOUS
Status: COMPLETED | OUTPATIENT
Start: 2023-03-11 | End: 2023-03-11

## 2023-03-11 RX ORDER — KETOROLAC TROMETHAMINE 30 MG/ML
30 INJECTION, SOLUTION INTRAMUSCULAR; INTRAVENOUS ONCE
Status: COMPLETED | OUTPATIENT
Start: 2023-03-11 | End: 2023-03-11

## 2023-03-11 RX ADMIN — SODIUM CHLORIDE 1000 ML: 9 INJECTION, SOLUTION INTRAVENOUS at 17:33

## 2023-03-11 RX ADMIN — KETOROLAC TROMETHAMINE 30 MG: 30 INJECTION, SOLUTION INTRAMUSCULAR; INTRAVENOUS at 17:34

## 2023-03-11 RX ADMIN — DEXAMETHASONE SODIUM PHOSPHATE 10 MG: 10 INJECTION INTRAMUSCULAR; INTRAVENOUS at 18:36

## 2023-03-11 RX ADMIN — CEFAZOLIN 2000 MG: 10 INJECTION, POWDER, FOR SOLUTION INTRAVENOUS at 20:23

## 2023-03-11 RX ADMIN — ONDANSETRON 4 MG: 2 INJECTION INTRAMUSCULAR; INTRAVENOUS at 18:36

## 2023-03-11 RX ADMIN — MORPHINE SULFATE 4 MG: 4 INJECTION, SOLUTION INTRAMUSCULAR; INTRAVENOUS at 18:36

## 2023-03-11 ASSESSMENT — ENCOUNTER SYMPTOMS
EYES NEGATIVE: 1
BACK PAIN: 0
GASTROINTESTINAL NEGATIVE: 1
ALLERGIC/IMMUNOLOGIC NEGATIVE: 1
RESPIRATORY NEGATIVE: 1

## 2023-03-11 ASSESSMENT — PAIN DESCRIPTION - ORIENTATION: ORIENTATION: LEFT

## 2023-03-11 ASSESSMENT — PAIN DESCRIPTION - DESCRIPTORS: DESCRIPTORS: THROBBING

## 2023-03-11 ASSESSMENT — PAIN - FUNCTIONAL ASSESSMENT: PAIN_FUNCTIONAL_ASSESSMENT: 0-10

## 2023-03-11 ASSESSMENT — PAIN DESCRIPTION - LOCATION: LOCATION: HAND

## 2023-03-11 ASSESSMENT — PAIN SCALES - GENERAL: PAINLEVEL_OUTOF10: 10

## 2023-03-11 NOTE — ED TRIAGE NOTES
Patient c/o Left hand swelling, warmth, and pain x2 days. Pt denies recent injury, chest pain, and SOB.

## 2023-03-11 NOTE — Clinical Note
Mariam Woodson was seen and treated in our emergency department on 3/11/2023. She may return to work on 03/15/2023. If you have any questions or concerns, please don't hesitate to call.       RAJANI Chin

## 2023-03-11 NOTE — ED PROVIDER NOTES
Emergency Department Provider Note                   PCP:                RADHA Quintero NP               Age: 40 y.o. Sex: female     DISPOSITION Discharge - Pending Orders Complete 03/11/2023 08:03:19 PM       ICD-10-CM    1. Thumb pain, left  M79.645 traMADol (ULTRAM) 50 MG tablet     [address] [place] [address] DECISION MAKING  Complexity of Problems Addressed:  1 or more acute illnesses that pose a threat to life or bodily function. Data Reviewed and Analyzed:  Category 1:     I ordered each unique test.  I reviewed the results of each unique test.        Category 2:       Category 3: Discussion of management or test interpretation. 8:00 PM Spoke with Dr. Angie Alvarez regarding patient. We discussed the history, physical exam, work-up and disposition. Patient does have fluid around the bone but no sign of osteomyelitis. The sed rate is elevated as is the CRP but the white count is normal.  She is a diabetic. Dr. Fallon Silveira recommends discharge after IV Ancef here and Keflex at home. Referral to Ortho. I made it urgently. Patient should return immediately if she is worsening in any way. She is stable for discharge and ambulatory out of the ED without difficulty at this time. Patient presented with acute finger pain to the ED. History obtained from patient, additional history provided by independent historian none. I reviewed the following tests labs, XR and US. My independent interpretation of the data was done. I did review records from an external source PCP. I did engage in shared decision making with the patient/family. I considered MRI but did not do it due to no acute signs/symptoms or indication of osteomyelitis here, Dr. nAgie Alvarez wants referral to Ortho, they can obtain MRI, will cover with antibiotics. There is no social determinant of health affecting care. I no use a clinical guideline to determine care of the patient.  I discussed results/disposition with patient, Dr. Enoch Olivares.            Risk of Complications and/or Morbidity of Patient Management:  OTC drug management performed, Prescription drug management performed, and Considerations: The following items were considered but not ordered: HARPREET Barton is a 40 y.o. female who presents to the Emergency Department with chief complaint of    Chief Complaint   Patient presents with    Hand Pain      Patient is here with left hand pain and swelling for the last 2 days. 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 No history of gout. She is a diabetic and is currently taking her medication which is an oral medication daily as directed. She has not noticed any swelling, redness or pain to the arm. No history of the same. No fever. No chest pain, shortness of breath, upper abdominal pain, dizziness, weakness, dyspnea exertion, orthopnea, swelling/tingling or weakness to their arms or legs, trouble with urination or bowel movements or other new symptoms. They did ambulate to the room without difficulty and is well hydrated. The history is provided by the patient. Hand Problem  Location:  Hand  Hand location:  L hand  Injury: no    Pain details:     Quality:  Aching    Radiates to:  Does not radiate    Severity:  Severe    Onset quality:  Gradual    Duration:  2 days    Timing:  Constant    Progression:  Worsening  Handedness:  Right-handed  Dislocation: no    Foreign body present:  No foreign bodies  Prior injury to area:  No  Relieved by:  Nothing  Worsened by:  Nothing  Ineffective treatments:  None tried  Associated symptoms: decreased range of motion, stiffness and swelling    Associated symptoms: no back pain, no fatigue, no fever, no muscle weakness, no neck pain, no numbness and no tingling       Review of Systems   Constitutional: Negative. Negative for fatigue and fever. HENT: Negative. Eyes: Negative. Respiratory: Negative. Cardiovascular: Negative. Gastrointestinal: Negative. Endocrine: Negative. Genitourinary: Negative. Musculoskeletal:  Positive for stiffness. Negative for back pain and neck pain. Left hand/thumb pain redness, swelling and pain   Skin: Negative. Allergic/Immunologic: Negative. Neurological: Negative. Hematological: Negative. Psychiatric/Behavioral: Negative. All other systems reviewed and are negative. Vitals signs and nursing note reviewed. Patient Vitals for the past 4 hrs:   Temp Pulse Resp BP SpO2   03/11/23 1658 98.2 °F (36.8 °C) 88 18 (!) 157/89 100 %          Physical Exam  Vitals and nursing note reviewed. Constitutional:       Appearance: Normal appearance. HENT:      Head: Normocephalic and atraumatic. Right Ear: External ear normal.      Left Ear: External ear normal.      Nose: Nose normal.      Mouth/Throat:      Mouth: Mucous membranes are moist.   Eyes:      Extraocular Movements: Extraocular movements intact. Conjunctiva/sclera: Conjunctivae normal.      Pupils: Pupils are equal, round, and reactive to light. Cardiovascular:      Rate and Rhythm: Normal rate. Pulses: Normal pulses. Pulmonary:      Effort: Pulmonary effort is normal.   Abdominal:      General: Abdomen is flat. Palpations: Abdomen is soft. Musculoskeletal:         General: Swelling, tenderness and signs of injury present. No deformity. Normal range of motion. Hands:       Cervical back: Normal range of motion. Right lower leg: No edema. Left lower leg: No edema. Comments: Tenderness, erythema, warmth and mild swelling to the left thumb and area of the hand surrounding that. No open wounds. There is mild limited range of motion due to pain and she is distally neurovascularly intact. Skin:     General: Skin is warm. Capillary Refill: Capillary refill takes less than 2 seconds. Neurological:      General: No focal deficit present. Mental Status: She is alert and oriented to person, place, and time. Mental status is at baseline. Psychiatric:         Mood and Affect: Mood normal.         Behavior: Behavior normal.         Thought Content: Thought content normal.         Judgment: Judgment normal.        Procedures     Orders Placed This Encounter   Procedures    XR HAND LEFT (MIN 3 VIEWS)    CBC with Auto Differential    CMP    Uric Acid    C-Reactive Protein    Sedimentation Rate    [address], [address] [place] [address]    Saline lock IV    Vascular duplex upper extremity venous left        Medications   ceFAZolin (ANCEF) 2000 mg in sterile water 20 mL IV syringe (has no administration in time range)   ketorolac (TORADOL) injection 30 mg (30 mg IntraVENous Given 3/11/23 1734)   0.9 % sodium chloride bolus (1,000 mLs IntraVENous New Bag 3/11/23 1733)   dexamethasone (DECADRON) injection 10 mg (10 mg IntraVENous Given 3/11/23 1836)   morphine injection 4 mg (4 mg IntraVENous Given 3/11/23 1836)   ondansetron (ZOFRAN) injection 4 mg (4 mg IntraVENous Given 3/11/23 1836)       New Prescriptions    CEPHALEXIN (KEFLEX) 500 MG CAPSULE    Take 1 capsule by mouth 4 times daily for 10 days    DICLOFENAC (CATAFLAM) 50 MG TABLET    Take 1 tablet by mouth 3 times daily as needed for Pain (With food) Do not use over-the-counter ibuprofen and/or Aleve if using this product. You may use Tylenol as directed with this product. TRAMADOL (ULTRAM) 50 MG TABLET    Take 1 tablet by mouth every 6 hours as needed for Pain for up to 3 days. Intended supply: 3 days.  Take lowest dose possible to manage pain Max Daily Amount: 200 mg        Past Medical History:   Diagnosis Date    Axillary hidradenitis suppurativa     Diabetes ([address]     oral med; avg fasting ; last A1C 7.6; denies hypo sx    Hidradenitis suppurativa     History of blood transfusion     Personal history of COVID-19 2021    not hospitalized        Past Surgical History: Procedure Laterality Date    ANKLE SURGERY Right     AXILLARY SURGERY Right 07/08/2022    RIGHT AXILLARY HYDRADENITIS EXCISION/ RIGHT ABDOMEN, BACK INGUINAL, & LEFT FLANK, performed by Vincent Quigley DO at [facility]  07/08/2022    Excision of right axillary hidradenitis 15x12 cm with creation of bilateral advancement flaps, complex closure; hidranitis excision of right abd, bilateral inguinal, left pannus, and left flank        Family History   Problem Relation Age of Onset    Other Mother         Brian Asher    Diabetes Mother     Diabetes Brother     Mult Sclerosis Brother         Social History     Socioeconomic History    Marital status: Single   Occupational History    Occupation: dispatcher   Tobacco Use    Smoking status: Never    Smokeless tobacco: Never   Vaping Use    Vaping Use: Never used   Substance and Sexual Activity    Alcohol use: Not Currently    Drug use: No    Sexual activity: Not Currently     Partners: Male   Social History Narrative    Lives with son who works at [employer] 3/1/03    Single never      Social Determinants of Health     Financial Resource Strain: Low Risk     Difficulty of Paying Living Expenses: Not hard at all   Food Insecurity: No Food Insecurity    Worried About [address] [employer] in the Last Year: Never true    [facility] in the Last Year: Never true   Transportation Needs: No Transportation Needs    Lack of Transportation (Medical): No    Lack of Transportation (Non-Medical):  No   Physical Activity: Insufficiently Active    Days of Exercise per Week: 5 days    Minutes of Exercise per Session: 20 min   Stress: Stress Concern Present    Feeling of Stress : Very much   Social Connections: Socially Isolated    Frequency of Communication with Friends and Family: Once a week    Frequency of Social Gatherings with Friends and Family: Once a week    Attends Mormonism Services: Never    Active Member of Clubs or Organizations: No    Attends Club or Organization Meetings: Never    Marital Status: Never    Intimate Partner Violence: Not At Risk    Fear of Current or Ex-Partner: No    Emotionally Abused: No    Physically Abused: No    Sexually Abused: No   Housing Stability: Unknown    Unable to Pay for Housing in the Last Year: No    Unstable Housing in the Last Year: No        Allergies: Patient has no known allergies. Previous Medications    BLOOD GLUCOSE MONITORING SUPPL (ONE TOUCH ULTRA 2) W/DEVICE KIT    USE TO CHECK BLOOD SUGAR    BUPROPION (WELLBUTRIN XL) 150 MG EXTENDED RELEASE TABLET    Take 1 tablet by mouth every morning    ESCITALOPRAM (LEXAPRO) 10 MG TABLET    Take 1 tablet by mouth daily    LISINOPRIL (PRINIVIL;ZESTRIL) 10 MG TABLET    Take 1 tablet by mouth daily    ONETOUCH DELICA LANCETS 70Z MISC    Inject into the skin in the morning and at bedtime     ONETOUCH ULTRA STRIP    Inject 1 each into the skin 2 times daily     QUETIAPINE (SEROQUEL) 50 MG TABLET    Take 1 tablet by mouth 2 times daily    SEMAGLUTIDE 14 MG TABS    Take 1 tablet by mouth daily        Results for orders placed or performed during the hospital encounter of 03/11/23   XR HAND LEFT (MIN 3 VIEWS)    Narrative    EXAMINATION: Three-view left hand    Date: 3/11/2023 5:15 PM    Comparison: None    Clinical History: Swelling pain         Findings: There is no fracture, subluxation, or dislocation. The joint spaces are within normal limits. Impression    Impression:    No fracture, subluxation, or dislocation. [address].      Tania Gamboa M.D.   3/11/2023 5:39:00 PM   CBC with Auto Differential   Result Value Ref Range    WBC 7.3 4.3 - 11.1 K/uL    RBC 3.89 (L) 4.05 - 5.2 M/uL    Hemoglobin 9.0 (L) 11.7 - 15.4 g/dL    Hematocrit 29.8 (L) 35.8 - 46.3 %    MCV 76.6 (L) 82.0 - 102.0 FL    MCH 23.1 (L) 26.1 - 32.9 PG    MCHC 30.2 (L) 31.4 - 35.0 g/dL    RDW 17.9 (H) 11.9 - 14.6 %    Platelets 879 446 - 596 K/uL MPV 9.5 9.4 - 12.3 FL    nRBC 0.00 0.0 - 0.2 K/uL    Differential Type AUTOMATED      Seg Neutrophils 62 43 - 78 %    Lymphocytes 29 13 - 44 %    Monocytes 5 4.0 - 12.0 %    Eosinophils % 3 0.5 - 7.8 %    Basophils 0 0.0 - 2.0 %    Immature Granulocytes 0 0.0 - 5.0 %    Segs Absolute 4.5 1.7 - 8.2 K/UL    Absolute Lymph # 2.1 0.5 - 4.6 K/UL    Absolute Mono # 0.4 0.1 - 1.3 K/UL    Absolute Eos # 0.2 0.0 - 0.8 K/UL    Basophils Absolute 0.0 0.0 - 0.2 K/UL    Absolute Immature Granulocyte 0.0 0.0 - 0.5 K/UL   CMP   Result Value Ref Range    Sodium 139 133 - 143 mmol/L    Potassium 3.7 3.5 - 5.1 mmol/L    Chloride 109 101 - 110 mmol/L    CO2 28 21 - 32 mmol/L    Anion Gap 2 2 - 11 mmol/L    Glucose 112 (H) 65 - 100 mg/dL    BUN 15 6 - 23 MG/DL    Creatinine 0.70 0.6 - 1.0 MG/DL    Est, Glom Filt Rate >60 >60 ml/min/1.73m2    Calcium 8.9 8.3 - 10.4 MG/DL    Total Bilirubin 0.3 0.2 - 1.1 MG/DL    ALT 44 12 - 65 U/L    AST 33 15 - 37 U/L    Alk Phosphatase 168 (H) 50 - 130 U/L    Total Protein 8.7 (H) 6.3 - 8.2 g/dL    Albumin 3.1 (L) 3.5 - 5.0 g/dL    Globulin 5.6 (H) 2.8 - 4.5 g/dL    Albumin/Globulin Ratio 0.6 0.4 - 1.6     Uric Acid   Result Value Ref Range    Uric Acid 4.4 2.6 - 6.0 MG/DL   C-Reactive Protein   Result Value Ref Range    CRP 2.1 (H) 0.0 - 0.9 mg/dL   Sedimentation Rate   Result Value Ref Range    Sed Rate, Automated 124 (H) 0 - 20 mm/hr   Vascular duplex upper extremity venous left    Narrative    Left upper extremity venous ultrasound    INDICATION:  Pain, swelling    COMPARISON:  None. TECHNIQUE:  Multiple grayscale and color Doppler ultrasound images were   obtained. Spectral doppler analysis was performed. FINDINGS:    The visualized deep veins are of normal compressibility and patency. Small   amount of fluid seen adjacent to the metacarpal, incompletely visualized and   evaluated.       Impression    Small amount of fluid seen adjacent to the metacarpal, incompletely visualized   and evaluated. Consider MRI as indicated. No sonographic evidence of deep venous thrombosis. Ayala Curran M.D.   3/11/2023 7:44:00 PM        Vascular duplex upper extremity venous left   Final Result      Small amount of fluid seen adjacent to the metacarpal, incompletely visualized    and evaluated. Consider MRI as indicated. No sonographic evidence of deep venous thrombosis. Ayala Curran M.D.    3/11/2023 7:44:00 PM      XR HAND LEFT (MIN 3 VIEWS)   Final Result   Impression:      No fracture, subluxation, or dislocation. [address]. Sayra Geiger M.D.    3/11/2023 5:39:00 PM                        Voice dictation software was used during the making of this note. This software is not perfect and grammatical and other typographical errors may be present. This note has not been completely proofread for errors.      RAJANI Lamb  03/11/23 2010

## 2023-03-12 NOTE — ED NOTES
Pt resting comfortably, NS fluids infusing and pt has no complaints.       Cherri Najjar, RN  03/11/23 1927

## 2023-03-12 NOTE — ED NOTES
I have reviewed discharge instructions with the patient. The patient verbalized understanding. Patient left ED via Discharge Method: ambulatory to Home with self    Opportunity for questions and clarification provided. Patient given 4 scripts. To continue your aftercare when you leave the hospital, you may receive an automated call from our care team to check in on how you are doing. This is a free service and part of our promise to provide the best care and service to meet your aftercare needs.  If you have questions, or wish to unsubscribe from this service please call [phone]. Thank you for Choosing our [facility] Emergency Department.         Wendi Foley RN  03/11/23 2901

## 2023-03-12 NOTE — DISCHARGE INSTRUCTIONS
Use warm moist heat multiple times a day to the area. Take the antibiotics as directed. Use the pain medication as directed. Return to the ED if worsening in any way.

## 2023-03-14 ENCOUNTER — CARE COORDINATION (OUTPATIENT)
Dept: CARE COORDINATION | Facility: CLINIC | Age: 43
End: 2023-03-14

## 2023-03-14 NOTE — CARE COORDINATION
Ambulatory Care Management Note        Date/Time:  3/14/2023 8:03 AM    This patient was received as a referral from  Daily assignment for case management of recent er admission. [facility] outreached to patient. No answer at this time, unable to leave voicemail. Awaiting response. If no response, [facility] will outreach tomorrow.

## 2023-03-15 ENCOUNTER — CARE COORDINATION (OUTPATIENT)
Dept: CARE COORDINATION | Facility: CLINIC | Age: 43
End: 2023-03-15

## 2023-03-15 NOTE — CARE COORDINATION
Ambulatory Care Management Note        Date/Time:  3/15/2023 10:34 AM    Ccm outreached to patient. No answer at this time, unable to leave message. Awaiting response. If no response, ccm will outreach next week.

## 2023-03-17 ENCOUNTER — OFFICE VISIT (OUTPATIENT)
Dept: FAMILY MEDICINE CLINIC | Facility: CLINIC | Age: 43
End: 2023-03-17
Payer: COMMERCIAL

## 2023-03-17 VITALS
WEIGHT: 284 LBS | TEMPERATURE: 97.4 F | DIASTOLIC BLOOD PRESSURE: 85 MMHG | HEART RATE: 90 BPM | BODY MASS INDEX: 42.06 KG/M2 | HEIGHT: 69 IN | SYSTOLIC BLOOD PRESSURE: 128 MMHG

## 2023-03-17 DIAGNOSIS — M79.645 THUMB PAIN, LEFT: Primary | ICD-10-CM

## 2023-03-17 DIAGNOSIS — R70.0 ELEVATED SED RATE: ICD-10-CM

## 2023-03-17 LAB
BASOPHILS # BLD: 0 K/UL (ref 0–0.2)
BASOPHILS NFR BLD: 1 % (ref 0–2)
DIFFERENTIAL METHOD BLD: ABNORMAL
EOSINOPHIL # BLD: 0.2 K/UL (ref 0–0.8)
EOSINOPHIL NFR BLD: 3 % (ref 0.5–7.8)
ERYTHROCYTE [DISTWIDTH] IN BLOOD BY AUTOMATED COUNT: 18.6 % (ref 11.9–14.6)
HCT VFR BLD AUTO: 30.4 % (ref 35.8–46.3)
HGB BLD-MCNC: 9 G/DL (ref 11.7–15.4)
IMM GRANULOCYTES # BLD AUTO: 0 K/UL (ref 0–0.5)
IMM GRANULOCYTES NFR BLD AUTO: 0 % (ref 0–5)
LYMPHOCYTES # BLD: 1.9 K/UL (ref 0.5–4.6)
LYMPHOCYTES NFR BLD: 23 % (ref 13–44)
MCH RBC QN AUTO: 23.4 PG (ref 26.1–32.9)
MCHC RBC AUTO-ENTMCNC: 29.6 G/DL (ref 31.4–35)
MCV RBC AUTO: 79.2 FL (ref 82–102)
MONOCYTES # BLD: 0.5 K/UL (ref 0.1–1.3)
MONOCYTES NFR BLD: 6 % (ref 4–12)
NEUTS SEG # BLD: 5.6 K/UL (ref 1.7–8.2)
NEUTS SEG NFR BLD: 68 % (ref 43–78)
NRBC # BLD: 0 K/UL (ref 0–0.2)
PLATELET # BLD AUTO: 383 K/UL (ref 150–450)
PMV BLD AUTO: 10.3 FL (ref 9.4–12.3)
RBC # BLD AUTO: 3.84 M/UL (ref 4.05–5.2)
WBC # BLD AUTO: 8.2 K/UL (ref 4.3–11.1)

## 2023-03-17 PROCEDURE — 99214 OFFICE O/P EST MOD 30 MIN: CPT | Performed by: NURSE PRACTITIONER

## 2023-03-17 PROCEDURE — 3074F SYST BP LT 130 MM HG: CPT | Performed by: NURSE PRACTITIONER

## 2023-03-17 PROCEDURE — 3079F DIAST BP 80-89 MM HG: CPT | Performed by: NURSE PRACTITIONER

## 2023-03-17 ASSESSMENT — PATIENT HEALTH QUESTIONNAIRE - PHQ9
10. IF YOU CHECKED OFF ANY PROBLEMS, HOW DIFFICULT HAVE THESE PROBLEMS MADE IT FOR YOU TO DO YOUR WORK, TAKE CARE OF THINGS AT HOME, OR GET ALONG WITH OTHER PEOPLE: 0
SUM OF ALL RESPONSES TO PHQ9 QUESTIONS 1 & 2: 0
2. FEELING DOWN, DEPRESSED OR HOPELESS: 0
SUM OF ALL RESPONSES TO PHQ QUESTIONS 1-9: 0
3. TROUBLE FALLING OR STAYING ASLEEP: 0
5. POOR APPETITE OR OVEREATING: 0
4. FEELING TIRED OR HAVING LITTLE ENERGY: 0
8. MOVING OR SPEAKING SO SLOWLY THAT OTHER PEOPLE COULD HAVE NOTICED. OR THE OPPOSITE, BEING SO FIGETY OR RESTLESS THAT YOU HAVE BEEN MOVING AROUND A LOT MORE THAN USUAL: 0
SUM OF ALL RESPONSES TO PHQ QUESTIONS 1-9: 0
6. FEELING BAD ABOUT YOURSELF - OR THAT YOU ARE A FAILURE OR HAVE LET YOURSELF OR YOUR FAMILY DOWN: 0
SUM OF ALL RESPONSES TO PHQ QUESTIONS 1-9: 0
7. TROUBLE CONCENTRATING ON THINGS, SUCH AS READING THE NEWSPAPER OR WATCHING TELEVISION: 0
9. THOUGHTS THAT YOU WOULD BE BETTER OFF DEAD, OR OF HURTING YOURSELF: 0
1. LITTLE INTEREST OR PLEASURE IN DOING THINGS: 0
SUM OF ALL RESPONSES TO PHQ QUESTIONS 1-9: 0

## 2023-03-17 NOTE — PROGRESS NOTES
Subjective:      Patient ID: Aby Porras is a 40 y.o. female. Here for er follow up for left / thumb hand swelling . The er Md asked for her to be seen to see if she has lupus. She got some sort of antibiotic  and it got better. Has an apt on Monday with ortho about her hand. She still struggles with chronic skin infection  Depression is better taking med's sleeping better  HPI    Review of Systems  Improved swelling and pain in left thumb but still sore  No fever  No chills  Objective:   Physical Exam  Vitals and nursing note reviewed. Constitutional:       Appearance: Normal appearance. She is obese. HENT:      Head: Normocephalic. Cardiovascular:      Rate and Rhythm: Normal rate and regular rhythm. Pulses: Normal pulses. Heart sounds: Normal heart sounds. Pulmonary:      Effort: Pulmonary effort is normal.   Musculoskeletal:         General: Swelling (slight at base of left thumb) and tenderness present. Normal range of motion. Skin:     General: Skin is warm and dry. Capillary Refill: Capillary refill takes less than 2 seconds. Neurological:      General: No focal deficit present. Mental Status: She is alert. Psychiatric:         Mood and Affect: Mood normal.         Behavior: Behavior normal.         Thought Content: Thought content normal.         Judgment: Judgment normal.       Assessment:      /85 (Site: Left Upper Arm, Position: Sitting, Cuff Size: Large Adult)   Pulse 90   Temp 97.4 °F (36.3 °C) (Temporal)   Ht 5' 9\" (1.753 m)   Wt 284 lb (128.8 kg)   LMP 02/26/2023   BMI 41.94 kg/m²        Plan:      1. Thumb pain, left  -     C-Reactive Protein; Future  -     CBC with Auto Differential; Future  -     IDALIA, Direct, w/Reflex; Future  -     RPR; Future  2. Elevated sed rate  -     C-Reactive Protein; Future  -     CBC with Auto Differential; Future  -     IDALIA, Direct, w/Reflex; Future  -     RPR;  Future     Reviewed er records xray's labs  If had a septic joint would explain  high crp and sed rate- but CBC was normal . I  will repeat labs and add zana and Ra to them as the er provider suggested to her She has an apt with ortho will defer to them as it an MRI of the joint.    Glad her depression and insomnia are improved on current meds  RADHA Medina - NP

## 2023-03-18 LAB — CRP SERPL-MCNC: 1.5 MG/DL (ref 0–0.9)

## 2023-03-20 ENCOUNTER — OFFICE VISIT (OUTPATIENT)
Dept: ORTHOPEDIC SURGERY | Age: 43
End: 2023-03-20
Payer: COMMERCIAL

## 2023-03-20 ENCOUNTER — TELEPHONE (OUTPATIENT)
Dept: FAMILY MEDICINE CLINIC | Facility: CLINIC | Age: 43
End: 2023-03-20

## 2023-03-20 VITALS — BODY MASS INDEX: 40.73 KG/M2 | WEIGHT: 275 LBS | HEIGHT: 69 IN

## 2023-03-20 DIAGNOSIS — M65.9 TENOSYNOVITIS: ICD-10-CM

## 2023-03-20 DIAGNOSIS — M79.645 THUMB PAIN, LEFT: Primary | ICD-10-CM

## 2023-03-20 DIAGNOSIS — M79.642 LEFT HAND PAIN: Primary | ICD-10-CM

## 2023-03-20 LAB
ANA SER QL: POSITIVE
CENTROMERE B AB SER-ACNC: <0.2 AI (ref 0–0.9)
CHROMATIN AB SERPL-ACNC: <0.2 AI (ref 0–0.9)
DSDNA AB SER-ACNC: <1 IU/ML (ref 0–9)
ENA JO1 AB SER-ACNC: <0.2 AI (ref 0–0.9)
ENA RNP AB SER-ACNC: 1.1 AI (ref 0–0.9)
ENA SCL70 AB SER-ACNC: <0.2 AI (ref 0–0.9)
ENA SM AB SER-ACNC: <0.2 AI (ref 0–0.9)
ENA SS-A AB SER-ACNC: <0.2 AI (ref 0–0.9)
ENA SS-B AB SER-ACNC: <0.2 AI (ref 0–0.9)
Lab: ABNORMAL
RPR SER QL: NONREACTIVE

## 2023-03-20 PROCEDURE — 99204 OFFICE O/P NEW MOD 45 MIN: CPT | Performed by: ORTHOPAEDIC SURGERY

## 2023-03-20 RX ORDER — MELOXICAM 7.5 MG/1
7.5 TABLET ORAL 2 TIMES DAILY
Qty: 42 TABLET | Refills: 0 | Status: SHIPPED | OUTPATIENT
Start: 2023-03-20 | End: 2023-03-21

## 2023-03-20 NOTE — TELEPHONE ENCOUNTER
I called the patients cell phone and couldn't leave a message due to her voicemail being full. I sent a [employer] message to the patient. The lab lady said she can't draw the labs for other providers, she will have to go to the outpatient lab to have them done.

## 2023-03-20 NOTE — PROGRESS NOTES
[facility] Note    Name: Bety Dozier  YOB: 1980  Gender: female  MRN: [id number]      CC: Patient referred for evaluation of upper extremity pain    HPI: Bety Dozier is a 40 y.o. female with a chief complaint of left thumb swelling and pain for 3 days, with no history of injury. She says the thumb was red and warm, and she was seen in the ED on 3/11. She was given injections of Keflex Decadron and Toradol. She was sent home with prescriptions for Keflex and diclofenac. Swelling and redness has improved. She still does have some pain in the area but it is getting better. She saw her PCP who ordered some additional blood work. ROS/Meds/PSH/PMH/FH/SH: I personally reviewed the patients standard intake form. Pertinents are discussed in the HPI    Physical Examination:    Musculoskeletal Exam:  Examination on the left upper extremity demonstrates cap refill < 5 seconds in all fingers, skin is intact, there is no warmth or erythema. There is no appreciable soft tissue swelling. She has tenderness to palpation at the thumb [facility] joint, and has a positive CMC grind. There is no tenderness to palpation over the thumb A1 pulley, no triggering present. Light touch sensation is intact throughout. Imaging / Electrodiagnostic Tests:     I independently reviewed and interpreted left hand radiographs. They demonstrate mild degenerative changes at the thumb [facility] joint      Assessment:     ICD-10-CM    1. Left hand pain  M79.642 Rheumatoid Factor     CCP Antibodies, IGG/IGA     HLA-B27 Antigen     meloxicam (MOBIC) 7.5 MG tablet      2. Tenosynovitis  M65.9 Rheumatoid Factor     CCP Antibodies, IGG/IGA     HLA-B27 Antigen     meloxicam (MOBIC) 7.5 MG tablet          Plan:   We discussed the diagnosis and different treatment options. I reviewed the patient's lab work from 3/11 as well as 3/17. She did have elevated ESR and CRP, with normal white blood cell counts. No

## 2023-03-20 NOTE — TELEPHONE ENCOUNTER
----- Message from RADHA Shankar NP sent at 3/20/2023  7:47 AM EDT -----  So far labs back cbc stable no elevation in wbc good news, still anemic work with gyn for this, CRP up but lower than in er other labs still pending

## 2023-03-20 NOTE — TELEPHONE ENCOUNTER
I called the patient and spoke with her about her lab results. She saw the Ortho MD and she told her that you ordered the wrong labs to check for Rheumatoid. So the Ortho MD put in the lab orders and she wanted to get them done here. Not sure if the lab person can see them here to get them done.

## 2023-03-21 ENCOUNTER — CARE COORDINATION (OUTPATIENT)
Dept: CARE COORDINATION | Facility: CLINIC | Age: 43
End: 2023-03-21

## 2023-03-21 DIAGNOSIS — M65.9 TENOSYNOVITIS: Primary | ICD-10-CM

## 2023-03-21 RX ORDER — MELOXICAM 15 MG/1
15 TABLET ORAL DAILY
Qty: 21 TABLET | Refills: 0 | Status: SHIPPED | OUTPATIENT
Start: 2023-03-21 | End: 2023-04-11

## 2023-03-21 NOTE — CARE COORDINATION
Ambulatory Care Management Note        Date/Time:  3/21/2023 11:55 AM    Ccm outreached to patient. No answer at this time, unable to leave message. Ccm will close case at this time d/t unable to reach x3. Ccm will remain available as needed.

## 2023-04-18 ENCOUNTER — APPOINTMENT (RX ONLY)
Dept: URBAN - NONMETROPOLITAN AREA CLINIC 1 | Facility: CLINIC | Age: 43
Setting detail: DERMATOLOGY
End: 2023-04-18

## 2023-04-18 DIAGNOSIS — L73.2 HIDRADENITIS SUPPURATIVA: ICD-10-CM | Status: INADEQUATELY CONTROLLED

## 2023-04-18 DIAGNOSIS — Z79.899 OTHER LONG TERM (CURRENT) DRUG THERAPY: ICD-10-CM

## 2023-04-18 PROCEDURE — ? HIGH RISK MEDICATION MONITORING

## 2023-04-18 PROCEDURE — ? PRESCRIPTION MEDICATION MANAGEMENT

## 2023-04-18 PROCEDURE — ? COUNSELING

## 2023-04-18 PROCEDURE — ? ORDER TESTS

## 2023-04-18 PROCEDURE — ? PRESCRIPTION

## 2023-04-18 PROCEDURE — 99214 OFFICE O/P EST MOD 30 MIN: CPT

## 2023-04-18 PROCEDURE — ? MEDICATION COUNSELING

## 2023-04-18 PROCEDURE — ? HUMIRA INITIATION

## 2023-04-18 RX ORDER — MINOCYCLINE HYDROCHLORIDE 100 MG/1
TABLET ORAL
Qty: 60 | Refills: 1 | Status: ERX | COMMUNITY
Start: 2023-04-18

## 2023-04-18 RX ADMIN — MINOCYCLINE HYDROCHLORIDE: 100 TABLET ORAL at 00:00

## 2023-04-18 ASSESSMENT — LOCATION DETAILED DESCRIPTION DERM
LOCATION DETAILED: LEFT RIB CAGE
LOCATION DETAILED: PERIUMBILICAL SKIN
LOCATION DETAILED: LEFT ANTERIOR PROXIMAL THIGH
LOCATION DETAILED: RIGHT SUPRAPUBIC SKIN
LOCATION DETAILED: RIGHT RIB CAGE

## 2023-04-18 ASSESSMENT — LOCATION ZONE DERM
LOCATION ZONE: TRUNK
LOCATION ZONE: LEG

## 2023-04-18 ASSESSMENT — LOCATION SIMPLE DESCRIPTION DERM
LOCATION SIMPLE: GROIN
LOCATION SIMPLE: LEFT THIGH
LOCATION SIMPLE: ABDOMEN

## 2023-04-18 ASSESSMENT — HURLEY STAGE
IN YOUR EXPERIENCE, AMONG ALL PATIENTS YOU HAVE SEEN WITH THIS CONDITION, HOW SEVERE IS THIS PATIENT'S CONDITION?: HURLEY STAGE III: MULTIPLE INTERCONNECTED SINUS TRACTS AND ABSCESSES THROUGHOUT AN ENTIRE AREA

## 2023-04-18 NOTE — PROCEDURE: HUMIRA INITIATION
Is Cyclosporine Contraindicated?: No
Humira Dosing: 160 mg SC day 1, 80 mg SC day 15, then 40 mg SC every week starting on day 29
Pregnancy And Lactation Warning Text: This medication is Pregnancy Category B and is considered safe during pregnancy. It is unknown if this medication is excreted in breast milk.
Humira Monitoring Guidelines: A yearly test for tuberculosis is required while taking Humira.
Diagnosis (Required): Hidradenitis Suppurativa
Detail Level: Zone

## 2023-04-18 NOTE — PROCEDURE: PRESCRIPTION MEDICATION MANAGEMENT
Initiate Treatment: Minocycline
Detail Level: Zone
Modify Regimen: Restart humira
Plan: Pt stopped humira due to personal family reasons, but wants to restart.
Render In Strict Bullet Format?: No

## 2023-04-18 NOTE — PROCEDURE: ORDER TESTS
Bill For Surgical Tray: no
Billing Type: Third-Party Bill
Expected Date Of Service: 04/18/2023
Performing Laboratory: 0

## 2023-04-18 NOTE — PROCEDURE: MEDICATION COUNSELING
show Ivermectin Counseling:  Patient instructed to take medication on an empty stomach with a full glass of water.  Patient informed of potential adverse effects including but not limited to nausea, diarrhea, dizziness, itching, and swelling of the extremities or lymph nodes.  The patient verbalized understanding of the proper use and possible adverse effects of ivermectin.  All of the patient's questions and concerns were addressed.

## 2023-04-18 NOTE — PROCEDURE: HIGH RISK MEDICATION MONITORING
Xelasaelz Pregnancy And Lactation Text: This medication is Pregnancy Category D and is not considered safe during pregnancy.  The risk during breast feeding is also uncertain.

## 2023-04-21 DIAGNOSIS — M65.9 TENOSYNOVITIS: ICD-10-CM

## 2023-04-21 RX ORDER — MELOXICAM 15 MG/1
15 TABLET ORAL DAILY
Qty: 21 TABLET | Refills: 0 | OUTPATIENT
Start: 2023-04-21 | End: 2023-05-12

## 2023-06-06 DIAGNOSIS — F32.9 REACTIVE DEPRESSION: ICD-10-CM

## 2023-06-07 RX ORDER — QUETIAPINE FUMARATE 50 MG/1
50 TABLET, FILM COATED ORAL 2 TIMES DAILY
Qty: 60 TABLET | Refills: 0 | Status: SHIPPED | OUTPATIENT
Start: 2023-06-07

## 2023-06-07 NOTE — TELEPHONE ENCOUNTER
[facility] Clinical Staff  Phone Number: [phone]     Refills have been requested for the following medications:         QUEtiapine (SEROQUEL) 50 MG tablet RADHA Espinosa     Preferred pharmacy: [facility] [address] [place] [address] [phone]

## 2023-06-23 ENCOUNTER — HOSPITAL ENCOUNTER (EMERGENCY)
Age: 43
Discharge: HOME | End: 2023-06-23
Attending: EMERGENCY MEDICINE
Payer: COMMERCIAL

## 2023-06-23 ENCOUNTER — APPOINTMENT (OUTPATIENT)
Dept: GENERAL RADIOLOGY | Age: 43
End: 2023-06-23
Payer: COMMERCIAL

## 2023-06-23 VITALS
HEART RATE: 79 BPM | BODY MASS INDEX: 40.61 KG/M2 | TEMPERATURE: 98.7 F | SYSTOLIC BLOOD PRESSURE: 141 MMHG | HEIGHT: 69 IN | RESPIRATION RATE: 18 BRPM | DIASTOLIC BLOOD PRESSURE: 77 MMHG | OXYGEN SATURATION: 98 %

## 2023-06-23 DIAGNOSIS — M79.644 PAIN OF RIGHT THUMB: Primary | ICD-10-CM

## 2023-06-23 DIAGNOSIS — M54.50 ACUTE RIGHT-SIDED LOW BACK PAIN WITHOUT SCIATICA: ICD-10-CM

## 2023-06-23 LAB
ANION GAP SERPL CALC-SCNC: 8 MMOL/L (ref 2–11)
BASOPHILS # BLD: 0 K/UL (ref 0–0.2)
BASOPHILS NFR BLD: 0 % (ref 0–2)
BUN SERPL-MCNC: 11 MG/DL (ref 6–23)
CALCIUM SERPL-MCNC: 9 MG/DL (ref 8.3–10.4)
CHLORIDE SERPL-SCNC: 108 MMOL/L (ref 101–110)
CO2 SERPL-SCNC: 25 MMOL/L (ref 21–32)
CREAT SERPL-MCNC: 0.88 MG/DL (ref 0.6–1)
DIFFERENTIAL METHOD BLD: ABNORMAL
EOSINOPHIL # BLD: 0.2 K/UL (ref 0–0.8)
EOSINOPHIL NFR BLD: 2 % (ref 0.5–7.8)
ERYTHROCYTE [DISTWIDTH] IN BLOOD BY AUTOMATED COUNT: 17.3 % (ref 11.9–14.6)
GLUCOSE SERPL-MCNC: 198 MG/DL (ref 65–100)
HCT VFR BLD AUTO: 29 % (ref 35.8–46.3)
HGB BLD-MCNC: 8.5 G/DL (ref 11.7–15.4)
IMM GRANULOCYTES # BLD AUTO: 0 K/UL (ref 0–0.5)
IMM GRANULOCYTES NFR BLD AUTO: 0 % (ref 0–5)
LYMPHOCYTES # BLD: 2 K/UL (ref 0.5–4.6)
LYMPHOCYTES NFR BLD: 21 % (ref 13–44)
MCH RBC QN AUTO: 22 PG (ref 26.1–32.9)
MCHC RBC AUTO-ENTMCNC: 29.3 G/DL (ref 31.4–35)
MCV RBC AUTO: 74.9 FL (ref 82–102)
MONOCYTES # BLD: 0.4 K/UL (ref 0.1–1.3)
MONOCYTES NFR BLD: 4 % (ref 4–12)
NEUTS SEG # BLD: 7.1 K/UL (ref 1.7–8.2)
NEUTS SEG NFR BLD: 72 % (ref 43–78)
NRBC # BLD: 0 K/UL (ref 0–0.2)
PLATELET # BLD AUTO: 400 K/UL (ref 150–450)
PMV BLD AUTO: 9.6 FL (ref 9.4–12.3)
POTASSIUM SERPL-SCNC: 4.4 MMOL/L (ref 3.5–5.1)
RBC # BLD AUTO: 3.87 M/UL (ref 4.05–5.2)
SODIUM SERPL-SCNC: 141 MMOL/L (ref 133–143)
URATE SERPL-MCNC: 5.6 MG/DL (ref 2.6–6)
WBC # BLD AUTO: 9.8 K/UL (ref 4.3–11.1)

## 2023-06-23 PROCEDURE — 99285 EMERGENCY DEPT VISIT HI MDM: CPT

## 2023-06-23 PROCEDURE — 96375 TX/PRO/DX INJ NEW DRUG ADDON: CPT

## 2023-06-23 PROCEDURE — 73130 X-RAY EXAM OF HAND: CPT

## 2023-06-23 PROCEDURE — 93005 ELECTROCARDIOGRAM TRACING: CPT | Performed by: EMERGENCY MEDICINE

## 2023-06-23 PROCEDURE — 6360000002 HC RX W HCPCS: Performed by: EMERGENCY MEDICINE

## 2023-06-23 PROCEDURE — 80048 BASIC METABOLIC PNL TOTAL CA: CPT

## 2023-06-23 PROCEDURE — 84550 ASSAY OF BLOOD/URIC ACID: CPT

## 2023-06-23 PROCEDURE — 85025 COMPLETE CBC W/AUTO DIFF WBC: CPT

## 2023-06-23 PROCEDURE — 2580000003 HC RX 258: Performed by: EMERGENCY MEDICINE

## 2023-06-23 PROCEDURE — 96374 THER/PROPH/DIAG INJ IV PUSH: CPT

## 2023-06-23 PROCEDURE — 6370000000 HC RX 637 (ALT 250 FOR IP): Performed by: EMERGENCY MEDICINE

## 2023-06-23 PROCEDURE — 71046 X-RAY EXAM CHEST 2 VIEWS: CPT

## 2023-06-23 RX ORDER — KETOROLAC TROMETHAMINE 30 MG/ML
30 INJECTION, SOLUTION INTRAMUSCULAR; INTRAVENOUS
Status: COMPLETED | OUTPATIENT
Start: 2023-06-23 | End: 2023-06-23

## 2023-06-23 RX ORDER — HYDROCODONE BITARTRATE AND ACETAMINOPHEN 5; 325 MG/1; MG/1
1 TABLET ORAL
Status: COMPLETED | OUTPATIENT
Start: 2023-06-23 | End: 2023-06-23

## 2023-06-23 RX ORDER — CYCLOBENZAPRINE HCL 10 MG
10 TABLET ORAL 3 TIMES DAILY PRN
Qty: 21 TABLET | Refills: 0 | Status: SHIPPED | OUTPATIENT
Start: 2023-06-23 | End: 2023-07-03

## 2023-06-23 RX ADMIN — HYDROCODONE BITARTRATE AND ACETAMINOPHEN 1 TABLET: 5; 325 TABLET ORAL at 23:07

## 2023-06-23 RX ADMIN — METHYLPREDNISOLONE SODIUM SUCCINATE 125 MG: 125 INJECTION INTRAMUSCULAR; INTRAVENOUS at 22:27

## 2023-06-23 RX ADMIN — KETOROLAC TROMETHAMINE 30 MG: 30 INJECTION, SOLUTION INTRAMUSCULAR at 22:28

## 2023-06-23 ASSESSMENT — ENCOUNTER SYMPTOMS
DIARRHEA: 0
COLOR CHANGE: 0
COUGH: 0
BACK PAIN: 1
VOMITING: 0
CONSTIPATION: 0
ABDOMINAL PAIN: 0
SHORTNESS OF BREATH: 0
NAUSEA: 0

## 2023-06-23 ASSESSMENT — PAIN SCALES - GENERAL: PAINLEVEL_OUTOF10: 10

## 2023-06-23 ASSESSMENT — PAIN - FUNCTIONAL ASSESSMENT: PAIN_FUNCTIONAL_ASSESSMENT: 0-10

## 2023-06-24 LAB
EKG ATRIAL RATE: 86 BPM
EKG DIAGNOSIS: NORMAL
EKG P AXIS: 43 DEGREES
EKG P-R INTERVAL: 160 MS
EKG Q-T INTERVAL: 354 MS
EKG QRS DURATION: 86 MS
EKG QTC CALCULATION (BAZETT): 423 MS
EKG R AXIS: 1 DEGREES
EKG T AXIS: 34 DEGREES
EKG VENTRICULAR RATE: 86 BPM

## 2023-06-24 PROCEDURE — 93010 ELECTROCARDIOGRAM REPORT: CPT | Performed by: INTERNAL MEDICINE

## 2023-06-24 NOTE — ED NOTES
I have reviewed discharge instructions with the patient. The patient verbalized understanding. Patient left ED via Discharge Method: ambulatory to Home with friend. Opportunity for questions and clarification provided. Patient given 2 scripts. To continue your aftercare when you leave the hospital, you may receive an automated call from our care team to check in on how you are doing. This is a free service and part of our promise to provide the best care and service to meet your aftercare needs.  If you have questions, or wish to unsubscribe from this service please call [phone]. Thank you for Choosing our [facility] Emergency Department.        Geovany Mcgee RN  06/23/23 1675

## 2023-06-24 NOTE — ED TRIAGE NOTES
Pt primary c/o right hand pain, swelling, right neck and back pain, pt also c/o some cp, pt htn in triage  [phone]

## 2023-06-24 NOTE — ED PROVIDER NOTES
Emergency Department Provider Note       PCP: RADHA Ventura NP   Age: 40 y.o. Sex: female     DISPOSITION Decision To Discharge 06/23/2023 10:47:52 PM       ICD-10-CM    1. Pain of right thumb  M79.644       2. Acute right-sided low back pain without sciatica  M54.50           Medical Decision Making     Complexity of Problems Addressed:  1 or more acute illnesses that pose a threat to life or bodily function. Data Reviewed and Analyzed:  Category 1:   I independently ordered and reviewed each unique test.  I reviewed external records: provider visit note from outside specialist.       Category 2:   I independently ordered and interpreted the ED EKG in the absence of a Cardiologist.    Rate: 86  EKG Interpretation: EKG Interpretation: sinus rhythm, no evidence of arrhythmia  ST Segments: Nonspecific ST segments - NO STEMI  I interpreted the X-rays no acute. Category 3: Discussion of management or test interpretation. Patient with right thumb pain and swelling with limited range of motion over the MCP joint. Getting worse over the past week. No injury. Similar presentation to some left thumb pain couple months ago. No acute on x-ray. Will discharge with further treatment at home and PCP follow-up. Risk of Complications and/or Morbidity of Patient Management:  Prescription drug management performed. Patient was discharged risks and benefits of hospitalization were considered. Shared medical decision making was utilized in creating the patients health plan today. Is this patient to be included in the SEP-1 core measure due to severe sepsis or septic shock? No Exclusion criteria - the patient is NOT to be included for SEP-1 Core Measure due to:  Infection is not suspected      History      Konrad Bowman is a 40 y.o. female who presents to the Emergency Department with chief complaint of    Chief Complaint   Patient presents with    Chest Pain      Patient is being worked up

## 2023-07-03 ENCOUNTER — OFFICE VISIT (OUTPATIENT)
Dept: FAMILY MEDICINE CLINIC | Facility: CLINIC | Age: 43
End: 2023-07-03
Payer: COMMERCIAL

## 2023-07-03 VITALS
WEIGHT: 292 LBS | DIASTOLIC BLOOD PRESSURE: 83 MMHG | SYSTOLIC BLOOD PRESSURE: 142 MMHG | TEMPERATURE: 98 F | BODY MASS INDEX: 43.25 KG/M2 | HEIGHT: 69 IN | HEART RATE: 85 BPM

## 2023-07-03 DIAGNOSIS — Z79.899 NEED FOR PROPHYLACTIC CHEMOTHERAPY: ICD-10-CM

## 2023-07-03 DIAGNOSIS — F32.9 REACTIVE DEPRESSION: ICD-10-CM

## 2023-07-03 DIAGNOSIS — E11.65 TYPE 2 DIABETES MELLITUS WITH HYPERGLYCEMIA, WITHOUT LONG-TERM CURRENT USE OF INSULIN (HCC): ICD-10-CM

## 2023-07-03 DIAGNOSIS — I10 PRIMARY HYPERTENSION: ICD-10-CM

## 2023-07-03 DIAGNOSIS — M79.645 THUMB PAIN, LEFT: Primary | ICD-10-CM

## 2023-07-03 DIAGNOSIS — M79.645 THUMB PAIN, LEFT: ICD-10-CM

## 2023-07-03 LAB
ALBUMIN SERPL-MCNC: 3 G/DL (ref 3.5–5)
ALBUMIN/GLOB SERPL: 0.5 (ref 0.4–1.6)
ALP SERPL-CCNC: 172 U/L (ref 50–136)
ALT SERPL-CCNC: 49 U/L (ref 12–65)
ANION GAP SERPL CALC-SCNC: 2 MMOL/L (ref 2–11)
AST SERPL-CCNC: 22 U/L (ref 15–37)
BILIRUB SERPL-MCNC: 0.4 MG/DL (ref 0.2–1.1)
BUN SERPL-MCNC: 13 MG/DL (ref 6–23)
CALCIUM SERPL-MCNC: 9 MG/DL (ref 8.3–10.4)
CHLORIDE SERPL-SCNC: 106 MMOL/L (ref 101–110)
CHOLEST SERPL-MCNC: 214 MG/DL
CO2 SERPL-SCNC: 27 MMOL/L (ref 21–32)
CREAT SERPL-MCNC: 0.9 MG/DL (ref 0.6–1)
CREAT UR-MCNC: 329 MG/DL
GLOBULIN SER CALC-MCNC: 5.7 G/DL (ref 2.8–4.5)
GLUCOSE SERPL-MCNC: 224 MG/DL (ref 65–100)
HDLC SERPL-MCNC: 118 MG/DL (ref 40–60)
HDLC SERPL: 1.8
LDLC SERPL CALC-MCNC: 88.8 MG/DL
MICROALBUMIN UR-MCNC: 76.3 MG/DL
MICROALBUMIN/CREAT UR-RTO: 232 MG/G (ref 0–30)
POTASSIUM SERPL-SCNC: 4.1 MMOL/L (ref 3.5–5.1)
PROT SERPL-MCNC: 8.7 G/DL (ref 6.3–8.2)
SODIUM SERPL-SCNC: 135 MMOL/L (ref 133–143)
TRIGL SERPL-MCNC: 36 MG/DL (ref 35–150)
VLDLC SERPL CALC-MCNC: 7.2 MG/DL (ref 6–23)

## 2023-07-03 PROCEDURE — 99214 OFFICE O/P EST MOD 30 MIN: CPT | Performed by: NURSE PRACTITIONER

## 2023-07-03 PROCEDURE — 3044F HG A1C LEVEL LT 7.0%: CPT | Performed by: NURSE PRACTITIONER

## 2023-07-03 PROCEDURE — 3079F DIAST BP 80-89 MM HG: CPT | Performed by: NURSE PRACTITIONER

## 2023-07-03 PROCEDURE — 3077F SYST BP >= 140 MM HG: CPT | Performed by: NURSE PRACTITIONER

## 2023-07-03 RX ORDER — BUPROPION HYDROCHLORIDE 150 MG/1
150 TABLET ORAL EVERY MORNING
Qty: 90 TABLET | Refills: 1 | Status: SHIPPED | OUTPATIENT
Start: 2023-07-03

## 2023-07-03 RX ORDER — ESCITALOPRAM OXALATE 10 MG/1
10 TABLET ORAL DAILY
Qty: 90 TABLET | Refills: 1 | Status: SHIPPED | OUTPATIENT
Start: 2023-07-03

## 2023-07-03 RX ORDER — LISINOPRIL 20 MG/1
20 TABLET ORAL DAILY
Qty: 90 TABLET | Refills: 1 | Status: SHIPPED | OUTPATIENT
Start: 2023-07-03

## 2023-07-03 RX ORDER — LISINOPRIL 10 MG/1
10 TABLET ORAL DAILY
Qty: 90 TABLET | Refills: 1 | Status: SHIPPED | OUTPATIENT
Start: 2023-07-03 | End: 2023-07-03 | Stop reason: SDUPTHER

## 2023-07-03 RX ORDER — QUETIAPINE FUMARATE 50 MG/1
50 TABLET, FILM COATED ORAL 2 TIMES DAILY
Qty: 180 TABLET | Refills: 1 | Status: SHIPPED | OUTPATIENT
Start: 2023-07-03

## 2023-07-03 SDOH — ECONOMIC STABILITY: FOOD INSECURITY: WITHIN THE PAST 12 MONTHS, THE FOOD YOU BOUGHT JUST DIDN'T LAST AND YOU DIDN'T HAVE MONEY TO GET MORE.: NEVER TRUE

## 2023-07-03 SDOH — ECONOMIC STABILITY: FOOD INSECURITY: WITHIN THE PAST 12 MONTHS, YOU WORRIED THAT YOUR FOOD WOULD RUN OUT BEFORE YOU GOT MONEY TO BUY MORE.: NEVER TRUE

## 2023-07-03 SDOH — ECONOMIC STABILITY: INCOME INSECURITY: HOW HARD IS IT FOR YOU TO PAY FOR THE VERY BASICS LIKE FOOD, HOUSING, MEDICAL CARE, AND HEATING?: NOT HARD AT ALL

## 2023-07-04 LAB
EST. AVERAGE GLUCOSE BLD GHB EST-MCNC: 189 MG/DL
HBA1C MFR BLD: 8.2 % (ref 4.8–5.6)
HBV CORE AB SERPL QL IA: NEGATIVE
HBV SURFACE AB SERPL IA-ACNC: <3.1 MIU/ML
HBV SURFACE AG SER QL: NONREACTIVE
HIV 1+2 AB+HIV1 P24 AG SERPL QL IA: NONREACTIVE
HIV 1/2 RESULT COMMENT: NORMAL
RHEUMATOID FACT SER QL LA: NEGATIVE

## 2023-07-05 ENCOUNTER — TELEPHONE (OUTPATIENT)
Dept: FAMILY MEDICINE CLINIC | Facility: CLINIC | Age: 43
End: 2023-07-05

## 2023-07-05 LAB — CCP IGA+IGG SERPL IA-ACNC: 4 UNITS (ref 0–19)

## 2023-07-05 NOTE — TELEPHONE ENCOUNTER
----- Message from RADHA Villatoro NP sent at 7/5/2023  2:01 PM EDT -----  Ha1c not well controlled on current med's would like to add jardiance and have you follow up in 3 months for this

## 2023-07-05 NOTE — TELEPHONE ENCOUNTER
I sent a [employer] message about her lab results. She will need to call back to schedule a 3 mo fu-lab only appointment around 10/5/2023. Or if she would like to see Kennedy Savage then she can schedule a 3 mo fu for her diabetes around 10/5 with her.

## 2023-07-07 LAB
M TB IFN-G BLD-IMP: NEGATIVE
M TB IFN-G CD4+ T-CELLS BLD-ACNC: 0.05 IU/ML
M TBIFN-G CD4+ CD8+T-CELLS BLD-ACNC: 0.04 IU/ML
QUANTIFERON CRITERIA: NORMAL
QUANTIFERON MITOGEN VALUE: >10 IU/ML
QUANTIFERON NIL VALUE: 0.08 IU/ML
QUANTIFERON, INCUBATION: NORMAL

## 2023-07-31 ENCOUNTER — OFFICE VISIT (OUTPATIENT)
Dept: FAMILY MEDICINE CLINIC | Facility: CLINIC | Age: 43
End: 2023-07-31
Payer: COMMERCIAL

## 2023-07-31 VITALS
DIASTOLIC BLOOD PRESSURE: 71 MMHG | HEIGHT: 69 IN | TEMPERATURE: 98 F | WEIGHT: 271 LBS | SYSTOLIC BLOOD PRESSURE: 104 MMHG | HEART RATE: 99 BPM | BODY MASS INDEX: 40.14 KG/M2

## 2023-07-31 DIAGNOSIS — E11.65 TYPE 2 DIABETES MELLITUS WITH HYPERGLYCEMIA, WITHOUT LONG-TERM CURRENT USE OF INSULIN (HCC): ICD-10-CM

## 2023-07-31 DIAGNOSIS — I10 PRIMARY HYPERTENSION: Primary | ICD-10-CM

## 2023-07-31 DIAGNOSIS — M54.2 NECK PAIN: ICD-10-CM

## 2023-07-31 PROCEDURE — 3074F SYST BP LT 130 MM HG: CPT | Performed by: NURSE PRACTITIONER

## 2023-07-31 PROCEDURE — 3078F DIAST BP <80 MM HG: CPT | Performed by: NURSE PRACTITIONER

## 2023-07-31 PROCEDURE — 3052F HG A1C>EQUAL 8.0%<EQUAL 9.0%: CPT | Performed by: NURSE PRACTITIONER

## 2023-07-31 PROCEDURE — 99214 OFFICE O/P EST MOD 30 MIN: CPT | Performed by: NURSE PRACTITIONER

## 2023-07-31 RX ORDER — CYCLOBENZAPRINE HCL 10 MG
10 TABLET ORAL NIGHTLY PRN
Qty: 30 TABLET | Refills: 2 | Status: SHIPPED | OUTPATIENT
Start: 2023-07-31 | End: 2023-10-29

## 2023-07-31 ASSESSMENT — ENCOUNTER SYMPTOMS: SHORTNESS OF BREATH: 0

## 2023-08-08 ENCOUNTER — APPOINTMENT (OUTPATIENT)
Dept: GENERAL RADIOLOGY | Age: 43
End: 2023-08-08
Payer: COMMERCIAL

## 2023-08-08 ENCOUNTER — HOSPITAL ENCOUNTER (EMERGENCY)
Age: 43
Discharge: HOME | End: 2023-08-09
Attending: EMERGENCY MEDICINE
Payer: COMMERCIAL

## 2023-08-08 DIAGNOSIS — R07.89 ATYPICAL CHEST PAIN: Primary | ICD-10-CM

## 2023-08-08 DIAGNOSIS — M62.838 CERVICAL PARASPINAL MUSCLE SPASM: ICD-10-CM

## 2023-08-08 LAB
ANION GAP SERPL CALC-SCNC: 4 MMOL/L (ref 2–11)
BASOPHILS # BLD: 0 K/UL (ref 0–0.2)
BASOPHILS NFR BLD: 0 % (ref 0–2)
BUN SERPL-MCNC: 14 MG/DL (ref 6–23)
CALCIUM SERPL-MCNC: 8.6 MG/DL (ref 8.3–10.4)
CHLORIDE SERPL-SCNC: 110 MMOL/L (ref 101–110)
CO2 SERPL-SCNC: 25 MMOL/L (ref 21–32)
CREAT SERPL-MCNC: 0.82 MG/DL (ref 0.6–1)
DIFFERENTIAL METHOD BLD: ABNORMAL
EOSINOPHIL # BLD: 0.2 K/UL (ref 0–0.8)
EOSINOPHIL NFR BLD: 2 % (ref 0.5–7.8)
ERYTHROCYTE [DISTWIDTH] IN BLOOD BY AUTOMATED COUNT: 19.4 % (ref 11.9–14.6)
GLUCOSE SERPL-MCNC: 138 MG/DL (ref 65–100)
HCT VFR BLD AUTO: 28.3 % (ref 35.8–46.3)
HGB BLD-MCNC: 8.4 G/DL (ref 11.7–15.4)
IMM GRANULOCYTES # BLD AUTO: 0 K/UL (ref 0–0.5)
IMM GRANULOCYTES NFR BLD AUTO: 0 % (ref 0–5)
LYMPHOCYTES # BLD: 2 K/UL (ref 0.5–4.6)
LYMPHOCYTES NFR BLD: 22 % (ref 13–44)
MCH RBC QN AUTO: 21.9 PG (ref 26.1–32.9)
MCHC RBC AUTO-ENTMCNC: 29.7 G/DL (ref 31.4–35)
MCV RBC AUTO: 73.9 FL (ref 82–102)
MONOCYTES # BLD: 0.4 K/UL (ref 0.1–1.3)
MONOCYTES NFR BLD: 5 % (ref 4–12)
NEUTS SEG # BLD: 6.6 K/UL (ref 1.7–8.2)
NEUTS SEG NFR BLD: 71 % (ref 43–78)
NRBC # BLD: 0 K/UL (ref 0–0.2)
PLATELET # BLD AUTO: 471 K/UL (ref 150–450)
PMV BLD AUTO: 9.1 FL (ref 9.4–12.3)
POTASSIUM SERPL-SCNC: 3.8 MMOL/L (ref 3.5–5.1)
RBC # BLD AUTO: 3.83 M/UL (ref 4.05–5.2)
SODIUM SERPL-SCNC: 139 MMOL/L (ref 133–143)
TROPONIN I SERPL HS-MCNC: 5 PG/ML (ref 0–14)
WBC # BLD AUTO: 9.3 K/UL (ref 4.3–11.1)

## 2023-08-08 PROCEDURE — 84484 ASSAY OF TROPONIN QUANT: CPT

## 2023-08-08 PROCEDURE — 93005 ELECTROCARDIOGRAM TRACING: CPT | Performed by: EMERGENCY MEDICINE

## 2023-08-08 PROCEDURE — 71046 X-RAY EXAM CHEST 2 VIEWS: CPT

## 2023-08-08 PROCEDURE — 85025 COMPLETE CBC W/AUTO DIFF WBC: CPT

## 2023-08-08 PROCEDURE — 80048 BASIC METABOLIC PNL TOTAL CA: CPT

## 2023-08-08 ASSESSMENT — PAIN - FUNCTIONAL ASSESSMENT: PAIN_FUNCTIONAL_ASSESSMENT: 0-10

## 2023-08-08 ASSESSMENT — PAIN DESCRIPTION - LOCATION
LOCATION: NECK;SHOULDER
LOCATION: CHEST

## 2023-08-08 ASSESSMENT — PAIN DESCRIPTION - PAIN TYPE: TYPE: ACUTE PAIN

## 2023-08-08 ASSESSMENT — PAIN DESCRIPTION - FREQUENCY: FREQUENCY: CONTINUOUS

## 2023-08-08 ASSESSMENT — PAIN SCALES - GENERAL
PAINLEVEL_OUTOF10: 10
PAINLEVEL_OUTOF10: 10

## 2023-08-09 ENCOUNTER — APPOINTMENT (OUTPATIENT)
Dept: GENERAL RADIOLOGY | Age: 43
End: 2023-08-09
Payer: COMMERCIAL

## 2023-08-09 VITALS
HEIGHT: 69 IN | WEIGHT: 270 LBS | BODY MASS INDEX: 39.99 KG/M2 | DIASTOLIC BLOOD PRESSURE: 85 MMHG | TEMPERATURE: 98.3 F | HEART RATE: 101 BPM | RESPIRATION RATE: 16 BRPM | OXYGEN SATURATION: 100 % | SYSTOLIC BLOOD PRESSURE: 131 MMHG

## 2023-08-09 LAB
EKG ATRIAL RATE: 101 BPM
EKG DIAGNOSIS: NORMAL
EKG P AXIS: 29 DEGREES
EKG P-R INTERVAL: 154 MS
EKG Q-T INTERVAL: 340 MS
EKG QRS DURATION: 90 MS
EKG QTC CALCULATION (BAZETT): 440 MS
EKG R AXIS: -4 DEGREES
EKG T AXIS: 3 DEGREES
EKG VENTRICULAR RATE: 101 BPM

## 2023-08-09 PROCEDURE — 6370000000 HC RX 637 (ALT 250 FOR IP): Performed by: EMERGENCY MEDICINE

## 2023-08-09 PROCEDURE — 93010 ELECTROCARDIOGRAM REPORT: CPT | Performed by: INTERNAL MEDICINE

## 2023-08-09 PROCEDURE — 99285 EMERGENCY DEPT VISIT HI MDM: CPT

## 2023-08-09 PROCEDURE — 6360000002 HC RX W HCPCS: Performed by: EMERGENCY MEDICINE

## 2023-08-09 PROCEDURE — 96374 THER/PROPH/DIAG INJ IV PUSH: CPT

## 2023-08-09 PROCEDURE — 72040 X-RAY EXAM NECK SPINE 2-3 VW: CPT

## 2023-08-09 RX ORDER — IBUPROFEN 800 MG/1
800 TABLET ORAL EVERY 8 HOURS PRN
Qty: 30 TABLET | Refills: 0 | Status: SHIPPED | OUTPATIENT
Start: 2023-08-09

## 2023-08-09 RX ORDER — METHOCARBAMOL 750 MG/1
750 TABLET, FILM COATED ORAL
Status: COMPLETED | OUTPATIENT
Start: 2023-08-09 | End: 2023-08-09

## 2023-08-09 RX ORDER — HYDROCODONE BITARTRATE AND ACETAMINOPHEN 5; 325 MG/1; MG/1
1 TABLET ORAL
Status: COMPLETED | OUTPATIENT
Start: 2023-08-09 | End: 2023-08-09

## 2023-08-09 RX ORDER — KETOROLAC TROMETHAMINE 15 MG/ML
15 INJECTION, SOLUTION INTRAMUSCULAR; INTRAVENOUS
Status: COMPLETED | OUTPATIENT
Start: 2023-08-09 | End: 2023-08-09

## 2023-08-09 RX ORDER — METHOCARBAMOL 750 MG/1
750 TABLET, FILM COATED ORAL 4 TIMES DAILY PRN
Qty: 28 TABLET | Refills: 0 | Status: SHIPPED | OUTPATIENT
Start: 2023-08-09

## 2023-08-09 RX ORDER — HYDROCODONE BITARTRATE AND ACETAMINOPHEN 5; 325 MG/1; MG/1
1 TABLET ORAL EVERY 6 HOURS PRN
Qty: 10 TABLET | Refills: 0 | Status: SHIPPED | OUTPATIENT
Start: 2023-08-09 | End: 2023-08-12

## 2023-08-09 RX ADMIN — METHOCARBAMOL 750 MG: 750 TABLET ORAL at 00:46

## 2023-08-09 RX ADMIN — HYDROCODONE BITARTRATE AND ACETAMINOPHEN 1 TABLET: 5; 325 TABLET ORAL at 02:22

## 2023-08-09 RX ADMIN — KETOROLAC TROMETHAMINE 15 MG: 15 INJECTION, SOLUTION INTRAMUSCULAR; INTRAVENOUS at 00:45

## 2023-08-09 ASSESSMENT — PAIN SCALES - GENERAL
PAINLEVEL_OUTOF10: 9
PAINLEVEL_OUTOF10: 10
PAINLEVEL_OUTOF10: 8

## 2023-08-09 ASSESSMENT — PAIN DESCRIPTION - LOCATION: LOCATION: NECK;SHOULDER;CHEST

## 2023-08-09 NOTE — ED NOTES
This nurse completed ekg and gave to Dr Luis Mukherjee, per pt upper mid chest pain that radiates into her right upper neck area patient denies cardiac history      Ada Bloch, RN  08/08/23 9450

## 2023-08-09 NOTE — ED NOTES
Pain in the back of the neck that comes down the shoulders then down to her chest. Pain has been on going for a couple weeks     Marah Holm RN  08/08/23 9525

## 2023-08-09 NOTE — ED PROVIDER NOTES
Emergency Department Provider Note       PCP: RADHA Meehan NP   Age: 40 y.o. Sex: female     DISPOSITION Decision To Discharge 08/09/2023 02:13:03 AM       ICD-10-CM    1. Atypical chest pain  R07.89 HYDROcodone-acetaminophen (NORCO) 5-325 MG per tablet      2. Cervical paraspinal muscle spasm  M62.838 HYDROcodone-acetaminophen (NORCO) 5-325 MG per tablet          Medical Decision Making     Complexity of Problems Addressed:  1 or more acute illnesses that pose a threat to life or bodily function. Data Reviewed and Analyzed:  Category 1:   I independently ordered and reviewed each unique test.  I reviewed external records: ED visit note from an outside group. I reviewed external records: provider visit note from PCP. Category 2:   I interpreted the X-rays chest x-ray revealed no acute process, and x-rays of the cervical spine revealed a mild straightening of the cervical spine with poorly visualized cervical thoracic junction. As there is no history of trauma, I do not believe CT of the neck is necessary at this time to further assess. .    Category 3: Discussion of management or test interpretation. 27-year-old female complains of neck pain on the left side rating to the left side of the chest over the last 3 weeks, progressively worsening. Tonight over-the-counter medications were not helping. EKG unremarkable for acute cardiac injury, x-rays negative, and lab work indicates no evidence of infection or acute coronary injury. Patient reassured and instructed follow-up with her family doctor if symptoms persist.  She will be discharged home on a short course of muscle relaxants and pain medicine. Risk of Complications and/or Morbidity of Patient Management:  Prescription drug management performed. Shared medical decision making was utilized in creating the patients health plan today.     ED Course as of 08/09/23 0537   Tue Aug 08, 2023   5007 ECG interpretation for ECG dated alert/some confusion noted, delayed response and processing time/confused

## 2023-08-23 ASSESSMENT — ENCOUNTER SYMPTOMS
ABDOMINAL PAIN: 0
VOMITING: 0
NAUSEA: 0

## 2023-08-31 ENCOUNTER — RX ONLY (OUTPATIENT)
Age: 43
Setting detail: RX ONLY
End: 2023-08-31

## 2023-08-31 RX ORDER — ADALIMUMAB 80MG/0.8ML
KIT SUBCUTANEOUS
Qty: 1 | Refills: 1 | Status: ERX | COMMUNITY
Start: 2023-08-31

## 2023-08-31 RX ORDER — ADALIMUMAB 40MG/0.4ML
KIT SUBCUTANEOUS
Qty: 1 | Refills: 5 | Status: ERX | COMMUNITY
Start: 2023-08-31

## 2023-09-06 ENCOUNTER — RX ONLY (OUTPATIENT)
Age: 43
Setting detail: RX ONLY
End: 2023-09-06

## 2023-09-06 RX ORDER — ADALIMUMAB 80MG/0.8ML
KIT SUBCUTANEOUS
Qty: 1 | Refills: 1 | Status: ERX

## 2023-09-06 RX ORDER — ADALIMUMAB 40MG/0.4ML
KIT SUBCUTANEOUS
Qty: 1 | Refills: 5 | Status: ERX

## 2023-10-02 ENCOUNTER — OFFICE VISIT (OUTPATIENT)
Dept: FAMILY MEDICINE CLINIC | Facility: CLINIC | Age: 43
End: 2023-10-02
Payer: COMMERCIAL

## 2023-10-02 VITALS
BODY MASS INDEX: 40.43 KG/M2 | HEART RATE: 87 BPM | SYSTOLIC BLOOD PRESSURE: 121 MMHG | TEMPERATURE: 98 F | WEIGHT: 273 LBS | HEIGHT: 69 IN | DIASTOLIC BLOOD PRESSURE: 79 MMHG

## 2023-10-02 DIAGNOSIS — N92.0 MENORRHAGIA WITH REGULAR CYCLE: ICD-10-CM

## 2023-10-02 DIAGNOSIS — E11.65 TYPE 2 DIABETES MELLITUS WITH HYPERGLYCEMIA, WITHOUT LONG-TERM CURRENT USE OF INSULIN (HCC): ICD-10-CM

## 2023-10-02 DIAGNOSIS — Z00.00 ANNUAL PHYSICAL EXAM: Primary | ICD-10-CM

## 2023-10-02 DIAGNOSIS — L73.2 HYDRADENITIS: ICD-10-CM

## 2023-10-02 DIAGNOSIS — Z12.31 BREAST CANCER SCREENING BY MAMMOGRAM: ICD-10-CM

## 2023-10-02 LAB
BASOPHILS # BLD: 0 K/UL (ref 0–0.2)
BASOPHILS NFR BLD: 0 % (ref 0–2)
DIFFERENTIAL METHOD BLD: ABNORMAL
EOSINOPHIL # BLD: 0.2 K/UL (ref 0–0.8)
EOSINOPHIL NFR BLD: 2 % (ref 0.5–7.8)
ERYTHROCYTE [DISTWIDTH] IN BLOOD BY AUTOMATED COUNT: 18.4 % (ref 11.9–14.6)
EST. AVERAGE GLUCOSE BLD GHB EST-MCNC: 143 MG/DL
HBA1C MFR BLD: 6.6 % (ref 4.8–5.6)
HCT VFR BLD AUTO: 31 % (ref 35.8–46.3)
HGB BLD-MCNC: 9.1 G/DL (ref 11.7–15.4)
IMM GRANULOCYTES # BLD AUTO: 0 K/UL (ref 0–0.5)
IMM GRANULOCYTES NFR BLD AUTO: 0 % (ref 0–5)
LYMPHOCYTES # BLD: 2.1 K/UL (ref 0.5–4.6)
LYMPHOCYTES NFR BLD: 30 % (ref 13–44)
MCH RBC QN AUTO: 23.1 PG (ref 26.1–32.9)
MCHC RBC AUTO-ENTMCNC: 29.4 G/DL (ref 31.4–35)
MCV RBC AUTO: 78.7 FL (ref 82–102)
MONOCYTES # BLD: 0.4 K/UL (ref 0.1–1.3)
MONOCYTES NFR BLD: 6 % (ref 4–12)
NEUTS SEG # BLD: 4.4 K/UL (ref 1.7–8.2)
NEUTS SEG NFR BLD: 61 % (ref 43–78)
NRBC # BLD: 0 K/UL (ref 0–0.2)
PLATELET # BLD AUTO: 425 K/UL (ref 150–450)
PMV BLD AUTO: 10.4 FL (ref 9.4–12.3)
RBC # BLD AUTO: 3.94 M/UL (ref 4.05–5.2)
WBC # BLD AUTO: 7.1 K/UL (ref 4.3–11.1)

## 2023-10-02 PROCEDURE — 99396 PREV VISIT EST AGE 40-64: CPT | Performed by: NURSE PRACTITIONER

## 2023-10-02 PROCEDURE — 3074F SYST BP LT 130 MM HG: CPT | Performed by: NURSE PRACTITIONER

## 2023-10-02 PROCEDURE — 3078F DIAST BP <80 MM HG: CPT | Performed by: NURSE PRACTITIONER

## 2023-10-02 RX ORDER — ADALIMUMAB 80MG/0.8ML
KIT SUBCUTANEOUS
COMMUNITY
Start: 2023-09-08

## 2023-10-02 SDOH — HEALTH STABILITY: PHYSICAL HEALTH: ON AVERAGE, HOW MANY DAYS PER WEEK DO YOU ENGAGE IN MODERATE TO STRENUOUS EXERCISE (LIKE A BRISK WALK)?: 0 DAYS

## 2023-10-02 SDOH — HEALTH STABILITY: PHYSICAL HEALTH: ON AVERAGE, HOW MANY MINUTES DO YOU ENGAGE IN EXERCISE AT THIS LEVEL?: 0 MIN

## 2023-10-02 ASSESSMENT — ENCOUNTER SYMPTOMS
SHORTNESS OF BREATH: 0
COUGH: 0
ABDOMINAL PAIN: 0
SINUS PRESSURE: 0
BACK PAIN: 0
EYE REDNESS: 0
DIARRHEA: 0
CHOKING: 0
BLOOD IN STOOL: 0
WHEEZING: 0
ABDOMINAL DISTENTION: 0
ANAL BLEEDING: 0
VOICE CHANGE: 1
CHEST TIGHTNESS: 0
EYE PAIN: 0
PHOTOPHOBIA: 0
TROUBLE SWALLOWING: 0
SINUS PAIN: 0
COLOR CHANGE: 0
CONSTIPATION: 0
SORE THROAT: 0

## 2023-10-02 ASSESSMENT — SOCIAL DETERMINANTS OF HEALTH (SDOH)
WITHIN THE LAST YEAR, HAVE YOU BEEN KICKED, HIT, SLAPPED, OR OTHERWISE PHYSICALLY HURT BY YOUR PARTNER OR EX-PARTNER?: NO
IN A TYPICAL WEEK, HOW MANY TIMES DO YOU TALK ON THE PHONE WITH FAMILY, FRIENDS, OR NEIGHBORS?: MORE THAN THREE TIMES A WEEK
ARE YOU MARRIED, WIDOWED, DIVORCED, SEPARATED, NEVER MARRIED, OR LIVING WITH A PARTNER?: NEVER MARRIED
WITHIN THE LAST YEAR, HAVE YOU BEEN HUMILIATED OR EMOTIONALLY ABUSED IN OTHER WAYS BY YOUR PARTNER OR EX-PARTNER?: NO
DO YOU BELONG TO ANY CLUBS OR ORGANIZATIONS SUCH AS CHURCH GROUPS UNIONS, FRATERNAL OR ATHLETIC GROUPS, OR SCHOOL GROUPS?: NO
HOW OFTEN DO YOU GET TOGETHER WITH FRIENDS OR RELATIVES?: TWICE A WEEK
HOW OFTEN DO YOU ATTENT MEETINGS OF THE CLUB OR ORGANIZATION YOU BELONG TO?: NEVER
WITHIN THE LAST YEAR, HAVE YOU BEEN AFRAID OF YOUR PARTNER OR EX-PARTNER?: NO
WITHIN THE LAST YEAR, HAVE TO BEEN RAPED OR FORCED TO HAVE ANY KIND OF SEXUAL ACTIVITY BY YOUR PARTNER OR EX-PARTNER?: NO
HOW OFTEN DO YOU ATTEND CHURCH OR RELIGIOUS SERVICES?: NEVER

## 2023-10-02 ASSESSMENT — LIFESTYLE VARIABLES
HOW OFTEN DO YOU HAVE A DRINK CONTAINING ALCOHOL: 2-4 TIMES A MONTH
HOW MANY STANDARD DRINKS CONTAINING ALCOHOL DO YOU HAVE ON A TYPICAL DAY: 1 OR 2

## 2023-10-02 NOTE — PROGRESS NOTES
Subjective:      Patient ID: Kaylin Lundberg is a 44 y.o. female. Here for a CPX   She wears her seat belt routinely   She eats an unhealthy diet  fast food she does not cook  She has up to date dental is due for eye exam   On period now. Never saw gyn for her heavy periods. Has 10 days regular periods with heavy clots. See derm and surgery for her mulitple cyts Has not been on humeria long enough to make a difference    HPI    Review of Systems   Constitutional:  Negative for chills, fatigue and fever. HENT:  Positive for nosebleeds and voice change. Negative for congestion, ear pain, hearing loss, mouth sores, sinus pressure, sinus pain, sore throat, tinnitus and trouble swallowing. Eyes:  Negative for photophobia, pain, redness and visual disturbance. Respiratory:  Negative for cough, choking, chest tightness, shortness of breath and wheezing. Cardiovascular:  Negative for chest pain, palpitations and leg swelling. Gastrointestinal:  Negative for abdominal distention, abdominal pain, anal bleeding, blood in stool, constipation and diarrhea. Endocrine: Negative for cold intolerance, heat intolerance, polydipsia and polyuria. Genitourinary:  Negative for difficulty urinating, dysuria, flank pain, frequency, hematuria, menstrual problem, pelvic pain and urgency. Periods are once a month and she bleed 7-10 days   Musculoskeletal:  Negative for arthralgias, back pain, joint swelling, neck pain and neck stiffness. Skin:  Positive for rash. Negative for color change, pallor and wound. Allergic/Immunologic: Negative for environmental allergies and food allergies. Neurological:  Positive for numbness (left leg at times). Negative for dizziness, tremors, syncope, weakness, light-headedness and headaches. Hematological:  Negative for adenopathy. Does not bruise/bleed easily.    Psychiatric/Behavioral:          Mood stable on current meds       Objective:   Physical Exam  Vitals and

## 2023-10-03 ENCOUNTER — TELEPHONE (OUTPATIENT)
Dept: FAMILY MEDICINE CLINIC | Facility: CLINIC | Age: 43
End: 2023-10-03

## 2023-10-03 NOTE — TELEPHONE ENCOUNTER
----- Message from RADHA Castro NP sent at 10/3/2023  7:34 AM EDT -----  Ha1c is awesome at 6.6 so DM well controlled Hgb is still low at 9 and likely explains your swelling.  Please see gyn for your excessive bleeding and if you ar not taking a daily iron supplement please do so

## 2023-11-17 DIAGNOSIS — N92.0 MENORRHAGIA WITH REGULAR CYCLE: Primary | ICD-10-CM

## 2024-01-22 ENCOUNTER — TELEPHONE (OUTPATIENT)
Dept: FAMILY MEDICINE CLINIC | Facility: CLINIC | Age: 44
End: 2024-01-22

## 2024-01-22 DIAGNOSIS — L73.2 HYDRADENITIS: Primary | ICD-10-CM

## 2024-01-22 NOTE — TELEPHONE ENCOUNTER
Pt called and left a message requesting a refill on minocycline that you gave her last month for her skin issues. She said the medicine is really helping her with her skin issues.

## 2024-01-22 NOTE — TELEPHONE ENCOUNTER
She said she didn't go back to the Dermatology due to the medicine wasn't working. She said the medicine is very expensive($89) for the Humira injection. She said that is why she was coming to you for this situation. She said the medicine wasn't working when she done them every 14 days.

## 2024-01-23 ENCOUNTER — TELEPHONE (OUTPATIENT)
Dept: FAMILY MEDICINE CLINIC | Facility: CLINIC | Age: 44
End: 2024-01-23

## 2024-01-23 DIAGNOSIS — L73.2 HYDRADENITIS: Primary | ICD-10-CM

## 2024-01-23 RX ORDER — MINOCYCLINE HYDROCHLORIDE 100 MG/1
100 TABLET ORAL 2 TIMES DAILY
Qty: 60 TABLET | Refills: 1 | Status: SHIPPED | OUTPATIENT
Start: 2024-01-23 | End: 2024-03-23

## 2024-01-23 RX ORDER — DOXYCYCLINE HYCLATE 100 MG
100 TABLET ORAL 2 TIMES DAILY
Qty: 20 TABLET | Refills: 2 | Status: SHIPPED | OUTPATIENT
Start: 2024-01-23 | End: 2024-02-22

## 2024-01-23 NOTE — TELEPHONE ENCOUNTER
Pt called and left a message about the doxycycline you sent in for her yesterday. She said that medicine doesn't work for her skin issues and she was wanting to get the minocycline for her skin issues because that one worked for her. Can she get the minocycline sent in instead?

## 2024-03-22 ENCOUNTER — HOSPITAL ENCOUNTER (EMERGENCY)
Age: 44
Discharge: HOME | End: 2024-03-22
Attending: EMERGENCY MEDICINE
Payer: COMMERCIAL

## 2024-03-22 VITALS
HEART RATE: 92 BPM | TEMPERATURE: 98.7 F | RESPIRATION RATE: 20 BRPM | WEIGHT: 293 LBS | BODY MASS INDEX: 43.4 KG/M2 | DIASTOLIC BLOOD PRESSURE: 83 MMHG | OXYGEN SATURATION: 100 % | SYSTOLIC BLOOD PRESSURE: 166 MMHG | HEIGHT: 69 IN

## 2024-03-22 DIAGNOSIS — M79.641 RIGHT HAND PAIN: Primary | ICD-10-CM

## 2024-03-22 LAB
GLUCOSE BLD STRIP.AUTO-MCNC: 165 MG/DL (ref 65–100)
SERVICE CMNT-IMP: ABNORMAL
URATE SERPL-MCNC: 5.2 MG/DL (ref 2.6–6)

## 2024-03-22 PROCEDURE — 6370000000 HC RX 637 (ALT 250 FOR IP): Performed by: EMERGENCY MEDICINE

## 2024-03-22 PROCEDURE — 6360000002 HC RX W HCPCS: Performed by: EMERGENCY MEDICINE

## 2024-03-22 PROCEDURE — 84550 ASSAY OF BLOOD/URIC ACID: CPT

## 2024-03-22 PROCEDURE — 82962 GLUCOSE BLOOD TEST: CPT

## 2024-03-22 PROCEDURE — 99283 EMERGENCY DEPT VISIT LOW MDM: CPT

## 2024-03-22 RX ORDER — INDOMETHACIN 25 MG/1
25 CAPSULE ORAL 3 TIMES DAILY PRN
Qty: 30 CAPSULE | Refills: 0 | Status: SHIPPED | OUTPATIENT
Start: 2024-03-22

## 2024-03-22 RX ORDER — HYDROCODONE BITARTRATE AND ACETAMINOPHEN 5; 325 MG/1; MG/1
1 TABLET ORAL
Status: COMPLETED | OUTPATIENT
Start: 2024-03-22 | End: 2024-03-22

## 2024-03-22 RX ORDER — DEXAMETHASONE SODIUM PHOSPHATE 10 MG/ML
10 INJECTION INTRAMUSCULAR; INTRAVENOUS ONCE
Status: COMPLETED | OUTPATIENT
Start: 2024-03-22 | End: 2024-03-22

## 2024-03-22 RX ORDER — HYDROCODONE BITARTRATE AND ACETAMINOPHEN 5; 325 MG/1; MG/1
1 TABLET ORAL EVERY 6 HOURS PRN
Qty: 12 TABLET | Refills: 0 | Status: SHIPPED | OUTPATIENT
Start: 2024-03-22 | End: 2024-03-25

## 2024-03-22 RX ADMIN — HYDROCODONE BITARTRATE AND ACETAMINOPHEN 1 TABLET: 5; 325 TABLET ORAL at 02:50

## 2024-03-22 RX ADMIN — DEXAMETHASONE SODIUM PHOSPHATE 10 MG: 10 INJECTION INTRAMUSCULAR; INTRAVENOUS at 03:34

## 2024-03-22 ASSESSMENT — LIFESTYLE VARIABLES
HOW OFTEN DO YOU HAVE A DRINK CONTAINING ALCOHOL: NEVER
HOW MANY STANDARD DRINKS CONTAINING ALCOHOL DO YOU HAVE ON A TYPICAL DAY: PATIENT DOES NOT DRINK

## 2024-03-22 ASSESSMENT — PAIN - FUNCTIONAL ASSESSMENT: PAIN_FUNCTIONAL_ASSESSMENT: 0-10

## 2024-03-22 ASSESSMENT — PAIN SCALES - GENERAL
PAINLEVEL_OUTOF10: 10
PAINLEVEL_OUTOF10: 10

## 2024-03-22 ASSESSMENT — PAIN DESCRIPTION - LOCATION: LOCATION: HAND

## 2024-03-22 NOTE — ED NOTES
I have reviewed discharge instructions with the patient.  The patient verbalized understanding.    Patient left ED via Discharge Method: ambulatory to Home with (insert name of family/friend, self, transport SELF).    Opportunity for questions and clarification provided.       Patient given 2 scripts.         To continue your aftercare when you leave the hospital, you may receive an automated call from our care team to check in on how you are doing.  This is a free service and part of our promise to provide the best care and service to meet your aftercare needs.” If you have questions, or wish to unsubscribe from this service please call [phone].  Thank you for Choosing our [facility] Emergency Department.

## 2024-03-22 NOTE — ED PROVIDER NOTES
Emergency Department Provider Note       PCP: Mary Vega APRN - NP   Age: 43 y.o.   Sex: female     DISPOSITION Decision To Discharge 03/22/2024 03:45:22 AM       ICD-10-CM    1. Right hand pain  M79.641 HYDROcodone-acetaminophen (NORCO) 5-325 MG per tablet          Medical Decision Making     43-year-old female with history of diabetes presents with complaint of right hand pain and swelling over the last couple of days.  No history of trauma, fever or chills.  On exam, the dorsum of the right hand is moderately tender with some mild swelling and warmth.  There is no obvious bony deformity, and patient's blood sugar is 165 at bedside.  A uric acid was sent to the lab which was within normal limits.  Patient was given 1 dose of p.o. Decadron, will be discharged home on Indocin and Norco.  Instructed follow-up with family doctor or return if symptoms persist or worsen.    1 acute, uncomplicated illness or injury.  Prescription drug management performed.  Chronic medical problems impacting care include diabetes.    I independently ordered and reviewed each unique test.                     History     43-year-old female presents the emergency department complaining of right hand pain starting couple days ago, progressively worsening.  No history of any trauma.  No fever or chills.  No history of similar episodes in the past and denies any history of gout.  Pain is worse with activity, movement and palpation.  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    The history is provided by the patient.       ROS     Review of Systems   Constitutional:  Negative for chills and fever.   Musculoskeletal:  Positive for arthralgias. Negative for myalgias.        Physical Exam     Vitals signs and nursing note reviewed:  Vitals:    03/22/24 0206   BP: (!) 166/83   Pulse: 92   Resp: 20   Temp: 98.7 °F (37.1 °C)   SpO2: 100%   Weight: 136.1 kg (300 lb)   Height: 1.753 m (5' 9\")      Physical Exam  Vitals and nursing note

## 2024-04-07 ENCOUNTER — APPOINTMENT (OUTPATIENT)
Dept: ULTRASOUND IMAGING | Age: 44
End: 2024-04-07
Payer: COMMERCIAL

## 2024-04-07 ENCOUNTER — HOSPITAL ENCOUNTER (EMERGENCY)
Age: 44
Discharge: HOME | End: 2024-04-07
Payer: COMMERCIAL

## 2024-04-07 VITALS
HEART RATE: 92 BPM | HEIGHT: 69 IN | WEIGHT: 293 LBS | BODY MASS INDEX: 43.4 KG/M2 | OXYGEN SATURATION: 100 % | RESPIRATION RATE: 18 BRPM | DIASTOLIC BLOOD PRESSURE: 78 MMHG | TEMPERATURE: 97 F | SYSTOLIC BLOOD PRESSURE: 163 MMHG

## 2024-04-07 DIAGNOSIS — M79.641 HAND PAIN, RIGHT: ICD-10-CM

## 2024-04-07 DIAGNOSIS — M79.89 SWELLING OF RIGHT UPPER EXTREMITY: Primary | ICD-10-CM

## 2024-04-07 PROCEDURE — 99284 EMERGENCY DEPT VISIT MOD MDM: CPT

## 2024-04-07 PROCEDURE — 6360000002 HC RX W HCPCS: Performed by: NURSE PRACTITIONER

## 2024-04-07 PROCEDURE — 93971 EXTREMITY STUDY: CPT

## 2024-04-07 PROCEDURE — 6370000000 HC RX 637 (ALT 250 FOR IP): Performed by: NURSE PRACTITIONER

## 2024-04-07 RX ORDER — IBUPROFEN 800 MG/1
800 TABLET ORAL
Status: COMPLETED | OUTPATIENT
Start: 2024-04-07 | End: 2024-04-07

## 2024-04-07 RX ORDER — INDOMETHACIN 25 MG/1
25 CAPSULE ORAL ONCE
Status: COMPLETED | OUTPATIENT
Start: 2024-04-07 | End: 2024-04-07

## 2024-04-07 RX ORDER — MELOXICAM 15 MG/1
15 TABLET ORAL DAILY
Qty: 90 TABLET | Refills: 1 | Status: SHIPPED | OUTPATIENT
Start: 2024-04-07 | End: 2024-04-07 | Stop reason: SINTOL

## 2024-04-07 RX ORDER — HYDROCODONE BITARTRATE AND ACETAMINOPHEN 5; 325 MG/1; MG/1
1 TABLET ORAL EVERY 6 HOURS PRN
Qty: 12 TABLET | Refills: 0 | Status: SHIPPED | OUTPATIENT
Start: 2024-04-07 | End: 2024-04-14

## 2024-04-07 RX ORDER — DEXAMETHASONE 4 MG/1
10 TABLET ORAL
Status: COMPLETED | OUTPATIENT
Start: 2024-04-07 | End: 2024-04-07

## 2024-04-07 RX ORDER — HYDROCODONE BITARTRATE AND ACETAMINOPHEN 7.5; 325 MG/1; MG/1
1 TABLET ORAL
Status: COMPLETED | OUTPATIENT
Start: 2024-04-07 | End: 2024-04-07

## 2024-04-07 RX ORDER — INDOMETHACIN 25 MG/1
25 CAPSULE ORAL
Qty: 42 CAPSULE | Refills: 0 | Status: SHIPPED | OUTPATIENT
Start: 2024-04-07 | End: 2024-04-21

## 2024-04-07 RX ADMIN — DEXAMETHASONE 10 MG: 4 TABLET ORAL at 21:13

## 2024-04-07 RX ADMIN — IBUPROFEN 800 MG: 800 TABLET, FILM COATED ORAL at 18:23

## 2024-04-07 RX ADMIN — HYDROCODONE BITARTRATE AND ACETAMINOPHEN 1 TABLET: 7.5; 325 TABLET ORAL at 18:23

## 2024-04-07 RX ADMIN — INDOMETHACIN 25 MG: 25 CAPSULE ORAL at 21:13

## 2024-04-07 ASSESSMENT — PAIN DESCRIPTION - LOCATION: LOCATION: HAND

## 2024-04-07 ASSESSMENT — PAIN DESCRIPTION - DESCRIPTORS: DESCRIPTORS: ACHING

## 2024-04-07 ASSESSMENT — PAIN - FUNCTIONAL ASSESSMENT: PAIN_FUNCTIONAL_ASSESSMENT: 0-10

## 2024-04-07 ASSESSMENT — PAIN DESCRIPTION - ORIENTATION: ORIENTATION: RIGHT

## 2024-04-07 ASSESSMENT — LIFESTYLE VARIABLES
HOW MANY STANDARD DRINKS CONTAINING ALCOHOL DO YOU HAVE ON A TYPICAL DAY: PATIENT DOES NOT DRINK
HOW OFTEN DO YOU HAVE A DRINK CONTAINING ALCOHOL: NEVER

## 2024-04-07 ASSESSMENT — PAIN SCALES - GENERAL: PAINLEVEL_OUTOF10: 10

## 2024-04-07 NOTE — ED TRIAGE NOTES
Per patient intermittent r hand pain x14 days. Denies injury. States evaluated previously for same incident. Denies hx of gout.

## 2024-04-08 ENCOUNTER — CARE COORDINATION (OUTPATIENT)
Dept: CARE COORDINATION | Facility: CLINIC | Age: 44
End: 2024-04-08

## 2024-04-08 NOTE — DISCHARGE INSTRUCTIONS
Take indocin 3 times a day for pain and swelling. Use norco for severe pain. Follow with PCP tomorrow and ortho later this week. Use ace wrap and ice. Hand splint to keep wrist from bending.

## 2024-04-08 NOTE — ED PROVIDER NOTES
hand swelling.  Patient reports over the last 2 weeks, she started developing right sided pain and swelling in her hand.  No injury noted.  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  She denies waking up after sleeping on it or any numbness and tingling.  She was seen in the ER on 3/22 and treated symptomatically with Indocin, oral Decadron in the ED as well as short course of Norco.  All of these medications improved her symptoms but she ran out of the medications and her symptoms have since returned.  She is now experiencing symptoms within her right wrist and forearm with soft tissue swelling and tenderness to touch.  She does not notice a big temperature discrepancy.  Her pain is constant throughout the day, not worse in the morning or at night.  She is right-hand dominant.    ROS     Review of Systems   Constitutional:  Negative for fever.   HENT: Negative.     Eyes: Negative.    Respiratory:  Negative for shortness of breath.    Cardiovascular:  Negative for chest pain.   Gastrointestinal:  Negative for abdominal pain, diarrhea and nausea.   Musculoskeletal:  Positive for arthralgias (right hand) and joint swelling (right wrist). Negative for back pain.   Neurological:  Negative for headaches.   All other systems reviewed and are negative.       Physical Exam     Vitals signs and nursing note reviewed:  Vitals:    04/07/24 1708   BP: (!) 163/78   Pulse: 92   Resp: 18   Temp: 97 °F (36.1 °C)   TempSrc: Oral   SpO2: 100%   Weight: 136.1 kg (300 lb)   Height: 1.753 m (5' 9\")      Physical Exam  Vitals and nursing note reviewed.   Constitutional:       Appearance: She is not ill-appearing.   HENT:      Mouth/Throat:      Mouth: Mucous membranes are moist.      Pharynx: Oropharynx is clear.   Cardiovascular:      Rate and Rhythm: Normal rate.   Pulmonary:      Effort: Pulmonary effort is normal.      Breath sounds: Normal breath sounds.

## 2024-04-08 NOTE — CARE COORDINATION
Ambulatory Care Management Note        Date/Time:  4/8/2024 10:29 AM    This patient was received as a referral from  Daily assignment for case management of recent er admission.ccm outreached to patient. No answer at this time, message left. Awaiting response. If no response, [facility] will outreach tomorrow.

## 2024-04-08 NOTE — FLOWSHEET NOTE
Patient mobility status  with no difficulty. Provider aware     I have reviewed discharge instructions with the patient.  The patient verbalized understanding.    Patient left ED via Discharge Method: ambulatory to Home with  self .    Opportunity for questions and clarification provided.     Patient given 2 scripts.     '

## 2024-04-10 ENCOUNTER — CARE COORDINATION (OUTPATIENT)
Dept: CARE COORDINATION | Facility: CLINIC | Age: 44
End: 2024-04-10

## 2024-04-10 NOTE — CARE COORDINATION
Ambulatory Care Management Note        Date/Time:  4/10/2024 9:22 AM    Ccm outreached to patient. No answer at this time, message left. Awaiting response. If no response, ccm will outreach next week.

## 2024-04-16 ENCOUNTER — CARE COORDINATION (OUTPATIENT)
Dept: CARE COORDINATION | Facility: CLINIC | Age: 44
End: 2024-04-16

## 2024-04-16 NOTE — CARE COORDINATION
Ambulatory Care Management Note        Date/Time:  4/16/2024 9:01 AM    Ccm outreached to patient. No answer at this time, unable to leave message. Ccm will close case at this time d/t unable to reach x3. Ccm will remain available as needed.

## 2024-04-24 ENCOUNTER — HOSPITAL ENCOUNTER (EMERGENCY)
Age: 44
Discharge: HOME | End: 2024-04-24
Payer: COMMERCIAL

## 2024-04-24 ENCOUNTER — APPOINTMENT (OUTPATIENT)
Dept: GENERAL RADIOLOGY | Age: 44
End: 2024-04-24
Payer: COMMERCIAL

## 2024-04-24 VITALS
SYSTOLIC BLOOD PRESSURE: 165 MMHG | OXYGEN SATURATION: 97 % | RESPIRATION RATE: 16 BRPM | DIASTOLIC BLOOD PRESSURE: 77 MMHG | TEMPERATURE: 98.8 F | HEART RATE: 92 BPM

## 2024-04-24 DIAGNOSIS — D64.9 ANEMIA, UNSPECIFIED TYPE: ICD-10-CM

## 2024-04-24 DIAGNOSIS — M79.641 HAND PAIN, RIGHT: Primary | ICD-10-CM

## 2024-04-24 LAB
ALBUMIN SERPL-MCNC: 2.8 G/DL (ref 3.5–5)
ALBUMIN/GLOB SERPL: 0.5 (ref 1–1.9)
ALP SERPL-CCNC: 159 U/L (ref 35–104)
ALT SERPL-CCNC: 14 U/L (ref 12–65)
ANION GAP SERPL CALC-SCNC: 12 MMOL/L (ref 9–18)
AST SERPL-CCNC: 18 U/L (ref 15–37)
BASOPHILS # BLD: 0 K/UL (ref 0–0.2)
BASOPHILS NFR BLD: 0 % (ref 0–2)
BILIRUB SERPL-MCNC: 0.2 MG/DL (ref 0–1.2)
BUN SERPL-MCNC: 17 MG/DL (ref 6–23)
CALCIUM SERPL-MCNC: 8.9 MG/DL (ref 8.8–10.2)
CHLORIDE SERPL-SCNC: 103 MMOL/L (ref 98–107)
CO2 SERPL-SCNC: 22 MMOL/L (ref 20–28)
CREAT SERPL-MCNC: 0.72 MG/DL (ref 0.6–1.1)
DIFFERENTIAL METHOD BLD: ABNORMAL
EOSINOPHIL # BLD: 0.2 K/UL (ref 0–0.8)
EOSINOPHIL NFR BLD: 2 % (ref 0.5–7.8)
ERYTHROCYTE [DISTWIDTH] IN BLOOD BY AUTOMATED COUNT: 19.1 % (ref 11.9–14.6)
ERYTHROCYTE [SEDIMENTATION RATE] IN BLOOD: 119 MM/HR (ref 0–20)
GLOBULIN SER CALC-MCNC: 5.2 G/DL (ref 2.3–3.5)
GLUCOSE SERPL-MCNC: 169 MG/DL (ref 70–99)
HCG SERPL QL: NEGATIVE
HCT VFR BLD AUTO: 25.6 % (ref 35.8–46.3)
HGB BLD-MCNC: 7.4 G/DL (ref 11.7–15.4)
IMM GRANULOCYTES # BLD AUTO: 0 K/UL (ref 0–0.5)
IMM GRANULOCYTES NFR BLD AUTO: 0 % (ref 0–5)
LYMPHOCYTES # BLD: 1.8 K/UL (ref 0.5–4.6)
LYMPHOCYTES NFR BLD: 21 % (ref 13–44)
MCH RBC QN AUTO: 21 PG (ref 26.1–32.9)
MCHC RBC AUTO-ENTMCNC: 28.9 G/DL (ref 31.4–35)
MCV RBC AUTO: 72.5 FL (ref 82–102)
MONOCYTES # BLD: 0.4 K/UL (ref 0.1–1.3)
MONOCYTES NFR BLD: 4 % (ref 4–12)
NEUTS SEG # BLD: 6.3 K/UL (ref 1.7–8.2)
NEUTS SEG NFR BLD: 73 % (ref 43–78)
NRBC # BLD: 0 K/UL (ref 0–0.2)
PLATELET # BLD AUTO: 393 K/UL (ref 150–450)
PMV BLD AUTO: 9.4 FL (ref 9.4–12.3)
POTASSIUM SERPL-SCNC: 3.7 MMOL/L (ref 3.5–5.1)
PROT SERPL-MCNC: 7.9 G/DL (ref 6.3–8.2)
RBC # BLD AUTO: 3.53 M/UL (ref 4.05–5.2)
SODIUM SERPL-SCNC: 137 MMOL/L (ref 136–145)
URATE SERPL-MCNC: 6.1 MG/DL (ref 2.5–7.1)
WBC # BLD AUTO: 8.6 K/UL (ref 4.3–11.1)

## 2024-04-24 PROCEDURE — 73130 X-RAY EXAM OF HAND: CPT

## 2024-04-24 PROCEDURE — 99284 EMERGENCY DEPT VISIT MOD MDM: CPT

## 2024-04-24 PROCEDURE — 6360000002 HC RX W HCPCS: Performed by: NURSE PRACTITIONER

## 2024-04-24 PROCEDURE — 85652 RBC SED RATE AUTOMATED: CPT

## 2024-04-24 PROCEDURE — 84703 CHORIONIC GONADOTROPIN ASSAY: CPT

## 2024-04-24 PROCEDURE — 84550 ASSAY OF BLOOD/URIC ACID: CPT

## 2024-04-24 PROCEDURE — 6370000000 HC RX 637 (ALT 250 FOR IP): Performed by: NURSE PRACTITIONER

## 2024-04-24 PROCEDURE — 80053 COMPREHEN METABOLIC PANEL: CPT

## 2024-04-24 PROCEDURE — 85025 COMPLETE CBC W/AUTO DIFF WBC: CPT

## 2024-04-24 RX ORDER — INDOMETHACIN 25 MG/1
25 CAPSULE ORAL 2 TIMES DAILY WITH MEALS
Qty: 10 CAPSULE | Refills: 0 | Status: SHIPPED | OUTPATIENT
Start: 2024-04-24 | End: 2024-04-29

## 2024-04-24 RX ORDER — HYDROCODONE BITARTRATE AND ACETAMINOPHEN 5; 325 MG/1; MG/1
1 TABLET ORAL
Status: COMPLETED | OUTPATIENT
Start: 2024-04-24 | End: 2024-04-24

## 2024-04-24 RX ORDER — HYDROCODONE BITARTRATE AND ACETAMINOPHEN 5; 325 MG/1; MG/1
1 TABLET ORAL EVERY 8 HOURS PRN
Qty: 10 TABLET | Refills: 0 | Status: SHIPPED | OUTPATIENT
Start: 2024-04-24 | End: 2024-04-27

## 2024-04-24 RX ORDER — DEXAMETHASONE SODIUM PHOSPHATE 10 MG/ML
10 INJECTION INTRAMUSCULAR; INTRAVENOUS ONCE
Status: COMPLETED | OUTPATIENT
Start: 2024-04-24 | End: 2024-04-24

## 2024-04-24 RX ORDER — INDOMETHACIN 25 MG/1
50 CAPSULE ORAL 3 TIMES DAILY PRN
Qty: 30 CAPSULE | Refills: 0 | Status: SHIPPED | OUTPATIENT
Start: 2024-04-24 | End: 2024-04-29

## 2024-04-24 RX ADMIN — DEXAMETHASONE SODIUM PHOSPHATE 10 MG: 10 INJECTION INTRAMUSCULAR; INTRAVENOUS at 19:22

## 2024-04-24 RX ADMIN — HYDROCODONE BITARTRATE AND ACETAMINOPHEN 1 TABLET: 5; 325 TABLET ORAL at 19:23

## 2024-04-24 ASSESSMENT — PAIN - FUNCTIONAL ASSESSMENT: PAIN_FUNCTIONAL_ASSESSMENT: 0-10

## 2024-04-24 ASSESSMENT — PAIN SCALES - GENERAL: PAINLEVEL_OUTOF10: 10

## 2024-04-24 ASSESSMENT — PAIN DESCRIPTION - LOCATION: LOCATION: HAND

## 2024-04-24 ASSESSMENT — PAIN DESCRIPTION - ORIENTATION: ORIENTATION: RIGHT

## 2024-04-24 NOTE — ED NOTES
Patient mobility status  with no difficulty. Provider aware     I have reviewed discharge instructions with the patient.  The patient verbalized understanding.    Patient left ED via Discharge Method: ambulatory to Home with Extended Family:.    Opportunity for questions and clarification provided.     Patient given 2 scripts.

## 2024-04-25 ENCOUNTER — CARE COORDINATION (OUTPATIENT)
Dept: CARE COORDINATION | Facility: CLINIC | Age: 44
End: 2024-04-25

## 2024-04-25 NOTE — ED PROVIDER NOTES
change in current symptoms, or if symptoms do not continue to improve. I instructed them to follow up with their primary care provider, own specialist, or medical provider that I am recommending for him within the next 2-3 days  The patient acknowledged understanding plan of care and affirmed approval.     Signed by: NEDRA Dickinson     This note created using Dragon voice recognition software.  Please excuse any accidental errors associated with its use, as note has not been fully proofread and edited.         1 acute complicated illness or injury.  Prescription drug management performed.  Patient was discharged risks and benefits of hospitalization were considered.  Shared medical decision making was utilized in creating the patients health plan today.    I independently ordered and reviewed each unique test.  I reviewed external records: provider visit note from PCP.     I interpreted the X-rays right hand x-ray result with no fracture noted as a burning.        Exclusion criteria - the patient is NOT to be included for SEP-1 Core Measure due to: 2+ SIRS criteria are not met       History     HPI  Pleasant well-appearing 43-year-old female presents to the ED with complaint of pain tenderness and swelling at the dorsal aspect of the right hand for the last 3 days.  Triage note reports symptoms x 3 weeks but she denies this.  States symptoms began while she was at work couple of days ago.  She is right-hand dominant.  States that she has had similar symptoms several times over the last year and no diagnosis has been made.  States that when she has flares of the symptoms she comes to the ED and receives benefit from oral steroids and indomethacin.  Denies gout history, similar.  Has diabetes type 2, states sugars have been well-controlled.  Denies injury or activity change.  No radiation of pain.  No numbness or tingling.  Patient's pleasant very well-appearing in no acute distress.    ROS     Review of

## 2024-04-25 NOTE — CARE COORDINATION
Ambulatory Care Management Note        Date/Time:  4/25/2024 7:43 AM    This patient was received as a referral from  Daily assignment for case management of recent er admission.  Ccm outreached to patient. No answer at this time, message left. Awaiting response. If no response, [facility] will outreach tomorrow.

## 2024-05-06 ENCOUNTER — CARE COORDINATION (OUTPATIENT)
Dept: CARE COORDINATION | Facility: CLINIC | Age: 44
End: 2024-05-06

## 2024-05-06 NOTE — CARE COORDINATION
Ambulatory Care Management Note        Date/Time:  5/6/2024 11:59 AM    Ccm outreached to patient. No answer at this time, unable to leave message. Awaiting response. If no response, ccm will outreach next week.

## 2024-05-13 ENCOUNTER — CARE COORDINATION (OUTPATIENT)
Dept: CARE COORDINATION | Facility: CLINIC | Age: 44
End: 2024-05-13

## 2024-05-13 NOTE — CARE COORDINATION
Ambulatory Care Management Note        Date/Time:  5/13/2024 9:56 AM    Ccm outreached to patient. No answer at this time,  message left. Ccm will close case at this time d/t unable to reach x3. Ccm will remain available as needed.

## 2024-06-17 ENCOUNTER — OFFICE VISIT (OUTPATIENT)
Dept: FAMILY MEDICINE CLINIC | Facility: CLINIC | Age: 44
End: 2024-06-17
Payer: COMMERCIAL

## 2024-06-17 VITALS
TEMPERATURE: 97.4 F | HEART RATE: 91 BPM | SYSTOLIC BLOOD PRESSURE: 166 MMHG | WEIGHT: 293 LBS | HEIGHT: 69 IN | DIASTOLIC BLOOD PRESSURE: 97 MMHG | BODY MASS INDEX: 43.4 KG/M2

## 2024-06-17 DIAGNOSIS — E11.65 TYPE 2 DIABETES MELLITUS WITH HYPERGLYCEMIA, WITHOUT LONG-TERM CURRENT USE OF INSULIN (HCC): Primary | ICD-10-CM

## 2024-06-17 DIAGNOSIS — I10 PRIMARY HYPERTENSION: ICD-10-CM

## 2024-06-17 DIAGNOSIS — E11.65 TYPE 2 DIABETES MELLITUS WITH HYPERGLYCEMIA, WITHOUT LONG-TERM CURRENT USE OF INSULIN (HCC): ICD-10-CM

## 2024-06-17 DIAGNOSIS — F32.1 CURRENT MODERATE EPISODE OF MAJOR DEPRESSIVE DISORDER WITHOUT PRIOR EPISODE (HCC): ICD-10-CM

## 2024-06-17 DIAGNOSIS — L73.2 LEFT AXILLARY HIDRADENITIS: ICD-10-CM

## 2024-06-17 LAB
ALBUMIN SERPL-MCNC: 2.7 G/DL (ref 3.5–5)
ALBUMIN/GLOB SERPL: 0.5 (ref 1–1.9)
ALP SERPL-CCNC: 202 U/L (ref 35–104)
ALT SERPL-CCNC: 15 U/L (ref 12–65)
ANION GAP SERPL CALC-SCNC: 9 MMOL/L (ref 9–18)
AST SERPL-CCNC: 22 U/L (ref 15–37)
BILIRUB SERPL-MCNC: 0.3 MG/DL (ref 0–1.2)
BUN SERPL-MCNC: 9 MG/DL (ref 6–23)
CALCIUM SERPL-MCNC: 8.8 MG/DL (ref 8.8–10.2)
CHLORIDE SERPL-SCNC: 101 MMOL/L (ref 98–107)
CHOLEST SERPL-MCNC: 190 MG/DL (ref 0–200)
CO2 SERPL-SCNC: 25 MMOL/L (ref 20–28)
CREAT SERPL-MCNC: 0.68 MG/DL (ref 0.6–1.1)
EST. AVERAGE GLUCOSE BLD GHB EST-MCNC: 249 MG/DL
GLOBULIN SER CALC-MCNC: 5.2 G/DL (ref 2.3–3.5)
GLUCOSE SERPL-MCNC: 232 MG/DL (ref 70–99)
HBA1C MFR BLD: 10.3 % (ref 0–5.6)
HDLC SERPL-MCNC: 101 MG/DL (ref 40–60)
HDLC SERPL: 1.9 (ref 0–5)
LDLC SERPL CALC-MCNC: 74 MG/DL (ref 0–100)
POTASSIUM SERPL-SCNC: 4 MMOL/L (ref 3.5–5.1)
PROT SERPL-MCNC: 7.8 G/DL (ref 6.3–8.2)
SODIUM SERPL-SCNC: 134 MMOL/L (ref 136–145)
TRIGL SERPL-MCNC: 75 MG/DL (ref 0–150)
VLDLC SERPL CALC-MCNC: 15 MG/DL (ref 6–23)

## 2024-06-17 PROCEDURE — 3080F DIAST BP >= 90 MM HG: CPT | Performed by: NURSE PRACTITIONER

## 2024-06-17 PROCEDURE — 99214 OFFICE O/P EST MOD 30 MIN: CPT | Performed by: NURSE PRACTITIONER

## 2024-06-17 PROCEDURE — 3077F SYST BP >= 140 MM HG: CPT | Performed by: NURSE PRACTITIONER

## 2024-06-17 RX ORDER — BUPROPION HYDROCHLORIDE 150 MG/1
150 TABLET ORAL EVERY MORNING
Qty: 90 TABLET | Refills: 1 | Status: SHIPPED | OUTPATIENT
Start: 2024-06-17

## 2024-06-17 RX ORDER — LISINOPRIL 40 MG/1
40 TABLET ORAL DAILY
Qty: 90 TABLET | Refills: 1 | Status: SHIPPED | OUTPATIENT
Start: 2024-06-17

## 2024-06-17 RX ORDER — QUETIAPINE FUMARATE 100 MG/1
100 TABLET, FILM COATED ORAL 2 TIMES DAILY
Qty: 60 TABLET | Refills: 5 | Status: SHIPPED | OUTPATIENT
Start: 2024-06-17

## 2024-06-17 RX ORDER — ESCITALOPRAM OXALATE 10 MG/1
10 TABLET ORAL DAILY
Qty: 90 TABLET | Refills: 1 | Status: SHIPPED | OUTPATIENT
Start: 2024-06-17

## 2024-06-17 RX ORDER — MINOCYCLINE HYDROCHLORIDE 100 MG/1
100 CAPSULE ORAL 2 TIMES DAILY
Qty: 180 CAPSULE | Refills: 1 | Status: SHIPPED | OUTPATIENT
Start: 2024-06-17

## 2024-06-17 ASSESSMENT — PATIENT HEALTH QUESTIONNAIRE - PHQ9
SUM OF ALL RESPONSES TO PHQ QUESTIONS 1-9: 9
5. POOR APPETITE OR OVEREATING: SEVERAL DAYS
6. FEELING BAD ABOUT YOURSELF - OR THAT YOU ARE A FAILURE OR HAVE LET YOURSELF OR YOUR FAMILY DOWN: SEVERAL DAYS
SUM OF ALL RESPONSES TO PHQ QUESTIONS 1-9: 9
4. FEELING TIRED OR HAVING LITTLE ENERGY: SEVERAL DAYS
SUM OF ALL RESPONSES TO PHQ9 QUESTIONS 1 & 2: 4
10. IF YOU CHECKED OFF ANY PROBLEMS, HOW DIFFICULT HAVE THESE PROBLEMS MADE IT FOR YOU TO DO YOUR WORK, TAKE CARE OF THINGS AT HOME, OR GET ALONG WITH OTHER PEOPLE: SOMEWHAT DIFFICULT
SUM OF ALL RESPONSES TO PHQ QUESTIONS 1-9: 9
8. MOVING OR SPEAKING SO SLOWLY THAT OTHER PEOPLE COULD HAVE NOTICED. OR THE OPPOSITE, BEING SO FIGETY OR RESTLESS THAT YOU HAVE BEEN MOVING AROUND A LOT MORE THAN USUAL: NOT AT ALL
2. FEELING DOWN, DEPRESSED OR HOPELESS: MORE THAN HALF THE DAYS
9. THOUGHTS THAT YOU WOULD BE BETTER OFF DEAD, OR OF HURTING YOURSELF: NOT AT ALL
1. LITTLE INTEREST OR PLEASURE IN DOING THINGS: MORE THAN HALF THE DAYS
7. TROUBLE CONCENTRATING ON THINGS, SUCH AS READING THE NEWSPAPER OR WATCHING TELEVISION: NOT AT ALL
SUM OF ALL RESPONSES TO PHQ QUESTIONS 1-9: 9
3. TROUBLE FALLING OR STAYING ASLEEP: MORE THAN HALF THE DAYS

## 2024-06-17 ASSESSMENT — ENCOUNTER SYMPTOMS: SHORTNESS OF BREATH: 0

## 2024-06-17 NOTE — PROGRESS NOTES
Danisha Agrawal (:  1980) is a 43 y.o. female,Established patient, here for evaluation of the following chief complaint(s):  Follow-up Chronic Condition ((Rm 11) 6 mo fu/labs/med refills ), Referral - General (Pt need  a referral to a GYN MD), and Other (Pt said she has lesions popping up again in genital area-front/back and breast area )      Assessment & Plan   1. Type 2 diabetes mellitus with hyperglycemia, without long-term current use of insulin ([facility])  -     Semaglutide 14 MG TABS; Take 1 tablet by mouth daily, Disp-90 tablet, R-1Normal  -     empagliflozin (JARDIANCE) 25 MG tablet; Take 1 tablet by mouth daily, Disp-90 tablet, R-1Normal  2. Primary hypertension  -     lisinopril (PRINIVIL;ZESTRIL) 40 MG tablet; Take 1 tablet by mouth daily, Disp-90 tablet, R-1Normal  3. Left axillary hidradenitis  -     [facility]  -     minocycline (MINOCIN;DYNACIN) 100 MG capsule; Take 1 capsule by mouth 2 times daily, Disp-180 capsule, R-1Normal  4. Body mass index (BMI) 45.0-49.9, adult ([facility])  5. Current moderate episode of major depressive disorder without prior episode ([facility])  -     QUEtiapine (SEROQUEL) 100 MG tablet; Take 1 tablet by mouth 2 times daily, Disp-60 tablet, R-5Normal  -     escitalopram (LEXAPRO) 10 MG tablet; Take 1 tablet by mouth daily, Disp-90 tablet, R-1Normal  -     buPROPion (WELLBUTRIN XL) 150 MG extended release tablet; Take 1 tablet by mouth every morning, Disp-90 tablet, R-1Normal  -     [facility] [address] (Psychiatry)  Again referred to psych ( have done so many times ) strongly urged to make an apt and keep it. Continue current meds but will not add or change as on 3 meds  Referral to derm for chronic skin infection  antibiotic ordered until seen to be taken  HTN poorly controlled increased lisinopril follow up in one month  DM sound uncontrolled checking labs this could contribute to her infections.  May nee d med

## 2024-06-18 ENCOUNTER — TELEPHONE (OUTPATIENT)
Dept: FAMILY MEDICINE CLINIC | Facility: CLINIC | Age: 44
End: 2024-06-18

## 2024-06-18 RX ORDER — GLIPIZIDE 5 MG/1
5 TABLET, FILM COATED, EXTENDED RELEASE ORAL DAILY
Qty: 30 TABLET | Refills: 3 | Status: SHIPPED | OUTPATIENT
Start: 2024-06-18

## 2024-06-18 NOTE — TELEPHONE ENCOUNTER
----- Message from RADHA Rosales NP sent at 6/18/2024  8:05 AM EDT -----  DM is out of control Ha1c is over 10. Adding glucotrol pill to be taken in am but she must eat after taking if misses breakfast take before lunch. Continue to check sugars and return in one month with BS log to see if improved

## 2024-06-20 NOTE — TELEPHONE ENCOUNTER
I called the patient back and spoke with her about her lab results. I scheduled her 1 month follow up appointment for her.

## 2024-07-01 ENCOUNTER — APPOINTMENT (RX ONLY)
Dept: URBAN - METROPOLITAN AREA CLINIC 330 | Facility: CLINIC | Age: 44
Setting detail: DERMATOLOGY
End: 2024-07-01

## 2024-07-01 DIAGNOSIS — Z79.899 OTHER LONG TERM (CURRENT) DRUG THERAPY: ICD-10-CM

## 2024-07-01 DIAGNOSIS — L73.2 HIDRADENITIS SUPPURATIVA: ICD-10-CM | Status: INADEQUATELY CONTROLLED

## 2024-07-01 PROCEDURE — ? COUNSELING

## 2024-07-01 PROCEDURE — ? HIGH RISK MEDICATION MONITORING

## 2024-07-01 PROCEDURE — ? COSENTYX INITIATION

## 2024-07-01 PROCEDURE — ? PRESCRIPTION MEDICATION MANAGEMENT

## 2024-07-01 PROCEDURE — 99214 OFFICE O/P EST MOD 30 MIN: CPT

## 2024-07-01 PROCEDURE — ? ORDER TESTS

## 2024-07-01 PROCEDURE — ? MEDICATION COUNSELING

## 2024-07-01 ASSESSMENT — LOCATION SIMPLE DESCRIPTION DERM
LOCATION SIMPLE: ABDOMEN
LOCATION SIMPLE: LEFT BUTTOCK
LOCATION SIMPLE: LEFT THIGH
LOCATION SIMPLE: GROIN

## 2024-07-01 ASSESSMENT — LOCATION ZONE DERM
LOCATION ZONE: LEG
LOCATION ZONE: TRUNK

## 2024-07-01 ASSESSMENT — LOCATION DETAILED DESCRIPTION DERM
LOCATION DETAILED: PERIUMBILICAL SKIN
LOCATION DETAILED: LEFT MEDIAL BUTTOCK
LOCATION DETAILED: LEFT ANTERIOR PROXIMAL THIGH
LOCATION DETAILED: RIGHT SUPRAPUBIC SKIN

## 2024-07-01 NOTE — PROCEDURE: ORDER TESTS
Expected Date Of Service: 07/01/2024
Lab Facility: [id number]
Bill For Surgical Tray: no
Performing Laboratory: [id number]
Billing Type: Third-Party Bill

## 2024-07-01 NOTE — PROCEDURE: PRESCRIPTION MEDICATION MANAGEMENT
Initiate Treatment: Minocycline bid with food\\nCosentyx
Detail Level: Zone
Plan: Minocycline prescribed by pcp\\n\\nPt has tried and failed Humira. \\nPrior authorization started today for cosentyx. \\nPt will obtain tb test this week.
Render In Strict Bullet Format?: No

## 2024-07-01 NOTE — PROCEDURE: COSENTYX INITIATION
Is Methotrexate Contraindicated?: Yes
Cosentyx Monitoring Guidelines: A yearly test for tuberculosis is required while taking Cosentyx.
Add Pregnancy And Lactation Warning To Medication Counseling?: No
Cosentyx Dosing: 300 mg SC week 0, 1, 2, 3, and 4 then every 4 weeks after that
Cyclosporine Specific Contraindications (Override- The Patient.....): potential for pregnancy
Diagnosis (Required): Psoriasis
Detail Level: Zone
Pregnancy And Lactation Warning Text: This medication is Pregnancy Category B and is considered safe during pregnancy. It is unknown if this medication is excreted in breast milk.
Phototherapy Specific Contraindications (Override- The Patient.....): not an appropriate treatment options for HS

## 2024-07-01 NOTE — PROCEDURE: MEDICATION COUNSELING
Dupixent Counseling: I discussed with the patient the risks of dupilumab including but not limited to eye infection and irritation, cold sores, injection site reactions, worsening of asthma, allergic reactions and increased risk of parasitic infection.  Live vaccines should be avoided while taking dupilumab. Dupilumab will also interact with certain medications such as warfarin and cyclosporine. The patient understands that monitoring is required and they must alert us or the primary physician if symptoms of infection or other concerning signs are noted.
Niacinamide Counseling: I recommended taking niacin or niacinamide, also know as vitamin B3, twice daily. Recent evidence suggests that taking vitamin B3 (500 mg twice daily) can reduce the risk of actinic keratoses and non-melanoma skin cancers. Side effects of vitamin B3 include flushing and headache.
Dapsone Pregnancy And Lactation Text: This medication is Pregnancy Category C and is not considered safe during pregnancy or breast feeding.
Winlevi Counseling:  I discussed with the patient the risks of topical clascoterone including but not limited to erythema, scaling, itching, and stinging. Patient voiced their understanding.
Thalidomide Counseling: I discussed with the patient the risks of thalidomide including but not limited to birth defects, anxiety, weakness, chest pain, dizziness, cough and severe allergy.
Oral Minoxidil Pregnancy And Lactation Text: This medication should only be used when clearly needed if you are pregnant, attempting to become pregnant or breast feeding.
Opioid Counseling: I discussed with the patient the potential side effects of opioids including but not limited to addiction, altered mental status, and depression. I stressed avoiding alcohol, benzodiazepines, muscle relaxants and sleep aids unless specifically okayed by a physician. The patient verbalized understanding of the proper use and possible adverse effects of opioids. All of the patient's questions and concerns were addressed. They were instructed to flush the remaining pills down the toilet if they did not need them for pain.
Tetracycline Pregnancy And Lactation Text: This medication is Pregnancy Category D and not consider safe during pregnancy. It is also excreted in breast milk.
Infliximab Pregnancy And Lactation Text: This medication is Pregnancy Category B and is considered safe during pregnancy. It is unknown if this medication is excreted in breast milk.
Cellcept Pregnancy And Lactation Text: This medication is Pregnancy Category D and isn't considered safe during pregnancy. It is unknown if this medication is excreted in breast milk.
Cephalexin Pregnancy And Lactation Text: This medication is Pregnancy Category B and considered safe during pregnancy.  It is also excreted in breast milk but can be used safely for shorter doses.
Minocycline Counseling: Patient advised regarding possible photosensitivity and discoloration of the teeth, skin, lips, tongue and gums.  Patient instructed to avoid sunlight, if possible.  When exposed to sunlight, patients should wear protective clothing, sunglasses, and sunscreen.  The patient was instructed to call the office immediately if the following severe adverse effects occur:  hearing changes, easy bruising/bleeding, severe headache, or vision changes.  The patient verbalized understanding of the proper use and possible adverse effects of minocycline.  All of the patient's questions and concerns were addressed.
Ivermectin Counseling:  Patient instructed to take medication on an empty stomach with a full glass of water.  Patient informed of potential adverse effects including but not limited to nausea, diarrhea, dizziness, itching, and swelling of the extremities or lymph nodes.  The patient verbalized understanding of the proper use and possible adverse effects of ivermectin.  All of the patient's questions and concerns were addressed.
Detail Level: Simple
Sotyktu Counseling:  I discussed the most common side effects of Sotyktu including: common cold, sore throat, sinus infections, cold sores, canker sores, folliculitis, and acne.? I also discussed more serious side effects of Sotyktu including but not limited to: serious allergic reactions; increased risk for infections such as TB; cancers such as lymphomas; rhabdomyolysis and elevated CPK; and elevated triglycerides and liver enzymes.?
5-Fu Pregnancy And Lactation Text: This medication is Pregnancy Category X and contraindicated in pregnancy and in women who may become pregnant. It is unknown if this medication is excreted in breast milk.
Opioid Pregnancy And Lactation Text: These medications can lead to premature delivery and should be avoided during pregnancy. These medications are also present in breast milk in small amounts.
Rituxan Counseling:  I discussed with the patient the risks of Rituxan infusions. Side effects can include infusion reactions, severe drug rashes including mucocutaneous reactions, reactivation of latent hepatitis and other infections and rarely progressive multifocal leukoencephalopathy.  All of the patient's questions and concerns were addressed.
Soolantra Counseling: I discussed with the patients the risks of topial Soolantra. This is a medicine which decreases the number of mites and inflammation in the skin. You experience burning, stinging, eye irritation or allergic reactions.  Please call our office if you develop any problems from using this medication.
Terbinafine Counseling: Patient counseling regarding adverse effects of terbinafine including but not limited to headache, diarrhea, rash, upset stomach, liver function test abnormalities, itching, taste/smell disturbance, nausea, abdominal pain, and flatulence.  There is a rare possibility of liver failure that can occur when taking terbinafine.  The patient understands that a baseline LFT and kidney function test may be required. The patient verbalized understanding of the proper use and possible adverse effects of terbinafine.  All of the patient's questions and concerns were addressed.
Erivedge Pregnancy And Lactation Text: This medication is Pregnancy Category X and is absolutely contraindicated during pregnancy. It is unknown if it is excreted in breast milk.
Prednisone Counseling:  I discussed with the patient the risks of prolonged use of prednisone including but not limited to weight gain, insomnia, osteoporosis, mood changes, diabetes, susceptibility to infection, glaucoma and high blood pressure.  In cases where prednisone use is prolonged, patients should be monitored with blood pressure checks, serum glucose levels and an eye exam.  Additionally, the patient may need to be placed on GI prophylaxis, PCP prophylaxis, and calcium and vitamin D supplementation and/or a bisphosphonate.  The patient verbalized understanding of the proper use and the possible adverse effects of prednisone.  All of the patient's questions and concerns were addressed.
Topical Ketoconazole Pregnancy And Lactation Text: This medication is Pregnancy Category B and is considered safe during pregnancy. It is unknown if it is excreted in breast milk.
Finasteride Pregnancy And Lactation Text: This medication is absolutely contraindicated during pregnancy. It is unknown if it is excreted in breast milk.
Picato Pregnancy And Lactation Text: This medication is Pregnancy Category C. It is unknown if this medication is excreted in breast milk.
Isotretinoin Counseling: Patient should get monthly blood tests, not donate blood, not drive at night if vision affected, not share medication, and not undergo elective surgery for 6 months after tx completed. Side effects reviewed, pt to contact office should one occur.
Azelaic Acid Pregnancy And Lactation Text: This medication is considered safe during pregnancy and breast feeding.
Terbinafine Pregnancy And Lactation Text: This medication is Pregnancy Category B and is considered safe during pregnancy. It is also excreted in breast milk and breast feeding isn't recommended.
Prednisone Pregnancy And Lactation Text: This medication is Pregnancy Category C and it isn't know if it is safe during pregnancy. This medication is excreted in breast milk.
Gabapentin Counseling: I discussed with the patient the risks of gabapentin including but not limited to dizziness, somnolence, fatigue and ataxia.
Otezla Counseling: The side effects of Otezla were discussed with the patient, including but not limited to worsening or new depression, weight loss, diarrhea, nausea, upper respiratory tract infection, and headache. Patient instructed to call the office should any adverse effect occur.  The patient verbalized understanding of the proper use and possible adverse effects of Otezla.  All the patient's questions and concerns were addressed.
Topical Metronidazole Counseling: Metronidazole is a topical antibiotic medication. You may experience burning, stinging, redness, or allergic reactions.  Please call our office if you develop any problems from using this medication.
Birth Control Pills Counseling: Birth Control Pill Counseling: I discussed with the patient the potential side effects of OCPs including but not limited to increased risk of stroke, heart attack, thrombophlebitis, deep venous thrombosis, hepatic adenomas, breast changes, GI upset, headaches, and depression.  The patient verbalized understanding of the proper use and possible adverse effects of OCPs. All of the patient's questions and concerns were addressed.
Libtayo Counseling- I discussed with the patient the risks of Libtayo including but not limited to nausea, vomiting, diarrhea, and bone or muscle pain.  The patient verbalized understanding of the proper use and possible adverse effects of Libtayo.  All of the patient's questions and concerns were addressed.
Winlevi Pregnancy And Lactation Text: This medication is considered safe during pregnancy and breastfeeding.
Ivermectin Pregnancy And Lactation Text: This medication is Pregnancy Category C and it isn't known if it is safe during pregnancy. It is also excreted in breast milk.
Cyclophosphamide Counseling:  I discussed with the patient the risks of cyclophosphamide including but not limited to hair loss, hormonal abnormalities, decreased fertility, abdominal pain, diarrhea, nausea and vomiting, bone marrow suppression and infection. The patient understands that monitoring is required while taking this medication.
Imiquimod Counseling:  I discussed with the patient the risks of imiquimod including but not limited to erythema, scaling, itching, weeping, crusting, and pain.  Patient understands that the inflammatory response to imiquimod is variable from person to person and was educated regarded proper titration schedule.  If flu-like symptoms develop, patient knows to discontinue the medication and contact us.
Dupixent Pregnancy And Lactation Text: This medication likely crosses the placenta but the risk for the fetus is uncertain. This medication is excreted in breast milk.
Clindamycin Counseling: I counseled the patient regarding use of clindamycin as an antibiotic for prophylactic and/or therapeutic purposes. Clindamycin is active against numerous classes of bacteria, including skin bacteria. Side effects may include nausea, diarrhea, gastrointestinal upset, rash, hives, yeast infections, and in rare cases, colitis.
Include Pregnancy/Lactation Warning?: No
Niacinamide Pregnancy And Lactation Text: These medications are considered safe during pregnancy.
Rituxan Pregnancy And Lactation Text: This medication is Pregnancy Category C and it isn't know if it is safe during pregnancy. It is unknown if this medication is excreted in breast milk but similar antibodies are known to be excreted.
Drysol Counseling:  I discussed with the patient the risks of drysol/aluminum chloride including but not limited to skin rash, itching, irritation, burning.
Taltz Counseling: I discussed with the patient the risks of ixekizumab including but not limited to immunosuppression, serious infections, worsening of inflammatory bowel disease and drug reactions.  The patient understands that monitoring is required including a PPD at baseline and must alert us or the primary physician if symptoms of infection or other concerning signs are noted.
Cyclophosphamide Pregnancy And Lactation Text: This medication is Pregnancy Category D and it isn't considered safe during pregnancy. This medication is excreted in breast milk.
Enbrel Counseling:  I discussed with the patient the risks of etanercept including but not limited to myelosuppression, immunosuppression, autoimmune hepatitis, demyelinating diseases, lymphoma, and infections.  The patient understands that monitoring is required including a PPD at baseline and must alert us or the primary physician if symptoms of infection or other concerning signs are noted.
Benzoyl Peroxide Counseling: Patient counseled that medicine may cause skin irritation and bleach clothing.  In the event of skin irritation, the patient was advised to reduce the amount of the drug applied or use it less frequently.   The patient verbalized understanding of the proper use and possible adverse effects of benzoyl peroxide.  All of the patient's questions and concerns were addressed.
Quinolones Counseling:  I discussed with the patient the risks of fluoroquinolones including but not limited to GI upset, allergic reaction, drug rash, diarrhea, dizziness, photosensitivity, yeast infections, liver function test abnormalities, tendonitis/tendon rupture.
Sotyktu Pregnancy And Lactation Text: There is insufficient data to evaluate whether or not Sotyktu is safe to use during pregnancy.? ?It is not known if Sotyktu passes into breast milk and whether or not it is safe to use when breastfeeding.??
Protopic Counseling: Patient may experience a mild burning sensation during topical application. Protopic is not approved in children less than 2 years of age. There have been case reports of hematologic and skin malignancies in patients using topical calcineurin inhibitors although causality is questionable.
Soolantra Pregnancy And Lactation Text: This medication is Pregnancy Category C. This medication is considered safe during breast feeding.
Fluconazole Counseling:  Patient counseled regarding adverse effects of fluconazole including but not limited to headache, diarrhea, nausea, upset stomach, liver function test abnormalities, taste disturbance, and stomach pain.  There is a rare possibility of liver failure that can occur when taking fluconazole.  The patient understands that monitoring of LFTs and kidney function test may be required, especially at baseline. The patient verbalized understanding of the proper use and possible adverse effects of fluconazole.  All of the patient's questions and concerns were addressed.
Isotretinoin Pregnancy And Lactation Text: This medication is Pregnancy Category X and is considered extremely dangerous during pregnancy. It is unknown if it is excreted in breast milk.
Fluconazole Pregnancy And Lactation Text: This medication is Pregnancy Category C and it isn't know if it is safe during pregnancy. It is also excreted in breast milk.
VTAMA Counseling: I discussed with the patient that VTAMA is not for use in the eyes, mouth or mouth. They should call the office if they develop any signs of allergic reactions to VTAMA. The patient verbalized understanding of the proper use and possible adverse effects of VTAMA.  All of the patient's questions and concerns were addressed.
Otezla Pregnancy And Lactation Text: This medication is Pregnancy Category C and it isn't known if it is safe during pregnancy. It is unknown if it is excreted in breast milk.
Protopic Pregnancy And Lactation Text: This medication is Pregnancy Category C. It is unknown if this medication is excreted in breast milk when applied topically.
Xeljanz Counseling: I discussed with the patient the risks of Xeljanz therapy including increased risk of infection, liver issues, headache, diarrhea, or cold symptoms. Live vaccines should be avoided. They were instructed to call if they have any problems.
Topical Retinoid counseling:  Patient advised to apply a pea-sized amount only at bedtime and wait 30 minutes after washing their face before applying.  If too drying, patient may add a non-comedogenic moisturizer. The patient verbalized understanding of the proper use and possible adverse effects of retinoids.  All of the patient's questions and concerns were addressed.
High Dose Vitamin A Counseling: Side effects reviewed, pt to contact office should one occur.
Tranexamic Acid Counseling:  Patient advised of the small risk of bleeding problems with tranexamic acid. They were also instructed to call if they developed any nausea, vomiting or diarrhea. All of the patient's questions and concerns were addressed.
Nsaids Counseling: NSAID Counseling: I discussed with the patient that NSAIDs should be taken with food. Prolonged use of NSAIDs can result in the development of stomach ulcers.  Patient advised to stop taking NSAIDs if abdominal pain occurs.  The patient verbalized understanding of the proper use and possible adverse effects of NSAIDs.  All of the patient's questions and concerns were addressed.
Clindamycin Pregnancy And Lactation Text: This medication can be used in pregnancy if certain situations. Clindamycin is also present in breast milk.
Gabapentin Pregnancy And Lactation Text: This medication is Pregnancy Category C and isn't considered safe during pregnancy. It is excreted in breast milk.
Arava Counseling:  Patient counseled regarding adverse effects of Arava including but not limited to nausea, vomiting, abnormalities in liver function tests. Patients may develop mouth sores, rash, diarrhea, and abnormalities in blood counts. The patient understands that monitoring is required including LFTs and blood counts.  There is a rare possibility of scarring of the liver and lung problems that can occur when taking methotrexate. Persistent nausea, loss of appetite, pale stools, dark urine, cough, and shortness of breath should be reported immediately. Patient advised to discontinue Arava treatment and consult with a physician prior to attempting conception. The patient will have to undergo a treatment to eliminate Arava from the body prior to conception.
Topical Metronidazole Pregnancy And Lactation Text: This medication is Pregnancy Category B and considered safe during pregnancy.  It is also considered safe to use while breastfeeding.
Siliq Counseling:  I discussed with the patient the risks of Siliq including but not limited to new or worsening depression, suicidal thoughts and behavior, immunosuppression, malignancy, posterior leukoencephalopathy syndrome, and serious infections.  The patient understands that monitoring is required including a PPD at baseline and must alert us or the primary physician if symptoms of infection or other concerning signs are noted. There is also a special program designed to monitor depression which is required with Siliq.
Nsaids Pregnancy And Lactation Text: These medications are considered safe up to 30 weeks gestation. It is excreted in breast milk.
Cimetidine Counseling:  I discussed with the patient the risks of Cimetidine including but not limited to gynecomastia, headache, diarrhea, nausea, drowsiness, arrhythmias, pancreatitis, skin rashes, psychosis, bone marrow suppression and kidney toxicity.
Cyclosporine Counseling:  I discussed with the patient the risks of cyclosporine including but not limited to hypertension, gingival hyperplasia,myelosuppression, immunosuppression, liver damage, kidney damage, neurotoxicity, lymphoma, and serious infections. The patient understands that monitoring is required including baseline blood pressure, CBC, CMP, lipid panel and uric acid, and then 1-2 times monthly CMP and blood pressure.
Tranexamic Acid Pregnancy And Lactation Text: It is unknown if this medication is safe during pregnancy or breast feeding.
Libtayo Pregnancy And Lactation Text: This medication is contraindicated in pregnancy and when breast feeding.
Adbry Counseling: I discussed with the patient the risks of tralokinumab including but not limited to eye infection and irritation, cold sores, injection site reactions, worsening of asthma, allergic reactions and increased risk of parasitic infection.  Live vaccines should be avoided while taking tralokinumab. The patient understands that monitoring is required and they must alert us or the primary physician if symptoms of infection or other concerning signs are noted.
Glycopyrrolate Counseling:  I discussed with the patient the risks of glycopyrrolate including but not limited to skin rash, drowsiness, dry mouth, difficulty urinating, and blurred vision.
Doxycycline Counseling:  Patient counseled regarding possible photosensitivity and increased risk for sunburn.  Patient instructed to avoid sunlight, if possible.  When exposed to sunlight, patients should wear protective clothing, sunglasses, and sunscreen.  The patient was instructed to call the office immediately if the following severe adverse effects occur:  hearing changes, easy bruising/bleeding, severe headache, or vision changes.  The patient verbalized understanding of the proper use and possible adverse effects of doxycycline.  All of the patient's questions and concerns were addressed.
Cibinqo Counseling: I discussed with the patient the risks of Cibinqo therapy including but not limited to common cold, nausea, headache, cold sores, increased blood CPK levels, dizziness, UTIs, fatigue, acne, and vomitting. Live vaccines should be avoided.  This medication has been linked to serious infections; higher rate of mortality; malignancy and lymphoproliferative disorders; major adverse cardiovascular events; thrombosis; thrombocytopenia and lymphopenia; lipid elevations; and retinal detachment.
Birth Control Pills Pregnancy And Lactation Text: This medication should be avoided if pregnant and for the first 30 days post-partum.
Taltz Pregnancy And Lactation Text: The risk during pregnancy and breastfeeding is uncertain with this medication.
Klisyri Counseling:  I discussed with the patient the risks of Klisyri including but not limited to erythema, scaling, itching, weeping, crusting, and pain.
Topical Steroids Counseling: I discussed with the patient that prolonged use of topical steroids can result in the increased appearance of superficial blood vessels (telangiectasias), lightening (hypopigmentation) and thinning of the skin (atrophy).  Patient understands to avoid using high potency steroids in skin folds, the groin or the face.  The patient verbalized understanding of the proper use and possible adverse effects of topical steroids.  All of the patient's questions and concerns were addressed.
Doxepin Pregnancy And Lactation Text: This medication is Pregnancy Category C and it isn't known if it is safe during pregnancy. It is also excreted in breast milk and breast feeding isn't recommended.
Spironolactone Counseling: Patient advised regarding risks of diarrhea, abdominal pain, hyperkalemia, birth defects (for female patients), liver toxicity and renal toxicity. The patient may need blood work to monitor liver and kidney function and potassium levels while on therapy. The patient verbalized understanding of the proper use and possible adverse effects of spironolactone.  All of the patient's questions and concerns were addressed.
Klisyri Pregnancy And Lactation Text: It is unknown if this medication can harm a developing fetus or if it is excreted in breast milk.
Qbrexza Counseling:  I discussed with the patient the risks of Qbrexza including but not limited to headache, mydriasis, blurred vision, dry eyes, nasal dryness, dry mouth, dry throat, dry skin, urinary hesitation, and constipation.  Local skin reactions including erythema, burning, stinging, and itching can also occur.
Xelasaelz Pregnancy And Lactation Text: This medication is Pregnancy Category D and is not considered safe during pregnancy.  The risk during breast feeding is also uncertain.
Oxybutynin Counseling:  I discussed with the patient the risks of oxybutynin including but not limited to skin rash, drowsiness, dry mouth, difficulty urinating, and blurred vision.
Vtama Pregnancy And Lactation Text: It is unknown if this medication can cause problems during pregnancy and breastfeeding.
Griseofulvin Counseling:  I discussed with the patient the risks of griseofulvin including but not limited to photosensitivity, cytopenia, liver damage, nausea/vomiting and severe allergy.  The patient understands that this medication is best absorbed when taken with a fatty meal (e.g., ice cream or french fries).
Azithromycin Counseling:  I discussed with the patient the risks of azithromycin including but not limited to GI upset, allergic reaction, drug rash, diarrhea, and yeast infections.
Olanzapine Counseling- I discussed with the patient the common side effects of olanzapine including but are not limited to: lack of energy, dry mouth, increased appetite, sleepiness, tremor, constipation, dizziness, changes in behavior, or restlessness.  Explained that teenagers are more likely to experience headaches, abdominal pain, pain in the arms or legs, tiredness, and sleepiness.  Serious side effects include but are not limited: increased risk of death in elderly patients who are confused, have memory loss, or dementia-related psychosis; hyperglycemia; increased cholesterol and triglycerides; and weight gain.
Humira Counseling:  I discussed with the patient the risks of adalimumab including but not limited to myelosuppression, immunosuppression, autoimmune hepatitis, demyelinating diseases, lymphoma, and serious infections.  The patient understands that monitoring is required including a PPD at baseline and must alert us or the primary physician if symptoms of infection or other concerning signs are noted.
Rifampin Counseling: I discussed with the patient the risks of rifampin including but not limited to liver damage, kidney damage, red-orange body fluids, nausea/vomiting and severe allergy.
Glycopyrrolate Pregnancy And Lactation Text: This medication is Pregnancy Category B and is considered safe during pregnancy. It is unknown if it is excreted breast milk.
Valtrex Counseling: I discussed with the patient the risks of valacyclovir including but not limited to kidney damage, nausea, vomiting and severe allergy.  The patient understands that if the infection seems to be worsening or is not improving, they are to call.
Clofazimine Counseling:  I discussed with the patient the risks of clofazimine including but not limited to skin and eye pigmentation, liver damage, nausea/vomiting, gastrointestinal bleeding and allergy.
Zoryve Counseling:  I discussed with the patient that Zoryve is not for use in the eyes, mouth or vagina. The most commonly reported side effects include diarrhea, headache, insomnia, application site pain, upper respiratory tract infections, and urinary tract infections.  All of the patient's questions and concerns were addressed.
Doxycycline Pregnancy And Lactation Text: This medication is Pregnancy Category D and not consider safe during pregnancy. It is also excreted in breast milk but is considered safe for shorter treatment courses.
Tremfya Counseling: I discussed with the patient the risks of guselkumab including but not limited to immunosuppression, serious infections, worsening of inflammatory bowel disease and drug reactions.  The patient understands that monitoring is required including a PPD at baseline and must alert us or the primary physician if symptoms of infection or other concerning signs are noted.
High Dose Vitamin A Pregnancy And Lactation Text: High dose vitamin A therapy is contraindicated during pregnancy and breast feeding.
Cibinqo Pregnancy And Lactation Text: It is unknown if this medication will adversely affect pregnancy or breast feeding.  You should not take this medication if you are currently pregnant or planning a pregnancy or while breastfeeding.
Odomzo Counseling- I discussed with the patient the risks of Odomzo including but not limited to nausea, vomiting, diarrhea, constipation, weight loss, changes in the sense of taste, decreased appetite, muscle spasms, and hair loss.  The patient verbalized understanding of the proper use and possible adverse effects of Odomzo.  All of the patient's questions and concerns were addressed.
Adbry Pregnancy And Lactation Text: It is unknown if this medication will adversely affect pregnancy or breast feeding.
Elidel Counseling: Patient may experience a mild burning sensation during topical application. Elidel is not approved in children less than 2 years of age. There have been case reports of hematologic and skin malignancies in patients using topical calcineurin inhibitors although causality is questionable.
Hydroxyzine Counseling: Patient advised that the medication is sedating and not to drive a car after taking this medication.  Patient informed of potential adverse effects including but not limited to dry mouth, urinary retention, and blurry vision.  The patient verbalized understanding of the proper use and possible adverse effects of hydroxyzine.  All of the patient's questions and concerns were addressed.
Olumiant Counseling: I discussed with the patient the risks of Olumiant therapy including but not limited to upper respiratory tract infections, shingles, cold sores, and nausea. Live vaccines should be avoided.  This medication has been linked to serious infections; higher rate of mortality; malignancy and lymphoproliferative disorders; major adverse cardiovascular events; thrombosis; gastrointestinal perforations; neutropenia; lymphopenia; anemia; liver enzyme elevations; and lipid elevations.
Doxepin Counseling:  Patient advised that the medication is sedating and not to drive a car after taking this medication. Patient informed of potential adverse effects including but not limited to dry mouth, urinary retention, and blurry vision.  The patient verbalized understanding of the proper use and possible adverse effects of doxepin.  All of the patient's questions and concerns were addressed.
Simponi Counseling:  I discussed with the patient the risks of golimumab including but not limited to myelosuppression, immunosuppression, autoimmune hepatitis, demyelinating diseases, lymphoma, and serious infections.  The patient understands that monitoring is required including a PPD at baseline and must alert us or the primary physician if symptoms of infection or other concerning signs are noted.
Tazorac Counseling:  Patient advised that medication is irritating and drying.  Patient may need to apply sparingly and wash off after an hour before eventually leaving it on overnight.  The patient verbalized understanding of the proper use and possible adverse effects of tazorac.  All of the patient's questions and concerns were addressed.
Topical Steroids Applications Pregnancy And Lactation Text: Most topical steroids are considered safe to use during pregnancy and lactation.  Any topical steroid applied to the breast or nipple should be washed off before breastfeeding.
Griseofulvin Pregnancy And Lactation Text: This medication is Pregnancy Category X and is known to cause serious birth defects. It is unknown if this medication is excreted in breast milk but breast feeding should be avoided.
Spironolactone Pregnancy And Lactation Text: This medication can cause feminization of the male fetus and should be avoided during pregnancy. The active metabolite is also found in breast milk.
Qbrexza Pregnancy And Lactation Text: There is no available data on Qbrexza use in pregnant women.  There is no available data on Qbrexza use in lactation.
Cimzia Counseling:  I discussed with the patient the risks of Cimzia including but not limited to immunosuppression, allergic reactions and infections.  The patient understands that monitoring is required including a PPD at baseline and must alert us or the primary physician if symptoms of infection or other concerning signs are noted.
Hydroxychloroquine Counseling:  I discussed with the patient that a baseline ophthalmologic exam is needed at the start of therapy and every year thereafter while on therapy. A CBC may also be warranted for monitoring.  The side effects of this medication were discussed with the patient, including but not limited to agranulocytosis, aplastic anemia, seizures, rashes, retinopathy, and liver toxicity. Patient instructed to call the office should any adverse effect occur.  The patient verbalized understanding of the proper use and possible adverse effects of Plaquenil.  All the patient's questions and concerns were addressed.
Valtrex Pregnancy And Lactation Text: this medication is Pregnancy Category B and is considered safe during pregnancy. This medication is not directly found in breast milk but it's metabolite acyclovir is present.
Propranolol Counseling:  I discussed with the patient the risks of propranolol including but not limited to low heart rate, low blood pressure, low blood sugar, restlessness and increased cold sensitivity. They should call the office if they experience any of these side effects.
Topical Sulfur Applications Counseling: Topical Sulfur Counseling: Patient counseled that this medication may cause skin irritation or allergic reactions.  In the event of skin irritation, the patient was advised to reduce the amount of the drug applied or use it less frequently.   The patient verbalized understanding of the proper use and possible adverse effects of topical sulfur application.  All of the patient's questions and concerns were addressed.
Rifampin Pregnancy And Lactation Text: This medication is Pregnancy Category C and it isn't know if it is safe during pregnancy. It is also excreted in breast milk and should not be used if you are breast feeding.
Benzoyl Peroxide Pregnancy And Lactation Text: This medication is Pregnancy Category C. It is unknown if benzoyl peroxide is excreted in breast milk.
Minoxidil Counseling: Minoxidil is a topical medication which can increase blood flow where it is applied. It is uncertain how this medication increases hair growth. Side effects are uncommon and include stinging and allergic reactions.
Olanzapine Pregnancy And Lactation Text: This medication is pregnancy category C.   There are no adequate and well controlled trials with olanzapine in pregnant females.  Olanzapine should be used during pregnancy only if the potential benefit justifies the potential risk to the fetus.   In a study in lactating healthy women, olanzapine was excreted in breast milk.  It is recommended that women taking olanzapine should not breast feed.
Acitretin Counseling:  I discussed with the patient the risks of acitretin including but not limited to hair loss, dry lips/skin/eyes, liver damage, hyperlipidemia, depression/suicidal ideation, photosensitivity.  Serious rare side effects can include but are not limited to pancreatitis, pseudotumor cerebri, bony changes, clot formation/stroke/heart attack.  Patient understands that alcohol is contraindicated since it can result in liver toxicity and significantly prolong the elimination of the drug by many years.
Azithromycin Pregnancy And Lactation Text: This medication is considered safe during pregnancy and is also secreted in breast milk.
Erythromycin Counseling:  I discussed with the patient the risks of erythromycin including but not limited to GI upset, allergic reaction, drug rash, diarrhea, increase in liver enzymes, and yeast infections.
Minoxidil Pregnancy And Lactation Text: This medication has not been assigned a Pregnancy Risk Category but animal studies failed to show danger with the topical medication. It is unknown if the medication is excreted in breast milk.
Eucrisa Counseling: Patient may experience a mild burning sensation during topical application. Eucrisa is not approved in children less than 2 years of age.
Xolair Counseling:  Patient informed of potential adverse effects including but not limited to fever, muscle aches, rash and allergic reactions.  The patient verbalized understanding of the proper use and possible adverse effects of Xolair.  All of the patient's questions and concerns were addressed.
Olumiant Pregnancy And Lactation Text: Based on animal studies, Olumiant may cause embryo-fetal harm when administered to pregnant women.  The medication should not be used in pregnancy.  Breastfeeding is not recommended during treatment.
Sarecycline Counseling: Patient advised regarding possible photosensitivity and discoloration of the teeth, skin, lips, tongue and gums.  Patient instructed to avoid sunlight, if possible.  When exposed to sunlight, patients should wear protective clothing, sunglasses, and sunscreen.  The patient was instructed to call the office immediately if the following severe adverse effects occur:  hearing changes, easy bruising/bleeding, severe headache, or vision changes.  The patient verbalized understanding of the proper use and possible adverse effects of sarecycline.  All of the patient's questions and concerns were addressed.
Ilumya Counseling: I discussed with the patient the risks of tildrakizumab including but not limited to immunosuppression, malignancy, posterior leukoencephalopathy syndrome, and serious infections.  The patient understands that monitoring is required including a PPD at baseline and must alert us or the primary physician if symptoms of infection or other concerning signs are noted.
Carac Counseling:  I discussed with the patient the risks of Carac including but not limited to erythema, scaling, itching, weeping, crusting, and pain.
Rhofade Counseling: Rhofade is a topical medication which can decrease superficial blood flow where applied. Side effects are uncommon and include stinging, redness and allergic reactions.
Itraconazole Counseling:  I discussed with the patient the risks of itraconazole including but not limited to liver damage, nausea/vomiting, neuropathy, and severe allergy.  The patient understands that this medication is best absorbed when taken with acidic beverages such as non-diet cola or ginger ale.  The patient understands that monitoring is required including baseline LFTs and repeat LFTs at intervals.  The patient understands that they are to contact us or the primary physician if concerning signs are noted.
Tazorac Pregnancy And Lactation Text: This medication is not safe during pregnancy. It is unknown if this medication is excreted in breast milk.
Hydroxyzine Pregnancy And Lactation Text: This medication is not safe during pregnancy and should not be taken. It is also excreted in breast milk and breast feeding isn't recommended.
Mirvaso Pregnancy And Lactation Text: This medication has not been assigned a Pregnancy Risk Category. It is unknown if the medication is excreted in breast milk.
Aklief counseling:  Patient advised to apply a pea-sized amount only at bedtime and wait 30 minutes after washing their face before applying.  If too drying, patient may add a non-comedogenic moisturizer.  The most commonly reported side effects including irritation, redness, scaling, dryness, stinging, burning, itching, and increased risk of sunburn.  The patient verbalized understanding of the proper use and possible adverse effects of retinoids.  All of the patient's questions and concerns were addressed.
Zyclara Counseling:  I discussed with the patient the risks of imiquimod including but not limited to erythema, scaling, itching, weeping, crusting, and pain.  Patient understands that the inflammatory response to imiquimod is variable from person to person and was educated regarded proper titration schedule.  If flu-like symptoms develop, patient knows to discontinue the medication and contact us.
Topical Sulfur Applications Pregnancy And Lactation Text: This medication is Pregnancy Category C and has an unknown safety profile during pregnancy. It is unknown if this topical medication is excreted in breast milk.
Propranolol Pregnancy And Lactation Text: This medication is Pregnancy Category C and it isn't known if it is safe during pregnancy. It is excreted in breast milk.
Topical Clindamycin Counseling: Patient counseled that this medication may cause skin irritation or allergic reactions.  In the event of skin irritation, the patient was advised to reduce the amount of the drug applied or use it less frequently.   The patient verbalized understanding of the proper use and possible adverse effects of clindamycin.  All of the patient's questions and concerns were addressed.
Aklief Pregnancy And Lactation Text: It is unknown if this medication is safe to use during pregnancy.  It is unknown if this medication is excreted in breast milk.  Breastfeeding women should use the topical cream on the smallest area of the skin for the shortest time needed while breastfeeding.  Do not apply to nipple and areola.
Dutasteride Male Counseling: Dustasteride Counseling:  I discussed with the patient the risks of use of dutasteride including but not limited to decreased libido, decreased ejaculate volume, and gynecomastia. Women who can become pregnant should not handle medication.  All of the patient's questions and concerns were addressed.
Erythromycin Pregnancy And Lactation Text: This medication is Pregnancy Category B and is considered safe during pregnancy. It is also excreted in breast milk.
Bactrim Counseling:  I discussed with the patient the risks of sulfa antibiotics including but not limited to GI upset, allergic reaction, drug rash, diarrhea, dizziness, photosensitivity, and yeast infections.  Rarely, more serious reactions can occur including but not limited to aplastic anemia, agranulocytosis, methemoglobinemia, blood dyscrasias, liver or kidney failure, lung infiltrates or desquamative/blistering drug rashes.
Azathioprine Counseling:  I discussed with the patient the risks of azathioprine including but not limited to myelosuppression, immunosuppression, hepatotoxicity, lymphoma, and infections.  The patient understands that monitoring is required including baseline LFTs, Creatinine, possible TPMP genotyping and weekly CBCs for the first month and then every 2 weeks thereafter.  The patient verbalized understanding of the proper use and possible adverse effects of azathioprine.  All of the patient's questions and concerns were addressed.
Cimzia Pregnancy And Lactation Text: This medication crosses the placenta but can be considered safe in certain situations. Cimzia may be excreted in breast milk.
Hydroxychloroquine Pregnancy And Lactation Text: This medication has been shown to cause fetal harm but it isn't assigned a Pregnancy Risk Category. There are small amounts excreted in breast milk.
Colchicine Counseling:  Patient counseled regarding adverse effects including but not limited to stomach upset (nausea, vomiting, stomach pain, or diarrhea).  Patient instructed to limit alcohol consumption while taking this medication.  Colchicine may reduce blood counts especially with prolonged use.  The patient understands that monitoring of kidney function and blood counts may be required, especially at baseline. The patient verbalized understanding of the proper use and possible adverse effects of colchicine.  All of the patient's questions and concerns were addressed.
Skyrizi Counseling: I discussed with the patient the risks of risankizumab-rzaa including but not limited to immunosuppression, and serious infections.  The patient understands that monitoring is required including a PPD at baseline and must alert us or the primary physician if symptoms of infection or other concerning signs are noted.
Xolair Pregnancy And Lactation Text: This medication is Pregnancy Category B and is considered safe during pregnancy. This medication is excreted in breast milk.
Albendazole Counseling:  I discussed with the patient the risks of albendazole including but not limited to cytopenia, kidney damage, nausea/vomiting and severe allergy.  The patient understands that this medication is being used in an off-label manner.
Cosentyx Counseling:  I discussed with the patient the risks of Cosentyx including but not limited to worsening of Crohn's disease, immunosuppression, allergic reactions and infections.  The patient understands that monitoring is required including a PPD at baseline and must alert us or the primary physician if symptoms of infection or other concerning signs are noted.
Metronidazole Counseling:  I discussed with the patient the risks of metronidazole including but not limited to seizures, nausea/vomiting, a metallic taste in the mouth, nausea/vomiting and severe allergy.
Opzelura Counseling:  I discussed with the patient the risks of Opzelura including but not limited to nasopharngitis, bronchitis, ear infection, eosinophila, hives, diarrhea, folliculitis, tonsillitis, and rhinorrhea.  Taken orally, this medication has been linked to serious infections; higher rate of mortality; malignancy and lymphoproliferative disorders; major adverse cardiovascular events; thrombosis; thrombocytopenia, anemia, and neutropenia; and lipid elevations.
Mirvaso Counseling: Mirvaso is a topical medication which can decrease superficial blood flow where applied. Side effects are uncommon and include stinging, redness and allergic reactions.
Acitretin Pregnancy And Lactation Text: This medication is Pregnancy Category X and should not be given to women who are pregnant or may become pregnant in the future. This medication is excreted in breast milk.
Azelaic Acid Counseling: Patient counseled that medicine may cause skin irritation and to avoid applying near the eyes.  In the event of skin irritation, the patient was advised to reduce the amount of the drug applied or use it less frequently.   The patient verbalized understanding of the proper use and possible adverse effects of azelaic acid.  All of the patient's questions and concerns were addressed.
Wartpeel Counseling:  I discussed with the patient the risks of Wartpeel including but not limited to erythema, scaling, itching, weeping, crusting, and pain.
Opzelura Pregnancy And Lactation Text: There is insufficient data to evaluate drug-associated risk for major birth defects, miscarriage, or other adverse maternal or fetal outcomes.  There is a pregnancy registry that monitors pregnancy outcomes in pregnant persons exposed to the medication during pregnancy.  It is unknown if this medication is excreted in breast milk.  Do not breastfeed during treatment and for about 4 weeks after the last dose.
Solaraze Counseling:  I discussed with the patient the risks of Solaraze including but not limited to erythema, scaling, itching, weeping, crusting, and pain.
Bexarotene Counseling:  I discussed with the patient the risks of bexarotene including but not limited to hair loss, dry lips/skin/eyes, liver abnormalities, hyperlipidemia, pancreatitis, depression/suicidal ideation, photosensitivity, drug rash/allergic reactions, hypothyroidism, anemia, leukopenia, infection, cataracts, and teratogenicity.  Patient understands that they will need regular blood tests to check lipid profile, liver function tests, white blood cell count, thyroid function tests and pregnancy test if applicable.
Dutasteride Pregnancy And Lactation Text: This medication is absolutely contraindicated in women, especially during pregnancy and breast feeding. Feminization of male fetuses is possible if taking while pregnant.
SSKI Counseling:  I discussed with the patient the risks of SSKI including but not limited to thyroid abnormalities, metallic taste, GI upset, fever, headache, acne, arthralgias, paraesthesias, lymphadenopathy, easy bleeding, arrhythmias, and allergic reaction.
Low Dose Naltrexone Counseling- I discussed with the patient the potential risks and side effects of low dose naltrexone including but not limited to: more vivid dreams, headaches, nausea, vomiting, abdominal pain, fatigue, dizziness, and anxiety.
Rinvoq Counseling: I discussed with the patient the risks of Rinvoq therapy including but not limited to upper respiratory tract infections, shingles, cold sores, bronchitis, nausea, cough, fever, acne, and headache. Live vaccines should be avoided.  This medication has been linked to serious infections; higher rate of mortality; malignancy and lymphoproliferative disorders; major adverse cardiovascular events; thrombosis; thrombocytopenia, anemia, and neutropenia; lipid elevations; liver enzyme elevations; and gastrointestinal perforations.
Bactrim Pregnancy And Lactation Text: This medication is Pregnancy Category D and is known to cause fetal risk.  It is also excreted in breast milk.
Ketoconazole Counseling:   Patient counseled regarding improving absorption with orange juice.  Adverse effects include but are not limited to breast enlargement, headache, diarrhea, nausea, upset stomach, liver function test abnormalities, taste disturbance, and stomach pain.  There is a rare possibility of liver failure that can occur when taking ketoconazole. The patient understands that monitoring of LFTs may be required, especially at baseline. The patient verbalized understanding of the proper use and possible adverse effects of ketoconazole.  All of the patient's questions and concerns were addressed.
Methotrexate Counseling:  Patient counseled regarding adverse effects of methotrexate including but not limited to nausea, vomiting, abnormalities in liver function tests. Patients may develop mouth sores, rash, diarrhea, and abnormalities in blood counts. The patient understands that monitoring is required including LFT's and blood counts.  There is a rare possibility of scarring of the liver and lung problems that can occur when taking methotrexate. Persistent nausea, loss of appetite, pale stools, dark urine, cough, and shortness of breath should be reported immediately. Patient advised to discontinue methotrexate treatment at least three months before attempting to become pregnant.  I discussed the need for folate supplements while taking methotrexate.  These supplements can decrease side effects during methotrexate treatment. The patient verbalized understanding of the proper use and possible adverse effects of methotrexate.  All of the patient's questions and concerns were addressed.
Infliximab Counseling:  I discussed with the patient the risks of infliximab including but not limited to myelosuppression, immunosuppression, autoimmune hepatitis, demyelinating diseases, lymphoma, and serious infections.  The patient understands that monitoring is required including a PPD at baseline and must alert us or the primary physician if symptoms of infection or other concerning signs are noted.
Cellcept Counseling:  I discussed with the patient the risks of mycophenolate mofetil including but not limited to infection/immunosuppression, GI upset, hypokalemia, hypercholesterolemia, bone marrow suppression, lymphoproliferative disorders, malignancy, GI ulceration/bleed/perforation, colitis, interstitial lung disease, kidney failure, progressive multifocal leukoencephalopathy, and birth defects.  The patient understands that monitoring is required including a baseline creatinine and regular CBC testing. In addition, patient must alert us immediately if symptoms of infection or other concerning signs are noted.
Tetracycline Counseling: Patient counseled regarding possible photosensitivity and increased risk for sunburn.  Patient instructed to avoid sunlight, if possible.  When exposed to sunlight, patients should wear protective clothing, sunglasses, and sunscreen.  The patient was instructed to call the office immediately if the following severe adverse effects occur:  hearing changes, easy bruising/bleeding, severe headache, or vision changes.  The patient verbalized understanding of the proper use and possible adverse effects of tetracycline.  All of the patient's questions and concerns were addressed. Patient understands to avoid pregnancy while on therapy due to potential birth defects.
Low Dose Naltrexone Pregnancy And Lactation Text: Naltrexone is pregnancy category C.  There have been no adequate and well-controlled studies in pregnant women.  It should be used in pregnancy only if the potential benefit justifies the potential risk to the fetus.   Limited data indicates that naltrexone is minimally excreted into breastmilk.
Dapsone Counseling: I discussed with the patient the risks of dapsone including but not limited to hemolytic anemia, agranulocytosis, rashes, methemoglobinemia, kidney failure, peripheral neuropathy, headaches, GI upset, and liver toxicity.  Patients who start dapsone require monitoring including baseline LFTs and weekly CBCs for the first month, then every month thereafter.  The patient verbalized understanding of the proper use and possible adverse effects of dapsone.  All of the patient's questions and concerns were addressed.
Cephalexin Counseling: I counseled the patient regarding use of cephalexin as an antibiotic for prophylactic and/or therapeutic purposes. Cephalexin (commonly prescribed under brand name Keflex) is a cephalosporin antibiotic which is active against numerous classes of bacteria, including most skin bacteria. Side effects may include nausea, diarrhea, gastrointestinal upset, rash, hives, yeast infections, and in rare cases, hepatitis, kidney disease, seizures, fever, confusion, neurologic symptoms, and others. Patients with severe allergies to penicillin medications are cautioned that there is about a 10% incidence of cross-reactivity with cephalosporins. When possible, patients with penicillin allergies should use alternatives to cephalosporins for antibiotic therapy.
Sski Pregnancy And Lactation Text: This medication is Pregnancy Category D and isn't considered safe during pregnancy. It is excreted in breast milk.
Metronidazole Pregnancy And Lactation Text: This medication is Pregnancy Category B and considered safe during pregnancy.  It is also excreted in breast milk.
Hydroquinone Counseling:  Patient advised that medication may result in skin irritation, lightening (hypopigmentation), dryness, and burning.  In the event of skin irritation, the patient was advised to reduce the amount of the drug applied or use it less frequently.  Rarely, spots that are treated with hydroquinone can become darker (pseudoochronosis).  Should this occur, patient instructed to stop medication and call the office. The patient verbalized understanding of the proper use and possible adverse effects of hydroquinone.  All of the patient's questions and concerns were addressed.
Erivedge Counseling- I discussed with the patient the risks of Erivedge including but not limited to nausea, vomiting, diarrhea, constipation, weight loss, changes in the sense of taste, decreased appetite, muscle spasms, and hair loss.  The patient verbalized understanding of the proper use and possible adverse effects of Erivedge.  All of the patient's questions and concerns were addressed.
Topical Ketoconazole Counseling: Patient counseled that this medication may cause skin irritation or allergic reactions.  In the event of skin irritation, the patient was advised to reduce the amount of the drug applied or use it less frequently.   The patient verbalized understanding of the proper use and possible adverse effects of ketoconazole.  All of the patient's questions and concerns were addressed.
Bexarotene Pregnancy And Lactation Text: This medication is Pregnancy Category X and should not be given to women who are pregnant or may become pregnant. This medication should not be used if you are breast feeding.
Finasteride Male Counseling: Finasteride Counseling:  I discussed with the patient the risks of use of finasteride including but not limited to decreased libido, decreased ejaculate volume, gynecomastia, and depression. Women should not handle medication.  All of the patient's questions and concerns were addressed.
Rinvoq Pregnancy And Lactation Text: Based on animal studies, Rinvoq may cause embryo-fetal harm when administered to pregnant women.  The medication should not be used in pregnancy.  Breastfeeding is not recommended during treatment and for 6 days after the last dose.
5-Fu Counseling: 5-Fluorouracil Counseling:  I discussed with the patient the risks of 5-fluorouracil including but not limited to erythema, scaling, itching, weeping, crusting, and pain.
Stelara Counseling:  I discussed with the patient the risks of ustekinumab including but not limited to immunosuppression, malignancy, posterior leukoencephalopathy syndrome, and serious infections.  The patient understands that monitoring is required including a PPD at baseline and must alert us or the primary physician if symptoms of infection or other concerning signs are noted.
Oral Minoxidil Counseling- I discussed with the patient the risks of oral minoxidil including but not limited to shortness of breath, swelling of the feet or ankles, dizziness, lightheadedness, unwanted hair growth and allergic reaction.  The patient verbalized understanding of the proper use and possible adverse effects of oral minoxidil.  All of the patient's questions and concerns were addressed.
Methotrexate Pregnancy And Lactation Text: This medication is Pregnancy Category X and is known to cause fetal harm. This medication is excreted in breast milk.
Picato Counseling:  I discussed with the patient the risks of Picato including but not limited to erythema, scaling, itching, weeping, crusting, and pain.
Ketoconazole Pregnancy And Lactation Text: This medication is Pregnancy Category C and it isn't know if it is safe during pregnancy. It is also excreted in breast milk and breast feeding isn't recommended.
Solaraze Pregnancy And Lactation Text: This medication is Pregnancy Category B and is considered safe. There is some data to suggest avoiding during the third trimester. It is unknown if this medication is excreted in breast milk.
Litfulo Counseling: I discussed with the patient the risks of Litfulo therapy including but not limited to upper respiratory tract infections, shingles, cold sores, and nausea. Live vaccines should be avoided.  This medication has been linked to serious infections; higher rate of mortality; malignancy and lymphoproliferative disorders; major adverse cardiovascular events; thrombosis; gastrointestinal perforations; neutropenia; lymphopenia; anemia; liver enzyme elevations; and lipid elevations.
Dutasteride Female Counseling: Dutasteride Counseling:  I discussed with the patient the risks of use of dutasteride including but not limited to decreased libido and sexual dysfunction. Explained the teratogenic nature of the medication and stressed the importance of not getting pregnant during treatment. All of the patient's questions and concerns were addressed.
Litfulo Pregnancy And Lactation Text: Based on animal studies, Lifulo may cause embryo-fetal harm when administered to pregnant women.  The medication should not be used in pregnancy.  Breastfeeding is not recommended during treatment.
Calcipotriene Counseling:  I discussed with the patient the risks of calcipotriene including but not limited to erythema, scaling, itching, and irritation.
Calcipotriene Pregnancy And Lactation Text: The use of this medication during pregnancy or lactation is not recommended as there is insufficient data.
Cantharidin Counseling:  I discussed with the patient the risks of Cantharidin including but not limited to pain, redness, burning, itching, and blistering.
Finasteride Female Counseling: Finasteride Counseling:  I discussed with the patient the risks of use of finasteride including but not limited to decreased libido and sexual dysfunction. Explained the teratogenic nature of the medication and stressed the importance of not getting pregnant during treatment. All of the patient's questions and concerns were addressed.
Cantharidin Pregnancy And Lactation Text: This medication has not been proven safe during pregnancy. It is unknown if this medication is excreted in breast milk.
Bimzelx Counseling:  I discussed with the patient the risks of Bimzelx including but not limited to depression, immunosuppression, allergic reactions and infections.  The patient understands that monitoring is required including a PPD at baseline and must alert us or the primary physician if symptoms of infection or other concerning signs are noted.
Hyrimoz Counseling:  I discussed with the patient the risks of adalimumab including but not limited to myelosuppression, immunosuppression, autoimmune hepatitis, demyelinating diseases, lymphoma, and serious infections.  The patient understands that monitoring is required including a PPD at baseline and must alert us or the primary physician if symptoms of infection or other concerning signs are noted.
Bimzelx Pregnancy And Lactation Text: This medication crosses the placenta and the safety is uncertain during pregnancy. It is unknown if this medication is present in breast milk.

## 2024-07-01 NOTE — PROCEDURE: HIGH RISK MEDICATION MONITORING
Azathioprine Pregnancy And Lactation Text: This medication is Pregnancy Category D and isn't considered safe during pregnancy. It is unknown if this medication is excreted in breast milk.
Metronidazole Pregnancy And Lactation Text: This medication is Pregnancy Category B and considered safe during pregnancy.  It is also excreted in breast milk.
Calcipotriene Counseling:  I discussed with the patient the risks of calcipotriene including but not limited to erythema, scaling, itching, and irritation.
Glycopyrrolate Counseling:  I discussed with the patient the risks of glycopyrrolate including but not limited to skin rash, drowsiness, dry mouth, difficulty urinating, and blurred vision.
Wartpeel Pregnancy And Lactation Text: This medication is Pregnancy Category X and contraindicated in pregnancy and in women who may become pregnant. It is unknown if this medication is excreted in breast milk.
Fluconazole Counseling:  Patient counseled regarding adverse effects of fluconazole including but not limited to headache, diarrhea, nausea, upset stomach, liver function test abnormalities, taste disturbance, and stomach pain.  There is a rare possibility of liver failure that can occur when taking fluconazole.  The patient understands that monitoring of LFTs and kidney function test may be required, especially at baseline. The patient verbalized understanding of the proper use and possible adverse effects of fluconazole.  All of the patient's questions and concerns were addressed.
Sotyktu Pregnancy And Lactation Text: There is insufficient data to evaluate whether or not Sotyktu is safe to use during pregnancy.? ?It is not known if Sotyktu passes into breast milk and whether or not it is safe to use when breastfeeding.??
Nsaids Pregnancy And Lactation Text: These medications are considered safe up to 30 weeks gestation. It is excreted in breast milk.
Cosentyx Pregnancy And Lactation Text: This medication is Pregnancy Category B and is considered safe during pregnancy. It is unknown if this medication is excreted in breast milk.
Xolair Counseling:  Patient informed of potential adverse effects including but not limited to fever, muscle aches, rash and allergic reactions.  The patient verbalized understanding of the proper use and possible adverse effects of Xolair.  All of the patient's questions and concerns were addressed.
Minocycline Pregnancy And Lactation Text: This medication is Pregnancy Category D and not consider safe during pregnancy. It is also excreted in breast milk.
Tranexamic Acid Pregnancy And Lactation Text: It is unknown if this medication is safe during pregnancy or breast feeding.
Albendazole Pregnancy And Lactation Text: This medication is Pregnancy Category C and it isn't known if it is safe during pregnancy. It is also excreted in breast milk.
Prednisone Counseling:  I discussed with the patient the risks of prolonged use of prednisone including but not limited to weight gain, insomnia, osteoporosis, mood changes, diabetes, susceptibility to infection, glaucoma and high blood pressure.  In cases where prednisone use is prolonged, patients should be monitored with blood pressure checks, serum glucose levels and an eye exam.  Additionally, the patient may need to be placed on GI prophylaxis, PCP prophylaxis, and calcium and vitamin D supplementation and/or a bisphosphonate.  The patient verbalized understanding of the proper use and the possible adverse effects of prednisone.  All of the patient's questions and concerns were addressed.
Arava Pregnancy And Lactation Text: This medication is Pregnancy Category X and is absolutely contraindicated during pregnancy. It is unknown if it is excreted in breast milk.
Oxybutynin Counseling:  I discussed with the patient the risks of oxybutynin including but not limited to skin rash, drowsiness, dry mouth, difficulty urinating, and blurred vision.
Terbinafine Pregnancy And Lactation Text: This medication is Pregnancy Category B and is considered safe during pregnancy. It is also excreted in breast milk and breast feeding isn't recommended.
Xolair Pregnancy And Lactation Text: This medication is Pregnancy Category B and is considered safe during pregnancy. This medication is excreted in breast milk.
Imiquimod Counseling:  I discussed with the patient the risks of imiquimod including but not limited to erythema, scaling, itching, weeping, crusting, and pain.  Patient understands that the inflammatory response to imiquimod is variable from person to person and was educated regarded proper titration schedule.  If flu-like symptoms develop, patient knows to discontinue the medication and contact us.
Bactrim Counseling:  I discussed with the patient the risks of sulfa antibiotics including but not limited to GI upset, allergic reaction, drug rash, diarrhea, dizziness, photosensitivity, and yeast infections.  Rarely, more serious reactions can occur including but not limited to aplastic anemia, agranulocytosis, methemoglobinemia, blood dyscrasias, liver or kidney failure, lung infiltrates or desquamative/blistering drug rashes.
Stelara Counseling:  I discussed with the patient the risks of ustekinumab including but not limited to immunosuppression, malignancy, posterior leukoencephalopathy syndrome, and serious infections.  The patient understands that monitoring is required including a PPD at baseline and must alert us or the primary physician if symptoms of infection or other concerning signs are noted.
Dupixent Counseling: I discussed with the patient the risks of dupilumab including but not limited to eye infection and irritation, cold sores, injection site reactions, worsening of asthma, allergic reactions and increased risk of parasitic infection.  Live vaccines should be avoided while taking dupilumab. Dupilumab will also interact with certain medications such as warfarin and cyclosporine. The patient understands that monitoring is required and they must alert us or the primary physician if symptoms of infection or other concerning signs are noted.
Quinolones Counseling:  I discussed with the patient the risks of fluoroquinolones including but not limited to GI upset, allergic reaction, drug rash, diarrhea, dizziness, photosensitivity, yeast infections, liver function test abnormalities, tendonitis/tendon rupture.
High Dose Vitamin A Counseling: Side effects reviewed, pt to contact office should one occur.
Olanzapine Counseling- I discussed with the patient the common side effects of olanzapine including but are not limited to: lack of energy, dry mouth, increased appetite, sleepiness, tremor, constipation, dizziness, changes in behavior, or restlessness.  Explained that teenagers are more likely to experience headaches, abdominal pain, pain in the arms or legs, tiredness, and sleepiness.  Serious side effects include but are not limited: increased risk of death in elderly patients who are confused, have memory loss, or dementia-related psychosis; hyperglycemia; increased cholesterol and triglycerides; and weight gain.
Erythromycin Pregnancy And Lactation Text: This medication is Pregnancy Category B and is considered safe during pregnancy. It is also excreted in breast milk.
Tazorac Counseling:  Patient advised that medication is irritating and drying.  Patient may need to apply sparingly and wash off after an hour before eventually leaving it on overnight.  The patient verbalized understanding of the proper use and possible adverse effects of tazorac.  All of the patient's questions and concerns were addressed.
Dutasteride Pregnancy And Lactation Text: This medication is absolutely contraindicated in women, especially during pregnancy and breast feeding. Feminization of male fetuses is possible if taking while pregnant.
Picato Pregnancy And Lactation Text: This medication is Pregnancy Category C. It is unknown if this medication is excreted in breast milk.
Detail Level: Detailed
Tazorac Pregnancy And Lactation Text: This medication is not safe during pregnancy. It is unknown if this medication is excreted in breast milk.
Xeljanz Counseling: I discussed with the patient the risks of Xeljanz therapy including increased risk of infection, liver issues, headache, diarrhea, or cold symptoms. Live vaccines should be avoided. They were instructed to call if they have any problems.
Metronidazole Counseling:  I discussed with the patient the risks of metronidazole including but not limited to seizures, nausea/vomiting, a metallic taste in the mouth, nausea/vomiting and severe allergy.
Valtrex Counseling: I discussed with the patient the risks of valacyclovir including but not limited to kidney damage, nausea, vomiting and severe allergy.  The patient understands that if the infection seems to be worsening or is not improving, they are to call.
Prednisone Pregnancy And Lactation Text: This medication is Pregnancy Category C and it isn't know if it is safe during pregnancy. This medication is excreted in breast milk.
Ivermectin Counseling:  Patient instructed to take medication on an empty stomach with a full glass of water.  Patient informed of potential adverse effects including but not limited to nausea, diarrhea, dizziness, itching, and swelling of the extremities or lymph nodes.  The patient verbalized understanding of the proper use and possible adverse effects of ivermectin.  All of the patient's questions and concerns were addressed.
Glycopyrrolate Pregnancy And Lactation Text: This medication is Pregnancy Category B and is considered safe during pregnancy. It is unknown if it is excreted breast milk.
Clofazimine Counseling:  I discussed with the patient the risks of clofazimine including but not limited to skin and eye pigmentation, liver damage, nausea/vomiting, gastrointestinal bleeding and allergy.
Fluconazole Pregnancy And Lactation Text: This medication is Pregnancy Category C and it isn't know if it is safe during pregnancy. It is also excreted in breast milk.
Calcipotriene Pregnancy And Lactation Text: This medication has not been proven safe during pregnancy. It is unknown if this medication is excreted in breast milk.
Oxybutynin Pregnancy And Lactation Text: This medication is Pregnancy Category B and is considered safe during pregnancy. It is unknown if it is excreted in breast milk.
Cellcept Counseling:  I discussed with the patient the risks of mycophenolate mofetil including but not limited to infection/immunosuppression, GI upset, hypokalemia, hypercholesterolemia, bone marrow suppression, lymphoproliferative disorders, malignancy, GI ulceration/bleed/perforation, colitis, interstitial lung disease, kidney failure, progressive multifocal leukoencephalopathy, and birth defects.  The patient understands that monitoring is required including a baseline creatinine and regular CBC testing. In addition, patient must alert us immediately if symptoms of infection or other concerning signs are noted.
Winlevi Counseling:  I discussed with the patient the risks of topical clascoterone including but not limited to erythema, scaling, itching, and stinging. Patient voiced their understanding.
Rituxan Counseling:  I discussed with the patient the risks of Rituxan infusions. Side effects can include infusion reactions, severe drug rashes including mucocutaneous reactions, reactivation of latent hepatitis and other infections and rarely progressive multifocal leukoencephalopathy.  All of the patient's questions and concerns were addressed.
Valtrex Pregnancy And Lactation Text: this medication is Pregnancy Category B and is considered safe during pregnancy. This medication is not directly found in breast milk but it's metabolite acyclovir is present.
Olanzapine Pregnancy And Lactation Text: This medication is pregnancy category C.   There are no adequate and well controlled trials with olanzapine in pregnant females.  Olanzapine should be used during pregnancy only if the potential benefit justifies the potential risk to the fetus.   In a study in lactating healthy women, olanzapine was excreted in breast milk.  It is recommended that women taking olanzapine should not breast feed.
5-Fu Counseling: 5-Fluorouracil Counseling:  I discussed with the patient the risks of 5-fluorouracil including but not limited to erythema, scaling, itching, weeping, crusting, and pain.
Hydroxychloroquine Counseling:  I discussed with the patient that a baseline ophthalmologic exam is needed at the start of therapy and every year thereafter while on therapy. A CBC may also be warranted for monitoring.  The side effects of this medication were discussed with the patient, including but not limited to agranulocytosis, aplastic anemia, seizures, rashes, retinopathy, and liver toxicity. Patient instructed to call the office should any adverse effect occur.  The patient verbalized understanding of the proper use and possible adverse effects of Plaquenil.  All the patient's questions and concerns were addressed.
Bactrim Pregnancy And Lactation Text: This medication is Pregnancy Category D and is known to cause fetal risk.  It is also excreted in breast milk.
Protopic Counseling: Patient may experience a mild burning sensation during topical application. Protopic is not approved in children less than 2 years of age. There have been case reports of hematologic and skin malignancies in patients using topical calcineurin inhibitors although causality is questionable.
High Dose Vitamin A Pregnancy And Lactation Text: High dose vitamin A therapy is contraindicated during pregnancy and breast feeding.
Cimetidine Counseling:  I discussed with the patient the risks of Cimetidine including but not limited to gynecomastia, headache, diarrhea, nausea, drowsiness, arrhythmias, pancreatitis, skin rashes, psychosis, bone marrow suppression and kidney toxicity.
Finasteride Male Counseling: Finasteride Counseling:  I discussed with the patient the risks of use of finasteride including but not limited to decreased libido, decreased ejaculate volume, gynecomastia, and depression. Women should not handle medication.  All of the patient's questions and concerns were addressed.
Dupixent Pregnancy And Lactation Text: This medication likely crosses the placenta but the risk for the fetus is uncertain. This medication is excreted in breast milk.
Xelasaelz Pregnancy And Lactation Text: This medication is Pregnancy Category D and is not considered safe during pregnancy.  The risk during breast feeding is also uncertain.
Erivedge Counseling- I discussed with the patient the risks of Erivedge including but not limited to nausea, vomiting, diarrhea, constipation, weight loss, changes in the sense of taste, decreased appetite, muscle spasms, and hair loss.  The patient verbalized understanding of the proper use and possible adverse effects of Erivedge.  All of the patient's questions and concerns were addressed.
Protopic Pregnancy And Lactation Text: This medication is Pregnancy Category C. It is unknown if this medication is excreted in breast milk when applied topically.
Taltz Counseling: I discussed with the patient the risks of ixekizumab including but not limited to immunosuppression, serious infections, worsening of inflammatory bowel disease and drug reactions.  The patient understands that monitoring is required including a PPD at baseline and must alert us or the primary physician if symptoms of infection or other concerning signs are noted.
Cephalexin Counseling: I counseled the patient regarding use of cephalexin as an antibiotic for prophylactic and/or therapeutic purposes. Cephalexin (commonly prescribed under brand name Keflex) is a cephalosporin antibiotic which is active against numerous classes of bacteria, including most skin bacteria. Side effects may include nausea, diarrhea, gastrointestinal upset, rash, hives, yeast infections, and in rare cases, hepatitis, kidney disease, seizures, fever, confusion, neurologic symptoms, and others. Patients with severe allergies to penicillin medications are cautioned that there is about a 10% incidence of cross-reactivity with cephalosporins. When possible, patients with penicillin allergies should use alternatives to cephalosporins for antibiotic therapy.
Minoxidil Counseling: Minoxidil is a topical medication which can increase blood flow where it is applied. It is uncertain how this medication increases hair growth. Side effects are uncommon and include stinging and allergic reactions.
Propranolol Counseling:  I discussed with the patient the risks of propranolol including but not limited to low heart rate, low blood pressure, low blood sugar, restlessness and increased cold sensitivity. They should call the office if they experience any of these side effects.
Clofazimine Pregnancy And Lactation Text: This medication is Pregnancy Category C and isn't considered safe during pregnancy. It is excreted in breast milk.
Topical Clindamycin Counseling: Patient counseled that this medication may cause skin irritation or allergic reactions.  In the event of skin irritation, the patient was advised to reduce the amount of the drug applied or use it less frequently.   The patient verbalized understanding of the proper use and possible adverse effects of clindamycin.  All of the patient's questions and concerns were addressed.
Cibinqo Counseling: I discussed with the patient the risks of Cibinqo therapy including but not limited to common cold, nausea, headache, cold sores, increased blood CPK levels, dizziness, UTIs, fatigue, acne, and vomitting. Live vaccines should be avoided.  This medication has been linked to serious infections; higher rate of mortality; malignancy and lymphoproliferative disorders; major adverse cardiovascular events; thrombosis; thrombocytopenia and lymphopenia; lipid elevations; and retinal detachment.
Winlevi Pregnancy And Lactation Text: This medication is considered safe during pregnancy and breastfeeding.
Griseofulvin Counseling:  I discussed with the patient the risks of griseofulvin including but not limited to photosensitivity, cytopenia, liver damage, nausea/vomiting and severe allergy.  The patient understands that this medication is best absorbed when taken with a fatty meal (e.g., ice cream or french fries).
Propranolol Pregnancy And Lactation Text: This medication is Pregnancy Category C and it isn't known if it is safe during pregnancy. It is excreted in breast milk.
Cyclophosphamide Counseling:  I discussed with the patient the risks of cyclophosphamide including but not limited to hair loss, hormonal abnormalities, decreased fertility, abdominal pain, diarrhea, nausea and vomiting, bone marrow suppression and infection. The patient understands that monitoring is required while taking this medication.
Enbrel Counseling:  I discussed with the patient the risks of etanercept including but not limited to myelosuppression, immunosuppression, autoimmune hepatitis, demyelinating diseases, lymphoma, and infections.  The patient understands that monitoring is required including a PPD at baseline and must alert us or the primary physician if symptoms of infection or other concerning signs are noted.
Rifampin Counseling: I discussed with the patient the risks of rifampin including but not limited to liver damage, kidney damage, red-orange body fluids, nausea/vomiting and severe allergy.
Colchicine Counseling:  Patient counseled regarding adverse effects including but not limited to stomach upset (nausea, vomiting, stomach pain, or diarrhea).  Patient instructed to limit alcohol consumption while taking this medication.  Colchicine may reduce blood counts especially with prolonged use.  The patient understands that monitoring of kidney function and blood counts may be required, especially at baseline. The patient verbalized understanding of the proper use and possible adverse effects of colchicine.  All of the patient's questions and concerns were addressed.
Azelaic Acid Counseling: Patient counseled that medicine may cause skin irritation and to avoid applying near the eyes.  In the event of skin irritation, the patient was advised to reduce the amount of the drug applied or use it less frequently.   The patient verbalized understanding of the proper use and possible adverse effects of azelaic acid.  All of the patient's questions and concerns were addressed.
Oral Minoxidil Counseling- I discussed with the patient the risks of oral minoxidil including but not limited to shortness of breath, swelling of the feet or ankles, dizziness, lightheadedness, unwanted hair growth and allergic reaction.  The patient verbalized understanding of the proper use and possible adverse effects of oral minoxidil.  All of the patient's questions and concerns were addressed.
Include Pregnancy/Lactation Warning?: No
Rituxan Pregnancy And Lactation Text: This medication is Pregnancy Category C and it isn't know if it is safe during pregnancy. It is unknown if this medication is excreted in breast milk but similar antibodies are known to be excreted.
Finasteride Pregnancy And Lactation Text: This medication is absolutely contraindicated during pregnancy. It is unknown if it is excreted in breast milk.
Hydroxychloroquine Pregnancy And Lactation Text: This medication has been shown to cause fetal harm but it isn't assigned a Pregnancy Risk Category. There are small amounts excreted in breast milk.
Doxepin Counseling:  Patient advised that the medication is sedating and not to drive a car after taking this medication. Patient informed of potential adverse effects including but not limited to dry mouth, urinary retention, and blurry vision.  The patient verbalized understanding of the proper use and possible adverse effects of doxepin.  All of the patient's questions and concerns were addressed.
Minoxidil Pregnancy And Lactation Text: This medication has not been assigned a Pregnancy Risk Category but animal studies failed to show danger with the topical medication. It is unknown if the medication is excreted in breast milk.
Taltz Pregnancy And Lactation Text: The risk during pregnancy and breastfeeding is uncertain with this medication.
Cibinqo Pregnancy And Lactation Text: It is unknown if this medication will adversely affect pregnancy or breast feeding.  You should not take this medication if you are currently pregnant or planning a pregnancy or while breastfeeding.
Birth Control Pills Counseling: Birth Control Pill Counseling: I discussed with the patient the potential side effects of OCPs including but not limited to increased risk of stroke, heart attack, thrombophlebitis, deep venous thrombosis, hepatic adenomas, breast changes, GI upset, headaches, and depression.  The patient verbalized understanding of the proper use and possible adverse effects of OCPs. All of the patient's questions and concerns were addressed.
Siliq Counseling:  I discussed with the patient the risks of Siliq including but not limited to new or worsening depression, suicidal thoughts and behavior, immunosuppression, malignancy, posterior leukoencephalopathy syndrome, and serious infections.  The patient understands that monitoring is required including a PPD at baseline and must alert us or the primary physician if symptoms of infection or other concerning signs are noted. There is also a special program designed to monitor depression which is required with Siliq.
Zyclara Counseling:  I discussed with the patient the risks of imiquimod including but not limited to erythema, scaling, itching, weeping, crusting, and pain.  Patient understands that the inflammatory response to imiquimod is variable from person to person and was educated regarded proper titration schedule.  If flu-like symptoms develop, patient knows to discontinue the medication and contact us.
Griseofulvin Pregnancy And Lactation Text: This medication is Pregnancy Category X and is known to cause serious birth defects. It is unknown if this medication is excreted in breast milk but breast feeding should be avoided.
Cephalexin Pregnancy And Lactation Text: This medication is Pregnancy Category B and considered safe during pregnancy.  It is also excreted in breast milk but can be used safely for shorter doses.
Rhofade Counseling: Rhofade is a topical medication which can decrease superficial blood flow where applied. Side effects are uncommon and include stinging, redness and allergic reactions.
Cyclophosphamide Pregnancy And Lactation Text: This medication is Pregnancy Category D and it isn't considered safe during pregnancy. This medication is excreted in breast milk.
Adbry Counseling: I discussed with the patient the risks of tralokinumab including but not limited to eye infection and irritation, cold sores, injection site reactions, worsening of asthma, allergic reactions and increased risk of parasitic infection.  Live vaccines should be avoided while taking tralokinumab. The patient understands that monitoring is required and they must alert us or the primary physician if symptoms of infection or other concerning signs are noted.
Drysol Counseling:  I discussed with the patient the risks of drysol/aluminum chloride including but not limited to skin rash, itching, irritation, burning.
Low Dose Naltrexone Counseling- I discussed with the patient the potential risks and side effects of low dose naltrexone including but not limited to: more vivid dreams, headaches, nausea, vomiting, abdominal pain, fatigue, dizziness, and anxiety.
Itraconazole Counseling:  I discussed with the patient the risks of itraconazole including but not limited to liver damage, nausea/vomiting, neuropathy, and severe allergy.  The patient understands that this medication is best absorbed when taken with acidic beverages such as non-diet cola or ginger ale.  The patient understands that monitoring is required including baseline LFTs and repeat LFTs at intervals.  The patient understands that they are to contact us or the primary physician if concerning signs are noted.
Libtayo Counseling- I discussed with the patient the risks of Libtayo including but not limited to nausea, vomiting, diarrhea, and bone or muscle pain.  The patient verbalized understanding of the proper use and possible adverse effects of Libtayo.  All of the patient's questions and concerns were addressed.
Azelaic Acid Pregnancy And Lactation Text: This medication is considered safe during pregnancy and breast feeding.
Oral Minoxidil Pregnancy And Lactation Text: This medication should only be used when clearly needed if you are pregnant, attempting to become pregnant or breast feeding.
Rifampin Pregnancy And Lactation Text: This medication is Pregnancy Category C and it isn't know if it is safe during pregnancy. It is also excreted in breast milk and should not be used if you are breast feeding.
Acitretin Counseling:  I discussed with the patient the risks of acitretin including but not limited to hair loss, dry lips/skin/eyes, liver damage, hyperlipidemia, depression/suicidal ideation, photosensitivity.  Serious rare side effects can include but are not limited to pancreatitis, pseudotumor cerebri, bony changes, clot formation/stroke/heart attack.  Patient understands that alcohol is contraindicated since it can result in liver toxicity and significantly prolong the elimination of the drug by many years.
SSKI Counseling:  I discussed with the patient the risks of SSKI including but not limited to thyroid abnormalities, metallic taste, GI upset, fever, headache, acne, arthralgias, paraesthesias, lymphadenopathy, easy bleeding, arrhythmias, and allergic reaction.
Acitretin Pregnancy And Lactation Text: This medication is Pregnancy Category X and should not be given to women who are pregnant or may become pregnant in the future. This medication is excreted in breast milk.
Clindamycin Counseling: I counseled the patient regarding use of clindamycin as an antibiotic for prophylactic and/or therapeutic purposes. Clindamycin is active against numerous classes of bacteria, including skin bacteria. Side effects may include nausea, diarrhea, gastrointestinal upset, rash, hives, yeast infections, and in rare cases, colitis.
Tremfya Counseling: I discussed with the patient the risks of guselkumab including but not limited to immunosuppression, serious infections, worsening of inflammatory bowel disease and drug reactions.  The patient understands that monitoring is required including a PPD at baseline and must alert us or the primary physician if symptoms of infection or other concerning signs are noted.
Mirvaso Counseling: Mirvaso is a topical medication which can decrease superficial blood flow where applied. Side effects are uncommon and include stinging, redness and allergic reactions.
Humira Counseling:  I discussed with the patient the risks of adalimumab including but not limited to myelosuppression, immunosuppression, autoimmune hepatitis, demyelinating diseases, lymphoma, and serious infections.  The patient understands that monitoring is required including a PPD at baseline and must alert us or the primary physician if symptoms of infection or other concerning signs are noted.
Sarecycline Counseling: Patient advised regarding possible photosensitivity and discoloration of the teeth, skin, lips, tongue and gums.  Patient instructed to avoid sunlight, if possible.  When exposed to sunlight, patients should wear protective clothing, sunglasses, and sunscreen.  The patient was instructed to call the office immediately if the following severe adverse effects occur:  hearing changes, easy bruising/bleeding, severe headache, or vision changes.  The patient verbalized understanding of the proper use and possible adverse effects of sarecycline.  All of the patient's questions and concerns were addressed.
Otezla Counseling: The side effects of Otezla were discussed with the patient, including but not limited to worsening or new depression, weight loss, diarrhea, nausea, upper respiratory tract infection, and headache. Patient instructed to call the office should any adverse effect occur.  The patient verbalized understanding of the proper use and possible adverse effects of Otezla.  All the patient's questions and concerns were addressed.
Olumiant Counseling: I discussed with the patient the risks of Olumiant therapy including but not limited to upper respiratory tract infections, shingles, cold sores, and nausea. Live vaccines should be avoided.  This medication has been linked to serious infections; higher rate of mortality; malignancy and lymphoproliferative disorders; major adverse cardiovascular events; thrombosis; gastrointestinal perforations; neutropenia; lymphopenia; anemia; liver enzyme elevations; and lipid elevations.
Topical Ketoconazole Counseling: Patient counseled that this medication may cause skin irritation or allergic reactions.  In the event of skin irritation, the patient was advised to reduce the amount of the drug applied or use it less frequently.   The patient verbalized understanding of the proper use and possible adverse effects of ketoconazole.  All of the patient's questions and concerns were addressed.
Birth Control Pills Pregnancy And Lactation Text: This medication should be avoided if pregnant and for the first 30 days post-partum.
Doxepin Pregnancy And Lactation Text: This medication is Pregnancy Category C and it isn't known if it is safe during pregnancy. It is also excreted in breast milk and breast feeding isn't recommended.
Rhofade Pregnancy And Lactation Text: This medication has not been assigned a Pregnancy Risk Category. It is unknown if the medication is excreted in breast milk.
Benzoyl Peroxide Counseling: Patient counseled that medicine may cause skin irritation and bleach clothing.  In the event of skin irritation, the patient was advised to reduce the amount of the drug applied or use it less frequently.   The patient verbalized understanding of the proper use and possible adverse effects of benzoyl peroxide.  All of the patient's questions and concerns were addressed.
Libtayo Pregnancy And Lactation Text: This medication is contraindicated in pregnancy and when breast feeding.
Olumiant Pregnancy And Lactation Text: Based on animal studies, Olumiant may cause embryo-fetal harm when administered to pregnant women.  The medication should not be used in pregnancy.  Breastfeeding is not recommended during treatment.
Low Dose Naltrexone Pregnancy And Lactation Text: Naltrexone is pregnancy category C.  There have been no adequate and well-controlled studies in pregnant women.  It should be used in pregnancy only if the potential benefit justifies the potential risk to the fetus.   Limited data indicates that naltrexone is minimally excreted into breastmilk.
Adbry Pregnancy And Lactation Text: It is unknown if this medication will adversely affect pregnancy or breast feeding.
Sski Pregnancy And Lactation Text: This medication is Pregnancy Category D and isn't considered safe during pregnancy. It is excreted in breast milk.
Dapsone Counseling: I discussed with the patient the risks of dapsone including but not limited to hemolytic anemia, agranulocytosis, rashes, methemoglobinemia, kidney failure, peripheral neuropathy, headaches, GI upset, and liver toxicity.  Patients who start dapsone require monitoring including baseline LFTs and weekly CBCs for the first month, then every month thereafter.  The patient verbalized understanding of the proper use and possible adverse effects of dapsone.  All of the patient's questions and concerns were addressed.
Cyclosporine Counseling:  I discussed with the patient the risks of cyclosporine including but not limited to hypertension, gingival hyperplasia,myelosuppression, immunosuppression, liver damage, kidney damage, neurotoxicity, lymphoma, and serious infections. The patient understands that monitoring is required including baseline blood pressure, CBC, CMP, lipid panel and uric acid, and then 1-2 times monthly CMP and blood pressure.
Otezla Pregnancy And Lactation Text: This medication is Pregnancy Category C and it isn't known if it is safe during pregnancy. It is unknown if it is excreted in breast milk.
Simponi Counseling:  I discussed with the patient the risks of golimumab including but not limited to myelosuppression, immunosuppression, autoimmune hepatitis, demyelinating diseases, lymphoma, and serious infections.  The patient understands that monitoring is required including a PPD at baseline and must alert us or the primary physician if symptoms of infection or other concerning signs are noted.
Opioid Counseling: I discussed with the patient the potential side effects of opioids including but not limited to addiction, altered mental status, and depression. I stressed avoiding alcohol, benzodiazepines, muscle relaxants and sleep aids unless specifically okayed by a physician. The patient verbalized understanding of the proper use and possible adverse effects of opioids. All of the patient's questions and concerns were addressed. They were instructed to flush the remaining pills down the toilet if they did not need them for pain.
Cimzia Counseling:  I discussed with the patient the risks of Cimzia including but not limited to immunosuppression, allergic reactions and infections.  The patient understands that monitoring is required including a PPD at baseline and must alert us or the primary physician if symptoms of infection or other concerning signs are noted.
Elidel Counseling: Patient may experience a mild burning sensation during topical application. Elidel is not approved in children less than 2 years of age. There have been case reports of hematologic and skin malignancies in patients using topical calcineurin inhibitors although causality is questionable.
Bexarotene Counseling:  I discussed with the patient the risks of bexarotene including but not limited to hair loss, dry lips/skin/eyes, liver abnormalities, hyperlipidemia, pancreatitis, depression/suicidal ideation, photosensitivity, drug rash/allergic reactions, hypothyroidism, anemia, leukopenia, infection, cataracts, and teratogenicity.  Patient understands that they will need regular blood tests to check lipid profile, liver function tests, white blood cell count, thyroid function tests and pregnancy test if applicable.
Niacinamide Counseling: I recommended taking niacin or niacinamide, also know as vitamin B3, twice daily. Recent evidence suggests that taking vitamin B3 (500 mg twice daily) can reduce the risk of actinic keratoses and non-melanoma skin cancers. Side effects of vitamin B3 include flushing and headache.
Clindamycin Pregnancy And Lactation Text: This medication can be used in pregnancy if certain situations. Clindamycin is also present in breast milk.
Solaraze Counseling:  I discussed with the patient the risks of Solaraze including but not limited to erythema, scaling, itching, weeping, crusting, and pain.
Spironolactone Counseling: Patient advised regarding risks of diarrhea, abdominal pain, hyperkalemia, birth defects (for female patients), liver toxicity and renal toxicity. The patient may need blood work to monitor liver and kidney function and potassium levels while on therapy. The patient verbalized understanding of the proper use and possible adverse effects of spironolactone.  All of the patient's questions and concerns were addressed.
Hydroxyzine Counseling: Patient advised that the medication is sedating and not to drive a car after taking this medication.  Patient informed of potential adverse effects including but not limited to dry mouth, urinary retention, and blurry vision.  The patient verbalized understanding of the proper use and possible adverse effects of hydroxyzine.  All of the patient's questions and concerns were addressed.
Solaraze Pregnancy And Lactation Text: This medication is Pregnancy Category B and is considered safe. There is some data to suggest avoiding during the third trimester. It is unknown if this medication is excreted in breast milk.
Rinvoq Counseling: I discussed with the patient the risks of Rinvoq therapy including but not limited to upper respiratory tract infections, shingles, cold sores, bronchitis, nausea, cough, fever, acne, and headache. Live vaccines should be avoided.  This medication has been linked to serious infections; higher rate of mortality; malignancy and lymphoproliferative disorders; major adverse cardiovascular events; thrombosis; thrombocytopenia, anemia, and neutropenia; lipid elevations; liver enzyme elevations; and gastrointestinal perforations.
Odomzo Counseling- I discussed with the patient the risks of Odomzo including but not limited to nausea, vomiting, diarrhea, constipation, weight loss, changes in the sense of taste, decreased appetite, muscle spasms, and hair loss.  The patient verbalized understanding of the proper use and possible adverse effects of Odomzo.  All of the patient's questions and concerns were addressed.
Hydroxyzine Pregnancy And Lactation Text: This medication is not safe during pregnancy and should not be taken. It is also excreted in breast milk and breast feeding isn't recommended.
Doxycycline Counseling:  Patient counseled regarding possible photosensitivity and increased risk for sunburn.  Patient instructed to avoid sunlight, if possible.  When exposed to sunlight, patients should wear protective clothing, sunglasses, and sunscreen.  The patient was instructed to call the office immediately if the following severe adverse effects occur:  hearing changes, easy bruising/bleeding, severe headache, or vision changes.  The patient verbalized understanding of the proper use and possible adverse effects of doxycycline.  All of the patient's questions and concerns were addressed.
Thalidomide Counseling: I discussed with the patient the risks of thalidomide including but not limited to birth defects, anxiety, weakness, chest pain, dizziness, cough and severe allergy.
Dapsone Pregnancy And Lactation Text: This medication is Pregnancy Category C and is not considered safe during pregnancy or breast feeding.
Ketoconazole Counseling:   Patient counseled regarding improving absorption with orange juice.  Adverse effects include but are not limited to breast enlargement, headache, diarrhea, nausea, upset stomach, liver function test abnormalities, taste disturbance, and stomach pain.  There is a rare possibility of liver failure that can occur when taking ketoconazole. The patient understands that monitoring of LFTs may be required, especially at baseline. The patient verbalized understanding of the proper use and possible adverse effects of ketoconazole.  All of the patient's questions and concerns were addressed.
Topical Sulfur Applications Counseling: Topical Sulfur Counseling: Patient counseled that this medication may cause skin irritation or allergic reactions.  In the event of skin irritation, the patient was advised to reduce the amount of the drug applied or use it less frequently.   The patient verbalized understanding of the proper use and possible adverse effects of topical sulfur application.  All of the patient's questions and concerns were addressed.
Benzoyl Peroxide Pregnancy And Lactation Text: This medication is Pregnancy Category C. It is unknown if benzoyl peroxide is excreted in breast milk.
Methotrexate Counseling:  Patient counseled regarding adverse effects of methotrexate including but not limited to nausea, vomiting, abnormalities in liver function tests. Patients may develop mouth sores, rash, diarrhea, and abnormalities in blood counts. The patient understands that monitoring is required including LFT's and blood counts.  There is a rare possibility of scarring of the liver and lung problems that can occur when taking methotrexate. Persistent nausea, loss of appetite, pale stools, dark urine, cough, and shortness of breath should be reported immediately. Patient advised to discontinue methotrexate treatment at least three months before attempting to become pregnant.  I discussed the need for folate supplements while taking methotrexate.  These supplements can decrease side effects during methotrexate treatment. The patient verbalized understanding of the proper use and possible adverse effects of methotrexate.  All of the patient's questions and concerns were addressed.
Ilumya Counseling: I discussed with the patient the risks of tildrakizumab including but not limited to immunosuppression, malignancy, posterior leukoencephalopathy syndrome, and serious infections.  The patient understands that monitoring is required including a PPD at baseline and must alert us or the primary physician if symptoms of infection or other concerning signs are noted.
Tetracycline Counseling: Patient counseled regarding possible photosensitivity and increased risk for sunburn.  Patient instructed to avoid sunlight, if possible.  When exposed to sunlight, patients should wear protective clothing, sunglasses, and sunscreen.  The patient was instructed to call the office immediately if the following severe adverse effects occur:  hearing changes, easy bruising/bleeding, severe headache, or vision changes.  The patient verbalized understanding of the proper use and possible adverse effects of tetracycline.  All of the patient's questions and concerns were addressed. Patient understands to avoid pregnancy while on therapy due to potential birth defects.
Gabapentin Counseling: I discussed with the patient the risks of gabapentin including but not limited to dizziness, somnolence, fatigue and ataxia.
Carac Counseling:  I discussed with the patient the risks of Carac including but not limited to erythema, scaling, itching, weeping, crusting, and pain.
Spironolactone Pregnancy And Lactation Text: This medication can cause feminization of the male fetus and should be avoided during pregnancy. The active metabolite is also found in breast milk.
Opzelura Counseling:  I discussed with the patient the risks of Opzelura including but not limited to nasopharngitis, bronchitis, ear infection, eosinophila, hives, diarrhea, folliculitis, tonsillitis, and rhinorrhea.  Taken orally, this medication has been linked to serious infections; higher rate of mortality; malignancy and lymphoproliferative disorders; major adverse cardiovascular events; thrombosis; thrombocytopenia, anemia, and neutropenia; and lipid elevations.
Opioid Pregnancy And Lactation Text: These medications can lead to premature delivery and should be avoided during pregnancy. These medications are also present in breast milk in small amounts.
Bexarotene Pregnancy And Lactation Text: This medication is Pregnancy Category X and should not be given to women who are pregnant or may become pregnant. This medication should not be used if you are breast feeding.
Niacinamide Pregnancy And Lactation Text: These medications are considered safe during pregnancy.
Cimzia Pregnancy And Lactation Text: This medication crosses the placenta but can be considered safe in certain situations. Cimzia may be excreted in breast milk.
Opzelura Pregnancy And Lactation Text: There is insufficient data to evaluate drug-associated risk for major birth defects, miscarriage, or other adverse maternal or fetal outcomes.  There is a pregnancy registry that monitors pregnancy outcomes in pregnant persons exposed to the medication during pregnancy.  It is unknown if this medication is excreted in breast milk.  Do not breastfeed during treatment and for about 4 weeks after the last dose.
Rinvoq Pregnancy And Lactation Text: Based on animal studies, Rinvoq may cause embryo-fetal harm when administered to pregnant women.  The medication should not be used in pregnancy.  Breastfeeding is not recommended during treatment and for 6 days after the last dose.
Skyrizi Counseling: I discussed with the patient the risks of risankizumab-rzaa including but not limited to immunosuppression, and serious infections.  The patient understands that monitoring is required including a PPD at baseline and must alert us or the primary physician if symptoms of infection or other concerning signs are noted.
Azithromycin Counseling:  I discussed with the patient the risks of azithromycin including but not limited to GI upset, allergic reaction, drug rash, diarrhea, and yeast infections.
Hydroquinone Counseling:  Patient advised that medication may result in skin irritation, lightening (hypopigmentation), dryness, and burning.  In the event of skin irritation, the patient was advised to reduce the amount of the drug applied or use it less frequently.  Rarely, spots that are treated with hydroquinone can become darker (pseudoochronosis).  Should this occur, patient instructed to stop medication and call the office. The patient verbalized understanding of the proper use and possible adverse effects of hydroquinone.  All of the patient's questions and concerns were addressed.
Ketoconazole Pregnancy And Lactation Text: This medication is Pregnancy Category C and it isn't know if it is safe during pregnancy. It is also excreted in breast milk and breast feeding isn't recommended.
Doxycycline Pregnancy And Lactation Text: This medication is Pregnancy Category D and not consider safe during pregnancy. It is also excreted in breast milk but is considered safe for shorter treatment courses.
Topical Retinoid counseling:  Patient advised to apply a pea-sized amount only at bedtime and wait 30 minutes after washing their face before applying.  If too drying, patient may add a non-comedogenic moisturizer. The patient verbalized understanding of the proper use and possible adverse effects of retinoids.  All of the patient's questions and concerns were addressed.
Topical Sulfur Applications Pregnancy And Lactation Text: This medication is Pregnancy Category C and has an unknown safety profile during pregnancy. It is unknown if this topical medication is excreted in breast milk.
Minocycline Counseling: Patient advised regarding possible photosensitivity and discoloration of the teeth, skin, lips, tongue and gums.  Patient instructed to avoid sunlight, if possible.  When exposed to sunlight, patients should wear protective clothing, sunglasses, and sunscreen.  The patient was instructed to call the office immediately if the following severe adverse effects occur:  hearing changes, easy bruising/bleeding, severe headache, or vision changes.  The patient verbalized understanding of the proper use and possible adverse effects of minocycline.  All of the patient's questions and concerns were addressed.
Eucrisa Counseling: Patient may experience a mild burning sensation during topical application. Eucrisa is not approved in children less than 2 years of age.
Cosentyx Counseling:  I discussed with the patient the risks of Cosentyx including but not limited to worsening of Crohn's disease, immunosuppression, allergic reactions and infections.  The patient understands that monitoring is required including a PPD at baseline and must alert us or the primary physician if symptoms of infection or other concerning signs are noted.
Methotrexate Pregnancy And Lactation Text: This medication is Pregnancy Category X and is known to cause fetal harm. This medication is excreted in breast milk.
Azathioprine Counseling:  I discussed with the patient the risks of azathioprine including but not limited to myelosuppression, immunosuppression, hepatotoxicity, lymphoma, and infections.  The patient understands that monitoring is required including baseline LFTs, Creatinine, possible TPMP genotyping and weekly CBCs for the first month and then every 2 weeks thereafter.  The patient verbalized understanding of the proper use and possible adverse effects of azathioprine.  All of the patient's questions and concerns were addressed.
Terbinafine Counseling: Patient counseling regarding adverse effects of terbinafine including but not limited to headache, diarrhea, rash, upset stomach, liver function test abnormalities, itching, taste/smell disturbance, nausea, abdominal pain, and flatulence.  There is a rare possibility of liver failure that can occur when taking terbinafine.  The patient understands that a baseline LFT and kidney function test may be required. The patient verbalized understanding of the proper use and possible adverse effects of terbinafine.  All of the patient's questions and concerns were addressed.
Arava Counseling:  Patient counseled regarding adverse effects of Arava including but not limited to nausea, vomiting, abnormalities in liver function tests. Patients may develop mouth sores, rash, diarrhea, and abnormalities in blood counts. The patient understands that monitoring is required including LFTs and blood counts.  There is a rare possibility of scarring of the liver and lung problems that can occur when taking methotrexate. Persistent nausea, loss of appetite, pale stools, dark urine, cough, and shortness of breath should be reported immediately. Patient advised to discontinue Arava treatment and consult with a physician prior to attempting conception. The patient will have to undergo a treatment to eliminate Arava from the body prior to conception.
Nsaids Counseling: NSAID Counseling: I discussed with the patient that NSAIDs should be taken with food. Prolonged use of NSAIDs can result in the development of stomach ulcers.  Patient advised to stop taking NSAIDs if abdominal pain occurs.  The patient verbalized understanding of the proper use and possible adverse effects of NSAIDs.  All of the patient's questions and concerns were addressed.
Isotretinoin Counseling: Patient should get monthly blood tests, not donate blood, not drive at night if vision affected, not share medication, and not undergo elective surgery for 6 months after tx completed. Side effects reviewed, pt to contact office should one occur.
Albendazole Counseling:  I discussed with the patient the risks of albendazole including but not limited to cytopenia, kidney damage, nausea/vomiting and severe allergy.  The patient understands that this medication is being used in an off-label manner.
Tranexamic Acid Counseling:  Patient advised of the small risk of bleeding problems with tranexamic acid. They were also instructed to call if they developed any nausea, vomiting or diarrhea. All of the patient's questions and concerns were addressed.
Isotretinoin Pregnancy And Lactation Text: This medication is Pregnancy Category X and is considered extremely dangerous during pregnancy. It is unknown if it is excreted in breast milk.
Erythromycin Counseling:  I discussed with the patient the risks of erythromycin including but not limited to GI upset, allergic reaction, drug rash, diarrhea, increase in liver enzymes, and yeast infections.
Infliximab Counseling:  I discussed with the patient the risks of infliximab including but not limited to myelosuppression, immunosuppression, autoimmune hepatitis, demyelinating diseases, lymphoma, and serious infections.  The patient understands that monitoring is required including a PPD at baseline and must alert us or the primary physician if symptoms of infection or other concerning signs are noted.
Dutasteride Male Counseling: Dustasteride Counseling:  I discussed with the patient the risks of use of dutasteride including but not limited to decreased libido, decreased ejaculate volume, and gynecomastia. Women who can become pregnant should not handle medication.  All of the patient's questions and concerns were addressed.
Wartpeel Counseling:  I discussed with the patient the risks of Wartpeel including but not limited to erythema, scaling, itching, weeping, crusting, and pain.
Sotyktu Counseling:  I discussed the most common side effects of Sotyktu including: common cold, sore throat, sinus infections, cold sores, canker sores, folliculitis, and acne.? I also discussed more serious side effects of Sotyktu including but not limited to: serious allergic reactions; increased risk for infections such as TB; cancers such as lymphomas; rhabdomyolysis and elevated CPK; and elevated triglycerides and liver enzymes.?
Picato Counseling:  I discussed with the patient the risks of Picato including but not limited to erythema, scaling, itching, weeping, crusting, and pain.
Azithromycin Pregnancy And Lactation Text: This medication is considered safe during pregnancy and is also secreted in breast milk.

## 2024-07-01 NOTE — PROCEDURE: MIPS QUALITY
Previously Declined (within the last year)
Quality 226: Preventive Care And Screening: Tobacco Use: Screening And Cessation Intervention: Patient screened for tobacco use and is an ex/non-smoker
Quality 130: Documentation Of Current Medications In The Medical Record: Current Medications Documented
Detail Level: Detailed
Quality 431: Preventive Care And Screening: Unhealthy Alcohol Use - Screening: Patient not identified as an unhealthy alcohol user when screened for unhealthy alcohol use using a systematic screening method

## 2024-07-03 NOTE — PROCEDURE: MEDICATION COUNSELING
Anticoagulation with Eliquis.     Azelaic Acid Counseling: Patient counseled that medicine may cause skin irritation and to avoid applying near the eyes.  In the event of skin irritation, the patient was advised to reduce the amount of the drug applied or use it less frequently.   The patient verbalized understanding of the proper use and possible adverse effects of azelaic acid.  All of the patient's questions and concerns were addressed.

## 2024-07-22 ENCOUNTER — OFFICE VISIT (OUTPATIENT)
Dept: FAMILY MEDICINE CLINIC | Facility: CLINIC | Age: 44
End: 2024-07-22
Payer: COMMERCIAL

## 2024-07-22 VITALS
HEIGHT: 69 IN | BODY MASS INDEX: 43.4 KG/M2 | SYSTOLIC BLOOD PRESSURE: 133 MMHG | DIASTOLIC BLOOD PRESSURE: 82 MMHG | HEART RATE: 96 BPM | TEMPERATURE: 98 F | WEIGHT: 293 LBS

## 2024-07-22 DIAGNOSIS — E11.65 TYPE 2 DIABETES MELLITUS WITH HYPERGLYCEMIA, WITHOUT LONG-TERM CURRENT USE OF INSULIN (HCC): Primary | ICD-10-CM

## 2024-07-22 LAB — GLUCOSE, POC: 160 MG/DL

## 2024-07-22 PROCEDURE — 3046F HEMOGLOBIN A1C LEVEL >9.0%: CPT | Performed by: NURSE PRACTITIONER

## 2024-07-22 PROCEDURE — 82962 GLUCOSE BLOOD TEST: CPT | Performed by: NURSE PRACTITIONER

## 2024-07-22 PROCEDURE — 99213 OFFICE O/P EST LOW 20 MIN: CPT | Performed by: NURSE PRACTITIONER

## 2024-07-22 PROCEDURE — 3075F SYST BP GE 130 - 139MM HG: CPT | Performed by: NURSE PRACTITIONER

## 2024-07-22 PROCEDURE — 3079F DIAST BP 80-89 MM HG: CPT | Performed by: NURSE PRACTITIONER

## 2024-07-22 NOTE — PROGRESS NOTES
Danisha Agrawal (:  1980) is a 43 y.o. female,Established patient, here for evaluation of the following chief complaint(s):  Diabetes ((Rm 11) 1 mo fu-blood sugar log -not checking ) and Forms (Pt said she need her FMLA paperwork completed every 6 months )      Assessment & Plan   1. Type 2 diabetes mellitus with hyperglycemia, without long-term current use of insulin (HCC)    Not checking sugars. Will get a random BS today too early to repeat Ha1c. Urged to check BS to improve DM control papers for FMLA completed urged to work with derm for her repeated skin infections  No follow-ups on file.       Subjective   HPI Here for follow up for uncontrolled DM did not bring meter is not checking sugars. Denies any excessive thirst or urination. Is taking new med for DM  Also needs FMLA paperwork completed as she missed work at intervals elyse to painful skin abscesses Is seeing derm and hopes to start a new med for her condition soon  Review of Systems   Constitutional:  Negative for unexpected weight change.   Endocrine: Negative for polydipsia and polyuria.   Genitourinary:  Negative for frequency.   Skin:         Skin infections under breasts in groin area in in perineal area          Objective   Physical Exam  Vitals and nursing note reviewed.   Constitutional:       Appearance: She is obese.   Neck:      Comments: Smells of cigar smoke  Cardiovascular:      Rate and Rhythm: Normal rate and regular rhythm.      Pulses: Normal pulses.      Heart sounds: Normal heart sounds.   Pulmonary:      Effort: Pulmonary effort is normal.      Breath sounds: Normal breath sounds.   Skin:     General: Skin is warm and dry.      Findings: No lesion.              /82 (Site: Left Upper Arm, Position: Sitting, Cuff Size: Large Adult)   Pulse 96   Temp 98 °F (36.7 °C) (Temporal)   Ht 1.753 m (5' 9\")   Wt 134.3 kg (296 lb)   LMP 2024   BMI 43.71 kg/m²      An electronic signature was used to

## 2024-08-13 ENCOUNTER — RX ONLY (OUTPATIENT)
Age: 44
Setting detail: RX ONLY
End: 2024-08-13

## 2024-08-13 RX ORDER — SECUKINUMAB 150 MG/ML
INJECTION SUBCUTANEOUS
Qty: 1 | Refills: 10 | Status: ERX | COMMUNITY
Start: 2024-08-13

## 2024-08-15 ENCOUNTER — RX ONLY (OUTPATIENT)
Age: 44
Setting detail: RX ONLY
End: 2024-08-15

## 2024-08-15 RX ORDER — SECUKINUMAB 150 MG/ML
INJECTION SUBCUTANEOUS
Qty: 1 | Refills: 10 | Status: ERX

## 2024-08-19 ENCOUNTER — RX ONLY (OUTPATIENT)
Age: 44
Setting detail: RX ONLY
End: 2024-08-19

## 2024-08-19 RX ORDER — SECUKINUMAB 150 MG/ML
INJECTION SUBCUTANEOUS
Qty: 1 | Refills: 10 | Status: ERX

## 2024-08-20 ENCOUNTER — RX ONLY (OUTPATIENT)
Age: 44
Setting detail: RX ONLY
End: 2024-08-20

## 2024-08-20 RX ORDER — SECUKINUMAB 150 MG/ML
INJECTION SUBCUTANEOUS
Qty: 1 | Refills: 10 | Status: ERX
